# Patient Record
Sex: FEMALE | Race: WHITE | Employment: OTHER | URBAN - METROPOLITAN AREA
[De-identification: names, ages, dates, MRNs, and addresses within clinical notes are randomized per-mention and may not be internally consistent; named-entity substitution may affect disease eponyms.]

---

## 2017-07-06 PROBLEM — J69.0 ASPIRATION PNEUMONIA (HCC): Status: ACTIVE | Noted: 2017-07-06

## 2018-07-03 ENCOUNTER — HOSPITAL ENCOUNTER (INPATIENT)
Age: 73
LOS: 7 days | Discharge: HOME | DRG: 280 | End: 2018-07-10
Attending: EMERGENCY MEDICINE | Admitting: FAMILY MEDICINE
Payer: MEDICARE

## 2018-07-03 ENCOUNTER — APPOINTMENT (OUTPATIENT)
Dept: GENERAL RADIOLOGY | Age: 73
DRG: 280 | End: 2018-07-03
Payer: MEDICARE

## 2018-07-03 DIAGNOSIS — I21.4 NON-STEMI (NON-ST ELEVATED MYOCARDIAL INFARCTION) (HCC): ICD-10-CM

## 2018-07-03 DIAGNOSIS — J93.9 PNEUMOTHORAX, LEFT: Primary | ICD-10-CM

## 2018-07-03 DIAGNOSIS — F41.9 ANXIETY: ICD-10-CM

## 2018-07-03 DIAGNOSIS — J44.1 COPD EXACERBATION (HCC): ICD-10-CM

## 2018-07-03 DIAGNOSIS — R06.03 ACUTE RESPIRATORY DISTRESS: ICD-10-CM

## 2018-07-03 LAB
ALBUMIN SERPL-MCNC: 4.2 G/DL (ref 3.5–5.2)
ALP BLD-CCNC: 64 U/L (ref 35–104)
ALT SERPL-CCNC: 6 U/L (ref 0–32)
ANION GAP SERPL CALCULATED.3IONS-SCNC: 14 MMOL/L (ref 7–16)
APTT: 24.6 SEC (ref 24.5–35.1)
APTT: 48.8 SEC (ref 24.5–35.1)
AST SERPL-CCNC: 12 U/L (ref 0–31)
BASOPHILS ABSOLUTE: 0.06 E9/L (ref 0–0.2)
BASOPHILS RELATIVE PERCENT: 0.5 % (ref 0–2)
BILIRUB SERPL-MCNC: 0.4 MG/DL (ref 0–1.2)
BILIRUBIN URINE: NEGATIVE
BLOOD, URINE: NEGATIVE
BUN BLDV-MCNC: 15 MG/DL (ref 8–23)
CALCIUM SERPL-MCNC: 9.1 MG/DL (ref 8.6–10.2)
CHLORIDE BLD-SCNC: 100 MMOL/L (ref 98–107)
CLARITY: CLEAR
CO2: 26 MMOL/L (ref 22–29)
COLOR: YELLOW
CREAT SERPL-MCNC: 0.6 MG/DL (ref 0.5–1)
EKG ATRIAL RATE: 111 BPM
EKG P AXIS: 72 DEGREES
EKG P-R INTERVAL: 140 MS
EKG Q-T INTERVAL: 362 MS
EKG QRS DURATION: 84 MS
EKG QTC CALCULATION (BAZETT): 511 MS
EKG R AXIS: -98 DEGREES
EKG T AXIS: 74 DEGREES
EKG VENTRICULAR RATE: 120 BPM
EOSINOPHILS ABSOLUTE: 0.11 E9/L (ref 0.05–0.5)
EOSINOPHILS RELATIVE PERCENT: 0.9 % (ref 0–6)
GFR AFRICAN AMERICAN: >60
GFR NON-AFRICAN AMERICAN: >60 ML/MIN/1.73
GLUCOSE BLD-MCNC: 134 MG/DL (ref 74–109)
GLUCOSE URINE: NEGATIVE MG/DL
HCT VFR BLD CALC: 41.8 % (ref 34–48)
HEMOGLOBIN: 13.6 G/DL (ref 11.5–15.5)
IMMATURE GRANULOCYTES #: 0.08 E9/L
IMMATURE GRANULOCYTES %: 0.7 % (ref 0–5)
KETONES, URINE: 15 MG/DL
LACTIC ACID: 1 MMOL/L (ref 0.5–2.2)
LEFT VENTRICULAR EJECTION FRACTION HIGH VALUE: 35 %
LEFT VENTRICULAR EJECTION FRACTION MODE: NORMAL
LEUKOCYTE ESTERASE, URINE: NEGATIVE
LV EF: 30 %
LV EF: 33 %
LVEF MODALITY: NORMAL
LYMPHOCYTES ABSOLUTE: 1.67 E9/L (ref 1.5–4)
LYMPHOCYTES RELATIVE PERCENT: 14 % (ref 20–42)
MCH RBC QN AUTO: 27.9 PG (ref 26–35)
MCHC RBC AUTO-ENTMCNC: 32.5 % (ref 32–34.5)
MCV RBC AUTO: 85.8 FL (ref 80–99.9)
MONOCYTES ABSOLUTE: 0.4 E9/L (ref 0.1–0.95)
MONOCYTES RELATIVE PERCENT: 3.4 % (ref 2–12)
NEUTROPHILS ABSOLUTE: 9.59 E9/L (ref 1.8–7.3)
NEUTROPHILS RELATIVE PERCENT: 80.5 % (ref 43–80)
NITRITE, URINE: NEGATIVE
PDW BLD-RTO: 13.3 FL (ref 11.5–15)
PH UA: 6 (ref 5–9)
PLATELET # BLD: 208 E9/L (ref 130–450)
PMV BLD AUTO: 10.1 FL (ref 7–12)
POTASSIUM SERPL-SCNC: 3.2 MMOL/L (ref 3.5–5)
PRO-BNP: 184 PG/ML (ref 0–125)
PROTEIN UA: NEGATIVE MG/DL
RBC # BLD: 4.87 E12/L (ref 3.5–5.5)
SODIUM BLD-SCNC: 140 MMOL/L (ref 132–146)
SPECIFIC GRAVITY UA: 1.02 (ref 1–1.03)
TOTAL PROTEIN: 7.4 G/DL (ref 6.4–8.3)
TROPONIN: 0.16 NG/ML (ref 0–0.03)
TROPONIN: 0.44 NG/ML (ref 0–0.03)
UROBILINOGEN, URINE: 0.2 E.U./DL
WBC # BLD: 11.9 E9/L (ref 4.5–11.5)

## 2018-07-03 PROCEDURE — 85730 THROMBOPLASTIN TIME PARTIAL: CPT

## 2018-07-03 PROCEDURE — 6360000002 HC RX W HCPCS: Performed by: PHYSICIAN ASSISTANT

## 2018-07-03 PROCEDURE — 93005 ELECTROCARDIOGRAM TRACING: CPT | Performed by: PHYSICIAN ASSISTANT

## 2018-07-03 PROCEDURE — 36415 COLL VENOUS BLD VENIPUNCTURE: CPT

## 2018-07-03 PROCEDURE — 81003 URINALYSIS AUTO W/O SCOPE: CPT

## 2018-07-03 PROCEDURE — 6360000002 HC RX W HCPCS: Performed by: NURSE PRACTITIONER

## 2018-07-03 PROCEDURE — 84484 ASSAY OF TROPONIN QUANT: CPT

## 2018-07-03 PROCEDURE — 94761 N-INVAS EAR/PLS OXIMETRY MLT: CPT

## 2018-07-03 PROCEDURE — 80053 COMPREHEN METABOLIC PANEL: CPT

## 2018-07-03 PROCEDURE — 99285 EMERGENCY DEPT VISIT HI MDM: CPT

## 2018-07-03 PROCEDURE — 0W9B30Z DRAINAGE OF LEFT PLEURAL CAVITY WITH DRAINAGE DEVICE, PERCUTANEOUS APPROACH: ICD-10-PCS | Performed by: FAMILY MEDICINE

## 2018-07-03 PROCEDURE — 2580000003 HC RX 258: Performed by: PHYSICIAN ASSISTANT

## 2018-07-03 PROCEDURE — 71045 X-RAY EXAM CHEST 1 VIEW: CPT

## 2018-07-03 PROCEDURE — 6360000004 HC RX CONTRAST MEDICATION: Performed by: INTERNAL MEDICINE

## 2018-07-03 PROCEDURE — APPSS60 APP SPLIT SHARED TIME 46-60 MINUTES: Performed by: NURSE PRACTITIONER

## 2018-07-03 PROCEDURE — 96375 TX/PRO/DX INJ NEW DRUG ADDON: CPT

## 2018-07-03 PROCEDURE — 85025 COMPLETE CBC W/AUTO DIFF WBC: CPT

## 2018-07-03 PROCEDURE — 6370000000 HC RX 637 (ALT 250 FOR IP): Performed by: FAMILY MEDICINE

## 2018-07-03 PROCEDURE — 6370000000 HC RX 637 (ALT 250 FOR IP): Performed by: NURSE PRACTITIONER

## 2018-07-03 PROCEDURE — 87081 CULTURE SCREEN ONLY: CPT

## 2018-07-03 PROCEDURE — 51702 INSERT TEMP BLADDER CATH: CPT

## 2018-07-03 PROCEDURE — 94667 MNPJ CHEST WALL 1ST: CPT

## 2018-07-03 PROCEDURE — 87040 BLOOD CULTURE FOR BACTERIA: CPT

## 2018-07-03 PROCEDURE — 96365 THER/PROPH/DIAG IV INF INIT: CPT

## 2018-07-03 PROCEDURE — 94640 AIRWAY INHALATION TREATMENT: CPT

## 2018-07-03 PROCEDURE — 32551 INSERTION OF CHEST TUBE: CPT

## 2018-07-03 PROCEDURE — 94664 DEMO&/EVAL PT USE INHALER: CPT

## 2018-07-03 PROCEDURE — 99223 1ST HOSP IP/OBS HIGH 75: CPT | Performed by: INTERNAL MEDICINE

## 2018-07-03 PROCEDURE — 83605 ASSAY OF LACTIC ACID: CPT

## 2018-07-03 PROCEDURE — 2580000003 HC RX 258: Performed by: FAMILY MEDICINE

## 2018-07-03 PROCEDURE — 2000000000 HC ICU R&B

## 2018-07-03 PROCEDURE — 83880 ASSAY OF NATRIURETIC PEPTIDE: CPT

## 2018-07-03 PROCEDURE — 6360000002 HC RX W HCPCS: Performed by: INTERNAL MEDICINE

## 2018-07-03 PROCEDURE — 6370000000 HC RX 637 (ALT 250 FOR IP): Performed by: PHYSICIAN ASSISTANT

## 2018-07-03 PROCEDURE — 2500000003 HC RX 250 WO HCPCS

## 2018-07-03 PROCEDURE — 93306 TTE W/DOPPLER COMPLETE: CPT

## 2018-07-03 RX ORDER — SODIUM CHLORIDE 0.9 % (FLUSH) 0.9 %
10 SYRINGE (ML) INJECTION EVERY 12 HOURS SCHEDULED
Status: DISCONTINUED | OUTPATIENT
Start: 2018-07-03 | End: 2018-07-10 | Stop reason: HOSPADM

## 2018-07-03 RX ORDER — CHLORHEXIDINE GLUCONATE 0.12 MG/ML
15 RINSE ORAL 2 TIMES DAILY
Status: DISCONTINUED | OUTPATIENT
Start: 2018-07-03 | End: 2018-07-10 | Stop reason: HOSPADM

## 2018-07-03 RX ORDER — POTASSIUM CHLORIDE 20 MEQ/1
40 TABLET, EXTENDED RELEASE ORAL ONCE
Status: COMPLETED | OUTPATIENT
Start: 2018-07-03 | End: 2018-07-03

## 2018-07-03 RX ORDER — ASPIRIN 81 MG/1
81 TABLET ORAL EVERY MORNING
Status: DISCONTINUED | OUTPATIENT
Start: 2018-07-04 | End: 2018-07-10 | Stop reason: HOSPADM

## 2018-07-03 RX ORDER — POTASSIUM CHLORIDE 7.45 MG/ML
10 INJECTION INTRAVENOUS ONCE
Status: COMPLETED | OUTPATIENT
Start: 2018-07-03 | End: 2018-07-03

## 2018-07-03 RX ORDER — CHOLECALCIFEROL (VITAMIN D3) 50 MCG
2000 TABLET ORAL EVERY MORNING
Status: DISCONTINUED | OUTPATIENT
Start: 2018-07-04 | End: 2018-07-10 | Stop reason: HOSPADM

## 2018-07-03 RX ORDER — HEPARIN SODIUM 10000 [USP'U]/100ML
12 INJECTION, SOLUTION INTRAVENOUS CONTINUOUS
Status: DISCONTINUED | OUTPATIENT
Start: 2018-07-03 | End: 2018-07-04

## 2018-07-03 RX ORDER — ACETAMINOPHEN 325 MG/1
650 TABLET ORAL EVERY 4 HOURS PRN
Status: DISCONTINUED | OUTPATIENT
Start: 2018-07-03 | End: 2018-07-10 | Stop reason: HOSPADM

## 2018-07-03 RX ORDER — SODIUM CHLORIDE 0.9 % (FLUSH) 0.9 %
10 SYRINGE (ML) INJECTION PRN
Status: DISCONTINUED | OUTPATIENT
Start: 2018-07-03 | End: 2018-07-10 | Stop reason: HOSPADM

## 2018-07-03 RX ORDER — HEPARIN SODIUM 1000 [USP'U]/ML
60 INJECTION, SOLUTION INTRAVENOUS; SUBCUTANEOUS ONCE
Status: COMPLETED | OUTPATIENT
Start: 2018-07-03 | End: 2018-07-03

## 2018-07-03 RX ORDER — ALBUTEROL SULFATE 2.5 MG/3ML
2.5 SOLUTION RESPIRATORY (INHALATION) EVERY 4 HOURS PRN
Status: DISCONTINUED | OUTPATIENT
Start: 2018-07-03 | End: 2018-07-10 | Stop reason: HOSPADM

## 2018-07-03 RX ORDER — ONDANSETRON 2 MG/ML
4 INJECTION INTRAMUSCULAR; INTRAVENOUS EVERY 6 HOURS PRN
Status: DISCONTINUED | OUTPATIENT
Start: 2018-07-03 | End: 2018-07-10 | Stop reason: HOSPADM

## 2018-07-03 RX ORDER — AMLODIPINE BESYLATE 5 MG/1
5 TABLET ORAL EVERY MORNING
Status: DISCONTINUED | OUTPATIENT
Start: 2018-07-04 | End: 2018-07-04

## 2018-07-03 RX ORDER — LOSARTAN POTASSIUM AND HYDROCHLOROTHIAZIDE 12.5; 1 MG/1; MG/1
1 TABLET ORAL EVERY MORNING
Status: DISCONTINUED | OUTPATIENT
Start: 2018-07-04 | End: 2018-07-03 | Stop reason: CLARIF

## 2018-07-03 RX ORDER — 0.9 % SODIUM CHLORIDE 0.9 %
1000 INTRAVENOUS SOLUTION INTRAVENOUS ONCE
Status: COMPLETED | OUTPATIENT
Start: 2018-07-03 | End: 2018-07-03

## 2018-07-03 RX ORDER — HEPARIN SODIUM 1000 [USP'U]/ML
60 INJECTION, SOLUTION INTRAVENOUS; SUBCUTANEOUS PRN
Status: DISCONTINUED | OUTPATIENT
Start: 2018-07-03 | End: 2018-07-04

## 2018-07-03 RX ORDER — HEPARIN SODIUM 1000 [USP'U]/ML
30 INJECTION, SOLUTION INTRAVENOUS; SUBCUTANEOUS PRN
Status: DISCONTINUED | OUTPATIENT
Start: 2018-07-03 | End: 2018-07-04

## 2018-07-03 RX ORDER — LEVOFLOXACIN 750 MG/1
750 TABLET ORAL DAILY
COMMUNITY
Start: 2018-06-20 | End: 2018-07-03

## 2018-07-03 RX ORDER — CALCIUM CARBONATE 500(1250)
1000 TABLET ORAL EVERY MORNING
Status: DISCONTINUED | OUTPATIENT
Start: 2018-07-04 | End: 2018-07-10 | Stop reason: HOSPADM

## 2018-07-03 RX ORDER — ASPIRIN 81 MG/1
324 TABLET, CHEWABLE ORAL ONCE
Status: COMPLETED | OUTPATIENT
Start: 2018-07-03 | End: 2018-07-03

## 2018-07-03 RX ORDER — METHYLPREDNISOLONE SODIUM SUCCINATE 125 MG/2ML
125 INJECTION, POWDER, LYOPHILIZED, FOR SOLUTION INTRAMUSCULAR; INTRAVENOUS ONCE
Status: COMPLETED | OUTPATIENT
Start: 2018-07-03 | End: 2018-07-03

## 2018-07-03 RX ORDER — LIDOCAINE HYDROCHLORIDE AND EPINEPHRINE 10; 10 MG/ML; UG/ML
INJECTION, SOLUTION INFILTRATION; PERINEURAL
Status: COMPLETED
Start: 2018-07-03 | End: 2018-07-03

## 2018-07-03 RX ORDER — IPRATROPIUM BROMIDE AND ALBUTEROL SULFATE 2.5; .5 MG/3ML; MG/3ML
3 SOLUTION RESPIRATORY (INHALATION) ONCE
Status: COMPLETED | OUTPATIENT
Start: 2018-07-03 | End: 2018-07-03

## 2018-07-03 RX ORDER — LOSARTAN POTASSIUM 50 MG/1
100 TABLET ORAL DAILY
Status: DISCONTINUED | OUTPATIENT
Start: 2018-07-04 | End: 2018-07-04

## 2018-07-03 RX ORDER — FAMOTIDINE 20 MG/1
20 TABLET, FILM COATED ORAL 2 TIMES DAILY
Status: DISCONTINUED | OUTPATIENT
Start: 2018-07-03 | End: 2018-07-10 | Stop reason: HOSPADM

## 2018-07-03 RX ORDER — RALOXIFENE HYDROCHLORIDE 60 MG/1
60 TABLET, FILM COATED ORAL EVERY MORNING
Status: DISCONTINUED | OUTPATIENT
Start: 2018-07-04 | End: 2018-07-10 | Stop reason: HOSPADM

## 2018-07-03 RX ORDER — FORMOTEROL FUMARATE 20 UG/2ML
20 SOLUTION RESPIRATORY (INHALATION) EVERY 12 HOURS
Status: DISCONTINUED | OUTPATIENT
Start: 2018-07-03 | End: 2018-07-04

## 2018-07-03 RX ORDER — IPRATROPIUM BROMIDE AND ALBUTEROL SULFATE 2.5; .5 MG/3ML; MG/3ML
1 SOLUTION RESPIRATORY (INHALATION) EVERY 6 HOURS PRN
Status: DISCONTINUED | OUTPATIENT
Start: 2018-07-03 | End: 2018-07-03

## 2018-07-03 RX ORDER — DIAZEPAM 5 MG/1
10 TABLET ORAL EVERY 12 HOURS PRN
Status: DISCONTINUED | OUTPATIENT
Start: 2018-07-03 | End: 2018-07-10 | Stop reason: HOSPADM

## 2018-07-03 RX ORDER — IPRATROPIUM BROMIDE AND ALBUTEROL SULFATE 2.5; .5 MG/3ML; MG/3ML
1 SOLUTION RESPIRATORY (INHALATION) 4 TIMES DAILY
Status: DISCONTINUED | OUTPATIENT
Start: 2018-07-03 | End: 2018-07-03

## 2018-07-03 RX ORDER — HYDROCHLOROTHIAZIDE 12.5 MG/1
12.5 TABLET ORAL DAILY
Status: DISCONTINUED | OUTPATIENT
Start: 2018-07-04 | End: 2018-07-04

## 2018-07-03 RX ORDER — BUDESONIDE 0.25 MG/2ML
250 INHALANT ORAL 2 TIMES DAILY
Status: DISCONTINUED | OUTPATIENT
Start: 2018-07-03 | End: 2018-07-10 | Stop reason: HOSPADM

## 2018-07-03 RX ORDER — ACETAMINOPHEN 650 MG
TABLET, EXTENDED RELEASE ORAL
Status: COMPLETED
Start: 2018-07-03 | End: 2018-07-03

## 2018-07-03 RX ORDER — FLUTICASONE PROPIONATE 50 MCG
1 SPRAY, SUSPENSION (ML) NASAL NIGHTLY PRN
Status: DISCONTINUED | OUTPATIENT
Start: 2018-07-03 | End: 2018-07-10 | Stop reason: HOSPADM

## 2018-07-03 RX ORDER — ALBUTEROL SULFATE 2.5 MG/3ML
2.5 SOLUTION RESPIRATORY (INHALATION) 4 TIMES DAILY
Status: DISCONTINUED | OUTPATIENT
Start: 2018-07-03 | End: 2018-07-10 | Stop reason: HOSPADM

## 2018-07-03 RX ADMIN — METHYLPREDNISOLONE SODIUM SUCCINATE 125 MG: 125 INJECTION, POWDER, FOR SOLUTION INTRAMUSCULAR; INTRAVENOUS at 11:29

## 2018-07-03 RX ADMIN — LIDOCAINE HYDROCHLORIDE,EPINEPHRINE BITARTRATE 20 ML: 10; .01 INJECTION, SOLUTION INFILTRATION; PERINEURAL at 12:39

## 2018-07-03 RX ADMIN — SODIUM CHLORIDE 1000 ML: 9 INJECTION, SOLUTION INTRAVENOUS at 11:28

## 2018-07-03 RX ADMIN — POTASSIUM CHLORIDE 10 MEQ: 7.46 INJECTION, SOLUTION INTRAVENOUS at 12:39

## 2018-07-03 RX ADMIN — IPRATROPIUM BROMIDE AND ALBUTEROL SULFATE 3 AMPULE: .5; 3 SOLUTION RESPIRATORY (INHALATION) at 11:30

## 2018-07-03 RX ADMIN — HEPARIN SODIUM 4300 UNITS: 1000 INJECTION, SOLUTION INTRAVENOUS; SUBCUTANEOUS at 13:25

## 2018-07-03 RX ADMIN — ACETAMINOPHEN 650 MG: 325 TABLET ORAL at 17:47

## 2018-07-03 RX ADMIN — Medication 10 ML: at 21:30

## 2018-07-03 RX ADMIN — POTASSIUM CHLORIDE 40 MEQ: 20 TABLET, EXTENDED RELEASE ORAL at 12:39

## 2018-07-03 RX ADMIN — HEPARIN SODIUM AND DEXTROSE 12 UNITS/KG/HR: 10000; 5 INJECTION INTRAVENOUS at 13:26

## 2018-07-03 RX ADMIN — FAMOTIDINE 20 MG: 20 TABLET ORAL at 20:29

## 2018-07-03 RX ADMIN — BUDESONIDE 250 MCG: 0.25 SUSPENSION RESPIRATORY (INHALATION) at 19:57

## 2018-07-03 RX ADMIN — DIAZEPAM 10 MG: 5 TABLET ORAL at 21:29

## 2018-07-03 RX ADMIN — Medication: at 12:40

## 2018-07-03 RX ADMIN — FORMOTEROL FUMARATE DIHYDRATE 20 MCG: 20 SOLUTION RESPIRATORY (INHALATION) at 19:58

## 2018-07-03 RX ADMIN — PERFLUTREN 2 ML: 6.52 INJECTION, SUSPENSION INTRAVENOUS at 14:37

## 2018-07-03 RX ADMIN — ASPIRIN 81 MG CHEWABLE TABLET 243 MG: 81 TABLET CHEWABLE at 12:38

## 2018-07-03 ASSESSMENT — PAIN DESCRIPTION - PAIN TYPE: TYPE: ACUTE PAIN

## 2018-07-03 ASSESSMENT — PAIN SCALES - GENERAL
PAINLEVEL_OUTOF10: 6
PAINLEVEL_OUTOF10: 0

## 2018-07-03 ASSESSMENT — PAIN DESCRIPTION - FREQUENCY: FREQUENCY: INTERMITTENT

## 2018-07-03 ASSESSMENT — PAIN DESCRIPTION - ORIENTATION: ORIENTATION: LEFT

## 2018-07-03 ASSESSMENT — PAIN DESCRIPTION - LOCATION: LOCATION: CHEST

## 2018-07-03 ASSESSMENT — PAIN DESCRIPTION - DESCRIPTORS: DESCRIPTORS: ACHING;DISCOMFORT

## 2018-07-03 NOTE — ED PROVIDER NOTES
has a 35.00 pack-year smoking history. She has never used smokeless tobacco. She reports that she does not drink alcohol or use drugs. Family History: family history includes Cancer in her mother and sister; Heart Disease in her mother; Other in her father. The patients home medications have been reviewed.     Allergies: Dye [iodides]    -------------------------------------------------- RESULTS -------------------------------------------------  All laboratory and radiology results have been personally reviewed by myself   LABS:  Results for orders placed or performed during the hospital encounter of 07/03/18   CBC Auto Differential   Result Value Ref Range    WBC 11.9 (H) 4.5 - 11.5 E9/L    RBC 4.87 3.50 - 5.50 E12/L    Hemoglobin 13.6 11.5 - 15.5 g/dL    Hematocrit 41.8 34.0 - 48.0 %    MCV 85.8 80.0 - 99.9 fL    MCH 27.9 26.0 - 35.0 pg    MCHC 32.5 32.0 - 34.5 %    RDW 13.3 11.5 - 15.0 fL    Platelets 675 825 - 146 E9/L    MPV 10.1 7.0 - 12.0 fL    Neutrophils % 80.5 (H) 43.0 - 80.0 %    Immature Granulocytes % 0.7 0.0 - 5.0 %    Lymphocytes % 14.0 (L) 20.0 - 42.0 %    Monocytes % 3.4 2.0 - 12.0 %    Eosinophils % 0.9 0.0 - 6.0 %    Basophils % 0.5 0.0 - 2.0 %    Neutrophils # 9.59 (H) 1.80 - 7.30 E9/L    Immature Granulocytes # 0.08 E9/L    Lymphocytes # 1.67 1.50 - 4.00 E9/L    Monocytes # 0.40 0.10 - 0.95 E9/L    Eosinophils # 0.11 0.05 - 0.50 E9/L    Basophils # 0.06 0.00 - 0.20 E9/L   Comprehensive Metabolic Panel   Result Value Ref Range    Sodium 140 132 - 146 mmol/L    Potassium 3.2 (L) 3.5 - 5.0 mmol/L    Chloride 100 98 - 107 mmol/L    CO2 26 22 - 29 mmol/L    Anion Gap 14 7 - 16 mmol/L    Glucose 134 (H) 74 - 109 mg/dL    BUN 15 8 - 23 mg/dL    CREATININE 0.6 0.5 - 1.0 mg/dL    GFR Non-African American >60 >=60 mL/min/1.73    GFR African American >60     Calcium 9.1 8.6 - 10.2 mg/dL    Total Protein 7.4 6.4 - 8.3 g/dL    Alb 4.2 3.5 - 5.2 g/dL    Total Bilirubin 0.4 0.0 - 1.2 mg/dL    Alkaline Phosphatase 64 35 - 104 U/L    ALT 6 0 - 32 U/L    AST 12 0 - 31 U/L   Troponin   Result Value Ref Range    Troponin 0.16 (H) 0.00 - 0.03 ng/mL   Brain Natriuretic Peptide   Result Value Ref Range    Pro- (H) 0 - 125 pg/mL   Lactic Acid, Plasma   Result Value Ref Range    Lactic Acid 1.0 0.5 - 2.2 mmol/L   APTT   Result Value Ref Range    aPTT 24.6 24.5 - 35.1 sec   EKG 12 Lead   Result Value Ref Range    Ventricular Rate 120 BPM    Atrial Rate 111 BPM    P-R Interval 140 ms    QRS Duration 84 ms    Q-T Interval 362 ms    QTc Calculation (Bazett) 511 ms    P Axis 72 degrees    R Axis -98 degrees    T Axis 74 degrees   EKG 12 Lead   Result Value Ref Range    Ventricular Rate 110 BPM    Atrial Rate 110 BPM    P-R Interval 128 ms    QRS Duration 82 ms    Q-T Interval 358 ms    QTc Calculation (Bazett) 484 ms    P Axis 69 degrees    R Axis -94 degrees    T Axis 68 degrees       RADIOLOGY:  Interpreted by Radiologist.  XR CHEST PORTABLE   Final Result   1. Interval decrease in size of previously identified left   pneumothorax status post chest tube placement. 2.  Persistent mildly spiculated left upper lobe bandlike opacity. If   not already obtained, pulmonary consultation should be considered. XR CHEST PORTABLE   Final Result   Left pneumothorax with estimated volume of approximately 40-50%. Small bilateral pleural effusions. Nonspecific hazy reticular opacities in the right lower lung, which   may be related to edema, interstitial infiltrate, or chronic fibrosis. Critical finding of pneumothorax reported to Dr. Key Bautista of the [facility]   ED at approximately 1210 hours on 7/3/2018. Dr. Key Bautista was already   aware of the finding.           ------------------------- NURSING NOTES AND VITALS REVIEWED ---------------------------   The nursing notes within the ED encounter and vital signs as below have been reviewed.    BP (!) 98/53   Pulse 105   Temp 97.9 °F (36.6 °C) (Oral)   Resp 16   Ht 5' 6\"

## 2018-07-03 NOTE — ED NOTES
ECHO finished in room, called to ICU to notify RN that pt is ready to go up.      Chaparro Gregory RN  07/03/18 7635

## 2018-07-03 NOTE — CONSULTS
Pulse  Av.8  Min: 105  Max: 110  Respiration Range: Resp  Av.5  Min: 16  Max: 26  Current Pulse Ox[de-identified]  SpO2: 96 %  24HR Pulse Ox Range:  SpO2  Av.8 %  Min: 84 %  Max: 100 %  Oxygen Amount and Delivery: O2 Flow Rate (L/min): 5 L/min    I/O (24 Hours)    Patient Vitals for the past 8 hrs:   BP Temp Temp src Pulse Resp SpO2 Height Weight   18 1343 (!) 98/53 97.9 °F (36.6 °C) Oral 105 16 96 % - -   18 1252 (!) 111/57 98.7 °F (37.1 °C) - 108 20 95 % - -   18 1223 (!) 98/56 98.7 °F (37.1 °C) - 108 20 100 % - -   18 1107 117/71 98.7 °F (37.1 °C) - 110 26 (!) 84 % 5' 6\" (1.676 m) 158 lb (71.7 kg)       Intake/Output Summary (Last 24 hours) at 18 1520  Last data filed at 18 1345   Gross per 24 hour   Intake              100 ml   Output                0 ml   Net              100 ml     I/O last 3 completed shifts: In: 100 [IV Piggyback:100]  Out: -      Date 18 0000 - 18 2359   Shift 7900-1760 3383-5844 8493-5612 24 Hour Total   I  N  T  A  K  E   IV Piggyback  100  (1.4)  100  (1.4)    Shift Total  (mL/kg)  100  (1.4)  100  (1.4)   O  U  T  P  U  T   Shift Total  (mL/kg)       Weight (kg)  71.7 71.7 71.7     Patient Vitals for the past 96 hrs (Last 3 readings):   Weight   18 1107 158 lb (71.7 kg)         Exam   PHYSICAL EXAM:  General: awake, alert, oriented to person, place, time, and purpose, appears stated age, cooperative, no acute distress  Eyes: conjunctivae/corneas clear, sclera non icteric, EOMI  Ears: no obvious scars, no lesions, no masses, hearing intact  Mouth: mucous membranes moist, no obvious oral sores  Head: normocephalic, atraumatic  Neck: no JVD, no adenopathy, no thyromegaly, neck is supple, trachea is midline  Back: ROM normal, no CVA tenderness.   Chest: tube placement at 5th intercostal space, slight tenderness to palpation over area  Lungs: clear to auscultation bilaterally, without rhonchi, crackle, wheezing, or rale, no retractions

## 2018-07-03 NOTE — ED NOTES
Notified ICU that ECHO is in room at this time and will be approx 20 minutes, we will call back if needed.      Jorden David RN  07/03/18 2241

## 2018-07-03 NOTE — CONSULTS
13.6   HCT  41.8   MCV  85.8   PLT  208       BMP:  Recent Labs      07/03/18   1124   NA  140   K  3.2*   CL  100   CO2  26   BUN  15   CREATININE  0.6    ALB:3,BILIDIR:3,BILITOT:3,ALKPHOS:3)@    PT/INR: No results for input(s): PROTIME, INR in the last 72 hours. Cultures:  Sputum: not available  Blood: not available    ABG:   No results for input(s): PH, PO2, PCO2, HCO3, BE, O2SAT, METHB, O2HB, COHB, O2CON, HHB, THB in the last 72 hours. Films:     XR CHEST PORTABLE   Final Result   1. Interval decrease in size of previously identified left   pneumothorax status post chest tube placement. 2.  Persistent mildly spiculated left upper lobe bandlike opacity. If   not already obtained, pulmonary consultation should be considered. XR CHEST PORTABLE   Final Result   Left pneumothorax with estimated volume of approximately 40-50%. Small bilateral pleural effusions. Nonspecific hazy reticular opacities in the right lower lung, which   may be related to edema, interstitial infiltrate, or chronic fibrosis. Critical finding of pneumothorax reported to Dr. Brigette Burks of the [facility]   ED at approximately 1210 hours on 7/3/2018. Dr. Brigette Burks was already   aware of the finding. .        Assessment:  1. PTX, spontaneous,   2. Bronchiectasis  3. + troponin: demand ischemia? Plan:  1. Chest tube to suction  2. Empiric aerosols  3. Vest treatments as at home      Thanks for letting us see this patient in consultation. Please contact us with any questions. Office [phone] or after hours through [employer], x [phone]. Please note that voice recognition technology was used in the preparation of this note and make therefore it may contain inadvertent transcription errors    Sree Saldivar M.D., F.C.C.P.   [facility] note  Pulmonary HPI:   67year old with a PMH of COPD, anxiety, 50 pack years (quit 2007), who presented 12/2017 for progressive T[place]NS[place] and a recent CT with \"many centrilobular nodules\" and raloxifene (EVISTA) 60 mg oral tablet Take by mouth    sertraline (ZOLOFT) 25 mg oral tablet    SPIRIVA RESPIMAT 2.5 mcg/actuation inhl inhalation 2 puffs daily 1 Inhaler 5    STARCH (THICK-IT ORAL) Take by mouth    VIT C/VIT E/LUTEIN/MIN/OMEGA-3 (OCUVITE ORAL) Take by mouth     No facility-administered medications prior to visit. Allergies: Allergies   Allergen Reactions    Iodinated Contrast- Oral And Iv Dye     Physical examination:  There were no vitals taken for this visit. Physical Examination:  Vitals:   04/27/18 1439   BP: 134/76   Pulse: 80   SpO2: 93%   Weight: 152 lb (68.9 kg)   Height: 5' 5\" (1.651 m)   4L O2     Gen: alert, pleasant, no distress at rest   HEENT:AT/ NC, no anterior cervical lymphadenopathy  PULM/THORAX: No accessory muscle use, very prolonged expiratory phase, No crackles appreciated. Scant bilateral end expiratory wheezing   CV:RRR, S1 and S2 appreciated, no extra heart sounds, murmurs or rub auscultated. No JVD  EXT: warm without edema, clubbing or cyanosis. No asymmetrical edema or tenderness     Labs: reviewed from recent inpatient stay     Imaging: reviewed from disc she brought in     PFTs:     Date FVC (%) FEV-1 FEV1/FVC FRC DLCO Flow loop/comment   12/8/17 2.86 (100%) 1.29 (64%) 45% 31%       Discussion:   67year old woman with severe COPD/emphysema with progressive symptoms over 4 months prior to initial consult (12/2017) found to have tree in bud findings on chest CT. No consitutional symptoms or hemoptysis but did have significant sputum production with initial concern for NTM. Outpatient cultures grew pan sensitive pseudomonas in Jan. She was seen in follow up in early March after having recent outpatient worsening of hypoxia in setting of influenza A virus. Inpatient stay was notable for sputum cx again + pan sensitive pseudomonas. She underwent bronch with transbronchial biopsy with tissue cx and BAL negative for AFB and negative bacterial cx.  Sputum AFB negative. Given worsening symptoms and sputum cx + pseudomonas, she was treated with vanc/zoysn initially and transitioned for an additional 14 days of levofloxacin at discharge     >Severe COPD/emphysema/bronchiectasis: Significantly improved since inpatient admission in March. Sputum cx +pseudomonas s/p 2 weeks of levofloxacin. AFB from sputum, BAL and tissue bx negative so at this point much less concerned regarding NTM  - Currently on 0L at rest, 2L NC with activity.  Repeat O2 eval with home pulmonologist in 6/2018  - Continue current inhaler therapy   - Continue current clearance maneuvers with chest vest/hypertonic saline BID     Patient was seen and discussed with Dr. Joye Rasheed    RTC in 3 months or sooner as needed      Trina Block, [address]  [address], [address] [place] [address]          [facility], [address] [place] [address], [address] [place] [address]

## 2018-07-03 NOTE — PLAN OF CARE
Problem: Falls - Risk of:  Goal: Will remain free from falls  Will remain free from falls   Outcome: Met This Shift  No falls noted

## 2018-07-03 NOTE — CONSULTS
Full Consult Dictated. Job #[id number].      Electronically signed by MISHA Callejas CNP on 7/3/18 at 4:41 PM

## 2018-07-03 NOTE — CONSULTS
[address] [address]                                   CONSULTATION    PATIENT NAME: Albert Dias                       :        1945  MED REC NO:   [id number]                            ROOM:       9114  ACCOUNT NO:   [de-identified]                           ADMIT DATE: 2018  PROVIDER:     Hui Montejo Cnp    CONSULT DATE:  2018    CONSULTANTS:  Dr. Minal Carey. REQUESTING PHYSICIAN:  Dr. Maria Eugenia Cadet. REASON FOR CONSULTATION:  Elevated troponin. HISTORY OF PRESENT ILLNESS:  The patient is a 78-year-old  female  who is previously not known to [facility]) Cardiology physicians. She has  a past medical history that includes COPD, hypertension, migraines, recent  pneumonia, left ankle fracture, and history of right breast lumpectomy,  which she was reported as \"benign. \"    The patient [facility] [place] [facility]  [facility] with complaints of sudden onset of shortness of breath and  midsternal chest tightness, which started at approximately 7:30 a.m. when  she woke up. She states she was getting out of bed to use her Advair  inhaler when she suddenly became short of breath and had 9/10 midsternal  chest tightness. She reported having \"a white thick\" productive cough over  the past three to four days. She was being treated for pneumonia for  approximately two weeks on p.o. Levaquin and reports no improvement. She  denies any nausea, vomiting, fever, chills, dizziness, or lightheadedness. Denies any recent near syncope. Upon arrival to the Emergency Department, vital signs included a blood  pressure of 117/71, heart rate 110, respirations 26, oral temperature 98.7  degrees Fahrenheit with an oxygen saturation of 84% on room air. She was  placed on a nonrebreather at 15 liters.   Laboratory results revealed a  sodium of 140, potassium 3.2, BUN 15, to 50% with small bilateral pleural effusion with  nonspecific hazy, reticular opacities in the right lower lung, which may be  related to edema, interstitial infiltrate, or chronic fibrosis. Repeat  chest x-ray on 07/03/2018 showed interval decrease in size of previously  identified left pneumothorax, status post chest tube placement, persistent,  mildly spiculated left upper lobe band-like opacity. Laboratory results:   ProBNP 184, troponin less 0.16, BUN 15, creatinine 0.6. White blood count  11.9. For remainder of diagnostic and laboratory tests, please see history of  present illness. Milla Krishnan CNP    I independently interviewed and examined the patient. I have reviewed the above documentation completed by the QUANG. Please see my additional contributions to the HPI, physical exam, and assessment / medical decision making.     Reason for consult: elevated troponin      She was not previously known to [facility] cardiology group.     Mrs. Last is a 76-year-old female with history of COPD on 3 L of home on O2, hypertension, migraines, MRSA pneumonia and peptic ulcer disease was recently treated for pneumonia with antibiotics. She has been coughing and spitting up thick mucus for the past few days. This morning, she developed acute dyspnea and chest tightness and came to the emergency room for evaluation. She felt tightness in her throat and could not breathe. She tried nebulizer treatment at home without any significant improvement in her symptoms. She denied fever, chills, nausea or vomiting. She was evaluated in the emergency room and was found have a 50% pneumothorax involving the left hemithorax. She immediately underwent a placement of chest tube with near complete resolution of her symptoms. Currently, she denies any chest pain, tightness, dyspnea or nausea or vomiting. No prior history of any heart attacks or strokes. She quit smoking several years back.  She does not have any diabetes or hyperlipidemia or family history of premature coronary artery disease.     Review of Systems:  Cardiac: As per HPI  General: No fever, chills  Pulmonary: As per HPI  GI: No nausea, vomiting  Musculoskeletal: CESPEDES x 4, no focal motor deficits  Skin: Intact, no rashes  Neuro/Psych: No headache or seizures     Physical Exam:  BP (!) 98/53   Pulse 105   Temp 97.9 °F (36.6 °C) (Oral)   Resp 16   Ht 5' 6\" (1.676 m)   Wt 158 lb (71.7 kg)   SpO2 96%   BMI 25.50 kg/m²   Appearance: Awake, alert, no acute respiratory distress  Skin: Intact, no rash  Head: Normocephalic, atraumatic  ENMT: MMM, no rhinorrhea  Neck: Supple, no carotid bruits  Lungs: Has bilateral scattered wheezing with diminished breath sounds over the left hemithorax. She also has a chest tube in her left hemithorax. Cardiac: Regular rate and rhythmtachycardic, +S1S2, no murmurs apparent  Abdomen: Soft, +bowel sounds  Extremities: Moves all extremities x 4, no lower extremity edema  Neurologic: No focal motor deficits apparent, normal mood and affect     Telemetry findings reviewed: SR at rate 105, no new tachy/bradyarrhythmias overnight  EKG: Sinus tachycardia, premature supraventricular complexes, right superior axis, abnormal EKG. Labs: Potassium 3.2, bun/creatinine 15 stress 0.6 rest of the chemistry is normal. Troponins were 0.16. CBC normal.     Assessment:  1. Elevated troponin in the presence of acute pneumothorax causing chest pain and dyspnea most likely secondary to demand ischemia  2. Acute dyspnea and chest pain secondary to large left-sided pneumothorax status post chest tube placementimproved  3. History of severe COPD on home O2  4. History of hypertension well controlled     Plan:   1. Trend troponins  2. Once the blood pressures are to stabilized consider restarting her on antihypertensive medications  3. Consider substituting heparin for Lovenox. If the Troponin starts trending down then we can discontinue heparin  4.  Obtain transthoracic echocardiogram to evaluate wall motion abnormalities and also assess LV function  5.  Once she is hemodynamically stable then we could consider getting a stress test as an outpatient or before she leaves the hospital.           Thank you for consulting us and I will be following with you for any  further recommendations     Rand Carranza MD  [facility]) Cardiology      D: 07/03/2018 16:40:30       T: 07/03/2018 19:56:14     [id number]  Job#: [id number]     Doc#: [id number]    CC:

## 2018-07-03 NOTE — CONSULTS
atraumatic  ENMT: MMM, no rhinorrhea  Neck: Supple, no carotid bruits  Lungs: Has bilateral scattered wheezing with diminished breath sounds over the left hemithorax. She also has a chest tube in her left hemithorax. Cardiac: Regular rate and rhythm-tachycardic, +S1S2, no murmurs apparent  Abdomen: Soft, +bowel sounds  Extremities: Moves all extremities x 4, no lower extremity edema  Neurologic: No focal motor deficits apparent, normal mood and affect    Telemetry findings reviewed: SR at rate 105, no new tachy/bradyarrhythmias overnight  EKG: Sinus tachycardia, premature supraventricular complexes, right superior axis, abnormal EKG. Labs: Potassium 3.2, bun/creatinine 15 stress 0.6 rest of the chemistry is normal. Troponins were 0.16. CBC normal.    Assessment:  1. Elevated troponin in the presence of acute pneumothorax causing chest pain and dyspnea most likely secondary to demand ischemia  2. Acute dyspnea and chest pain secondary to large left-sided pneumothorax status post chest tube placement-improved  3. History of severe COPD on home O2  4. History of hypertension well controlled    Plan:   1. Trend troponins  2. Once the blood pressures are to stabilized consider restarting her on antihypertensive medications  3. Consider substituting heparin for Lovenox. If the Troponin starts trending down then we can discontinue heparin  4. Obtain transthoracic echocardiogram to evaluate wall motion abnormalities and also assess LV function  5.  Once she is hemodynamically stable then we could consider getting a stress test as an outpatient or before she leaves the hospital.        Thank you for consulting us and I will be following with you for any  further recommendations    Prakash Gunter MD  [facility]) Cardiology

## 2018-07-04 ENCOUNTER — APPOINTMENT (OUTPATIENT)
Dept: GENERAL RADIOLOGY | Age: 73
DRG: 280 | End: 2018-07-04
Payer: MEDICARE

## 2018-07-04 PROBLEM — I42.9 CARDIOMYOPATHY (HCC): Status: ACTIVE | Noted: 2018-07-04

## 2018-07-04 PROBLEM — I21.4 NSTEMI (NON-ST ELEVATED MYOCARDIAL INFARCTION) (HCC): Status: ACTIVE | Noted: 2018-07-04

## 2018-07-04 LAB
ALBUMIN SERPL-MCNC: 3.5 G/DL (ref 3.5–5.2)
ALP BLD-CCNC: 46 U/L (ref 35–104)
ALT SERPL-CCNC: 6 U/L (ref 0–32)
ANION GAP SERPL CALCULATED.3IONS-SCNC: 14 MMOL/L (ref 7–16)
APTT: 55.5 SEC (ref 24.5–35.1)
AST SERPL-CCNC: 21 U/L (ref 0–31)
BASOPHILS ABSOLUTE: 0 E9/L (ref 0–0.2)
BASOPHILS RELATIVE PERCENT: 0 % (ref 0–2)
BILIRUB SERPL-MCNC: 0.2 MG/DL (ref 0–1.2)
BUN BLDV-MCNC: 13 MG/DL (ref 8–23)
CALCIUM SERPL-MCNC: 8.4 MG/DL (ref 8.6–10.2)
CHLORIDE BLD-SCNC: 105 MMOL/L (ref 98–107)
CO2: 24 MMOL/L (ref 22–29)
CREAT SERPL-MCNC: 0.7 MG/DL (ref 0.5–1)
EOSINOPHILS ABSOLUTE: 0 E9/L (ref 0.05–0.5)
EOSINOPHILS RELATIVE PERCENT: 0 % (ref 0–6)
GFR AFRICAN AMERICAN: >60
GFR NON-AFRICAN AMERICAN: >60 ML/MIN/1.73
GLUCOSE BLD-MCNC: 127 MG/DL (ref 74–109)
HCT VFR BLD CALC: 35.9 % (ref 34–48)
HEMOGLOBIN: 11.7 G/DL (ref 11.5–15.5)
IMMATURE GRANULOCYTES #: 0.04 E9/L
IMMATURE GRANULOCYTES %: 0.6 % (ref 0–5)
LYMPHOCYTES ABSOLUTE: 1.22 E9/L (ref 1.5–4)
LYMPHOCYTES RELATIVE PERCENT: 18.7 % (ref 20–42)
MCH RBC QN AUTO: 28 PG (ref 26–35)
MCHC RBC AUTO-ENTMCNC: 32.6 % (ref 32–34.5)
MCV RBC AUTO: 85.9 FL (ref 80–99.9)
MONOCYTES ABSOLUTE: 0.36 E9/L (ref 0.1–0.95)
MONOCYTES RELATIVE PERCENT: 5.5 % (ref 2–12)
NEUTROPHILS ABSOLUTE: 4.92 E9/L (ref 1.8–7.3)
NEUTROPHILS RELATIVE PERCENT: 75.2 % (ref 43–80)
PDW BLD-RTO: 13.3 FL (ref 11.5–15)
PLATELET # BLD: 182 E9/L (ref 130–450)
PMV BLD AUTO: 10 FL (ref 7–12)
POTASSIUM REFLEX MAGNESIUM: 3.6 MMOL/L (ref 3.5–5)
RBC # BLD: 4.18 E12/L (ref 3.5–5.5)
SODIUM BLD-SCNC: 143 MMOL/L (ref 132–146)
TOTAL PROTEIN: 6.5 G/DL (ref 6.4–8.3)
TROPONIN: 0.32 NG/ML (ref 0–0.03)
TROPONIN: 0.39 NG/ML (ref 0–0.03)
WBC # BLD: 6.5 E9/L (ref 4.5–11.5)

## 2018-07-04 PROCEDURE — 99233 SBSQ HOSP IP/OBS HIGH 50: CPT | Performed by: INTERNAL MEDICINE

## 2018-07-04 PROCEDURE — 6370000000 HC RX 637 (ALT 250 FOR IP): Performed by: FAMILY MEDICINE

## 2018-07-04 PROCEDURE — 85025 COMPLETE CBC W/AUTO DIFF WBC: CPT

## 2018-07-04 PROCEDURE — 6370000000 HC RX 637 (ALT 250 FOR IP): Performed by: NURSE PRACTITIONER

## 2018-07-04 PROCEDURE — 94668 MNPJ CHEST WALL SBSQ: CPT

## 2018-07-04 PROCEDURE — 2060000000 HC ICU INTERMEDIATE R&B

## 2018-07-04 PROCEDURE — 36415 COLL VENOUS BLD VENIPUNCTURE: CPT

## 2018-07-04 PROCEDURE — 87077 CULTURE AEROBIC IDENTIFY: CPT

## 2018-07-04 PROCEDURE — 6370000000 HC RX 637 (ALT 250 FOR IP): Performed by: INTERNAL MEDICINE

## 2018-07-04 PROCEDURE — 85730 THROMBOPLASTIN TIME PARTIAL: CPT

## 2018-07-04 PROCEDURE — 71045 X-RAY EXAM CHEST 1 VIEW: CPT

## 2018-07-04 PROCEDURE — 87186 SC STD MICRODIL/AGAR DIL: CPT

## 2018-07-04 PROCEDURE — 87205 SMEAR GRAM STAIN: CPT

## 2018-07-04 PROCEDURE — 6360000002 HC RX W HCPCS: Performed by: INTERNAL MEDICINE

## 2018-07-04 PROCEDURE — 80053 COMPREHEN METABOLIC PANEL: CPT

## 2018-07-04 PROCEDURE — 2580000003 HC RX 258: Performed by: FAMILY MEDICINE

## 2018-07-04 PROCEDURE — 87070 CULTURE OTHR SPECIMN AEROBIC: CPT

## 2018-07-04 PROCEDURE — 84484 ASSAY OF TROPONIN QUANT: CPT

## 2018-07-04 PROCEDURE — 94150 VITAL CAPACITY TEST: CPT

## 2018-07-04 PROCEDURE — 94640 AIRWAY INHALATION TREATMENT: CPT

## 2018-07-04 RX ORDER — ATORVASTATIN CALCIUM 40 MG/1
40 TABLET, FILM COATED ORAL NIGHTLY
Status: DISCONTINUED | OUTPATIENT
Start: 2018-07-04 | End: 2018-07-10 | Stop reason: HOSPADM

## 2018-07-04 RX ORDER — METOPROLOL SUCCINATE 25 MG/1
12.5 TABLET, EXTENDED RELEASE ORAL DAILY
Status: DISCONTINUED | OUTPATIENT
Start: 2018-07-04 | End: 2018-07-10 | Stop reason: HOSPADM

## 2018-07-04 RX ORDER — HYDROCODONE BITARTRATE AND ACETAMINOPHEN 5; 325 MG/1; MG/1
1 TABLET ORAL EVERY 6 HOURS PRN
Status: DISCONTINUED | OUTPATIENT
Start: 2018-07-04 | End: 2018-07-10 | Stop reason: HOSPADM

## 2018-07-04 RX ORDER — HEPARIN SODIUM 1000 [USP'U]/ML
60 INJECTION, SOLUTION INTRAVENOUS; SUBCUTANEOUS ONCE
Status: DISCONTINUED | OUTPATIENT
Start: 2018-07-04 | End: 2018-07-04

## 2018-07-04 RX ADMIN — FORMOTEROL FUMARATE DIHYDRATE 20 MCG: 20 SOLUTION RESPIRATORY (INHALATION) at 09:39

## 2018-07-04 RX ADMIN — HYDROCHLOROTHIAZIDE 12.5 MG: 12.5 TABLET ORAL at 08:30

## 2018-07-04 RX ADMIN — ATORVASTATIN CALCIUM 40 MG: 40 TABLET, FILM COATED ORAL at 20:19

## 2018-07-04 RX ADMIN — ALBUTEROL SULFATE 2.5 MG: 2.5 SOLUTION RESPIRATORY (INHALATION) at 16:34

## 2018-07-04 RX ADMIN — Medication 10 ML: at 20:20

## 2018-07-04 RX ADMIN — ENOXAPARIN SODIUM 30 MG: 30 INJECTION SUBCUTANEOUS at 08:29

## 2018-07-04 RX ADMIN — BUDESONIDE 250 MCG: 0.25 SUSPENSION RESPIRATORY (INHALATION) at 20:38

## 2018-07-04 RX ADMIN — Medication 1000 MG: at 08:30

## 2018-07-04 RX ADMIN — ALBUTEROL SULFATE 2.5 MG: 2.5 SOLUTION RESPIRATORY (INHALATION) at 12:17

## 2018-07-04 RX ADMIN — ALBUTEROL SULFATE 2.5 MG: 2.5 SOLUTION RESPIRATORY (INHALATION) at 20:39

## 2018-07-04 RX ADMIN — METOPROLOL SUCCINATE 12.5 MG: 25 TABLET, FILM COATED, EXTENDED RELEASE ORAL at 08:30

## 2018-07-04 RX ADMIN — LOSARTAN POTASSIUM 100 MG: 50 TABLET, FILM COATED ORAL at 08:30

## 2018-07-04 RX ADMIN — RALOXIFENE HYDROCHLORIDE 60 MG: 60 TABLET, FILM COATED ORAL at 08:31

## 2018-07-04 RX ADMIN — BUDESONIDE 250 MCG: 0.25 SUSPENSION RESPIRATORY (INHALATION) at 09:39

## 2018-07-04 RX ADMIN — FAMOTIDINE 20 MG: 20 TABLET ORAL at 08:30

## 2018-07-04 RX ADMIN — FAMOTIDINE 20 MG: 20 TABLET ORAL at 20:19

## 2018-07-04 RX ADMIN — CHLORHEXIDINE GLUCONATE 15 ML: 1.2 RINSE ORAL at 08:29

## 2018-07-04 RX ADMIN — SERTRALINE 50 MG: 50 TABLET, FILM COATED ORAL at 20:19

## 2018-07-04 RX ADMIN — Medication 2000 UNITS: at 08:31

## 2018-07-04 RX ADMIN — ACETAMINOPHEN 650 MG: 325 TABLET ORAL at 06:24

## 2018-07-04 RX ADMIN — HYDROCODONE BITARTRATE AND ACETAMINOPHEN 1 TABLET: 5; 325 TABLET ORAL at 16:06

## 2018-07-04 RX ADMIN — Medication 10 ML: at 08:29

## 2018-07-04 RX ADMIN — ASPIRIN 81 MG: 81 TABLET, COATED ORAL at 08:30

## 2018-07-04 ASSESSMENT — PAIN DESCRIPTION - DESCRIPTORS
DESCRIPTORS: ACHING
DESCRIPTORS: HEADACHE
DESCRIPTORS: ACHING

## 2018-07-04 ASSESSMENT — PAIN SCALES - GENERAL
PAINLEVEL_OUTOF10: 0
PAINLEVEL_OUTOF10: 4
PAINLEVEL_OUTOF10: 2
PAINLEVEL_OUTOF10: 0
PAINLEVEL_OUTOF10: 5
PAINLEVEL_OUTOF10: 0
PAINLEVEL_OUTOF10: 0
PAINLEVEL_OUTOF10: 2

## 2018-07-04 ASSESSMENT — PAIN DESCRIPTION - PAIN TYPE
TYPE: ACUTE PAIN
TYPE: CHRONIC PAIN
TYPE: ACUTE PAIN

## 2018-07-04 ASSESSMENT — PAIN DESCRIPTION - PROGRESSION: CLINICAL_PROGRESSION: GRADUALLY IMPROVING

## 2018-07-04 ASSESSMENT — PAIN DESCRIPTION - LOCATION
LOCATION: BACK;RIB CAGE
LOCATION: HEAD
LOCATION: BACK;RIB CAGE

## 2018-07-04 ASSESSMENT — PAIN DESCRIPTION - FREQUENCY
FREQUENCY: INTERMITTENT

## 2018-07-04 ASSESSMENT — PAIN DESCRIPTION - ORIENTATION
ORIENTATION: LEFT
ORIENTATION: LEFT

## 2018-07-04 ASSESSMENT — PAIN DESCRIPTION - ONSET: ONSET: AWAKENED FROM SLEEP

## 2018-07-04 NOTE — PROGRESS NOTES
Critical Care Team - Daily Progress Note         Date and time: 7/4/2018 12:55 PM  Patient's name:  Vicente Gifford Record Number: [id number]  Patient's account/billing number: [de-identified]  Patient's YOB: 1945  Age: 67 y.o. Date of Admission: 7/3/2018 10:54 AM  Length of stay during current admission: 1      Primary Care Physician: Hawa Machuca MD  ICU Attending Physician: Dr. Ayad Gramajo    Code Status: Full Code    Reason for ICU admission: Pneumothorax      SUBJECTIVE:     BRIEF HISTORY:     The patient is a 67 y.o. female with significant past medical history of COPD, Cryptococcal Pneumonia, and HTN presented to  ED on 7/3/18 after having a complaint of shortness of breath for the past 2-3 days. Patient states that today she awoke and feel an increase in shortness of breath with simple movements or even daily chores. In the ED the patient was found to have a pneumothorax on the left side of her chest. In the ED, the patient had a chest tube placed on the left side with marked improvement on subsequent chest x-ray. Additionally, the patient states that she is currenlty on Levaquin as she had been having some pink colored sputum over the past several days. The patient denies any fevers, chills, or chest pain. OVERNIGHT EVENTS:    7/4/18: No acute events overnight, patient maintained pressure and oxygen saturation overnight without complaints.       CURRENT VENTILATION STATUS:     [] Ventilator  [] BIPAP  [] Nasal Cannula [x] Room Air      IF INTUBATED, ET TUBE MARKING AT LOWER LIP:     cms    SECRETIONS : Amount:  [] Small [] Moderate  [] Large  [x] None  Color:     [] White [] Colored  [] Bloody    SEDATION:  RAAS Score:  [] Propofol gtt  [] Versed gtt  [] Ativan gtt   [] No Sedation    PARALYZED:  [x] No    [] Yes      VASOPRESSORS:  [x] No    [] Yes    If yes -   [] Levophed       [] Dopamine     [] Vasopressin       [] Dobutamine  [] Phenylephrine         [] Epinephrine    CENTRAL LINES:     [] No   [] Yes   (Date of Insertion:   )           If yes -     [] Right IJ     [] Left IJ [] Right Femoral [] Left Femoral                   [] Right Subclavian [] Left Subclavian       TRACY'S CATHETER:   [] No   [x] Yes  (Date of Insertion:   )     URINE OUTPUT:            [] Good   [x] Low              [] Anuric      OBJECTIVE:     VITAL SIGNS:  /70   Pulse 70   Temp 98.2 °F (36.8 °C) (Oral)   Resp 26   Ht 5' 6\" (1.676 m)   Wt 164 lb 0.4 oz (74.4 kg)   SpO2 94%   BMI 26.47 kg/m²   Tmax over 24 hours:  Temp (24hrs), Av °F (36.7 °C), Min:97.7 °F (36.5 °C), Max:98.5 °F (36.9 °C)      Patient Vitals for the past 6 hrs:   BP Temp Temp src Pulse Resp SpO2   18 1100 114/70 - - 70 26 94 %   18 1000 112/62 - - 96 21 92 %   18 0900 105/63 - - 85 30 94 %   18 0829 105/63 - - 74 - -   18 0800 100/63 98.2 °F (36.8 °C) Oral 71 17 94 %   18 0700 (!) 97/59 - - 71 26 94 %         Intake/Output Summary (Last 24 hours) at 18 1255  Last data filed at 18 1000   Gross per 24 hour   Intake              624 ml   Output             1847 ml   Net            -1223 ml     Wt Readings from Last 2 Encounters:   18 164 lb 0.4 oz (74.4 kg)   17 157 lb (71.2 kg)     Body mass index is 26.47 kg/m². PHYSICAL EXAM:  General: awake, alert, oriented to person, place, time, and purpose, appears stated age, cooperative, no acute distress  Eyes: conjunctivae/corneas clear, sclera non icteric, EOMI  Ears: no obvious scars, no lesions, no masses, hearing intact  Mouth: mucous membranes moist, no obvious oral sores  Head: normocephalic, atraumatic  Neck: no JVD, no adenopathy, no thyromegaly, neck is supple, trachea is midline  Back: ROM normal, no CVA tenderness.   Chest: tube placement at 5th intercostal space, slight tenderness to palpation over area  Lungs: clear to auscultation bilaterally, without rhonchi, crackle, wheezing, or rale, Subcut  [] EPC Cuffs    NUTRITION:  [] NPO [] Tube Feeding (Specify: ) [] TPN  [x] PO (Diet: DIET CARDIAC; Honey Thick  Diet NPO, After Midnight)    HOME MEDICATIONS RECONCILED: [] No  [x] Yes    INSULIN DRIP:   [x] No   [] Yes    CONSULTATION NEEDED:  [x] No   [] Yes    FAMILY UPDATED:    [x] No   [] Yes    TRANSFER OUT OF ICU:   [] No   [x] Yes    ADDITIONAL PLAN:  1. Pneumothorax resolved on repeat chest xray continue to follow CXR and D/C chest tube  2. Elevated troponin, cardiology on case, no intervention at this time  2. On GI/DVT prophylaxis  3. Can be transferred out of the ICU    Robbin Valerio DO  Resident, PGY-2  7/4/2018  12:55 PM  I personally saw, examined and provided care for the patient. Radiographs, labs and medication list were reviewed by me independently. I spoke with bedside nursing, therapists and consultants. Critical care services and times documented are independent of procedures and multidisciplinary rounds with Residents. Additionally comprehensive, multidisciplinary rounds were conducted with the MICU team. The case was discussed in detail and plans for care were established. Review of Residents documentation was conducted and revisions were made as appropriate. I agree with the above documented exam, problem list and plan of care.   Miroslava Gómez D.O.  CCT 39 min

## 2018-07-04 NOTE — H&P
mg by mouth every morning   losartan-hydrochlorothiazide (HYZAAR) 100-12.5 MG per tablet, Take 1 tablet by mouth every morning   chlorhexidine (PERIDEX) 0.12 % solution, Take 15 mLs by mouth 2 times daily  Thickening Agents (THICK-AID) LIQD, by Does not apply route as needed  albuterol sulfate  (90 BASE) MCG/ACT inhaler, Inhale 2 puffs into the lungs every 4 hours as needed for Wheezing  EPINEPHrine (EPIPEN 2-TIARA) 0.3 MG/0.3ML SOAJ injection, Inject 0.3 mLs into the muscle once as needed Use as directed for allergic reaction  diazepam (VALIUM) 10 MG tablet, Take 5-10 mg by mouth every 12 hours as needed for Anxiety.  .  raloxifene (EVISTA) 60 MG tablet, Take 60 mg by mouth every morning   aspirin 81 MG tablet, Take 81 mg by mouth every morning   Multiple Vitamins-Minerals (OCUVITE PO), Take 1 tablet by mouth every morning   calcium carbonate 600 MG TABS tablet, Take 2 tablets by mouth every morning   Cholecalciferol (VITAMIN D) 2000 UNITS CAPS capsule, Take 2,000 Units by mouth every morning   Omega-3 Fatty Acids (FISH OIL) 1000 MG CPDR, Take 1,000 mg by mouth every morning   UNABLE TO FIND, Please include Vital Stimualtion Therapy into treatment plan with Speech Pathologist  DX: Dysphagia    Allergies:    Dye [iodides]    Social History:   Social History     Social History    Marital status:      Spouse name: N/A    Number of children: N/A    Years of education: N/A     Occupational History    retired           Social History Main Topics    Smoking status: Former Smoker     Packs/day: 1.00     Years: 35.00     Types: Cigarettes     Quit date: 1/1/2008    Smokeless tobacco: Never Used    Alcohol use No    Drug use: No    Sexual activity: Not on file     Other Topics Concern    Not on file     Social History Narrative    No narrative on file       Family History:   Family History   Problem Relation Age of Onset    Heart Disease Mother     Cancer Mother    Gama Joshua Other Father    Gamaflorian Ortegacal

## 2018-07-04 NOTE — PROGRESS NOTES
Serge Neumann MD   10 mL at 07/03/18 2130    sodium chloride flush 0.9 % injection 10 mL  10 mL Intravenous PRN Serge Neumann MD        acetaminophen (TYLENOL) tablet 650 mg  650 mg Oral Q4H PRN Serge Neumann MD   650 mg at 07/03/18 1747    magnesium hydroxide (MILK OF MAGNESIA) 400 MG/5ML suspension 30 mL  30 mL Oral Daily PRN Serge Neumann MD        [facility]etron [facility]) injection 4 mg  4 mg Intravenous Q6H PRN Serge Neumann MD        famotidine (PEPCID) tablet 20 mg  20 mg Oral BID Serge Neumann MD   20 mg at 07/03/18 2029    amLODIPine (NORVASC) tablet 5 mg  5 mg Oral SHAYNA Neumann MD        aspirin EC tablet 81 mg  81 mg Oral SHAYNA Neumann MD        calcium elemental (OSCAL) tablet 1,000 mg  1,000 mg Oral SHAYNA Neumann MD        chlorhexidine (PERIDEX) 0.12 % solution 15 mL  15 mL Mouth/Throat BID Serge Neumann MD        vitamin D tablet 2,000 Units  2,000 Units Oral SHAYNA Neumann MD        fluticasone (FLONASE) 50 MCG/ACT nasal spray 1 spray  1 spray Nasal Nightly PRN Serge Neumann MD        raloxifene (EVISTA) tablet 60 mg  60 mg Oral SHAYNA Neumann MD        sertraline (ZOLOFT) tablet 50 mg  50 mg Oral Nightly Serge Neumann MD        diazepam (VALIUM) tablet 10 mg  10 mg Oral Q12H PRN Serge Neumann MD   10 mg at 07/03/18 2129    budesonide (PULMICORT) nebulizer suspension 250 mcg  250 mcg Nebulization BID Nellene Banana, APRN - CNP   250 mcg at 07/03/18 1957    losartan (COZAAR) tablet 100 mg  100 mg Oral Daily Serge Neumann MD        And    hydrochlorothiazide (HYDRODIURIL) tablet 12.5 mg  12.5 mg Oral Daily Serge Neumann MD        formoterol (PERFOROMIST) nebulizer solution 20 mcg  20 mcg Nebulization Q12H Daraquilino Grimes, DO   20 mcg at 07/03/18 1958    albuterol (PROVENTIL) nebulizer solution 2.5 mg  2.5 mg Nebulization 4x daily Geraldine Grimes DO   Stopped at 07/03/18 1957    albuterol (PROVENTIL) nebulizer solution 2.5 mg  2.5 mg Nebulization Q4H PRN Carola Hernandez DO          heparin (porcine) 14 Units/kg/hr (07/04/18 0501)       Physical Exam:  BP 93/68   Pulse 69   Temp 97.8 °F (36.6 °C) (Oral)   Resp 18   Ht 5' 6\" (1.676 m)   Wt 164 lb 0.4 oz (74.4 kg)   SpO2 95%   BMI 26.47 kg/m²   Weight change: Wt Readings from Last 3 Encounters:   07/03/18 164 lb 0.4 oz (74.4 kg)   07/06/17 157 lb (71.2 kg)   05/14/17 160 lb (72.6 kg)     The patient is awake, alert and in no discomfort or distress. No gross musculoskeletal deformity is present. No significant skin or nail changes are present. Gross examination of head, eyes, nose and throat are negative. Jugular venous pressure is normal and no carotid bruits are present. Normal respiratory effort is noted with no accessory muscle usage present. Lung fields are clear to ascultation. Cardiac examination is notable for a regular rate and rhythm with no palpable thrill. No gallop rhythm or cardiac murmur are identified. A benign abdominal examination is present with no masses or organomegaly. Intact pulses are present throughout all extremities and no peripheral edema is present. No focal neurologic deficits are present. Intake/Output:    Intake/Output Summary (Last 24 hours) at 07/04/18 0609  Last data filed at 07/04/18 0400   Gross per 24 hour   Intake              174 ml   Output             1210 ml   Net            -1036 ml     I/O this shift:  In: -   Out: 650 [Urine:650]    Laboratory Tests:  Lab Results   Component Value Date    CREATININE 0.7 07/04/2018    BUN 13 07/04/2018     07/04/2018    K 3.6 07/04/2018     07/04/2018    CO2 24 07/04/2018     No results for input(s): CKTOTAL, CKMB in the last 72 hours.     Invalid input(s): TROPONONI  No results found for: BNP  Lab Results   Component Value Date    WBC 6.5 07/04/2018    RBC 4.18 07/04/2018    HGB 11.7 07/04/2018    HCT 35.9 07/04/2018    MCV 85.9 07/04/2018    MCH 28.0 07/04/2018    [facility] further stabilization of her pneumothorax with a repeat chest x-ray pending. Continued appropriate medical management including that of high-dose atorvastatin benefit a reduced risk of future atherosclerotic development. Armida Lopez.  Maurice Or, [facility]

## 2018-07-04 NOTE — PLAN OF CARE
Problem: Falls - Risk of:  Goal: Absence of physical injury  Absence of physical injury   Outcome: Met This Shift      Problem: Airway Clearance - Ineffective:  Goal: Ability to maintain a clear airway will improve  Ability to maintain a clear airway will improve   Outcome: Met This Shift      Problem: Gas Exchange - Impaired:  Goal: Levels of oxygenation will improve  Levels of oxygenation will improve   Outcome: Met This Shift

## 2018-07-04 NOTE — PROGRESS NOTES
Pulmonary Progress Note    Admit Date: 7/3/2018  Hospital day                               PCP: Sonja Mathur MD    Chief Complaint (s):  Patient Active Problem List   Diagnosis    Tachycardia    HTN (hypertension)    Hypoxia    [facility]    Cryptogenic organizing pneumonia [facility]    Aspiration pneumonia ([facility]    Pneumothorax, left       Subjective:  - Awake and alert, doing well. Conversant. Chest radiograph shows resolution of the pneumothorax. Echocardiogram is noted. Vitals:  VITALS:  /62   Pulse 96   Temp 98.2 °F (36.8 °C) (Oral)   Resp 21   Ht 5' 6\" (1.676 m)   Wt 164 lb 0.4 oz (74.4 kg)   SpO2 92%   BMI 26.47 kg/m²     24HR INTAKE/OUTPUT:      Intake/Output Summary (Last 24 hours) at 18 1036  Last data filed at 18 1000   Gross per 24 hour   Intake              624 ml   Output             1847 ml   Net            -1223 ml       24HR PULSE OXIMETRY RANGE:    SpO2  Av.4 %  Min: 84 %  Max: 100 %    Medications:  IV:      Scheduled Meds:   metoprolol succinate  12.5 mg Oral Daily    atorvastatin  40 mg Oral Nightly    enoxaparin  30 mg Subcutaneous Daily    sodium chloride flush  10 mL Intravenous 2 times per day    famotidine  20 mg Oral BID    aspirin  81 mg Oral QAM    calcium elemental  1,000 mg Oral QAM    chlorhexidine  15 mL Mouth/Throat BID    vitamin D  2,000 Units Oral QAM    raloxifene  60 mg Oral QAM    sertraline  50 mg Oral Nightly    budesonide  250 mcg Nebulization BID    losartan  100 mg Oral Daily    And    hydrochlorothiazide  12.5 mg Oral Daily    formoterol  20 mcg Nebulization Q12H    albuterol  2.5 mg Nebulization 4x daily       Diet:   DIET CARDIAC; Honey Thick  Diet NPO, After Midnight     EXAM:  General: No distress. Alert. Eyes: PERRL. No sclera icterus. No conjunctival injection. ENT: No discharge. Pharynx clear. Neck: Trachea midline. Normal thyroid.   Resp: No accessory muscle use. No rales. No wheezing. No rhonchi. CV: Regular rate. Regular rhythm. No murmur or rub. Abd: Non-tender. Non-distended. No masses. No organomegaly. Normal bowel sounds. Skin: Warm and dry. No nodule on exposed extremities. No rash on exposed extremities. Ext: No cyanosis, clubbing, edema  Lymph: No cervical LAD. No supraclavicular LAD. M/S: No cyanosis. No joint deformity. No clubbing. Neuro: Awake. Follows commands. Positive pupils/gag/corneals. Normal pain response. Results:  CBC: Recent Labs      07/03/18 1124 07/04/18   0413   WBC  11.9*  6.5   HGB  13.6  11.7   HCT  41.8  35.9   MCV  85.8  85.9   PLT  208  182     BMP: Recent Labs      07/03/18 1124 07/04/18   0413   NA  140  143   K  3.2*  3.6   CL  100  105   CO2  26  24   BUN  15  13   CREATININE  0.6  0.7     LIVER PROFILE:   Recent Labs      07/03/18 1124 07/04/18   0413   AST  12  21   ALT  6  6   BILITOT  0.4  0.2   ALKPHOS  64  46     PT/INR: No results for input(s): PROTIME, INR in the last 72 hours. APTT:   Recent Labs      07/03/18 1124 07/03/18 2020 07/04/18   0413   APTT  24.6  48.8*  55.5*         Pathology:  1. N/A      Microbiology:  1. None    Recent ABG:   No results for input(s): PH, PO2, PCO2, HCO3, BE, O2SAT, METHB, O2HB, COHB, O2CON, HHB, THB in the last 72 hours. Recent Films:  XR CHEST PORTABLE   Final Result   Findings compatible with emphysema   Tortuous ectatic aorta   Probable infiltrate at the left lung base suspicious for pneumonia                  XR CHEST PORTABLE   Final Result   1. Interval decrease in size of previously identified left   pneumothorax status post chest tube placement. 2.  Persistent mildly spiculated left upper lobe bandlike opacity. If   not already obtained, pulmonary consultation should be considered. XR CHEST PORTABLE   Final Result   Left pneumothorax with estimated volume of approximately 40-50%. Small bilateral pleural effusions.

## 2018-07-05 ENCOUNTER — APPOINTMENT (OUTPATIENT)
Dept: GENERAL RADIOLOGY | Age: 73
DRG: 280 | End: 2018-07-05
Payer: MEDICARE

## 2018-07-05 LAB
APTT: 24.8 SEC (ref 24.5–35.1)
BASOPHILS ABSOLUTE: 0.03 E9/L (ref 0–0.2)
BASOPHILS RELATIVE PERCENT: 0.3 % (ref 0–2)
EKG ATRIAL RATE: 110 BPM
EKG P AXIS: 69 DEGREES
EKG P-R INTERVAL: 128 MS
EKG Q-T INTERVAL: 358 MS
EKG QRS DURATION: 82 MS
EKG QTC CALCULATION (BAZETT): 484 MS
EKG R AXIS: -94 DEGREES
EKG T AXIS: 68 DEGREES
EKG VENTRICULAR RATE: 110 BPM
EOSINOPHILS ABSOLUTE: 0.03 E9/L (ref 0.05–0.5)
EOSINOPHILS RELATIVE PERCENT: 0.3 % (ref 0–6)
HCT VFR BLD CALC: 36.4 % (ref 34–48)
HEMOGLOBIN: 11.6 G/DL (ref 11.5–15.5)
IMMATURE GRANULOCYTES #: 0.04 E9/L
IMMATURE GRANULOCYTES %: 0.5 % (ref 0–5)
LEFT VENTRICULAR EJECTION FRACTION HIGH VALUE: 40 %
LEFT VENTRICULAR EJECTION FRACTION MODE: NORMAL
LV EF: 35 %
LYMPHOCYTES ABSOLUTE: 2.44 E9/L (ref 1.5–4)
LYMPHOCYTES RELATIVE PERCENT: 28.3 % (ref 20–42)
MCH RBC QN AUTO: 28.2 PG (ref 26–35)
MCHC RBC AUTO-ENTMCNC: 31.9 % (ref 32–34.5)
MCV RBC AUTO: 88.3 FL (ref 80–99.9)
MONOCYTES ABSOLUTE: 0.58 E9/L (ref 0.1–0.95)
MONOCYTES RELATIVE PERCENT: 6.7 % (ref 2–12)
MRSA CULTURE ONLY: NORMAL
NEUTROPHILS ABSOLUTE: 5.5 E9/L (ref 1.8–7.3)
NEUTROPHILS RELATIVE PERCENT: 63.9 % (ref 43–80)
PDW BLD-RTO: 13.6 FL (ref 11.5–15)
PLATELET # BLD: 179 E9/L (ref 130–450)
PMV BLD AUTO: 10 FL (ref 7–12)
RBC # BLD: 4.12 E12/L (ref 3.5–5.5)
WBC # BLD: 8.6 E9/L (ref 4.5–11.5)

## 2018-07-05 PROCEDURE — 6370000000 HC RX 637 (ALT 250 FOR IP): Performed by: NURSE PRACTITIONER

## 2018-07-05 PROCEDURE — 2700000000 HC OXYGEN THERAPY PER DAY

## 2018-07-05 PROCEDURE — 71045 X-RAY EXAM CHEST 1 VIEW: CPT

## 2018-07-05 PROCEDURE — 94640 AIRWAY INHALATION TREATMENT: CPT

## 2018-07-05 PROCEDURE — 36415 COLL VENOUS BLD VENIPUNCTURE: CPT

## 2018-07-05 PROCEDURE — 6370000000 HC RX 637 (ALT 250 FOR IP): Performed by: INTERNAL MEDICINE

## 2018-07-05 PROCEDURE — 93308 TTE F-UP OR LMTD: CPT

## 2018-07-05 PROCEDURE — 6360000004 HC RX CONTRAST MEDICATION: Performed by: INTERNAL MEDICINE

## 2018-07-05 PROCEDURE — 2580000003 HC RX 258: Performed by: FAMILY MEDICINE

## 2018-07-05 PROCEDURE — 85025 COMPLETE CBC W/AUTO DIFF WBC: CPT

## 2018-07-05 PROCEDURE — 85730 THROMBOPLASTIN TIME PARTIAL: CPT

## 2018-07-05 PROCEDURE — 2060000000 HC ICU INTERMEDIATE R&B

## 2018-07-05 PROCEDURE — 94150 VITAL CAPACITY TEST: CPT

## 2018-07-05 PROCEDURE — 99233 SBSQ HOSP IP/OBS HIGH 50: CPT | Performed by: INTERNAL MEDICINE

## 2018-07-05 PROCEDURE — 6370000000 HC RX 637 (ALT 250 FOR IP): Performed by: FAMILY MEDICINE

## 2018-07-05 PROCEDURE — 6360000002 HC RX W HCPCS: Performed by: INTERNAL MEDICINE

## 2018-07-05 RX ORDER — HEPARIN SODIUM 10000 [USP'U]/100ML
12 INJECTION, SOLUTION INTRAVENOUS CONTINUOUS
Status: DISCONTINUED | OUTPATIENT
Start: 2018-07-05 | End: 2018-07-09

## 2018-07-05 RX ORDER — HEPARIN SODIUM 1000 [USP'U]/ML
30 INJECTION, SOLUTION INTRAVENOUS; SUBCUTANEOUS PRN
Status: DISCONTINUED | OUTPATIENT
Start: 2018-07-05 | End: 2018-07-09

## 2018-07-05 RX ORDER — HEPARIN SODIUM 1000 [USP'U]/ML
60 INJECTION, SOLUTION INTRAVENOUS; SUBCUTANEOUS PRN
Status: DISCONTINUED | OUTPATIENT
Start: 2018-07-05 | End: 2018-07-09

## 2018-07-05 RX ADMIN — PERFLUTREN 2 ML: 6.52 INJECTION, SUSPENSION INTRAVENOUS at 10:50

## 2018-07-05 RX ADMIN — CHLORHEXIDINE GLUCONATE 15 ML: 1.2 RINSE ORAL at 20:58

## 2018-07-05 RX ADMIN — ENOXAPARIN SODIUM 30 MG: 30 INJECTION SUBCUTANEOUS at 08:59

## 2018-07-05 RX ADMIN — SERTRALINE 50 MG: 50 TABLET, FILM COATED ORAL at 20:57

## 2018-07-05 RX ADMIN — HYDROCODONE BITARTRATE AND ACETAMINOPHEN 1 TABLET: 5; 325 TABLET ORAL at 08:59

## 2018-07-05 RX ADMIN — METOPROLOL SUCCINATE 12.5 MG: 25 TABLET, FILM COATED, EXTENDED RELEASE ORAL at 08:58

## 2018-07-05 RX ADMIN — ALBUTEROL SULFATE 2.5 MG: 2.5 SOLUTION RESPIRATORY (INHALATION) at 08:34

## 2018-07-05 RX ADMIN — Medication 10 ML: at 20:57

## 2018-07-05 RX ADMIN — Medication 1000 MG: at 08:58

## 2018-07-05 RX ADMIN — ALBUTEROL SULFATE 2.5 MG: 2.5 SOLUTION RESPIRATORY (INHALATION) at 20:16

## 2018-07-05 RX ADMIN — BUDESONIDE 250 MCG: 0.25 SUSPENSION RESPIRATORY (INHALATION) at 08:32

## 2018-07-05 RX ADMIN — FAMOTIDINE 20 MG: 20 TABLET ORAL at 20:57

## 2018-07-05 RX ADMIN — RALOXIFENE HYDROCHLORIDE 60 MG: 60 TABLET, FILM COATED ORAL at 08:59

## 2018-07-05 RX ADMIN — ASPIRIN 81 MG: 81 TABLET, COATED ORAL at 08:59

## 2018-07-05 RX ADMIN — ALBUTEROL SULFATE 2.5 MG: 2.5 SOLUTION RESPIRATORY (INHALATION) at 11:54

## 2018-07-05 RX ADMIN — HEPARIN SODIUM AND DEXTROSE 12 UNITS/KG/HR: 10000; 5 INJECTION INTRAVENOUS at 18:20

## 2018-07-05 RX ADMIN — Medication 10 ML: at 09:00

## 2018-07-05 RX ADMIN — Medication 2000 UNITS: at 08:59

## 2018-07-05 RX ADMIN — CHLORHEXIDINE GLUCONATE 15 ML: 1.2 RINSE ORAL at 08:59

## 2018-07-05 RX ADMIN — BUDESONIDE 250 MCG: 0.25 SUSPENSION RESPIRATORY (INHALATION) at 20:16

## 2018-07-05 RX ADMIN — HYDROCODONE BITARTRATE AND ACETAMINOPHEN 1 TABLET: 5; 325 TABLET ORAL at 18:02

## 2018-07-05 RX ADMIN — FAMOTIDINE 20 MG: 20 TABLET ORAL at 08:59

## 2018-07-05 RX ADMIN — ALBUTEROL SULFATE 2.5 MG: 2.5 SOLUTION RESPIRATORY (INHALATION) at 17:18

## 2018-07-05 RX ADMIN — ATORVASTATIN CALCIUM 40 MG: 40 TABLET, FILM COATED ORAL at 20:57

## 2018-07-05 ASSESSMENT — PAIN SCALES - GENERAL
PAINLEVEL_OUTOF10: 2
PAINLEVEL_OUTOF10: 4
PAINLEVEL_OUTOF10: 4
PAINLEVEL_OUTOF10: 2
PAINLEVEL_OUTOF10: 3

## 2018-07-05 NOTE — PLAN OF CARE
Problem: Falls - Risk of:  Goal: Will remain free from falls  Will remain free from falls   Outcome: Met This Shift    Goal: Absence of physical injury  Absence of physical injury   Outcome: Met This Shift      Problem: Gas Exchange - Impaired:  Goal: Levels of oxygenation will improve  Levels of oxygenation will improve   Outcome: Met This Shift

## 2018-07-05 NOTE — PROGRESS NOTES
Pulmonary Progress Note    Admit Date: 7/3/2018  Hospital day                               PCP: Neelima Tamez MD    Chief Complaint (s):  Patient Active Problem List   Diagnosis    Tachycardia    HTN (hypertension)    Hypoxia    [facility]    Cryptogenic organizing pneumonia ([facility]    Aspiration pneumonia ([facility]    Pneumothorax, left    Cardiomyopathy ([facility]    NSTEMI (non-ST elevated myocardial infarction) ([facility]       Subjective:  - Awake and alert, Alerted by radiology that with water seal, the patient's pneumothorax has increased in size. Vitals:  VITALS:  BP (!) 106/55   Pulse 78   Temp 98.6 °F (37 °C) (Oral)   Resp 17   Ht 5' 6\" (1.676 m)   Wt 160 lb (72.6 kg)   SpO2 92%   BMI 25.82 kg/m²     24HR INTAKE/OUTPUT:      Intake/Output Summary (Last 24 hours) at 18 1216  Last data filed at 18 0031   Gross per 24 hour   Intake              120 ml   Output              600 ml   Net             -480 ml       24HR PULSE OXIMETRY RANGE:    SpO2  Av.8 %  Min: 92 %  Max: 95 %    Medications:  IV:      Scheduled Meds:   [START ON 2018] enoxaparin  40 mg Subcutaneous Daily    metoprolol succinate  12.5 mg Oral Daily    atorvastatin  40 mg Oral Nightly    sodium chloride flush  10 mL Intravenous 2 times per day    famotidine  20 mg Oral BID    aspirin  81 mg Oral QAM    calcium elemental  1,000 mg Oral QAM    chlorhexidine  15 mL Mouth/Throat BID    vitamin D  2,000 Units Oral QAM    raloxifene  60 mg Oral QAM    sertraline  50 mg Oral Nightly    budesonide  250 mcg Nebulization BID    albuterol  2.5 mg Nebulization 4x daily       Diet:   DIET CARDIAC; Honey Thick  Diet NPO, After Midnight     EXAM:  General: No distress. Alert. Eyes: PERRL. No sclera icterus. No conjunctival injection. ENT: No discharge. Pharynx clear. Neck: Trachea midline. Normal thyroid. Resp: No accessory muscle use. No rales. No wheezing. Tortuous ectatic aorta   Probable infiltrate at the left lung base suspicious for pneumonia                  XR CHEST PORTABLE   Final Result   1. Interval decrease in size of previously identified left   pneumothorax status post chest tube placement. 2.  Persistent mildly spiculated left upper lobe bandlike opacity. If   not already obtained, pulmonary consultation should be considered. XR CHEST PORTABLE   Final Result   Left pneumothorax with estimated volume of approximately 40-50%. Small bilateral pleural effusions. Nonspecific hazy reticular opacities in the right lower lung, which   may be related to edema, interstitial infiltrate, or chronic fibrosis. Critical finding of pneumothorax reported to Dr. Ash Herbert of the [facility]   ED at approximately 1210 hours on 7/3/2018. Dr. Ash Herbert was already   aware of the finding. Assessment:  1. COPD with severe bronchiectasis  2. Pseudomonas colonization with tender intermittent infection  3. No evidence of NTM at this time  4. Spontaneous pneumothorax  5. Abnormal echocardiogram with positive cardiac enzymes: Motion abnormalities suggest Takatsubo cardiomyopathy. Beta agonist use may aggravate this. Plan:  1. Chest tube to Suction      Care reviewed with the staff and the patient's family as available. Please note that voice recognition technology was used in the preparation of this note and make therefore it may contain inadvertent transcription errors. Peace Yap M.D., F.C.C.P.     [facility], [address] [place] [address], [address] [place] [address]

## 2018-07-05 NOTE — CARE COORDINATION
CASE MANAGEMENT. .. Met with patient and her family in room 439. We discussed being npo after mn for poss cc in am. Nursing to confirm with pulm if patient will be stable for procedure. Patient will be kept updated with plan of care.

## 2018-07-05 NOTE — PROGRESS NOTES
Dr. Maxine Britt was paged for cardiac cath clearance. Dr. Maxine Britt said no cardiac cath right now. He asked that I make sure Dr. Emani Kwong is aware.  Dr. Emani Kwong was messaged via perfect serve

## 2018-07-05 NOTE — PROGRESS NOTES
Subjective: The patient is awake and alert. No problems overnight. Denies chest pain, angina, and dyspnea. Denies abdominal pain. Tolerating diet. No nausea or vomiting. Objective:    Vitals:  BP (!) 106/55   Pulse 78   Temp 98.6 °F (37 °C) (Oral)   Resp 17   Ht 5' 6\" (1.676 m)   Wt 160 lb (72.6 kg)   SpO2 92%   BMI 25.82 kg/m²     Physical Exam:  Heart:  RRR, no murmurs, gallops, or rubs. Lungs:diminished breath sounds LLL    Abd: bowel sounds present, nontender, nondistended, no masses  Extrem:  No clubbing, cyanosis, or edema    Labs:  CBC:   Lab Results   Component Value Date    WBC 8.6 2018    RBC 4.12 2018    HGB 11.6 2018    HCT 36.4 2018    MCV 88.3 2018    MCH 28.2 2018    MCHC 31.9 2018    RDW 13.6 2018     2018    MPV 10.0 2018     BMP:    Lab Results   Component Value Date     2018    K 3.6 2018     2018    CO2 24 2018    BUN 13 2018    LABALBU 3.5 2018    CREATININE 0.7 2018    CALCIUM 8.4 2018    GFRAA >60 2018    LABGLOM >60 2018    GLUCOSE 127 2018        Radiology:  Xr Chest Portable    Result Date: 2018  Patient MRN:  [id number] : 1945 Age: 67 years Gender: Female Order Date:  2018 11:15 AM EXAM: XR CHEST PORTABLE NUMBER OF IMAGES:  1 INDICATION:  water seal chest tube water seal chest tube COMPARISON: 2018, July 3, 2018 Result: Lines and tubes: There is a left-sided chest tube, unchanged in position. Lungs and Pleura: Fibrotic change and bronchiectasis of the right upper lobe. Heterogeneous opacity in the left midlung is unchanged. There is slightly improved aeration of lungs bilaterally, a small left pleural effusion remains. No pneumothorax. Cardiomediastinal silhouette: Stable appearance of the cardiomediastinal silhouette. Other: Bony thorax is unchanged.      Relatively stable appearance of the chest, slightly

## 2018-07-05 NOTE — PROGRESS NOTES
Dr. Savita Agrawal called back and wants to place patient on heparin drip but wants to make sure it's ok with Dr. Pranav Pappas first. Call placed to Dr. Cody Fortune answering service.  Awaiting a call back

## 2018-07-06 ENCOUNTER — APPOINTMENT (OUTPATIENT)
Dept: GENERAL RADIOLOGY | Age: 73
DRG: 280 | End: 2018-07-06
Payer: MEDICARE

## 2018-07-06 LAB
APTT: 38.7 SEC (ref 24.5–35.1)
APTT: 60.7 SEC (ref 24.5–35.1)
CHOLESTEROL, TOTAL: 138 MG/DL (ref 0–199)
HDLC SERPL-MCNC: 67 MG/DL
LDL CHOLESTEROL CALCULATED: 53 MG/DL (ref 0–99)
TRIGL SERPL-MCNC: 91 MG/DL (ref 0–149)
VLDLC SERPL CALC-MCNC: 18 MG/DL

## 2018-07-06 PROCEDURE — 85730 THROMBOPLASTIN TIME PARTIAL: CPT

## 2018-07-06 PROCEDURE — 36415 COLL VENOUS BLD VENIPUNCTURE: CPT

## 2018-07-06 PROCEDURE — 71045 X-RAY EXAM CHEST 1 VIEW: CPT

## 2018-07-06 PROCEDURE — 6370000000 HC RX 637 (ALT 250 FOR IP): Performed by: INTERNAL MEDICINE

## 2018-07-06 PROCEDURE — 6370000000 HC RX 637 (ALT 250 FOR IP): Performed by: NURSE PRACTITIONER

## 2018-07-06 PROCEDURE — 6370000000 HC RX 637 (ALT 250 FOR IP): Performed by: FAMILY MEDICINE

## 2018-07-06 PROCEDURE — 2580000003 HC RX 258: Performed by: FAMILY MEDICINE

## 2018-07-06 PROCEDURE — 2060000000 HC ICU INTERMEDIATE R&B

## 2018-07-06 PROCEDURE — 94640 AIRWAY INHALATION TREATMENT: CPT

## 2018-07-06 PROCEDURE — 99232 SBSQ HOSP IP/OBS MODERATE 35: CPT | Performed by: INTERNAL MEDICINE

## 2018-07-06 PROCEDURE — 80061 LIPID PANEL: CPT

## 2018-07-06 PROCEDURE — 6360000002 HC RX W HCPCS: Performed by: INTERNAL MEDICINE

## 2018-07-06 PROCEDURE — 2700000000 HC OXYGEN THERAPY PER DAY

## 2018-07-06 RX ADMIN — SERTRALINE 50 MG: 50 TABLET, FILM COATED ORAL at 20:46

## 2018-07-06 RX ADMIN — Medication 1000 MG: at 08:02

## 2018-07-06 RX ADMIN — Medication 2000 UNITS: at 08:02

## 2018-07-06 RX ADMIN — Medication 10 ML: at 20:46

## 2018-07-06 RX ADMIN — FAMOTIDINE 20 MG: 20 TABLET ORAL at 20:46

## 2018-07-06 RX ADMIN — BUDESONIDE 250 MCG: 0.25 SUSPENSION RESPIRATORY (INHALATION) at 08:04

## 2018-07-06 RX ADMIN — BUDESONIDE 250 MCG: 0.25 SUSPENSION RESPIRATORY (INHALATION) at 20:14

## 2018-07-06 RX ADMIN — METOPROLOL SUCCINATE 12.5 MG: 25 TABLET, FILM COATED, EXTENDED RELEASE ORAL at 08:02

## 2018-07-06 RX ADMIN — ATORVASTATIN CALCIUM 40 MG: 40 TABLET, FILM COATED ORAL at 20:46

## 2018-07-06 RX ADMIN — CHLORHEXIDINE GLUCONATE 15 ML: 1.2 RINSE ORAL at 20:47

## 2018-07-06 RX ADMIN — ALBUTEROL SULFATE 2.5 MG: 2.5 SOLUTION RESPIRATORY (INHALATION) at 20:14

## 2018-07-06 RX ADMIN — RALOXIFENE HYDROCHLORIDE 60 MG: 60 TABLET, FILM COATED ORAL at 08:02

## 2018-07-06 RX ADMIN — HYDROCODONE BITARTRATE AND ACETAMINOPHEN 1 TABLET: 5; 325 TABLET ORAL at 08:01

## 2018-07-06 RX ADMIN — ALBUTEROL SULFATE 2.5 MG: 2.5 SOLUTION RESPIRATORY (INHALATION) at 11:59

## 2018-07-06 RX ADMIN — ALBUTEROL SULFATE 2.5 MG: 2.5 SOLUTION RESPIRATORY (INHALATION) at 08:04

## 2018-07-06 RX ADMIN — HYDROCODONE BITARTRATE AND ACETAMINOPHEN 1 TABLET: 5; 325 TABLET ORAL at 00:17

## 2018-07-06 RX ADMIN — CHLORHEXIDINE GLUCONATE 15 ML: 1.2 RINSE ORAL at 08:02

## 2018-07-06 RX ADMIN — HEPARIN SODIUM 2180 UNITS: 1000 INJECTION, SOLUTION INTRAVENOUS; SUBCUTANEOUS at 01:16

## 2018-07-06 RX ADMIN — HEPARIN SODIUM AND DEXTROSE 14 UNITS/KG/HR: 10000; 5 INJECTION INTRAVENOUS at 16:49

## 2018-07-06 RX ADMIN — HYDROCODONE BITARTRATE AND ACETAMINOPHEN 1 TABLET: 5; 325 TABLET ORAL at 14:28

## 2018-07-06 RX ADMIN — ALBUTEROL SULFATE 2.5 MG: 2.5 SOLUTION RESPIRATORY (INHALATION) at 16:31

## 2018-07-06 RX ADMIN — FAMOTIDINE 20 MG: 20 TABLET ORAL at 08:02

## 2018-07-06 RX ADMIN — ASPIRIN 81 MG: 81 TABLET, COATED ORAL at 08:02

## 2018-07-06 ASSESSMENT — PAIN DESCRIPTION - PAIN TYPE
TYPE: ACUTE PAIN
TYPE: ACUTE PAIN

## 2018-07-06 ASSESSMENT — PAIN DESCRIPTION - ORIENTATION
ORIENTATION: LEFT
ORIENTATION: LEFT

## 2018-07-06 ASSESSMENT — PAIN DESCRIPTION - DESCRIPTORS
DESCRIPTORS: ACHING;DISCOMFORT
DESCRIPTORS: ACHING;DISCOMFORT

## 2018-07-06 ASSESSMENT — PAIN DESCRIPTION - LOCATION
LOCATION: CHEST
LOCATION: RIB CAGE

## 2018-07-06 ASSESSMENT — PAIN DESCRIPTION - ONSET: ONSET: ON-GOING

## 2018-07-06 ASSESSMENT — PAIN SCALES - GENERAL
PAINLEVEL_OUTOF10: 2
PAINLEVEL_OUTOF10: 4
PAINLEVEL_OUTOF10: 2
PAINLEVEL_OUTOF10: 2

## 2018-07-06 ASSESSMENT — PAIN DESCRIPTION - PROGRESSION: CLINICAL_PROGRESSION: GRADUALLY IMPROVING

## 2018-07-06 ASSESSMENT — PAIN DESCRIPTION - FREQUENCY
FREQUENCY: CONTINUOUS
FREQUENCY: INTERMITTENT

## 2018-07-06 NOTE — PROGRESS NOTES
2057    HYDROcodone-acetaminophen (NORCO) 5-325 MG per tablet 1 tablet  1 tablet Oral Q6H PRN Nellene Banana, APRN - CNP   1 tablet at 07/06/18 0801    sodium chloride flush 0.9 % injection 10 mL  10 mL Intravenous 2 times per day Serge Neumann MD   10 mL at 07/05/18 2057    sodium chloride flush 0.9 % injection 10 mL  10 mL Intravenous PRN Serge Neumann MD        acetaminophen (TYLENOL) tablet 650 mg  650 mg Oral Q4H PRN Serge Neumann MD   650 mg at 07/04/18 9667    magnesium hydroxide (MILK OF MAGNESIA) 400 MG/5ML suspension 30 mL  30 mL Oral Daily PRN Serge Neumann MD        ondansetron [facility]) injection 4 mg  4 mg Intravenous Q6H PRN Serge Neumann MD        famotidine (PEPCID) tablet 20 mg  20 mg Oral BID Serge Neumann MD   20 mg at 07/06/18 0802    aspirin EC tablet 81 mg  81 mg Oral SHAYNA Neumann MD   81 mg at 07/06/18 0802    calcium elemental (OSCAL) tablet 1,000 mg  1,000 mg Oral SHAYNA Neumann MD   1,000 mg at 07/06/18 0802    chlorhexidine (PERIDEX) 0.12 % solution 15 mL  15 mL Mouth/Throat BID Serge Neumann MD   15 mL at 07/06/18 0802    vitamin D tablet 2,000 Units  2,000 Units Oral SHAYNA Neumann MD   2,000 Units at 07/06/18 0802    fluticasone (FLONASE) 50 MCG/ACT nasal spray 1 spray  1 spray Nasal Nightly PRN Serge Neumann MD        raloxifene (EVISTA) tablet 60 mg  60 mg Oral SHAYNA Neumann MD   60 mg at 07/06/18 0802    sertraline (ZOLOFT) tablet 50 mg  50 mg Oral Nightly Serge Neumann MD   50 mg at 07/05/18 2057    diazepam (VALIUM) tablet 10 mg  10 mg Oral Q12H PRN Serge Neumann MD   10 mg at 07/03/18 2129    budesonide (PULMICORT) nebulizer suspension 250 mcg  250 mcg Nebulization BID Nellene Gurmeet, APRN - CNP   250 mcg at 07/06/18 0804    albuterol (PROVENTIL) nebulizer solution 2.5 mg  2.5 mg Nebulization 4x daily Geraldine Grimes, DO   2.5 mg at 07/06/18 0804    albuterol (PROVENTIL) nebulizer solution 2.5 mg  2.5 mg

## 2018-07-06 NOTE — PROGRESS NOTES
masses. No organomegaly. Normal bowel sounds. Skin: Warm and dry. No nodule on exposed extremities. No rash on exposed extremities. Ext: No cyanosis, clubbing, edema  Lymph: No cervical LAD. No supraclavicular LAD. M/S: No cyanosis. No joint deformity. No clubbing. Neuro: Awake. Follows commands. Positive pupils/gag/corneals. Normal pain response. Results:  CBC:   Recent Labs      07/04/18 0413  07/05/18   0259   WBC  6.5  8.6   HGB  11.7  11.6   HCT  35.9  36.4   MCV  85.9  88.3   PLT  182  179     BMP:   Recent Labs      07/04/18 0413   NA  143   K  3.6   CL  105   CO2  24   BUN  13   CREATININE  0.7     LIVER PROFILE:   Recent Labs      07/04/18 0413   AST  21   ALT  6   BILITOT  0.2   ALKPHOS  46     PT/INR: No results for input(s): PROTIME, INR in the last 72 hours. APTT:   Recent Labs      07/05/18   1817  07/06/18   0005  07/06/18   0655   APTT  24.8  38.7*  60.7*         Pathology:  1. N/A      Microbiology:  1. None    Recent ABG:   No results for input(s): PH, PO2, PCO2, HCO3, BE, O2SAT, METHB, O2HB, COHB, O2CON, HHB, THB in the last 72 hours. Recent Films:  XR CHEST PORTABLE   Final Result   1. Since examination dated 07/05/2018, interval decrease in size of   the left apical pneumothorax, as above. 2. Unchanged left-sided pleural effusion with associated patchy left   basilar airspace opacity. Recommend clinical correlation. 3. Stable patchy right upper lobe airspace disease. 4. Unchanged positioning of a left-sided chest tube, as above. ALERT:  THIS IS AN ABNORMAL REPORT      XR CHEST PORTABLE   Final Result   ALERT:  THIS IS AN ABNORMAL REPORT. Findings communicated   directly with Dr. Stephanie Woods at approximately 7/5/2018 11:38 AM .   1. Left-sided pleural catheter with persistent left pneumothorax. 2. Stable, enlarged cardiomediastinal silhouette. .      XR CHEST PORTABLE   Final Result      Relatively stable appearance of the chest, slightly improved aeration   of

## 2018-07-07 ENCOUNTER — APPOINTMENT (OUTPATIENT)
Dept: GENERAL RADIOLOGY | Age: 73
DRG: 280 | End: 2018-07-07
Payer: MEDICARE

## 2018-07-07 LAB
ANION GAP SERPL CALCULATED.3IONS-SCNC: 12 MMOL/L (ref 7–16)
APTT: 47.1 SEC (ref 24.5–35.1)
APTT: 59.1 SEC (ref 24.5–35.1)
BUN BLDV-MCNC: 20 MG/DL (ref 8–23)
CALCIUM SERPL-MCNC: 8.3 MG/DL (ref 8.6–10.2)
CHLORIDE BLD-SCNC: 105 MMOL/L (ref 98–107)
CO2: 26 MMOL/L (ref 22–29)
CREAT SERPL-MCNC: 0.7 MG/DL (ref 0.5–1)
CULTURE, RESPIRATORY: ABNORMAL
CULTURE, RESPIRATORY: ABNORMAL
GFR AFRICAN AMERICAN: >60
GFR NON-AFRICAN AMERICAN: >60 ML/MIN/1.73
GLUCOSE BLD-MCNC: 102 MG/DL (ref 74–109)
ORGANISM: ABNORMAL
PLATELET # BLD: 151 E9/L (ref 130–450)
POTASSIUM SERPL-SCNC: 3.5 MMOL/L (ref 3.5–5)
SMEAR, RESPIRATORY: ABNORMAL
SODIUM BLD-SCNC: 143 MMOL/L (ref 132–146)

## 2018-07-07 PROCEDURE — 6370000000 HC RX 637 (ALT 250 FOR IP): Performed by: FAMILY MEDICINE

## 2018-07-07 PROCEDURE — 2700000000 HC OXYGEN THERAPY PER DAY

## 2018-07-07 PROCEDURE — 6370000000 HC RX 637 (ALT 250 FOR IP): Performed by: NURSE PRACTITIONER

## 2018-07-07 PROCEDURE — 36415 COLL VENOUS BLD VENIPUNCTURE: CPT

## 2018-07-07 PROCEDURE — 6370000000 HC RX 637 (ALT 250 FOR IP): Performed by: INTERNAL MEDICINE

## 2018-07-07 PROCEDURE — 85730 THROMBOPLASTIN TIME PARTIAL: CPT

## 2018-07-07 PROCEDURE — 6360000002 HC RX W HCPCS: Performed by: INTERNAL MEDICINE

## 2018-07-07 PROCEDURE — 94640 AIRWAY INHALATION TREATMENT: CPT

## 2018-07-07 PROCEDURE — 2580000003 HC RX 258: Performed by: FAMILY MEDICINE

## 2018-07-07 PROCEDURE — 80048 BASIC METABOLIC PNL TOTAL CA: CPT

## 2018-07-07 PROCEDURE — 85049 AUTOMATED PLATELET COUNT: CPT

## 2018-07-07 PROCEDURE — 2060000000 HC ICU INTERMEDIATE R&B

## 2018-07-07 PROCEDURE — 99232 SBSQ HOSP IP/OBS MODERATE 35: CPT | Performed by: INTERNAL MEDICINE

## 2018-07-07 PROCEDURE — 71045 X-RAY EXAM CHEST 1 VIEW: CPT

## 2018-07-07 RX ADMIN — HYDROCODONE BITARTRATE AND ACETAMINOPHEN 1 TABLET: 5; 325 TABLET ORAL at 20:42

## 2018-07-07 RX ADMIN — HEPARIN SODIUM AND DEXTROSE 16 UNITS/KG/HR: 10000; 5 INJECTION INTRAVENOUS at 15:28

## 2018-07-07 RX ADMIN — FAMOTIDINE 20 MG: 20 TABLET ORAL at 20:35

## 2018-07-07 RX ADMIN — ALBUTEROL SULFATE 2.5 MG: 2.5 SOLUTION RESPIRATORY (INHALATION) at 21:06

## 2018-07-07 RX ADMIN — Medication 10 ML: at 09:11

## 2018-07-07 RX ADMIN — SERTRALINE 50 MG: 50 TABLET, FILM COATED ORAL at 20:35

## 2018-07-07 RX ADMIN — Medication 10 ML: at 20:35

## 2018-07-07 RX ADMIN — Medication 2000 UNITS: at 09:06

## 2018-07-07 RX ADMIN — Medication 1000 MG: at 09:07

## 2018-07-07 RX ADMIN — BUDESONIDE 250 MCG: 0.25 SUSPENSION RESPIRATORY (INHALATION) at 07:55

## 2018-07-07 RX ADMIN — ALBUTEROL SULFATE 2.5 MG: 2.5 SOLUTION RESPIRATORY (INHALATION) at 11:45

## 2018-07-07 RX ADMIN — METOPROLOL SUCCINATE 12.5 MG: 25 TABLET, FILM COATED, EXTENDED RELEASE ORAL at 09:05

## 2018-07-07 RX ADMIN — RALOXIFENE HYDROCHLORIDE 60 MG: 60 TABLET, FILM COATED ORAL at 09:09

## 2018-07-07 RX ADMIN — HEPARIN SODIUM AND DEXTROSE 16 UNITS/KG/HR: 10000; 5 INJECTION INTRAVENOUS at 07:51

## 2018-07-07 RX ADMIN — BUDESONIDE 250 MCG: 0.25 SUSPENSION RESPIRATORY (INHALATION) at 21:06

## 2018-07-07 RX ADMIN — HEPARIN SODIUM 2180 UNITS: 1000 INJECTION, SOLUTION INTRAVENOUS; SUBCUTANEOUS at 07:50

## 2018-07-07 RX ADMIN — ASPIRIN 81 MG: 81 TABLET, COATED ORAL at 09:07

## 2018-07-07 RX ADMIN — CHLORHEXIDINE GLUCONATE 15 ML: 1.2 RINSE ORAL at 09:10

## 2018-07-07 RX ADMIN — CHLORHEXIDINE GLUCONATE 15 ML: 1.2 RINSE ORAL at 20:36

## 2018-07-07 RX ADMIN — HYDROCODONE BITARTRATE AND ACETAMINOPHEN 1 TABLET: 5; 325 TABLET ORAL at 07:53

## 2018-07-07 RX ADMIN — FAMOTIDINE 20 MG: 20 TABLET ORAL at 09:06

## 2018-07-07 RX ADMIN — ATORVASTATIN CALCIUM 40 MG: 40 TABLET, FILM COATED ORAL at 20:35

## 2018-07-07 RX ADMIN — ALBUTEROL SULFATE 2.5 MG: 2.5 SOLUTION RESPIRATORY (INHALATION) at 17:01

## 2018-07-07 RX ADMIN — ALBUTEROL SULFATE 2.5 MG: 2.5 SOLUTION RESPIRATORY (INHALATION) at 07:55

## 2018-07-07 ASSESSMENT — PAIN DESCRIPTION - DESCRIPTORS: DESCRIPTORS: SORE;TENDER

## 2018-07-07 ASSESSMENT — PAIN DESCRIPTION - PAIN TYPE: TYPE: ACUTE PAIN

## 2018-07-07 ASSESSMENT — PAIN DESCRIPTION - FREQUENCY: FREQUENCY: INTERMITTENT

## 2018-07-07 ASSESSMENT — PAIN SCALES - GENERAL
PAINLEVEL_OUTOF10: 0
PAINLEVEL_OUTOF10: 4
PAINLEVEL_OUTOF10: 3
PAINLEVEL_OUTOF10: 5
PAINLEVEL_OUTOF10: 0

## 2018-07-07 ASSESSMENT — PAIN DESCRIPTION - ORIENTATION: ORIENTATION: LEFT

## 2018-07-07 ASSESSMENT — PAIN DESCRIPTION - LOCATION: LOCATION: BACK

## 2018-07-07 ASSESSMENT — PAIN DESCRIPTION - DIRECTION: RADIATING_TOWARDS: CHEST

## 2018-07-07 NOTE — PROGRESS NOTES
Pulmonary Progress Note    Admit Date: 7/3/2018  Hospital day                               PCP: Kaya Barajas MD    Chief Complaint (s):  Patient Active Problem List   Diagnosis    Tachycardia    HTN (hypertension)    Hypoxia    [facility]    Cryptogenic organizing pneumonia [facility]    Aspiration pneumonia ([facility]    Pneumothorax, left    Cardiomyopathy [facility]    NSTEMI (non-ST elevated myocardial infarction) ([facility]       Subjective:  - Awake and alert, Doing well. Vitals:  VITALS:  BP (!) 100/54   Pulse 79   Temp 97.8 °F (36.6 °C) (Oral)   Resp 19   Ht 5' 6\" (1.676 m)   Wt 162 lb 3.2 oz (73.6 kg)   SpO2 92%   BMI 26.18 kg/m²     24HR INTAKE/OUTPUT:      Intake/Output Summary (Last 24 hours) at 18 1457  Last data filed at 18 1000   Gross per 24 hour   Intake            409.2 ml   Output              250 ml   Net            159.2 ml       24HR PULSE OXIMETRY RANGE:    SpO2  Av %  Min: 92 %  Max: 96 %    Medications:  IV:   heparin (porcine) 16 Units/kg/hr (18 0751)       Scheduled Meds:   metoprolol succinate  12.5 mg Oral Daily    atorvastatin  40 mg Oral Nightly    sodium chloride flush  10 mL Intravenous 2 times per day    famotidine  20 mg Oral BID    aspirin  81 mg Oral QAM    calcium elemental  1,000 mg Oral QAM    chlorhexidine  15 mL Mouth/Throat BID    vitamin D  2,000 Units Oral QAM    raloxifene  60 mg Oral QAM    sertraline  50 mg Oral Nightly    budesonide  250 mcg Nebulization BID    albuterol  2.5 mg Nebulization 4x daily       Diet:   DIET CARDIAC; Honey Thick     EXAM:  General: No distress. Alert. Eyes: PERRL. No sclera icterus. No conjunctival injection. ENT: No discharge. Pharynx clear. Neck: Trachea midline. Normal thyroid. Resp: No accessory muscle use. No rales. No wheezing. No rhonchi. CV: Regular rate. Regular rhythm. No murmur or rub. Abd: Non-tender. Non-distended.  No communicated   directly with Dr. Candis Gutiérrez at approximately 7/5/2018 11:38 AM .   1. Left-sided pleural catheter with persistent left pneumothorax. 2. Stable, enlarged cardiomediastinal silhouette. .      XR CHEST PORTABLE   Final Result      Relatively stable appearance of the chest, slightly improved aeration   of the lungs. Fibrotic change of the right upper lobe and heterogeneous opacity left   midlung is unchanged. No pneumothorax. XR CHEST PORTABLE   Final Result   Findings compatible with emphysema   Tortuous ectatic aorta   Probable infiltrate at the left lung base suspicious for pneumonia                  XR CHEST PORTABLE   Final Result   1. Interval decrease in size of previously identified left   pneumothorax status post chest tube placement. 2.  Persistent mildly spiculated left upper lobe bandlike opacity. If   not already obtained, pulmonary consultation should be considered. XR CHEST PORTABLE   Final Result   Left pneumothorax with estimated volume of approximately 40-50%. Small bilateral pleural effusions. Nonspecific hazy reticular opacities in the right lower lung, which   may be related to edema, interstitial infiltrate, or chronic fibrosis. Critical finding of pneumothorax reported to Dr. Yoana Mukherjee of the [facility]   ED at approximately 1210 hours on 7/3/2018. Dr. Yoana Mukherjee was already   aware of the finding. XR CHEST PORTABLE    (Results Pending)       Assessment:  1. COPD with severe bronchiectasis  2. Pseudomonas colonization with intermittent infection  3. No evidence of NTM at this time  4. Spontaneous pneumothorax: This a.m. chest x-ray shows resolution  5. Abnormal echocardiogram with positive cardiac enzymes: Motion abnormalities suggest Takatsubo cardiomyopathy. Beta agonist use may aggravate this. Plan:  1. Chest tube Removed at the bedside without incident  2. Chest radial graft in the morning. If no PTX, remove chest tube.   3. Cardiac cath on

## 2018-07-07 NOTE — PROGRESS NOTES
INPATIENT CARDIOLOGY FOLLOW-UP    Name: Aurelia Parikh    Age: 67 y.o. Date of Admission: 7/3/2018 10:54 AM    Date of Service: 7/7/2018    Chief Complaint: Follow-up for cardiomyopathy ,NSTEMI    Interim History:  No new overnight cardiac complaints. Currently with no complaints of CP, SOB, palpitations, dizziness, or lightheadedness. SR on telemetry. Review of Systems:   Cardiac: As per HPI  General: No fever, chills  Pulmonary: As per HPI  HEENT: No visual disturbances, difficult swallowing  GI: No nausea, vomiting  Endocrine: No thyroid disease or DM  Musculoskeletal: CESPEDES x 4, no focal motor deficits  Skin: Intact, no rashes  Neuro/Psych: No headache or seizures    Problem List:  Active Problems:    HTN (hypertension)    COPD (chronic obstructive pulmonary disease) [facility]    Cryptogenic organizing pneumonia (HCC)    Pneumothorax, left    Cardiomyopathy ([facility]    NSTEMI (non-ST elevated myocardial infarction) ([facility]  Resolved Problems:    * No resolved hospital problems. *      Allergies:   Allergies   Allergen Reactions    Dye [Iodides]        Current Medications:  Current Facility-Administered Medications   Medication Dose Route Frequency Provider Last Rate Last Dose    heparin (porcine) injection 4,360 Units  60 Units/kg Intravenous PRN Laci Brooks MD        heparin (porcine) injection 2,180 Units  30 Units/kg Intravenous PRN Laci Brooks MD   2,180 Units at 07/07/18 0750    heparin 25,000 units in dextrose 5% 250 mL infusion  12 Units/kg/hr Intravenous Continuous Laci Brooks MD 11.6 mL/hr at 07/07/18 1528 16 Units/kg/hr at 07/07/18 1528    metoprolol succinate (TOPROL XL) extended release tablet 12.5 mg  12.5 mg Oral Daily Alejandrina Weiner MD   12.5 mg at 07/07/18 0905    atorvastatin (LIPITOR) tablet 40 mg  40 mg Oral Nightly Alejandrina Weiner MD   40 mg at 07/06/18 2046    HYDROcodone-acetaminophen (NORCO) 5-325 MG per tablet 1 tablet  1 tablet Oral Q6H PRN MISHA Marsh - for: TSHFT4, TSH  No results found for: LABA1C  No results found for: EAG  Lab Results   Component Value Date    CHOL 138 07/06/2018     Lab Results   Component Value Date    TRIG 91 07/06/2018     Lab Results   Component Value Date    HDL 67 07/06/2018     Lab Results   Component Value Date    LDLCALC 53 07/06/2018     Lab Results   Component Value Date    LABVLDL 18 07/06/2018     No results found for: [facility]    Cardiac Tests:  ECG:    TTE- 7/3/2018  Technically difficult examination due to COPD and underlying left   pneumothorax. Definity contrast was used to delineate endocardial borders.   Left ventricle is normal in size .   Ejection fraction is visually estimated at 30-35%.   Mid to apical segments are akinetic with hyperdynamic basal segments   suggests stress/Takostsubo cardiomyopathy.   Normal left ventricle wall thickness.   Stage I diastolic dysfunction.   Normal right ventricular size and function.   No significant valvular abnormalities.   Pulmonary hypertension is moderate .   No evidence for hemodynamically significant pericardial effusion.   No previous echo for comparison.     Repeat echo- showed no significant change in the LV function. No LV thrombus but LV apex looks akinetic     Assessment:  1. Elevated troponin in the presence of acute pneumothorax /chest pain  ? Non ST elevation MI vs. Stress cardiomyopathy. 2. New LV dysfunction EF 30-35% - possuble stress cardiomyopathy, Angiogram 08/09/18. AUC score 8  indication 4.     3.  S/P chest tube for large left pneumothorax improving but pneumothorax was not resolved. 4. History of severe COPD on home O2  5. History of hypertension well controlled     Plan  1    cath on Monday.   2    Continue metoprolol  May add ACEI pending course  3    Continue Lipitor and aspirin  4    Rx COPD and pneumothorax as Dr. Dolly Miller MD  [facility]) Cardiology        Lab Results   Component Value Date     07/07/2018

## 2018-07-08 ENCOUNTER — APPOINTMENT (OUTPATIENT)
Dept: GENERAL RADIOLOGY | Age: 73
DRG: 280 | End: 2018-07-08
Payer: MEDICARE

## 2018-07-08 LAB
APTT: 60.9 SEC (ref 24.5–35.1)
BLOOD CULTURE, ROUTINE: NORMAL
CULTURE, BLOOD 2: NORMAL

## 2018-07-08 PROCEDURE — 6370000000 HC RX 637 (ALT 250 FOR IP): Performed by: NURSE PRACTITIONER

## 2018-07-08 PROCEDURE — 6370000000 HC RX 637 (ALT 250 FOR IP): Performed by: INTERNAL MEDICINE

## 2018-07-08 PROCEDURE — 99231 SBSQ HOSP IP/OBS SF/LOW 25: CPT | Performed by: NURSE PRACTITIONER

## 2018-07-08 PROCEDURE — 2060000000 HC ICU INTERMEDIATE R&B

## 2018-07-08 PROCEDURE — 6370000000 HC RX 637 (ALT 250 FOR IP): Performed by: FAMILY MEDICINE

## 2018-07-08 PROCEDURE — 71045 X-RAY EXAM CHEST 1 VIEW: CPT

## 2018-07-08 PROCEDURE — 85730 THROMBOPLASTIN TIME PARTIAL: CPT

## 2018-07-08 PROCEDURE — 2700000000 HC OXYGEN THERAPY PER DAY

## 2018-07-08 PROCEDURE — 6360000002 HC RX W HCPCS: Performed by: INTERNAL MEDICINE

## 2018-07-08 PROCEDURE — 36415 COLL VENOUS BLD VENIPUNCTURE: CPT

## 2018-07-08 PROCEDURE — 94640 AIRWAY INHALATION TREATMENT: CPT

## 2018-07-08 RX ADMIN — ALBUTEROL SULFATE 2.5 MG: 2.5 SOLUTION RESPIRATORY (INHALATION) at 07:49

## 2018-07-08 RX ADMIN — Medication 1000 MG: at 08:50

## 2018-07-08 RX ADMIN — ASPIRIN 81 MG: 81 TABLET, COATED ORAL at 08:50

## 2018-07-08 RX ADMIN — ALBUTEROL SULFATE 2.5 MG: 2.5 SOLUTION RESPIRATORY (INHALATION) at 12:13

## 2018-07-08 RX ADMIN — ALBUTEROL SULFATE 2.5 MG: 2.5 SOLUTION RESPIRATORY (INHALATION) at 16:17

## 2018-07-08 RX ADMIN — HEPARIN SODIUM AND DEXTROSE 16 UNITS/KG/HR: 10000; 5 INJECTION INTRAVENOUS at 12:25

## 2018-07-08 RX ADMIN — CHLORHEXIDINE GLUCONATE 15 ML: 1.2 RINSE ORAL at 08:50

## 2018-07-08 RX ADMIN — SERTRALINE 50 MG: 50 TABLET, FILM COATED ORAL at 20:59

## 2018-07-08 RX ADMIN — CHLORHEXIDINE GLUCONATE 15 ML: 1.2 RINSE ORAL at 20:59

## 2018-07-08 RX ADMIN — FAMOTIDINE 20 MG: 20 TABLET ORAL at 08:50

## 2018-07-08 RX ADMIN — RALOXIFENE HYDROCHLORIDE 60 MG: 60 TABLET, FILM COATED ORAL at 08:50

## 2018-07-08 RX ADMIN — ALBUTEROL SULFATE 2.5 MG: 2.5 SOLUTION RESPIRATORY (INHALATION) at 20:08

## 2018-07-08 RX ADMIN — DIAZEPAM 10 MG: 5 TABLET ORAL at 21:03

## 2018-07-08 RX ADMIN — METOPROLOL SUCCINATE 12.5 MG: 25 TABLET, FILM COATED, EXTENDED RELEASE ORAL at 08:50

## 2018-07-08 RX ADMIN — ATORVASTATIN CALCIUM 40 MG: 40 TABLET, FILM COATED ORAL at 20:59

## 2018-07-08 RX ADMIN — BUDESONIDE 250 MCG: 0.25 SUSPENSION RESPIRATORY (INHALATION) at 07:49

## 2018-07-08 RX ADMIN — FAMOTIDINE 20 MG: 20 TABLET ORAL at 20:59

## 2018-07-08 RX ADMIN — Medication 2000 UNITS: at 08:50

## 2018-07-08 RX ADMIN — BUDESONIDE 250 MCG: 0.25 SUSPENSION RESPIRATORY (INHALATION) at 20:08

## 2018-07-08 RX ADMIN — HYDROCODONE BITARTRATE AND ACETAMINOPHEN 1 TABLET: 5; 325 TABLET ORAL at 17:22

## 2018-07-08 ASSESSMENT — PAIN SCALES - GENERAL
PAINLEVEL_OUTOF10: 4
PAINLEVEL_OUTOF10: 0

## 2018-07-08 ASSESSMENT — PAIN DESCRIPTION - PAIN TYPE: TYPE: ACUTE PAIN

## 2018-07-08 ASSESSMENT — PAIN DESCRIPTION - FREQUENCY: FREQUENCY: INTERMITTENT

## 2018-07-08 ASSESSMENT — PAIN DESCRIPTION - LOCATION: LOCATION: BACK;CHEST;HEAD

## 2018-07-08 ASSESSMENT — PAIN DESCRIPTION - DESCRIPTORS: DESCRIPTORS: ACHING;DISCOMFORT

## 2018-07-08 NOTE — PROGRESS NOTES
right upper lobe airspace disease. 4. Unchanged positioning of a left-sided chest tube, as above. ALERT:  THIS IS AN ABNORMAL REPORT      XR CHEST PORTABLE   Final Result   ALERT:  THIS IS AN ABNORMAL REPORT. Findings communicated   directly with Dr. Alison Nguyen at approximately 7/5/2018 11:38 AM .   1. Left-sided pleural catheter with persistent left pneumothorax. 2. Stable, enlarged cardiomediastinal silhouette. .      XR CHEST PORTABLE   Final Result      Relatively stable appearance of the chest, slightly improved aeration   of the lungs. Fibrotic change of the right upper lobe and heterogeneous opacity left   midlung is unchanged. No pneumothorax. XR CHEST PORTABLE   Final Result   Findings compatible with emphysema   Tortuous ectatic aorta   Probable infiltrate at the left lung base suspicious for pneumonia                  XR CHEST PORTABLE   Final Result   1. Interval decrease in size of previously identified left   pneumothorax status post chest tube placement. 2.  Persistent mildly spiculated left upper lobe bandlike opacity. If   not already obtained, pulmonary consultation should be considered. XR CHEST PORTABLE   Final Result   Left pneumothorax with estimated volume of approximately 40-50%. Small bilateral pleural effusions. Nonspecific hazy reticular opacities in the right lower lung, which   may be related to edema, interstitial infiltrate, or chronic fibrosis. Critical finding of pneumothorax reported to Dr. Tao See of the [facility]   ED at approximately 1210 hours on 7/3/2018. Dr. Tao See was already   aware of the finding. XR CHEST PORTABLE    (Results Pending)       Assessment:  1. COPD with severe bronchiectasis  2. Pseudomonas colonization with intermittent infection, now resistant to fluoroquinolones  3. No evidence of NTM at this time  4. Spontaneous pneumothorax: This a.m. chest x-ray shows resolution  5.  Abnormal echocardiogram with positive cardiac

## 2018-07-08 NOTE — PROGRESS NOTES
Inpatient Cardiology Progress note     PATIENT IS BEING FOLLOWED FOR: Cardiomyopathy and elevated troponin    Maya Carroll is a 67 y. o. female seen in initial consultation by Dr Ray Olivo 7/3/2018     SUBJECTIVE: Denies CP. SOB at baseline. Moist cough continues  OBJECTIVE: No apparent distress     ROS:  Consist: Denies fevers, chills or night sweats  Heart: Denies chest pain, palpitations, lightheadedness, dizziness or syncope  Lungs: Denies SOB, cough, wheezing, orthopnea or PND  GI: Denies abdominal pain, vomiting or diarrhea    PHYSICAL EXAM:   /63   Pulse 76   Temp 98.1 °F (36.7 °C) (Oral)   Resp 18   Ht 5' 6\" (1.676 m)   Wt 162 lb 3.2 oz (73.6 kg)   SpO2 92%   BMI 26.18 kg/m²    B/P Range last 24 hours: Systolic (14WUP), IMI:828 , Min:102 , GZP:221    Diastolic (02JDY), DUK:47, Min:56, Max:63    CONST: Well developed, elderly  female who appears stated age. Awake, alert and cooperative. No apparent distress  HEENT:   Head- Normocephalic, atraumatic   Eyes- Conjunctivae pink, anicteric  Throat- Oral mucosa pink and moist  Neck-  No stridor, trachea midline, no jugular venous distention. No carotid bruit  CHEST: Chest symmetrical and non-tender to palpation. No accessory muscle use or intercostal retractions  RESPIRATORY:  Lung sounds - rhonchi posteriorly no wheezing, on NC O2.   CARDIOVASCULAR:     Heart Ausculation- Regular rate and rhythm, no murmur. No s3, s4 or rub   PV: No lower extremity edema. No varicosities. Pedal pulses palpable, no clubbing or cyanosis   ABDOMEN: Soft, non-tender to light palpation. Bowel sounds present. No palpable masses; no abdominal bruit  MS: Good muscle strength and tone. No atrophy or abnormal movements. : Deferred  SKIN: Warm and dry no statis dermatitis or ulcers   NEURO / PSYCH: Oriented to person, place and time. Speech clear and appropriate. Follows all commands.  Pleasant affect       Intake/Output Summary (Last 24 hours) at 07/08/18 [address] filed at 07/08/18 1053   Gross per 24 hour   Intake              360 ml   Output              575 ml   Net             -215 ml       Weight:   Wt Readings from Last 3 Encounters:   07/08/18 162 lb 3.2 oz (73.6 kg)   07/06/17 157 lb (71.2 kg)   05/14/17 160 lb (72.6 kg)     Current Inpatient Medications:   metoprolol succinate  12.5 mg Oral Daily    atorvastatin  40 mg Oral Nightly    sodium chloride flush  10 mL Intravenous 2 times per day    famotidine  20 mg Oral BID    aspirin  81 mg Oral QAM    calcium elemental  1,000 mg Oral QAM    chlorhexidine  15 mL Mouth/Throat BID    vitamin D  2,000 Units Oral QAM    raloxifene  60 mg Oral QAM    sertraline  50 mg Oral Nightly    budesonide  250 mcg Nebulization BID    albuterol  2.5 mg Nebulization 4x daily       IV Infusions (if any):   heparin (porcine) 16 Units/kg/hr (07/08/18 1225)       DIAGNOSTIC/ LABORATORY DATA:  Labs:   CBC:   Recent Labs      07/07/18   0620   PLT  151     BMP: Recent Labs      07/07/18   0620   NA  143   K  3.5   CO2  26   BUN  20   CREATININE  0.7   LABGLOM  >60   CALCIUM  8.3*     APTT:  Recent Labs      07/07/18   0620  07/07/18   1430   APTT  47.1*  59.1*     FASTING LIPID PANEL:  Lab Results   Component Value Date    CHOL 138 07/06/2018    HDL 67 07/06/2018    LDLCALC 53 07/06/2018    TRIG 91 07/06/2018       CXR 7/8/2018: Stable bibasilar opacities. Differential includes atelectasis/scarring, infection, and/or layering pleural effusions    Telemetry: SR with PVC's    TTE 7/3/2018 Dr Savita Agrawal: Technically difficult examination due to COPD and underlying left pneumothorax.  Definity contrast was used to delineate endocardial borders. Left ventricle is normal in size. EF visually estimated at 30-35%. Mid to apical segments are akinetic with hyperdynamic basal segments suggests stress/Takostsubo cardiomyopathy. Normal left ventricle wall thickness. Stage I DD. Normal right ventricular size and function.   No

## 2018-07-08 NOTE — PROGRESS NOTES
Subjective: The patient is awake and alert. No problems overnight. Denies chest pain, angina, and dyspnea. Denies abdominal pain. Tolerating diet. No nausea or vomiting. Objective:    Vitals:  /63   Pulse 76   Temp 98.1 °F (36.7 °C) (Oral)   Resp 18   Ht 5' 6\" (1.676 m)   Wt 162 lb 3.2 oz (73.6 kg)   SpO2 92%   BMI 26.18 kg/m²     Physical Exam:  Heart:  RRR, no murmurs, gallops, or rubs. Lungs: scattered rhonchi and wheezing. breath sounds LLL improved  Abd: bowel sounds present, nontender, nondistended, no masses  Extrem:  No clubbing, cyanosis, or edema    Labs:  CBC:   Lab Results   Component Value Date    WBC 8.6 2018    RBC 4.12 2018    HGB 11.6 2018    HCT 36.4 2018    MCV 88.3 2018    MCH 28.2 2018    MCHC 31.9 2018    RDW 13.6 2018     2018    MPV 10.0 2018     BMP:    Lab Results   Component Value Date     2018    K 3.5 2018    K 3.6 2018     2018    CO2 26 2018    BUN 20 2018    LABALBU 3.5 2018    CREATININE 0.7 2018    CALCIUM 8.3 2018    GFRAA >60 2018    LABGLOM >60 2018    GLUCOSE 102 2018        Radiology:  Xr Chest Portable    Result Date: 2018  Patient MRN:  [id number] : 1945 Age: 67 years Gender: Female Order Date:  2018 11:15 AM EXAM: XR CHEST PORTABLE NUMBER OF IMAGES:  1 INDICATION:  water seal chest tube water seal chest tube COMPARISON: 2018, July 3, 2018 Result: Lines and tubes: There is a left-sided chest tube, unchanged in position. Lungs and Pleura: Fibrotic change and bronchiectasis of the right upper lobe. Heterogeneous opacity in the left midlung is unchanged. There is slightly improved aeration of lungs bilaterally, a small left pleural effusion remains. No pneumothorax. Cardiomediastinal silhouette: Stable appearance of the cardiomediastinal silhouette. Other: Bony thorax is unchanged. Relatively stable appearance of the chest, slightly improved aeration of the lungs. Fibrotic change of the right upper lobe and heterogeneous opacity left midlung is unchanged. No pneumothorax. Xr Chest Portable    Result Date: 2018  Patient MRN: [id number] : 1945 Age:  67 years Gender: Female Order Date: 2018 8:30 AM Exam: XR CHEST PORTABLE Number of Images: 1 view Indication:   respiratory distress respiratory distress Comparison: 7/3/2018 Findings: The heart is unremarkable. The lung fields are hyperinflated. Density is seen in the lung fields suggesting scar. The aorta is tortuous and ectatic. There is a suggestion of an infiltrate at the left lung base    Findings compatible with emphysema Tortuous ectatic aorta Probable infiltrate at the left lung base suspicious for pneumonia     Xr Chest Portable    Result Date: 7/3/2018  Patient MRN:  [id number] : 1945 Age: 67 years Gender: Female Order Date:  7/3/2018 12:15 PM EXAM: XR CHEST PORTABLE NUMBER OF IMAGES:  2, VIEWS:  1 INDICATION: Chest Tube Placement. COMPARISON: Chest x-ray dated 7/3/2018 at 1147. Chest CT dated 10/30/2017. Findings: There has been interval placement of a left-sided chest tube. The lungs again show increased and irregular radiolucency compatible with emphysema/COPD. A linear lucent lucent area seen along the medial aspect of the left lung at the interface of the pleural surface and the mediastinum suggesting a small, approximately 10% or less, pneumothorax. A mildly spiculated left upper lobe bandlike opacity again seen. The pulmonary vasculature and cardiomediastinal silhouette appear within normal limits. 1.  Interval decrease in size of previously identified left pneumothorax status post chest tube placement. 2.  Persistent mildly spiculated left upper lobe bandlike opacity. If not already obtained, pulmonary consultation should be considered.      Xr Chest Portable    Result Date: 7/3/2018  Patient MRN: [id number] : 1945 Age: 67 years Gender: Female Order Date:  7/3/2018 11:15 AM Exam: XR CHEST PORTABLE Number of views:  1 portable AP upright view of the chest Indication: Shortness of breath Comparison: Chest CT dated 10/30/2017. Chest radiograph dated 2015. FINDINGS:  There is a left pneumothorax with estimated volume of approximately 40-50%. There are small bilateral pleural effusions. There are hazy reticular opacities in the right lower lung. Left pneumothorax with estimated volume of approximately 40-50%. Small bilateral pleural effusions. Nonspecific hazy reticular opacities in the right lower lung, which may be related to edema, interstitial infiltrate, or chronic fibrosis. Critical finding of pneumothorax reported to Dr. Neymar Montez of the [facility] ED at approximately 1210 hours on 7/3/2018. Dr. Neymar Montez was already aware of the finding.        Assessment:    Patient Active Problem List   Diagnosis    Tachycardia    HTN (hypertension)    Hypoxia    COPD (chronic obstructive pulmonary disease) ([address]    Cryptogenic organizing pneumonia ([address]    Aspiration pneumonia ([address]    Pneumothorax, left    Cardiomyopathy ([address]    NSTEMI (non-ST elevated myocardial infarction) [facility])       Plan:for left heart cath tomorrow            Electronically signed by Marisa Melendez MD on 2018 at 2:01 PM

## 2018-07-09 ENCOUNTER — HOSPITAL ENCOUNTER (OUTPATIENT)
Age: 73
Discharge: HOME | End: 2018-07-09
Payer: MEDICARE

## 2018-07-09 ENCOUNTER — APPOINTMENT (OUTPATIENT)
Dept: GENERAL RADIOLOGY | Age: 73
DRG: 280 | End: 2018-07-09
Payer: MEDICARE

## 2018-07-09 LAB
ABO/RH: NORMAL
ANION GAP SERPL CALCULATED.3IONS-SCNC: 13 MMOL/L (ref 7–16)
ANTIBODY SCREEN: NORMAL
BUN BLDV-MCNC: 12 MG/DL (ref 8–23)
CALCIUM SERPL-MCNC: 8.5 MG/DL (ref 8.6–10.2)
CHLORIDE BLD-SCNC: 102 MMOL/L (ref 98–107)
CO2: 25 MMOL/L (ref 22–29)
CREAT SERPL-MCNC: 0.8 MG/DL (ref 0.5–1)
GFR AFRICAN AMERICAN: >60
GFR NON-AFRICAN AMERICAN: >60 ML/MIN/1.73
GLUCOSE BLD-MCNC: 98 MG/DL (ref 74–109)
HCT VFR BLD CALC: 36.1 % (ref 34–48)
HEMOGLOBIN: 11.5 G/DL (ref 11.5–15.5)
MCH RBC QN AUTO: 27.8 PG (ref 26–35)
MCHC RBC AUTO-ENTMCNC: 31.9 % (ref 32–34.5)
MCV RBC AUTO: 87.4 FL (ref 80–99.9)
PDW BLD-RTO: 13.7 FL (ref 11.5–15)
PLATELET # BLD: 175 E9/L (ref 130–450)
PMV BLD AUTO: 10 FL (ref 7–12)
POTASSIUM SERPL-SCNC: 3.8 MMOL/L (ref 3.5–5)
RBC # BLD: 4.13 E12/L (ref 3.5–5.5)
SODIUM BLD-SCNC: 140 MMOL/L (ref 132–146)
WBC # BLD: 6.8 E9/L (ref 4.5–11.5)

## 2018-07-09 PROCEDURE — 80048 BASIC METABOLIC PNL TOTAL CA: CPT

## 2018-07-09 PROCEDURE — 85027 COMPLETE CBC AUTOMATED: CPT

## 2018-07-09 PROCEDURE — 6370000000 HC RX 637 (ALT 250 FOR IP): Performed by: FAMILY MEDICINE

## 2018-07-09 PROCEDURE — 36415 COLL VENOUS BLD VENIPUNCTURE: CPT

## 2018-07-09 PROCEDURE — 2709999900 HC NON-CHARGEABLE SUPPLY

## 2018-07-09 PROCEDURE — C1769 GUIDE WIRE: HCPCS

## 2018-07-09 PROCEDURE — 71045 X-RAY EXAM CHEST 1 VIEW: CPT

## 2018-07-09 PROCEDURE — 6370000000 HC RX 637 (ALT 250 FOR IP): Performed by: INTERNAL MEDICINE

## 2018-07-09 PROCEDURE — 93458 L HRT ARTERY/VENTRICLE ANGIO: CPT | Performed by: INTERNAL MEDICINE

## 2018-07-09 PROCEDURE — 94667 MNPJ CHEST WALL 1ST: CPT

## 2018-07-09 PROCEDURE — 4A023N7 MEASUREMENT OF CARDIAC SAMPLING AND PRESSURE, LEFT HEART, PERCUTANEOUS APPROACH: ICD-10-PCS | Performed by: INTERNAL MEDICINE

## 2018-07-09 PROCEDURE — B2111ZZ FLUOROSCOPY OF MULTIPLE CORONARY ARTERIES USING LOW OSMOLAR CONTRAST: ICD-10-PCS | Performed by: INTERNAL MEDICINE

## 2018-07-09 PROCEDURE — 2700000000 HC OXYGEN THERAPY PER DAY

## 2018-07-09 PROCEDURE — 6370000000 HC RX 637 (ALT 250 FOR IP): Performed by: NURSE PRACTITIONER

## 2018-07-09 PROCEDURE — C1894 INTRO/SHEATH, NON-LASER: HCPCS

## 2018-07-09 PROCEDURE — 86901 BLOOD TYPING SEROLOGIC RH(D): CPT

## 2018-07-09 PROCEDURE — 99232 SBSQ HOSP IP/OBS MODERATE 35: CPT | Performed by: INTERNAL MEDICINE

## 2018-07-09 PROCEDURE — 2500000003 HC RX 250 WO HCPCS

## 2018-07-09 PROCEDURE — 86850 RBC ANTIBODY SCREEN: CPT

## 2018-07-09 PROCEDURE — B2151ZZ FLUOROSCOPY OF LEFT HEART USING LOW OSMOLAR CONTRAST: ICD-10-PCS | Performed by: INTERNAL MEDICINE

## 2018-07-09 PROCEDURE — A0425 GROUND MILEAGE: HCPCS

## 2018-07-09 PROCEDURE — 2580000003 HC RX 258: Performed by: FAMILY MEDICINE

## 2018-07-09 PROCEDURE — 94640 AIRWAY INHALATION TREATMENT: CPT

## 2018-07-09 PROCEDURE — 86900 BLOOD TYPING SEROLOGIC ABO: CPT

## 2018-07-09 PROCEDURE — A0426 ALS 1: HCPCS

## 2018-07-09 PROCEDURE — 6360000002 HC RX W HCPCS

## 2018-07-09 PROCEDURE — 2060000000 HC ICU INTERMEDIATE R&B

## 2018-07-09 RX ADMIN — CHLORHEXIDINE GLUCONATE 15 ML: 1.2 RINSE ORAL at 20:02

## 2018-07-09 RX ADMIN — ALBUTEROL SULFATE 2.5 MG: 2.5 SOLUTION RESPIRATORY (INHALATION) at 20:31

## 2018-07-09 RX ADMIN — RALOXIFENE HYDROCHLORIDE 60 MG: 60 TABLET, FILM COATED ORAL at 14:53

## 2018-07-09 RX ADMIN — FAMOTIDINE 20 MG: 20 TABLET ORAL at 20:01

## 2018-07-09 RX ADMIN — HYDROCODONE BITARTRATE AND ACETAMINOPHEN 1 TABLET: 5; 325 TABLET ORAL at 20:01

## 2018-07-09 RX ADMIN — SERTRALINE 50 MG: 50 TABLET, FILM COATED ORAL at 20:01

## 2018-07-09 RX ADMIN — PREDNISONE 50 MG: 10 TABLET ORAL at 07:11

## 2018-07-09 RX ADMIN — ATORVASTATIN CALCIUM 40 MG: 40 TABLET, FILM COATED ORAL at 20:01

## 2018-07-09 RX ADMIN — Medication 10 ML: at 20:01

## 2018-07-09 RX ADMIN — Medication 2000 UNITS: at 14:53

## 2018-07-09 RX ADMIN — ACETAMINOPHEN 650 MG: 325 TABLET ORAL at 15:01

## 2018-07-09 RX ADMIN — DIAZEPAM 10 MG: 5 TABLET ORAL at 08:02

## 2018-07-09 RX ADMIN — BUDESONIDE 250 MCG: 0.25 SUSPENSION RESPIRATORY (INHALATION) at 20:31

## 2018-07-09 RX ADMIN — METOPROLOL SUCCINATE 12.5 MG: 25 TABLET, FILM COATED, EXTENDED RELEASE ORAL at 14:53

## 2018-07-09 RX ADMIN — ALBUTEROL SULFATE 2.5 MG: 2.5 SOLUTION RESPIRATORY (INHALATION) at 16:19

## 2018-07-09 RX ADMIN — Medication 10 ML: at 14:53

## 2018-07-09 RX ADMIN — Medication 1000 MG: at 14:53

## 2018-07-09 ASSESSMENT — PAIN SCALES - GENERAL
PAINLEVEL_OUTOF10: 0
PAINLEVEL_OUTOF10: 3
PAINLEVEL_OUTOF10: 7
PAINLEVEL_OUTOF10: 0

## 2018-07-09 NOTE — PROGRESS NOTES
LABA1C  No results found for: EAG  Lab Results   Component Value Date    CHOL 138 07/06/2018     Lab Results   Component Value Date    TRIG 91 07/06/2018     Lab Results   Component Value Date    HDL 67 07/06/2018     Lab Results   Component Value Date    LDLCALC 53 07/06/2018     Lab Results   Component Value Date    LABVLDL 18 07/06/2018     No results found for: CHOLHDLRATIO    Cardiac Tests:  ECG:    CXR 7/8/2018: Stable bibasilar opacities. Differential includes atelectasis/scarring, infection, and/or layering pleural effusions     Telemetry: SR with PVC's     TTE 7/3/2018 Dr Chris Panchal: Technically difficult examination due to COPD and underlying left pneumothorax. Definity contrast was used to delineate endocardial borders. Left ventricle is normal in size. EF visually estimated at 30-35%. Mid to apical segments are akinetic with hyperdynamic basal segments suggests stress/Takostsubo cardiomyopathy. Normal left ventricle wall thickness. Stage I DD. Normal right ventricular size and function.   No significant valvular abnormalities. Moderate PHTN. No evidence for hemodynamically significant pericardial effusion. No previous echo for comparison.     Limited TTE 7/5/2018 Dr Chris Panchal: Limited echo to evaluate LV function. Definity given during procedure to delineate endocardial borders. Left ventricle size is normal. Ejection fraction is visually estimated at 35-40%.  Hyperdynamic basal segments and severely hypokinetic mid to distal anterior wall, anterolateral and inferior and apical walls.   No evidence of left ventricular mass or thrombus noted. Normal right ventricular size and function.   Pulmonary hypertension is mild. Compared to prior echo, no changes noted.     ASSESSMENT:   1. Elevated troponin in setting of acute pneumothorax causing CP and TOWNSEND secondary to NSTEMI versus stress cardiomyopathy  2. New LV dysfunction EF 30-35%  Possible stress cardiomyopathy  3.  Severe COPD/emphysema/bronchiectasis on home O2

## 2018-07-09 NOTE — PROCEDURES
[address]                   [address] [place],  [facility]                              CARDIAC CATHETERIZATION    PATIENT NAME: Michael Lamb                       :        1945  MED REC NO:   [id number]                            ROOM:  ACCOUNT NO:   [de-identified]                           ADMIT DATE: 2018  PROVIDER:     Nighat Arnold MD    DATE OF PROCEDURE:  2018    PROCEDURE:  Left heart catheterization, selective coronary angiography, and  left ventriculography done through right radial approach. The patient received intravenous Versed for sedation. INDICATION FOR THE PROCEDURE:  Possible non-ST-elevation myocardial  infarction. PRESSURES:  1. Aorta 90/68 with a mean of 80.  2.  Left ventricular systolic pressure 736. Ventricular end-diastolic  pressure 8.  3.  There was no gradient across aortic valve. CORONARY ANGIOGRAPHY:  Left main:  The left main artery did not appear to have any significant  angiographic disease. LAD:  The left anterior descending artery did not appear to have any  significant angiographic disease. Intermediate ramus: This is a moderate size vessel which had a 50% mid  vessel narrowing: There was a mild aneurysmal dilatation before the mid  vessel concentric 50% narrowing. LCX:  Left circumflex is a codominant vessel with around 20% proximal  vessel narrowing. RCA:  The right coronary artery is a codominant vessel with around 40% mid  vessel disease. LEFT VENTRICULOGRAPHY:  The left ventricle is normal in size. There was  hypokinesis of the apical one-third of the anterolateral wall, the apex,  and the apical half of the inferior wall with an estimated ejection  fraction of 40%. There was no mitral regurgitation. The right radial arterial sheath was removed at the end of the procedure,  and a TR band was applied with adequate hemostasis and with preservation of  pulse.     The

## 2018-07-09 NOTE — PROGRESS NOTES
No masses. No organomegaly. Normal bowel sounds. Skin: Warm and dry. No nodule on exposed extremities. No rash on exposed extremities. Ext: No cyanosis, clubbing, edema  Lymph: No cervical LAD. No supraclavicular LAD. M/S: No cyanosis. No joint deformity. No clubbing. Neuro: Awake. Follows commands. Positive pupils/gag/corneals. Normal pain response. Results:  CBC:   Recent Labs      07/07/18   0620  07/09/18   0411   WBC   --   6.8   HGB   --   11.5   HCT   --   36.1   MCV   --   87.4   PLT  151  175     BMP:   Recent Labs      07/07/18   0620  07/09/18   0411   NA  143  140   K  3.5  3.8   CL  105  102   CO2  26  25   BUN  20  12   CREATININE  0.7  0.8     LIVER PROFILE:   No results for input(s): AST, ALT, LIPASE, BILIDIR, BILITOT, ALKPHOS in the last 72 hours. Invalid input(s): AMYLASE,  ALB  PT/INR: No results for input(s): PROTIME, INR in the last 72 hours. APTT:   Recent Labs      07/07/18   0620  07/07/18   1430  07/08/18   1500   APTT  47.1*  59.1*  60.9*         Pathology:  1. N/A      Microbiology:  Pseudomonas aeruginosa, note that this is now resistant to Levaquin, the antibiotic that has been used repeatedly by her [facility] pulmonary group. Recent ABG:   No results for input(s): PH, PO2, PCO2, HCO3, BE, O2SAT, METHB, O2HB, COHB, O2CON, HHB, THB in the last 72 hours. Recent Films:  XR CHEST PORTABLE   Final Result   Cardiomegaly   Findings compatible with atherosclerotic disease of the aorta. Stable density at the lung bases and at the right upper lobe                  XR CHEST PORTABLE   Final Result   Stable bibasilar opacities. Differential includes   atelectasis/scarring, infection, and/or layering pleural effusions.                            XR CHEST PORTABLE   Final Result   Findings compatible with emphysema   Tortuous ectatic aorta   Findings compatible with extensive scar throughout the lung fields   Left chest tube remains, no definite pneumothorax

## 2018-07-09 NOTE — OP NOTE
Indication:  1. Possible NSTEMI  2. AUC score: 8  3. AUC indication: 4    Procedure: Left Heart Catheterization, coronary angiography, left ventriculography    Anesthesia: Versed  Time sedation was administered: 09:33. I was present in the room when sedation was administered. Procedure end time: 10:02  Time spent with face to face monitoring of moderate sedation: 10:10    LHC performed via right radial approach using a 6 F sheath. 2.5mg of diluted Verapamil and 200mcg of nitroglycerine administered through the sheath. 5000 U heparin administered IV. Findings:  Left main: 0%  stenosis  LAD: 0 %  Stenosis  IR: 40% stenosis  Circumflex: 20-30 %   stenosis  RCA: Dominant. 30-40 %  stenosis  LV angio: 40%  ejection fraction    Hemodynamics:  LV: 110 mmHg. EDP: 8 No gradient across AV. Ao: 90/68 ( 80 ). Sheath removed and TR band applied. There was good hemostasis achieved and the distal pulses were intact.      Complication: None   Estimated blood loss: minimal  Contrast use: 72    Post op diagnosis:  Mild CAD  LV dysfunction likely secondary to stress induced cardiomyopathy     PLAN:  Medical therapy  Risk factor modification  Back to [facility] for continued management / FU pulmonary status    Electronically signed by Jim Son MD on 7/9/2018 at 10:26 AM

## 2018-07-10 VITALS
DIASTOLIC BLOOD PRESSURE: 57 MMHG | TEMPERATURE: 97.7 F | WEIGHT: 165.5 LBS | RESPIRATION RATE: 20 BRPM | HEART RATE: 86 BPM | OXYGEN SATURATION: 96 % | HEIGHT: 66 IN | BODY MASS INDEX: 26.6 KG/M2 | SYSTOLIC BLOOD PRESSURE: 101 MMHG

## 2018-07-10 PROBLEM — J93.9 PNEUMOTHORAX: Status: ACTIVE | Noted: 2018-07-10

## 2018-07-10 PROBLEM — J93.9 PNEUMOTHORAX: Status: RESOLVED | Noted: 2018-07-10 | Resolved: 2018-07-10

## 2018-07-10 LAB
ANION GAP SERPL CALCULATED.3IONS-SCNC: 12 MMOL/L (ref 7–16)
BUN BLDV-MCNC: 14 MG/DL (ref 8–23)
CALCIUM SERPL-MCNC: 8.6 MG/DL (ref 8.6–10.2)
CHLORIDE BLD-SCNC: 105 MMOL/L (ref 98–107)
CO2: 26 MMOL/L (ref 22–29)
CREAT SERPL-MCNC: 0.7 MG/DL (ref 0.5–1)
GFR AFRICAN AMERICAN: >60
GFR NON-AFRICAN AMERICAN: >60 ML/MIN/1.73
GLUCOSE BLD-MCNC: 120 MG/DL (ref 74–109)
POTASSIUM SERPL-SCNC: 3.7 MMOL/L (ref 3.5–5)
SODIUM BLD-SCNC: 143 MMOL/L (ref 132–146)

## 2018-07-10 PROCEDURE — 99231 SBSQ HOSP IP/OBS SF/LOW 25: CPT | Performed by: NURSE PRACTITIONER

## 2018-07-10 PROCEDURE — 6370000000 HC RX 637 (ALT 250 FOR IP): Performed by: FAMILY MEDICINE

## 2018-07-10 PROCEDURE — 2580000003 HC RX 258: Performed by: FAMILY MEDICINE

## 2018-07-10 PROCEDURE — 94640 AIRWAY INHALATION TREATMENT: CPT

## 2018-07-10 PROCEDURE — 6370000000 HC RX 637 (ALT 250 FOR IP): Performed by: NURSE PRACTITIONER

## 2018-07-10 PROCEDURE — 36415 COLL VENOUS BLD VENIPUNCTURE: CPT

## 2018-07-10 PROCEDURE — 80048 BASIC METABOLIC PNL TOTAL CA: CPT

## 2018-07-10 PROCEDURE — 2700000000 HC OXYGEN THERAPY PER DAY

## 2018-07-10 PROCEDURE — 6370000000 HC RX 637 (ALT 250 FOR IP): Performed by: INTERNAL MEDICINE

## 2018-07-10 PROCEDURE — 94668 MNPJ CHEST WALL SBSQ: CPT

## 2018-07-10 RX ORDER — ATORVASTATIN CALCIUM 40 MG/1
40 TABLET, FILM COATED ORAL NIGHTLY
Qty: 30 TABLET | Refills: 3 | Status: CANCELLED | OUTPATIENT
Start: 2018-07-10

## 2018-07-10 RX ORDER — METOPROLOL SUCCINATE 25 MG/1
12.5 TABLET, EXTENDED RELEASE ORAL DAILY
Qty: 30 TABLET | Refills: 3 | Status: SHIPPED | OUTPATIENT
Start: 2018-07-11

## 2018-07-10 RX ORDER — BUDESONIDE 0.25 MG/2ML
250 INHALANT ORAL 2 TIMES DAILY
Qty: 60 AMPULE | Refills: 3 | Status: CANCELLED | OUTPATIENT
Start: 2018-07-10

## 2018-07-10 RX ORDER — BUDESONIDE 0.25 MG/2ML
250 INHALANT ORAL 2 TIMES DAILY
Qty: 60 AMPULE | Refills: 3 | Status: SHIPPED | OUTPATIENT
Start: 2018-07-10

## 2018-07-10 RX ORDER — METOPROLOL SUCCINATE 25 MG/1
12.5 TABLET, EXTENDED RELEASE ORAL DAILY
Qty: 30 TABLET | Refills: 3 | Status: CANCELLED | OUTPATIENT
Start: 2018-07-10

## 2018-07-10 RX ORDER — ATORVASTATIN CALCIUM 40 MG/1
40 TABLET, FILM COATED ORAL NIGHTLY
Qty: 30 TABLET | Refills: 3 | Status: SHIPPED | OUTPATIENT
Start: 2018-07-10 | End: 2019-03-21 | Stop reason: SDUPTHER

## 2018-07-10 RX ORDER — DIAZEPAM 5 MG/1
10 TABLET ORAL NIGHTLY PRN
Qty: 30 TABLET | Refills: 0 | Status: SHIPPED | OUTPATIENT
Start: 2018-07-10 | End: 2018-07-20

## 2018-07-10 RX ADMIN — ALBUTEROL SULFATE 2.5 MG: 2.5 SOLUTION RESPIRATORY (INHALATION) at 12:21

## 2018-07-10 RX ADMIN — ALBUTEROL SULFATE 2.5 MG: 2.5 SOLUTION RESPIRATORY (INHALATION) at 09:35

## 2018-07-10 RX ADMIN — FAMOTIDINE 20 MG: 20 TABLET ORAL at 08:30

## 2018-07-10 RX ADMIN — Medication 1000 MG: at 11:52

## 2018-07-10 RX ADMIN — CHLORHEXIDINE GLUCONATE 15 ML: 1.2 RINSE ORAL at 08:31

## 2018-07-10 RX ADMIN — BUDESONIDE 250 MCG: 0.25 SUSPENSION RESPIRATORY (INHALATION) at 09:35

## 2018-07-10 RX ADMIN — Medication 2000 UNITS: at 08:30

## 2018-07-10 RX ADMIN — Medication 10 ML: at 08:31

## 2018-07-10 RX ADMIN — METOPROLOL SUCCINATE 12.5 MG: 25 TABLET, FILM COATED, EXTENDED RELEASE ORAL at 08:30

## 2018-07-10 RX ADMIN — HYDROCODONE BITARTRATE AND ACETAMINOPHEN 1 TABLET: 5; 325 TABLET ORAL at 11:51

## 2018-07-10 RX ADMIN — RALOXIFENE HYDROCHLORIDE 60 MG: 60 TABLET, FILM COATED ORAL at 11:51

## 2018-07-10 RX ADMIN — ASPIRIN 81 MG: 81 TABLET, COATED ORAL at 08:30

## 2018-07-10 ASSESSMENT — PAIN SCALES - GENERAL
PAINLEVEL_OUTOF10: 0

## 2018-07-10 NOTE — PROGRESS NOTES
(EVISTA) tablet 60 mg  60 mg Oral QAM Yolanda Hidalgo MD   60 mg at 18 1453    sertraline (ZOLOFT) tablet 50 mg  50 mg Oral Nightly Yolanda Hidalgo MD   50 mg at 18    diazepam (VALIUM) tablet 10 mg  10 mg Oral Q12H PRN Yolanda Hidalgo MD   10 mg at 18 08    budesonide (PULMICORT) nebulizer suspension 250 mcg  250 mcg Nebulization BID Giana Wood MISHA - CNP   250 mcg at 18    albuterol (PROVENTIL) nebulizer solution 2.5 mg  2.5 mg Nebulization 4x daily Edgerton Skipper, DO   2.5 mg at 18    albuterol (PROVENTIL) nebulizer solution 2.5 mg  2.5 mg Nebulization Q4H PRN Sobeida Skipper, DO             Objective:    Vital Signs:  BP (!) 99/56   Pulse 78   Temp 97.3 °F (36.3 °C) (Oral)   Resp 18   Ht 5' 6\" (1.676 m)   Wt 165 lb 8 oz (75.1 kg)   SpO2 95%   BMI 26.71 kg/m²   Temp: 97.3 °F (36.3 °C) Temp  Av.6 °F (36.4 °C)  Min: 97.3 °F (36.3 °C)  Max: 97.8 °F (87.4 °C)  Systolic (98JHB), BUX:787 , Min:99 , PTZ:517    Diastolic (35RGW), KCA:83, Min:55, Max:63      Intake/Output:    Intake/Output Summary (Last 24 hours) at 07/10/18 0353  Last data filed at 18 1900   Gross per 24 hour   Intake              360 ml   Output              100 ml   Net              260 ml     No intake/output data recorded. Weight:     Wt Readings from Last 3 Encounters:   07/10/18 165 lb 8 oz (75.1 kg)   17 157 lb (71.2 kg)   17 160 lb (72.6 kg)         Physical Exam:   General Appearance: alert and oriented to person, place and time, well developed and well- nourished, in no acute distress  Pulmonary/Chest:  Bilateral air entry is equal - occasional rhonchi.   Cardiovascular: normal rate, regular rhythm, normal S1 and S2, no murmurs,  Abdomen: soft, non-tender, non-distended, normal bowel sounds, no masses or organomegaly,  Extremities: no cyanosis, clubbing or edema      Labs:    CBC:   Recent Labs      18   0620  18   0411   WBC   --   6.8 Active Problem List   Diagnosis Code    Tachycardia R00.0    HTN (hypertension) I10    Hypoxia R09.02    COPD (chronic obstructive pulmonary disease) (HCC) J44.9    Cryptogenic organizing pneumonia ([facility] J84.116    [facility] J69.0    Pneumothorax, left J93.9    Cardiomyopathy ([facility] I42.9    NSTEMI (non-ST elevated myocardial infarction) ([facility] I21.4       Plan:     Discharge home    All chart data thoroughly reviewed during today's visit. Case discussed with Dr. Charmaine Rosenthal, who agrees with the above plan.     Electronically signed by MISHA Lundberg CNP on 7/10/2018 at 3:53 AM

## 2018-07-10 NOTE — PROGRESS NOTES
Inpatient Cardiology Progress note     PATIENT IS BEING FOLLOWED FOR: Cardiomyopathy and elevated troponin    Cary Sellers is a 67 y. o. female seen in initial consultation by Dr Pineda Dose 7/3/2018     SUBJECTIVE: No CP, SOB or dizziness. OBJECTIVE: No apparent distress while sitting up in bed. ROS:  Consist: Denies fevers, chills or night sweats  Heart: Denies chest pain, palpitations, lightheadedness, dizziness or syncope  Lungs: Denies SOB, cough, wheezing, orthopnea or PND  GI: Denies abdominal pain, vomiting or diarrhea    PHYSICAL EXAM:   BP (!) 101/57   Pulse 86   Temp 97.7 °F (36.5 °C) (Oral)   Resp 20   Ht 5' 6\" (1.676 m)   Wt 165 lb 8 oz (75.1 kg)   SpO2 96%   BMI 26.71 kg/m²    B/P Range last 24 hours: Systolic (22YTJ), KRH:772 , Min:99 , GPS:424    Diastolic (10URL), SFP:35, Min:56, Max:63  CONST: Well developed, elderly  female who appears stated age. Awake, alert and cooperative. No apparent distress  HEENT:   Head- Normocephalic, atraumatic   Eyes- Conjunctivae pink, anicteric  Throat- Oral mucosa pink and moist  Neck-  No stridor, trachea midline, no jugular venous distention. No carotid bruit  CHEST: Chest symmetrical and non-tender to palpation. No accessory muscle use or intercostal retractions. Dressing D&I on L upper flank, no Sub-q emphysema noted. RESPIRATORY:  Lung sounds - rhonchi posteriorly no wheezing, on NC O2.   CARDIOVASCULAR:     Heart Ausculation- Regular rate and rhythm, no murmur. No s3, s4 or rub   PV: No lower extremity edema. No varicosities. Pedal pulses palpable, no clubbing or cyanosis   ABDOMEN: Soft, non-tender to light palpation. Bowel sounds present. No palpable masses; no abdominal bruit  MS: Good muscle strength and tone. No atrophy or abnormal movements. : Deferred  SKIN: Warm and dry no statis dermatitis or ulcers   NEURO / PSYCH: Oriented to person, place and time. Speech clear and appropriate. Follows all commands.  Pleasant affect cardiomyopathy. Normal left ventricle wall thickness. Stage I DD. Normal right ventricular size and function. No significant valvular abnormalities. Moderate PHTN. No evidence for hemodynamically significant pericardial effusion. No previous echo for comparison.     Limited TTE 7/5/2018 Dr Phuong Tyler: Limited echo to evaluate LV function. Definity given during procedure to delineate endocardial borders. Left ventricle size is normal. Ejection fraction is visually estimated at 35-40%.  Hyperdynamic basal segments and severely hypokinetic mid to distal anterior wall, anterolateral and inferior and apical walls. No evidence of left ventricular mass or thrombus noted. Normal right ventricular size and function. Pulmonary hypertension is mild. Compared to prior echo, no changes noted. [facility] 7/9/2018 Dr Manjeet Jain: Left main, no significant disease. LAD, no significant disease. Intermediate ramus, 50% mid vessel narrowing. LCX, codominant vessel with 20% proximal vessel disease. RCA, codominant vessel with 40% mid vessel disease. ASSESSMENT:   1. Elevated troponin in setting of acute pneumothorax causing CP and TOWNSEND secondary to NSTEMI versus stress cardiomyopathy  2. New LV dysfunction EF 30-35% (EF 60-65% in 2013) most likely stress cardiomyopathy  3. Mild CAD on [facility] 7/9/2018  4. Severe COPD/emphysema/bronchiectasis on home O2 (3 liters). Hx tobacco use quit in 2007  5. Hx HTN with borderline hypotension this admission  6. COPD with severe bronchiectasis  7. Pseudomonas colonization with intermittent infection, now resistant to fluoroquinolones  8. Spontaneous pneumothorax 7/3/2018 s/p CT insertion with removal 7/7/2018. CXR 7/7/2018 shows resolution  9. Iodine allergy causes Rash        PLAN:  1. Myopathy possibly due to Takotsubo but due to CAD on cath we will treat with GDMT  2. Will add ACEI as BP allows as outpatient (BP will not allow to initiate in hospital)  3. No need for diuretic at this time  4.  Continue ASA and

## 2018-07-11 NOTE — DISCHARGE SUMMARY
Spiriva Respimat 2.5 mcg two  inhalations daily, sertraline 50 mg at bedtime, fluticasone nasal spray,  losartan//12.5 mg daily, raloxifene 60 mg daily, aspirin 81 mg  daily, multivitamin daily, calcium carbonate 600 mg b.i.d., vitamin D 2000  IU per day, omega-3 fatty acid. The patient instructed to stop Levaquin,  stop amlodipine.         Darvin Figueroa MD    D: 07/10/2018 14:43:50       T: 07/10/2018 23:46:08     [id number]  Job#: [id number]     Doc#: [id number]    CC:

## 2018-07-12 ENCOUNTER — TELEPHONE (OUTPATIENT)
Dept: CARDIOLOGY CLINIC | Age: 73
End: 2018-07-12

## 2018-07-19 ENCOUNTER — HOSPITAL ENCOUNTER (OUTPATIENT)
Dept: GENERAL RADIOLOGY | Age: 73
Discharge: HOME | End: 2018-07-21
Payer: MEDICARE

## 2018-07-19 ENCOUNTER — HOSPITAL ENCOUNTER (OUTPATIENT)
Dept: NUCLEAR MEDICINE | Age: 73
Discharge: HOME | End: 2018-07-19
Payer: MEDICARE

## 2018-07-19 DIAGNOSIS — R09.02 HYPOXIA: ICD-10-CM

## 2018-07-19 DIAGNOSIS — I26.99 PE (PULMONARY THROMBOEMBOLISM) (HCC): ICD-10-CM

## 2018-07-19 PROCEDURE — A9558 XE133 XENON 10MCI: HCPCS | Performed by: RADIOLOGY

## 2018-07-19 PROCEDURE — 3430000000 HC RX DIAGNOSTIC RADIOPHARMACEUTICAL: Performed by: RADIOLOGY

## 2018-07-19 PROCEDURE — A9540 TC99M MAA: HCPCS | Performed by: RADIOLOGY

## 2018-07-19 PROCEDURE — 78582 LUNG VENTILAT&PERFUS IMAGING: CPT

## 2018-07-19 PROCEDURE — 71045 X-RAY EXAM CHEST 1 VIEW: CPT

## 2018-07-19 RX ADMIN — Medication 10 MILLICURIE: at 13:10

## 2018-07-19 RX ADMIN — Medication 6 MILLICURIE: at 13:10

## 2018-08-07 ENCOUNTER — OFFICE VISIT (OUTPATIENT)
Dept: CARDIOLOGY CLINIC | Age: 73
End: 2018-08-07
Payer: MEDICARE

## 2018-08-07 VITALS
SYSTOLIC BLOOD PRESSURE: 108 MMHG | HEART RATE: 62 BPM | WEIGHT: 158.2 LBS | RESPIRATION RATE: 16 BRPM | HEIGHT: 66 IN | BODY MASS INDEX: 25.43 KG/M2 | DIASTOLIC BLOOD PRESSURE: 60 MMHG

## 2018-08-07 DIAGNOSIS — I10 HYPERTENSION, UNSPECIFIED TYPE: Primary | ICD-10-CM

## 2018-08-07 DIAGNOSIS — I51.81 STRESS-INDUCED CARDIOMYOPATHY: ICD-10-CM

## 2018-08-07 PROCEDURE — 1123F ACP DISCUSS/DSCN MKR DOCD: CPT | Performed by: INTERNAL MEDICINE

## 2018-08-07 PROCEDURE — 1111F DSCHRG MED/CURRENT MED MERGE: CPT | Performed by: INTERNAL MEDICINE

## 2018-08-07 PROCEDURE — 3017F COLORECTAL CA SCREEN DOC REV: CPT | Performed by: INTERNAL MEDICINE

## 2018-08-07 PROCEDURE — G8419 CALC BMI OUT NRM PARAM NOF/U: HCPCS | Performed by: INTERNAL MEDICINE

## 2018-08-07 PROCEDURE — 99214 OFFICE O/P EST MOD 30 MIN: CPT | Performed by: INTERNAL MEDICINE

## 2018-08-07 PROCEDURE — G8598 ASA/ANTIPLAT THER USED: HCPCS | Performed by: INTERNAL MEDICINE

## 2018-08-07 PROCEDURE — G8400 PT W/DXA NO RESULTS DOC: HCPCS | Performed by: INTERNAL MEDICINE

## 2018-08-07 PROCEDURE — 1090F PRES/ABSN URINE INCON ASSESS: CPT | Performed by: INTERNAL MEDICINE

## 2018-08-07 PROCEDURE — 1101F PT FALLS ASSESS-DOCD LE1/YR: CPT | Performed by: INTERNAL MEDICINE

## 2018-08-07 PROCEDURE — 93000 ELECTROCARDIOGRAM COMPLETE: CPT | Performed by: INTERNAL MEDICINE

## 2018-08-07 PROCEDURE — G8427 DOCREV CUR MEDS BY ELIG CLIN: HCPCS | Performed by: INTERNAL MEDICINE

## 2018-08-07 PROCEDURE — 4040F PNEUMOC VAC/ADMIN/RCVD: CPT | Performed by: INTERNAL MEDICINE

## 2018-08-07 PROCEDURE — 1036F TOBACCO NON-USER: CPT | Performed by: INTERNAL MEDICINE

## 2018-08-16 ENCOUNTER — HOSPITAL ENCOUNTER (OUTPATIENT)
Dept: CARDIOLOGY | Age: 73
Discharge: HOME | End: 2018-08-16
Payer: MEDICARE

## 2018-08-16 DIAGNOSIS — I51.81 STRESS-INDUCED CARDIOMYOPATHY: ICD-10-CM

## 2018-08-16 LAB
LV EF: 55 %
LVEF MODALITY: NORMAL

## 2018-08-16 PROCEDURE — 93306 TTE W/DOPPLER COMPLETE: CPT | Performed by: PSYCHIATRY & NEUROLOGY

## 2018-08-17 ENCOUNTER — TELEPHONE (OUTPATIENT)
Dept: CARDIOLOGY CLINIC | Age: 73
End: 2018-08-17

## 2018-09-06 ENCOUNTER — OFFICE VISIT (OUTPATIENT)
Dept: CARDIOLOGY CLINIC | Age: 73
End: 2018-09-06
Payer: MEDICARE

## 2018-09-06 VITALS
HEART RATE: 73 BPM | DIASTOLIC BLOOD PRESSURE: 84 MMHG | HEIGHT: 66 IN | WEIGHT: 160 LBS | RESPIRATION RATE: 18 BRPM | SYSTOLIC BLOOD PRESSURE: 126 MMHG | BODY MASS INDEX: 25.71 KG/M2

## 2018-09-06 DIAGNOSIS — I51.81 STRESS-INDUCED CARDIOMYOPATHY: Primary | ICD-10-CM

## 2018-09-06 DIAGNOSIS — I21.4 NSTEMI (NON-ST ELEVATED MYOCARDIAL INFARCTION) (HCC): ICD-10-CM

## 2018-09-06 PROCEDURE — 4040F PNEUMOC VAC/ADMIN/RCVD: CPT | Performed by: INTERNAL MEDICINE

## 2018-09-06 PROCEDURE — G8598 ASA/ANTIPLAT THER USED: HCPCS | Performed by: INTERNAL MEDICINE

## 2018-09-06 PROCEDURE — G8427 DOCREV CUR MEDS BY ELIG CLIN: HCPCS | Performed by: INTERNAL MEDICINE

## 2018-09-06 PROCEDURE — 1123F ACP DISCUSS/DSCN MKR DOCD: CPT | Performed by: INTERNAL MEDICINE

## 2018-09-06 PROCEDURE — G8419 CALC BMI OUT NRM PARAM NOF/U: HCPCS | Performed by: INTERNAL MEDICINE

## 2018-09-06 PROCEDURE — 1036F TOBACCO NON-USER: CPT | Performed by: INTERNAL MEDICINE

## 2018-09-06 PROCEDURE — 1090F PRES/ABSN URINE INCON ASSESS: CPT | Performed by: INTERNAL MEDICINE

## 2018-09-06 PROCEDURE — 99213 OFFICE O/P EST LOW 20 MIN: CPT | Performed by: INTERNAL MEDICINE

## 2018-09-06 PROCEDURE — G8400 PT W/DXA NO RESULTS DOC: HCPCS | Performed by: INTERNAL MEDICINE

## 2018-09-06 PROCEDURE — 1101F PT FALLS ASSESS-DOCD LE1/YR: CPT | Performed by: INTERNAL MEDICINE

## 2018-09-06 PROCEDURE — 93000 ELECTROCARDIOGRAM COMPLETE: CPT | Performed by: INTERNAL MEDICINE

## 2018-09-06 PROCEDURE — 3017F COLORECTAL CA SCREEN DOC REV: CPT | Performed by: INTERNAL MEDICINE

## 2018-09-06 NOTE — PROGRESS NOTES
OUTPATIENT CARDIOLOGY FOLLOW-UP    Name: Cydney Isaacs    Age: 75 y.o. Primary Care Physician: Reina Anderson MD    Date of Service: 9/6/2018    Chief Complaint:   Chief Complaint   Patient presents with    Hypertension     4 week ov- pt has complaints of sob, dizziness       Interim History:   Mrs. Last is a 34-year-old female history of COPD, hypertension, migraines and history of peptic ulcer disease was recently admitted to the hospital for pneumonia. On 7/3/2018, She was found have a 50% pneumothorax on the left side of the chest when she presented with acute chest pain and dyspnea. . She immediately underwent placement of a left-sided chest tube with a near complete resolution of her symptoms. She denied any history of diabetes, hyperlipidemia or premature coronary artery disease in her family. She was seen in the hospital as a new consult from 7/3/2018 for elevated troponin levels. She had an echocardiogram in the hospital which showed a new onset LV dysfunction with an EF of 30-35% with hyperdynamic basal segments and the severe anterior wall ischemia suggestive of stress-induced cardiomyopathy. She underwent a cardiac catheterization on 7/9/2018 which showed no significant coronary artery disease. She was discharged home on 7/10/2018. Her chest tube was removed prior to cardiac cath with a complete resolution of pneumothorax. She was seen in the office on 8/7/2018, since her last visit, she has not had any ER visits or hospitalizations. He is compliant with medications, as well as salt and fluid intake. He does not take any over the arthritis medications. She was diagnosed with pseudomonas lung infection and has been getting IV meropenum for the past  2 weeks. She is still coughing and expectorating yellow sputum. She thinks her dyspnea is getting better and currently taking home oxygen at 2 L/m by nasal cannula. She is still coughing up large amounts of sputum.  No new cardiac complaints (TOPROL XL) 25 MG extended release tablet Take 0.5 tablets by mouth daily 30 tablet 3    OXYGEN Inhale 3 L into the lungs continuous      tiotropium (SPIRIVA RESPIMAT) 2.5 MCG/ACT AERS inhaler Inhale 2 puffs into the lungs daily 3 Inhaler 6    sertraline (ZOLOFT) 25 MG tablet Take 25 mg by mouth nightly       fluticasone (FLONASE) 50 MCG/ACT nasal spray 1 spray by Nasal route nightly as needed for Rhinitis or Allergies       losartan-hydrochlorothiazide (HYZAAR) 100-12.5 MG per tablet Take 1 tablet by mouth every morning       Thickening Agents (THICK-AID) LIQD by Does not apply route as needed      diazepam (VALIUM) 10 MG tablet Take 5-10 mg by mouth every 12 hours as needed for Anxiety. Orval Opitz raloxifene (EVISTA) 60 MG tablet Take 60 mg by mouth every morning       aspirin 81 MG tablet Take 81 mg by mouth every morning       Multiple Vitamins-Minerals (OCUVITE PO) Take 1 tablet by mouth every morning       calcium carbonate 600 MG TABS tablet Take 2 tablets by mouth every morning       Cholecalciferol (VITAMIN D) 2000 UNITS CAPS capsule Take 2,000 Units by mouth every morning       Omega-3 Fatty Acids (FISH OIL) 1000 MG CPDR Take 1,000 mg by mouth every morning        No current facility-administered medications for this visit. Physical Exam:  /84   Pulse 73   Resp 18   Ht 5' 6\" (1.676 m)   Wt 160 lb (72.6 kg)   BMI 25.82 kg/m²   Wt Readings from Last 3 Encounters:   09/06/18 160 lb (72.6 kg)   08/07/18 158 lb 3.2 oz (71.8 kg)   07/10/18 165 lb 8 oz (75.1 kg)     Appearance: Awake, alert and oriented x 3, no acute respiratory distress  Skin: Intact, no rash  Head: Normocephalic, atraumatic  Eyes: EOMI, no conjunctival erythema  ENMT: No pharyngeal erythema, MMM, no rhinorrhea  Neck: Supple, no elevated JVP, no carotid bruits  Lungs: Clear to auscultation bilaterally. No wheezes, rales, or rhonchi.   Cardiac: Regular rate and rhythm, +S1S2, no murmurs apparent  Abdomen: Soft, score (Juan Walden et al., 2013) failed to calculate for the following reasons: The patient has a prior MI or stroke diagnosis        ASSESSMENT:  1. Stress cardiomyopathy with an EF of 30-35% with no significant coronary artery disease   improving, her acute stress is most likely tension pneumothorax. Now, her LV function has recovered, HFpEF improved. 2. Severe COPD and history of recent left-sided tension pneumothorax requiring chest tube placement resolved - on home O2  3. Nonanginal left-sided chest pain - resolved  4. Hypertension well controlled  5. Hyperlipidemia on statin  6. Pseudomonas lung infection on IV antibiotics    Plan:   1. Continue metoprolol and cozaar for at least one year. 2. Continue rest of the medication as before. 3. Complete antibiotics as per primary team  4. Continue antibiotics as per her pulmonologist.  5. Follow up in 6 months. The patient's current medication list, allergies, problem list and results of all previously ordered testing were reviewed at today's visit.     Elli Monge MD  [facility]) Cardiology

## 2018-09-07 ENCOUNTER — APPOINTMENT (OUTPATIENT)
Dept: GENERAL RADIOLOGY | Age: 73
DRG: 163 | End: 2018-09-07
Payer: MEDICARE

## 2018-09-07 ENCOUNTER — HOSPITAL ENCOUNTER (INPATIENT)
Age: 73
LOS: 7 days | Discharge: HOME HEALTH | DRG: 163 | End: 2018-09-14
Attending: EMERGENCY MEDICINE | Admitting: FAMILY MEDICINE
Payer: MEDICARE

## 2018-09-07 DIAGNOSIS — J93.12 SECONDARY SPONTANEOUS PNEUMOTHORAX: ICD-10-CM

## 2018-09-07 DIAGNOSIS — R09.02 HYPOXIA: ICD-10-CM

## 2018-09-07 DIAGNOSIS — R06.89 DYSPNEA AND RESPIRATORY ABNORMALITIES: ICD-10-CM

## 2018-09-07 DIAGNOSIS — J93.9 PNEUMOTHORAX ON LEFT: Primary | ICD-10-CM

## 2018-09-07 DIAGNOSIS — R06.00 DYSPNEA AND RESPIRATORY ABNORMALITIES: ICD-10-CM

## 2018-09-07 PROBLEM — J69.0 ASPIRATION PNEUMONIA (HCC): Status: RESOLVED | Noted: 2017-07-06 | Resolved: 2018-09-07

## 2018-09-07 PROBLEM — I21.4 NSTEMI (NON-ST ELEVATED MYOCARDIAL INFARCTION) (HCC): Status: RESOLVED | Noted: 2018-07-04 | Resolved: 2018-09-07

## 2018-09-07 LAB
ABO/RH: NORMAL
ALBUMIN SERPL-MCNC: 4.1 G/DL (ref 3.5–5.2)
ALP BLD-CCNC: 58 U/L (ref 35–104)
ALT SERPL-CCNC: 7 U/L (ref 0–32)
ANION GAP SERPL CALCULATED.3IONS-SCNC: 13 MMOL/L (ref 7–16)
ANTIBODY SCREEN: NORMAL
AST SERPL-CCNC: 14 U/L (ref 0–31)
BASOPHILS ABSOLUTE: 0.05 E9/L (ref 0–0.2)
BASOPHILS RELATIVE PERCENT: 0.5 % (ref 0–2)
BILIRUB SERPL-MCNC: 0.4 MG/DL (ref 0–1.2)
BUN BLDV-MCNC: 14 MG/DL (ref 8–23)
CALCIUM SERPL-MCNC: 9 MG/DL (ref 8.6–10.2)
CHLORIDE BLD-SCNC: 102 MMOL/L (ref 98–107)
CO2: 27 MMOL/L (ref 22–29)
CREAT SERPL-MCNC: 0.7 MG/DL (ref 0.5–1)
EKG ATRIAL RATE: 85 BPM
EKG P AXIS: 58 DEGREES
EKG P-R INTERVAL: 138 MS
EKG Q-T INTERVAL: 334 MS
EKG QRS DURATION: 72 MS
EKG QTC CALCULATION (BAZETT): 397 MS
EKG R AXIS: -65 DEGREES
EKG T AXIS: 2 DEGREES
EKG VENTRICULAR RATE: 85 BPM
EOSINOPHILS ABSOLUTE: 0.06 E9/L (ref 0.05–0.5)
EOSINOPHILS RELATIVE PERCENT: 0.6 % (ref 0–6)
GFR AFRICAN AMERICAN: >60
GFR NON-AFRICAN AMERICAN: >60 ML/MIN/1.73
GLUCOSE BLD-MCNC: 134 MG/DL (ref 74–109)
HCT VFR BLD CALC: 41.3 % (ref 34–48)
HEMOGLOBIN: 13.2 G/DL (ref 11.5–15.5)
IMMATURE GRANULOCYTES #: 0.03 E9/L
IMMATURE GRANULOCYTES %: 0.3 % (ref 0–5)
INR BLD: 1
LACTIC ACID: 0.9 MMOL/L (ref 0.5–2.2)
LYMPHOCYTES ABSOLUTE: 1.45 E9/L (ref 1.5–4)
LYMPHOCYTES RELATIVE PERCENT: 15.4 % (ref 20–42)
MCH RBC QN AUTO: 27.8 PG (ref 26–35)
MCHC RBC AUTO-ENTMCNC: 32 % (ref 32–34.5)
MCV RBC AUTO: 86.9 FL (ref 80–99.9)
MONOCYTES ABSOLUTE: 0.4 E9/L (ref 0.1–0.95)
MONOCYTES RELATIVE PERCENT: 4.2 % (ref 2–12)
NEUTROPHILS ABSOLUTE: 7.43 E9/L (ref 1.8–7.3)
NEUTROPHILS RELATIVE PERCENT: 79 % (ref 43–80)
PDW BLD-RTO: 14.3 FL (ref 11.5–15)
PLATELET # BLD: 170 E9/L (ref 130–450)
PMV BLD AUTO: 11 FL (ref 7–12)
POTASSIUM SERPL-SCNC: 3.4 MMOL/L (ref 3.5–5)
PROTHROMBIN TIME: 12.1 SEC (ref 9.3–12.4)
RBC # BLD: 4.75 E12/L (ref 3.5–5.5)
SODIUM BLD-SCNC: 142 MMOL/L (ref 132–146)
TOTAL PROTEIN: 7.2 G/DL (ref 6.4–8.3)
TROPONIN: 0.04 NG/ML (ref 0–0.03)
WBC # BLD: 9.4 E9/L (ref 4.5–11.5)

## 2018-09-07 PROCEDURE — 6360000002 HC RX W HCPCS: Performed by: EMERGENCY MEDICINE

## 2018-09-07 PROCEDURE — 6370000000 HC RX 637 (ALT 250 FOR IP): Performed by: NURSE PRACTITIONER

## 2018-09-07 PROCEDURE — 80053 COMPREHEN METABOLIC PANEL: CPT

## 2018-09-07 PROCEDURE — 6360000002 HC RX W HCPCS: Performed by: NURSE PRACTITIONER

## 2018-09-07 PROCEDURE — 6370000000 HC RX 637 (ALT 250 FOR IP): Performed by: EMERGENCY MEDICINE

## 2018-09-07 PROCEDURE — 86850 RBC ANTIBODY SCREEN: CPT

## 2018-09-07 PROCEDURE — 85610 PROTHROMBIN TIME: CPT

## 2018-09-07 PROCEDURE — 71045 X-RAY EXAM CHEST 1 VIEW: CPT

## 2018-09-07 PROCEDURE — 87040 BLOOD CULTURE FOR BACTERIA: CPT

## 2018-09-07 PROCEDURE — 0W9B30Z DRAINAGE OF LEFT PLEURAL CAVITY WITH DRAINAGE DEVICE, PERCUTANEOUS APPROACH: ICD-10-PCS | Performed by: EMERGENCY MEDICINE

## 2018-09-07 PROCEDURE — 6370000000 HC RX 637 (ALT 250 FOR IP): Performed by: FAMILY MEDICINE

## 2018-09-07 PROCEDURE — 86901 BLOOD TYPING SEROLOGIC RH(D): CPT

## 2018-09-07 PROCEDURE — 85025 COMPLETE CBC W/AUTO DIFF WBC: CPT

## 2018-09-07 PROCEDURE — 83605 ASSAY OF LACTIC ACID: CPT

## 2018-09-07 PROCEDURE — 94664 DEMO&/EVAL PT USE INHALER: CPT

## 2018-09-07 PROCEDURE — 32551 INSERTION OF CHEST TUBE: CPT

## 2018-09-07 PROCEDURE — 2580000003 HC RX 258: Performed by: FAMILY MEDICINE

## 2018-09-07 PROCEDURE — 94640 AIRWAY INHALATION TREATMENT: CPT

## 2018-09-07 PROCEDURE — 2060000000 HC ICU INTERMEDIATE R&B

## 2018-09-07 PROCEDURE — 6360000002 HC RX W HCPCS: Performed by: FAMILY MEDICINE

## 2018-09-07 PROCEDURE — 2700000000 HC OXYGEN THERAPY PER DAY

## 2018-09-07 PROCEDURE — 99285 EMERGENCY DEPT VISIT HI MDM: CPT

## 2018-09-07 PROCEDURE — 93005 ELECTROCARDIOGRAM TRACING: CPT | Performed by: EMERGENCY MEDICINE

## 2018-09-07 PROCEDURE — 86900 BLOOD TYPING SEROLOGIC ABO: CPT

## 2018-09-07 PROCEDURE — 84484 ASSAY OF TROPONIN QUANT: CPT

## 2018-09-07 RX ORDER — LOSARTAN POTASSIUM AND HYDROCHLOROTHIAZIDE 12.5; 1 MG/1; MG/1
1 TABLET ORAL EVERY MORNING
Status: DISCONTINUED | OUTPATIENT
Start: 2018-09-08 | End: 2018-09-07

## 2018-09-07 RX ORDER — MORPHINE SULFATE 4 MG/ML
4 INJECTION, SOLUTION INTRAMUSCULAR; INTRAVENOUS EVERY 4 HOURS PRN
Status: DISCONTINUED | OUTPATIENT
Start: 2018-09-07 | End: 2018-09-10 | Stop reason: RX

## 2018-09-07 RX ORDER — CALCIUM CARBONATE 500(1250)
1 TABLET ORAL EVERY MORNING
Status: DISCONTINUED | OUTPATIENT
Start: 2018-09-08 | End: 2018-09-14 | Stop reason: HOSPADM

## 2018-09-07 RX ORDER — FLUTICASONE PROPIONATE 50 MCG
1 SPRAY, SUSPENSION (ML) NASAL NIGHTLY PRN
Status: DISCONTINUED | OUTPATIENT
Start: 2018-09-07 | End: 2018-09-14 | Stop reason: HOSPADM

## 2018-09-07 RX ORDER — BUDESONIDE 0.25 MG/2ML
250 INHALANT ORAL 2 TIMES DAILY
Status: DISCONTINUED | OUTPATIENT
Start: 2018-09-07 | End: 2018-09-14 | Stop reason: HOSPADM

## 2018-09-07 RX ORDER — ASPIRIN 81 MG/1
81 TABLET ORAL EVERY MORNING
Status: DISCONTINUED | OUTPATIENT
Start: 2018-09-08 | End: 2018-09-14 | Stop reason: HOSPADM

## 2018-09-07 RX ORDER — MIDAZOLAM HYDROCHLORIDE 1 MG/ML
2 INJECTION INTRAMUSCULAR; INTRAVENOUS ONCE
Status: COMPLETED | OUTPATIENT
Start: 2018-09-07 | End: 2018-09-07

## 2018-09-07 RX ORDER — IMIPENEM AND CILASTATIN 500; 500 MG/1; MG/1
1000 INJECTION, POWDER, FOR SOLUTION INTRAVENOUS EVERY 8 HOURS
Status: DISCONTINUED | OUTPATIENT
Start: 2018-09-07 | End: 2018-09-07

## 2018-09-07 RX ORDER — ATORVASTATIN CALCIUM 40 MG/1
40 TABLET, FILM COATED ORAL NIGHTLY
Status: DISCONTINUED | OUTPATIENT
Start: 2018-09-07 | End: 2018-09-14 | Stop reason: HOSPADM

## 2018-09-07 RX ORDER — SODIUM CHLORIDE FOR INHALATION 3 %
15 VIAL, NEBULIZER (ML) INHALATION 2 TIMES DAILY
Status: DISCONTINUED | OUTPATIENT
Start: 2018-09-07 | End: 2018-09-14 | Stop reason: HOSPADM

## 2018-09-07 RX ORDER — SODIUM CHLORIDE 0.9 % (FLUSH) 0.9 %
10 SYRINGE (ML) INJECTION EVERY 12 HOURS SCHEDULED
Status: DISCONTINUED | OUTPATIENT
Start: 2018-09-07 | End: 2018-09-11 | Stop reason: SDUPTHER

## 2018-09-07 RX ORDER — IPRATROPIUM BROMIDE AND ALBUTEROL SULFATE 2.5; .5 MG/3ML; MG/3ML
1 SOLUTION RESPIRATORY (INHALATION)
Status: COMPLETED | OUTPATIENT
Start: 2018-09-07 | End: 2018-09-07

## 2018-09-07 RX ORDER — LOSARTAN POTASSIUM 50 MG/1
100 TABLET ORAL DAILY
Status: DISCONTINUED | OUTPATIENT
Start: 2018-09-08 | End: 2018-09-08

## 2018-09-07 RX ORDER — OXYCODONE HYDROCHLORIDE AND ACETAMINOPHEN 5; 325 MG/1; MG/1
1 TABLET ORAL ONCE
Status: COMPLETED | OUTPATIENT
Start: 2018-09-07 | End: 2018-09-07

## 2018-09-07 RX ORDER — DIAZEPAM 5 MG/1
5 TABLET ORAL NIGHTLY
Status: DISCONTINUED | OUTPATIENT
Start: 2018-09-07 | End: 2018-09-14 | Stop reason: HOSPADM

## 2018-09-07 RX ORDER — HYDROCODONE BITARTRATE AND ACETAMINOPHEN 5; 325 MG/1; MG/1
1 TABLET ORAL EVERY 6 HOURS PRN
Status: DISCONTINUED | OUTPATIENT
Start: 2018-09-07 | End: 2018-09-11

## 2018-09-07 RX ORDER — SODIUM CHLORIDE FOR INHALATION 3 %
15 VIAL, NEBULIZER (ML) INHALATION 2 TIMES DAILY
Status: ON HOLD | COMMUNITY
End: 2018-09-14 | Stop reason: HOSPADM

## 2018-09-07 RX ORDER — HYDROCHLOROTHIAZIDE 12.5 MG/1
12.5 TABLET ORAL DAILY
Status: DISCONTINUED | OUTPATIENT
Start: 2018-09-08 | End: 2018-09-08

## 2018-09-07 RX ORDER — SODIUM CHLORIDE 0.9 % (FLUSH) 0.9 %
10 SYRINGE (ML) INJECTION PRN
Status: DISCONTINUED | OUTPATIENT
Start: 2018-09-07 | End: 2018-09-11 | Stop reason: SDUPTHER

## 2018-09-07 RX ORDER — POTASSIUM CHLORIDE 20 MEQ/1
20 TABLET, EXTENDED RELEASE ORAL 2 TIMES DAILY WITH MEALS
Status: DISCONTINUED | OUTPATIENT
Start: 2018-09-07 | End: 2018-09-12

## 2018-09-07 RX ORDER — IMIPENEM AND CILASTATIN 500; 500 MG/1; MG/1
1000 INJECTION, POWDER, FOR SOLUTION INTRAVENOUS EVERY 8 HOURS
COMMUNITY
Start: 2018-09-05 | End: 2018-09-18

## 2018-09-07 RX ORDER — CHOLECALCIFEROL (VITAMIN D3) 50 MCG
2000 TABLET ORAL EVERY MORNING
Status: DISCONTINUED | OUTPATIENT
Start: 2018-09-08 | End: 2018-09-14 | Stop reason: HOSPADM

## 2018-09-07 RX ORDER — METOPROLOL SUCCINATE 25 MG/1
12.5 TABLET, EXTENDED RELEASE ORAL DAILY
Status: DISCONTINUED | OUTPATIENT
Start: 2018-09-08 | End: 2018-09-14 | Stop reason: HOSPADM

## 2018-09-07 RX ORDER — RALOXIFENE HYDROCHLORIDE 60 MG/1
60 TABLET, FILM COATED ORAL EVERY MORNING
Status: DISCONTINUED | OUTPATIENT
Start: 2018-09-08 | End: 2018-09-14 | Stop reason: HOSPADM

## 2018-09-07 RX ORDER — ACETAMINOPHEN 325 MG/1
650 TABLET ORAL EVERY 4 HOURS PRN
Status: DISCONTINUED | OUTPATIENT
Start: 2018-09-07 | End: 2018-09-13 | Stop reason: SDUPTHER

## 2018-09-07 RX ADMIN — DIAZEPAM 5 MG: 5 TABLET ORAL at 20:21

## 2018-09-07 RX ADMIN — SODIUM CHLORIDE SOLN NEBU 3% 4 ML: 3 NEBU SOLN at 19:44

## 2018-09-07 RX ADMIN — MIDAZOLAM HYDROCHLORIDE 2 MG: 1 INJECTION, SOLUTION INTRAMUSCULAR; INTRAVENOUS at 11:46

## 2018-09-07 RX ADMIN — IPRATROPIUM BROMIDE AND ALBUTEROL SULFATE 1 AMPULE: .5; 3 SOLUTION RESPIRATORY (INHALATION) at 10:30

## 2018-09-07 RX ADMIN — BUDESONIDE 250 MCG: 0.25 SUSPENSION RESPIRATORY (INHALATION) at 19:44

## 2018-09-07 RX ADMIN — ENOXAPARIN SODIUM 40 MG: 40 INJECTION SUBCUTANEOUS at 17:34

## 2018-09-07 RX ADMIN — MORPHINE SULFATE 4 MG: 4 INJECTION, SOLUTION INTRAMUSCULAR; INTRAVENOUS at 20:21

## 2018-09-07 RX ADMIN — SERTRALINE HYDROCHLORIDE 50 MG: 50 TABLET ORAL at 20:22

## 2018-09-07 RX ADMIN — HYDROCODONE BITARTRATE AND ACETAMINOPHEN 1 TABLET: 5; 325 TABLET ORAL at 23:26

## 2018-09-07 RX ADMIN — Medication 10 ML: at 18:55

## 2018-09-07 RX ADMIN — MEROPENEM 1 G: 1 INJECTION, POWDER, FOR SOLUTION INTRAVENOUS at 18:54

## 2018-09-07 RX ADMIN — POTASSIUM CHLORIDE 20 MEQ: 20 TABLET, EXTENDED RELEASE ORAL at 17:34

## 2018-09-07 RX ADMIN — ATORVASTATIN CALCIUM 40 MG: 40 TABLET, FILM COATED ORAL at 20:21

## 2018-09-07 RX ADMIN — OXYCODONE HYDROCHLORIDE AND ACETAMINOPHEN 1 TABLET: 5; 325 TABLET ORAL at 12:45

## 2018-09-07 RX ADMIN — HYDROCODONE BITARTRATE AND ACETAMINOPHEN 1 TABLET: 5; 325 TABLET ORAL at 17:34

## 2018-09-07 RX ADMIN — IPRATROPIUM BROMIDE AND ALBUTEROL SULFATE 1 AMPULE: .5; 3 SOLUTION RESPIRATORY (INHALATION) at 10:29

## 2018-09-07 RX ADMIN — Medication 10 ML: at 20:21

## 2018-09-07 RX ADMIN — IPRATROPIUM BROMIDE AND ALBUTEROL SULFATE 1 AMPULE: .5; 3 SOLUTION RESPIRATORY (INHALATION) at 10:27

## 2018-09-07 ASSESSMENT — PAIN DESCRIPTION - ORIENTATION: ORIENTATION: LEFT;POSTERIOR

## 2018-09-07 ASSESSMENT — PAIN DESCRIPTION - LOCATION
LOCATION: CHEST
LOCATION: CHEST;BACK

## 2018-09-07 ASSESSMENT — PAIN SCALES - GENERAL
PAINLEVEL_OUTOF10: 10
PAINLEVEL_OUTOF10: 8

## 2018-09-07 ASSESSMENT — PAIN DESCRIPTION - FREQUENCY: FREQUENCY: CONTINUOUS

## 2018-09-07 ASSESSMENT — PAIN DESCRIPTION - ONSET: ONSET: ON-GOING

## 2018-09-07 ASSESSMENT — PAIN DESCRIPTION - DESCRIPTORS
DESCRIPTORS: ACHING;CONSTANT
DESCRIPTORS: TIGHTNESS
DESCRIPTORS: ACHING;CONSTANT;DISCOMFORT

## 2018-09-07 ASSESSMENT — PAIN DESCRIPTION - PAIN TYPE
TYPE: ACUTE PAIN;SURGICAL PAIN
TYPE: SURGICAL PAIN

## 2018-09-07 ASSESSMENT — PAIN DESCRIPTION - PROGRESSION: CLINICAL_PROGRESSION: GRADUALLY WORSENING

## 2018-09-07 NOTE — PROCEDURES
Chest Tube Placement Procedure Note    Indication: pneumothorax    Consent: The patient was counseled regarding the procedure, its indications, risks, potential complications and alternatives, and any questions were answered. Consent was obtained to proceed. Pre-Medication: midazolam (Versed) intravenously    Procedure: The patient was placed in a semirecumbent position with the head of the bed at 30 degrees. The left side was prepped with betadine and draped in a sterile fashion. Local anesthesia over the insertion site was obtained by infiltration using 1% Lidocaine without epinephrine. An incision was made laterally in the midaxillary line. Blunt dissection up and over the rib was performed until access was obtained into the pleural cavity. A 9 [place] chest tube was placed and connected to a pleurivac at 20 cm H2O. Initial output from the tube was air. The tube was sutured in place and the site was covered with an occlusive dressing. All connections were banded. Breath sounds after the procedure were improved. A chest x-ray was obtained to evaluate placement and is still pending at this time. The patient tolerated the procedure well.     Complications: None

## 2018-09-07 NOTE — ED NOTES
Bed: 06  Expected date:   Expected time:   Means of arrival:   Comments:  JAMAR [facility]  09/07/18 1097

## 2018-09-08 ENCOUNTER — APPOINTMENT (OUTPATIENT)
Dept: GENERAL RADIOLOGY | Age: 73
DRG: 163 | End: 2018-09-08
Payer: MEDICARE

## 2018-09-08 LAB
ANION GAP SERPL CALCULATED.3IONS-SCNC: 10 MMOL/L (ref 7–16)
BUN BLDV-MCNC: 16 MG/DL (ref 8–23)
CALCIUM SERPL-MCNC: 8.7 MG/DL (ref 8.6–10.2)
CHLORIDE BLD-SCNC: 103 MMOL/L (ref 98–107)
CO2: 28 MMOL/L (ref 22–29)
CREAT SERPL-MCNC: 0.8 MG/DL (ref 0.5–1)
GFR AFRICAN AMERICAN: >60
GFR NON-AFRICAN AMERICAN: >60 ML/MIN/1.73
GLUCOSE BLD-MCNC: 102 MG/DL (ref 74–109)
HCT VFR BLD CALC: 37.8 % (ref 34–48)
HEMOGLOBIN: 11.8 G/DL (ref 11.5–15.5)
MCH RBC QN AUTO: 27.7 PG (ref 26–35)
MCHC RBC AUTO-ENTMCNC: 31.2 % (ref 32–34.5)
MCV RBC AUTO: 88.7 FL (ref 80–99.9)
PDW BLD-RTO: 14.6 FL (ref 11.5–15)
PLATELET # BLD: 160 E9/L (ref 130–450)
PMV BLD AUTO: 10.4 FL (ref 7–12)
POTASSIUM SERPL-SCNC: 3.7 MMOL/L (ref 3.5–5)
RBC # BLD: 4.26 E12/L (ref 3.5–5.5)
SODIUM BLD-SCNC: 141 MMOL/L (ref 132–146)
WBC # BLD: 6.8 E9/L (ref 4.5–11.5)

## 2018-09-08 PROCEDURE — 71045 X-RAY EXAM CHEST 1 VIEW: CPT

## 2018-09-08 PROCEDURE — 82104 ALPHA-1-ANTITRYPSIN PHENO: CPT

## 2018-09-08 PROCEDURE — 6360000002 HC RX W HCPCS: Performed by: NURSE PRACTITIONER

## 2018-09-08 PROCEDURE — 85027 COMPLETE CBC AUTOMATED: CPT

## 2018-09-08 PROCEDURE — 2580000003 HC RX 258: Performed by: FAMILY MEDICINE

## 2018-09-08 PROCEDURE — 2700000000 HC OXYGEN THERAPY PER DAY

## 2018-09-08 PROCEDURE — 6360000002 HC RX W HCPCS: Performed by: FAMILY MEDICINE

## 2018-09-08 PROCEDURE — 2060000000 HC ICU INTERMEDIATE R&B

## 2018-09-08 PROCEDURE — 82103 ALPHA-1-ANTITRYPSIN TOTAL: CPT

## 2018-09-08 PROCEDURE — 6370000000 HC RX 637 (ALT 250 FOR IP): Performed by: FAMILY MEDICINE

## 2018-09-08 PROCEDURE — 6370000000 HC RX 637 (ALT 250 FOR IP): Performed by: NURSE PRACTITIONER

## 2018-09-08 PROCEDURE — 80048 BASIC METABOLIC PNL TOTAL CA: CPT

## 2018-09-08 PROCEDURE — 36415 COLL VENOUS BLD VENIPUNCTURE: CPT

## 2018-09-08 PROCEDURE — 94640 AIRWAY INHALATION TREATMENT: CPT

## 2018-09-08 RX ADMIN — MORPHINE SULFATE 4 MG: 4 INJECTION, SOLUTION INTRAMUSCULAR; INTRAVENOUS at 20:46

## 2018-09-08 RX ADMIN — CALCIUM 500 MG: 500 TABLET ORAL at 09:01

## 2018-09-08 RX ADMIN — SODIUM CHLORIDE SOLN NEBU 3% 15 ML: 3 NEBU SOLN at 07:37

## 2018-09-08 RX ADMIN — MEROPENEM 1 G: 1 INJECTION, POWDER, FOR SOLUTION INTRAVENOUS at 09:01

## 2018-09-08 RX ADMIN — SODIUM CHLORIDE SOLN NEBU 3% 15 ML: 3 NEBU SOLN at 20:59

## 2018-09-08 RX ADMIN — RALOXIFENE HYDROCHLORIDE 60 MG: 60 TABLET, FILM COATED ORAL at 09:01

## 2018-09-08 RX ADMIN — MORPHINE SULFATE 4 MG: 4 INJECTION, SOLUTION INTRAMUSCULAR; INTRAVENOUS at 04:44

## 2018-09-08 RX ADMIN — HYDROCODONE BITARTRATE AND ACETAMINOPHEN 1 TABLET: 5; 325 TABLET ORAL at 06:16

## 2018-09-08 RX ADMIN — MORPHINE SULFATE 4 MG: 4 INJECTION, SOLUTION INTRAMUSCULAR; INTRAVENOUS at 00:44

## 2018-09-08 RX ADMIN — BUDESONIDE 250 MCG: 0.25 SUSPENSION RESPIRATORY (INHALATION) at 20:59

## 2018-09-08 RX ADMIN — DIAZEPAM 5 MG: 5 TABLET ORAL at 20:47

## 2018-09-08 RX ADMIN — MEROPENEM 1 G: 1 INJECTION, POWDER, FOR SOLUTION INTRAVENOUS at 01:51

## 2018-09-08 RX ADMIN — POTASSIUM CHLORIDE 20 MEQ: 20 TABLET, EXTENDED RELEASE ORAL at 09:01

## 2018-09-08 RX ADMIN — BUDESONIDE 250 MCG: 0.25 SUSPENSION RESPIRATORY (INHALATION) at 07:37

## 2018-09-08 RX ADMIN — MEROPENEM 1 G: 1 INJECTION, POWDER, FOR SOLUTION INTRAVENOUS at 16:29

## 2018-09-08 RX ADMIN — POTASSIUM CHLORIDE 20 MEQ: 20 TABLET, EXTENDED RELEASE ORAL at 16:29

## 2018-09-08 RX ADMIN — ASPIRIN 81 MG: 81 TABLET, COATED ORAL at 09:01

## 2018-09-08 RX ADMIN — ATORVASTATIN CALCIUM 40 MG: 40 TABLET, FILM COATED ORAL at 20:47

## 2018-09-08 RX ADMIN — Medication 10 ML: at 09:01

## 2018-09-08 RX ADMIN — Medication 10 ML: at 20:47

## 2018-09-08 RX ADMIN — CHOLECALCIFEROL TAB 50 MCG (2000 UNIT) 2000 UNITS: 50 TAB at 09:01

## 2018-09-08 RX ADMIN — SERTRALINE HYDROCHLORIDE 50 MG: 50 TABLET ORAL at 21:53

## 2018-09-08 RX ADMIN — ENOXAPARIN SODIUM 40 MG: 40 INJECTION SUBCUTANEOUS at 16:29

## 2018-09-08 ASSESSMENT — PAIN SCALES - GENERAL
PAINLEVEL_OUTOF10: 0
PAINLEVEL_OUTOF10: 7
PAINLEVEL_OUTOF10: 8
PAINLEVEL_OUTOF10: 6
PAINLEVEL_OUTOF10: 6
PAINLEVEL_OUTOF10: 2

## 2018-09-09 ENCOUNTER — APPOINTMENT (OUTPATIENT)
Dept: GENERAL RADIOLOGY | Age: 73
DRG: 163 | End: 2018-09-09
Payer: MEDICARE

## 2018-09-09 ENCOUNTER — APPOINTMENT (OUTPATIENT)
Dept: CT IMAGING | Age: 73
DRG: 163 | End: 2018-09-09
Payer: MEDICARE

## 2018-09-09 LAB
ANION GAP SERPL CALCULATED.3IONS-SCNC: 9 MMOL/L (ref 7–16)
BUN BLDV-MCNC: 21 MG/DL (ref 8–23)
CALCIUM SERPL-MCNC: 8.5 MG/DL (ref 8.6–10.2)
CHLORIDE BLD-SCNC: 103 MMOL/L (ref 98–107)
CO2: 28 MMOL/L (ref 22–29)
CREAT SERPL-MCNC: 0.7 MG/DL (ref 0.5–1)
GFR AFRICAN AMERICAN: >60
GFR NON-AFRICAN AMERICAN: >60 ML/MIN/1.73
GLUCOSE BLD-MCNC: 97 MG/DL (ref 74–109)
HCT VFR BLD CALC: 38.6 % (ref 34–48)
HEMOGLOBIN: 12.1 G/DL (ref 11.5–15.5)
MCH RBC QN AUTO: 28.1 PG (ref 26–35)
MCHC RBC AUTO-ENTMCNC: 31.3 % (ref 32–34.5)
MCV RBC AUTO: 89.6 FL (ref 80–99.9)
PDW BLD-RTO: 14.5 FL (ref 11.5–15)
PLATELET # BLD: 148 E9/L (ref 130–450)
PMV BLD AUTO: 10.7 FL (ref 7–12)
POTASSIUM SERPL-SCNC: 4.1 MMOL/L (ref 3.5–5)
RBC # BLD: 4.31 E12/L (ref 3.5–5.5)
SODIUM BLD-SCNC: 140 MMOL/L (ref 132–146)
WBC # BLD: 7.6 E9/L (ref 4.5–11.5)

## 2018-09-09 PROCEDURE — 6360000002 HC RX W HCPCS: Performed by: FAMILY MEDICINE

## 2018-09-09 PROCEDURE — 2060000000 HC ICU INTERMEDIATE R&B

## 2018-09-09 PROCEDURE — 6360000002 HC RX W HCPCS: Performed by: NURSE PRACTITIONER

## 2018-09-09 PROCEDURE — 6370000000 HC RX 637 (ALT 250 FOR IP): Performed by: FAMILY MEDICINE

## 2018-09-09 PROCEDURE — 80048 BASIC METABOLIC PNL TOTAL CA: CPT

## 2018-09-09 PROCEDURE — 85027 COMPLETE CBC AUTOMATED: CPT

## 2018-09-09 PROCEDURE — 71250 CT THORAX DX C-: CPT

## 2018-09-09 PROCEDURE — 94640 AIRWAY INHALATION TREATMENT: CPT

## 2018-09-09 PROCEDURE — 36415 COLL VENOUS BLD VENIPUNCTURE: CPT

## 2018-09-09 PROCEDURE — 2580000003 HC RX 258: Performed by: FAMILY MEDICINE

## 2018-09-09 PROCEDURE — 71045 X-RAY EXAM CHEST 1 VIEW: CPT

## 2018-09-09 PROCEDURE — 2700000000 HC OXYGEN THERAPY PER DAY

## 2018-09-09 RX ORDER — SERTRALINE HYDROCHLORIDE 100 MG/1
100 TABLET, FILM COATED ORAL NIGHTLY
Status: DISCONTINUED | OUTPATIENT
Start: 2018-09-09 | End: 2018-09-14 | Stop reason: HOSPADM

## 2018-09-09 RX ADMIN — ENOXAPARIN SODIUM 40 MG: 40 INJECTION SUBCUTANEOUS at 17:10

## 2018-09-09 RX ADMIN — MEROPENEM 1 G: 1 INJECTION, POWDER, FOR SOLUTION INTRAVENOUS at 08:52

## 2018-09-09 RX ADMIN — CALCIUM 500 MG: 500 TABLET ORAL at 08:52

## 2018-09-09 RX ADMIN — METOPROLOL SUCCINATE 12.5 MG: 25 TABLET, EXTENDED RELEASE ORAL at 08:52

## 2018-09-09 RX ADMIN — SERTRALINE 100 MG: 100 TABLET, FILM COATED ORAL at 21:16

## 2018-09-09 RX ADMIN — MEROPENEM 1 G: 1 INJECTION, POWDER, FOR SOLUTION INTRAVENOUS at 17:10

## 2018-09-09 RX ADMIN — SODIUM CHLORIDE SOLN NEBU 3% 4 ML: 3 NEBU SOLN at 08:32

## 2018-09-09 RX ADMIN — ATORVASTATIN CALCIUM 40 MG: 40 TABLET, FILM COATED ORAL at 21:16

## 2018-09-09 RX ADMIN — POTASSIUM CHLORIDE 20 MEQ: 20 TABLET, EXTENDED RELEASE ORAL at 17:10

## 2018-09-09 RX ADMIN — DIAZEPAM 5 MG: 5 TABLET ORAL at 21:16

## 2018-09-09 RX ADMIN — ASPIRIN 81 MG: 81 TABLET, COATED ORAL at 08:52

## 2018-09-09 RX ADMIN — MEROPENEM 1 G: 1 INJECTION, POWDER, FOR SOLUTION INTRAVENOUS at 01:38

## 2018-09-09 RX ADMIN — BUDESONIDE 250 MCG: 0.25 SUSPENSION RESPIRATORY (INHALATION) at 08:35

## 2018-09-09 RX ADMIN — Medication 10 ML: at 21:17

## 2018-09-09 RX ADMIN — BUDESONIDE 250 MCG: 0.25 SUSPENSION RESPIRATORY (INHALATION) at 20:51

## 2018-09-09 RX ADMIN — MORPHINE SULFATE 4 MG: 4 INJECTION, SOLUTION INTRAMUSCULAR; INTRAVENOUS at 10:24

## 2018-09-09 RX ADMIN — CHOLECALCIFEROL TAB 50 MCG (2000 UNIT) 2000 UNITS: 50 TAB at 08:52

## 2018-09-09 RX ADMIN — RALOXIFENE HYDROCHLORIDE 60 MG: 60 TABLET, FILM COATED ORAL at 08:52

## 2018-09-09 RX ADMIN — Medication 10 ML: at 08:52

## 2018-09-09 RX ADMIN — MORPHINE SULFATE 4 MG: 4 INJECTION, SOLUTION INTRAMUSCULAR; INTRAVENOUS at 18:45

## 2018-09-09 RX ADMIN — SODIUM CHLORIDE SOLN NEBU 3% 15 ML: 3 NEBU SOLN at 20:51

## 2018-09-09 RX ADMIN — POTASSIUM CHLORIDE 20 MEQ: 20 TABLET, EXTENDED RELEASE ORAL at 08:52

## 2018-09-09 ASSESSMENT — PAIN SCALES - GENERAL
PAINLEVEL_OUTOF10: 7
PAINLEVEL_OUTOF10: 7
PAINLEVEL_OUTOF10: 0

## 2018-09-10 ENCOUNTER — APPOINTMENT (OUTPATIENT)
Dept: GENERAL RADIOLOGY | Age: 73
DRG: 163 | End: 2018-09-10
Payer: MEDICARE

## 2018-09-10 LAB
ANION GAP SERPL CALCULATED.3IONS-SCNC: 11 MMOL/L (ref 7–16)
BUN BLDV-MCNC: 10 MG/DL (ref 8–23)
CALCIUM SERPL-MCNC: 6.8 MG/DL (ref 8.6–10.2)
CHLORIDE BLD-SCNC: 108 MMOL/L (ref 98–107)
CO2: 27 MMOL/L (ref 22–29)
CREAT SERPL-MCNC: 0.5 MG/DL (ref 0.5–1)
GFR AFRICAN AMERICAN: >60
GFR NON-AFRICAN AMERICAN: >60 ML/MIN/1.73
GLUCOSE BLD-MCNC: 114 MG/DL (ref 74–109)
HCT VFR BLD CALC: 33.3 % (ref 34–48)
HEMOGLOBIN: 10.4 G/DL (ref 11.5–15.5)
MCH RBC QN AUTO: 28 PG (ref 26–35)
MCHC RBC AUTO-ENTMCNC: 31.2 % (ref 32–34.5)
MCV RBC AUTO: 89.8 FL (ref 80–99.9)
PDW BLD-RTO: 14.3 FL (ref 11.5–15)
PLATELET # BLD: 137 E9/L (ref 130–450)
PMV BLD AUTO: 10.5 FL (ref 7–12)
POTASSIUM SERPL-SCNC: 3.2 MMOL/L (ref 3.5–5)
RBC # BLD: 3.71 E12/L (ref 3.5–5.5)
SODIUM BLD-SCNC: 146 MMOL/L (ref 132–146)
WBC # BLD: 6.5 E9/L (ref 4.5–11.5)

## 2018-09-10 PROCEDURE — 71045 X-RAY EXAM CHEST 1 VIEW: CPT

## 2018-09-10 PROCEDURE — 6370000000 HC RX 637 (ALT 250 FOR IP): Performed by: NURSE PRACTITIONER

## 2018-09-10 PROCEDURE — 6360000002 HC RX W HCPCS: Performed by: FAMILY MEDICINE

## 2018-09-10 PROCEDURE — 36415 COLL VENOUS BLD VENIPUNCTURE: CPT

## 2018-09-10 PROCEDURE — 85027 COMPLETE CBC AUTOMATED: CPT

## 2018-09-10 PROCEDURE — 2060000000 HC ICU INTERMEDIATE R&B

## 2018-09-10 PROCEDURE — 94640 AIRWAY INHALATION TREATMENT: CPT

## 2018-09-10 PROCEDURE — 2700000000 HC OXYGEN THERAPY PER DAY

## 2018-09-10 PROCEDURE — 6360000002 HC RX W HCPCS: Performed by: NURSE PRACTITIONER

## 2018-09-10 PROCEDURE — 2580000003 HC RX 258: Performed by: FAMILY MEDICINE

## 2018-09-10 PROCEDURE — 6370000000 HC RX 637 (ALT 250 FOR IP): Performed by: FAMILY MEDICINE

## 2018-09-10 PROCEDURE — 80048 BASIC METABOLIC PNL TOTAL CA: CPT

## 2018-09-10 RX ORDER — POTASSIUM CHLORIDE 20 MEQ/1
40 TABLET, EXTENDED RELEASE ORAL ONCE
Status: COMPLETED | OUTPATIENT
Start: 2018-09-10 | End: 2018-09-10

## 2018-09-10 RX ORDER — MORPHINE SULFATE 2 MG/ML
4 INJECTION, SOLUTION INTRAMUSCULAR; INTRAVENOUS EVERY 4 HOURS PRN
Status: DISCONTINUED | OUTPATIENT
Start: 2018-09-10 | End: 2018-09-11 | Stop reason: SDUPTHER

## 2018-09-10 RX ADMIN — Medication 10 ML: at 20:45

## 2018-09-10 RX ADMIN — MEROPENEM 1 G: 1 INJECTION, POWDER, FOR SOLUTION INTRAVENOUS at 01:00

## 2018-09-10 RX ADMIN — ENOXAPARIN SODIUM 40 MG: 40 INJECTION SUBCUTANEOUS at 16:58

## 2018-09-10 RX ADMIN — METOPROLOL SUCCINATE 12.5 MG: 25 TABLET, EXTENDED RELEASE ORAL at 09:10

## 2018-09-10 RX ADMIN — MORPHINE SULFATE 4 MG: 2 INJECTION, SOLUTION INTRAMUSCULAR; INTRAVENOUS at 21:05

## 2018-09-10 RX ADMIN — ASPIRIN 81 MG: 81 TABLET, COATED ORAL at 09:11

## 2018-09-10 RX ADMIN — BUDESONIDE 250 MCG: 0.25 SUSPENSION RESPIRATORY (INHALATION) at 20:56

## 2018-09-10 RX ADMIN — MORPHINE SULFATE 4 MG: 4 INJECTION, SOLUTION INTRAMUSCULAR; INTRAVENOUS at 00:07

## 2018-09-10 RX ADMIN — MORPHINE SULFATE 4 MG: 4 INJECTION, SOLUTION INTRAMUSCULAR; INTRAVENOUS at 17:03

## 2018-09-10 RX ADMIN — SODIUM CHLORIDE SOLN NEBU 3% 4 ML: 3 NEBU SOLN at 08:40

## 2018-09-10 RX ADMIN — SODIUM CHLORIDE SOLN NEBU 3% 4 ML: 3 NEBU SOLN at 20:52

## 2018-09-10 RX ADMIN — CHOLECALCIFEROL TAB 50 MCG (2000 UNIT) 2000 UNITS: 50 TAB at 09:11

## 2018-09-10 RX ADMIN — ATORVASTATIN CALCIUM 40 MG: 40 TABLET, FILM COATED ORAL at 20:45

## 2018-09-10 RX ADMIN — Medication 10 ML: at 09:10

## 2018-09-10 RX ADMIN — POTASSIUM CHLORIDE 20 MEQ: 20 TABLET, EXTENDED RELEASE ORAL at 16:58

## 2018-09-10 RX ADMIN — DIAZEPAM 5 MG: 5 TABLET ORAL at 20:45

## 2018-09-10 RX ADMIN — MEROPENEM 1 G: 1 INJECTION, POWDER, FOR SOLUTION INTRAVENOUS at 16:59

## 2018-09-10 RX ADMIN — MORPHINE SULFATE 4 MG: 4 INJECTION, SOLUTION INTRAMUSCULAR; INTRAVENOUS at 04:36

## 2018-09-10 RX ADMIN — MEROPENEM 1 G: 1 INJECTION, POWDER, FOR SOLUTION INTRAVENOUS at 09:10

## 2018-09-10 RX ADMIN — SERTRALINE 100 MG: 100 TABLET, FILM COATED ORAL at 20:45

## 2018-09-10 RX ADMIN — POTASSIUM CHLORIDE 20 MEQ: 20 TABLET, EXTENDED RELEASE ORAL at 09:11

## 2018-09-10 RX ADMIN — MORPHINE SULFATE 4 MG: 4 INJECTION, SOLUTION INTRAMUSCULAR; INTRAVENOUS at 10:18

## 2018-09-10 RX ADMIN — POTASSIUM CHLORIDE 40 MEQ: 20 TABLET, EXTENDED RELEASE ORAL at 18:22

## 2018-09-10 RX ADMIN — RALOXIFENE HYDROCHLORIDE 60 MG: 60 TABLET, FILM COATED ORAL at 09:10

## 2018-09-10 RX ADMIN — CALCIUM 500 MG: 500 TABLET ORAL at 09:11

## 2018-09-10 RX ADMIN — BUDESONIDE 250 MCG: 0.25 SUSPENSION RESPIRATORY (INHALATION) at 08:40

## 2018-09-10 ASSESSMENT — PAIN SCALES - GENERAL
PAINLEVEL_OUTOF10: 0
PAINLEVEL_OUTOF10: 4
PAINLEVEL_OUTOF10: 0
PAINLEVEL_OUTOF10: 8
PAINLEVEL_OUTOF10: 0
PAINLEVEL_OUTOF10: 0
PAINLEVEL_OUTOF10: 10
PAINLEVEL_OUTOF10: 7
PAINLEVEL_OUTOF10: 8
PAINLEVEL_OUTOF10: 0
PAINLEVEL_OUTOF10: 8

## 2018-09-10 ASSESSMENT — PAIN SCALES - WONG BAKER: WONGBAKER_NUMERICALRESPONSE: 0

## 2018-09-10 ASSESSMENT — PAIN DESCRIPTION - PAIN TYPE
TYPE: SURGICAL PAIN;ACUTE PAIN
TYPE: ACUTE PAIN;SURGICAL PAIN

## 2018-09-10 ASSESSMENT — PAIN DESCRIPTION - ORIENTATION
ORIENTATION: LEFT

## 2018-09-10 ASSESSMENT — PAIN DESCRIPTION - LOCATION
LOCATION: CHEST;BACK
LOCATION: BACK;CHEST;FLANK
LOCATION: CHEST;BACK
LOCATION: CHEST;BACK

## 2018-09-10 ASSESSMENT — PAIN DESCRIPTION - DESCRIPTORS
DESCRIPTORS: DISCOMFORT;ACHING;CONSTANT
DESCRIPTORS: ACHING;DISCOMFORT;SORE;SHARP
DESCRIPTORS: CONSTANT;DISCOMFORT
DESCRIPTORS: ACHING;DISCOMFORT;SHARP

## 2018-09-10 NOTE — PATIENT CARE CONFERENCE
[facility] Quality Flow/Interdisciplinary Rounds Progress Note        Quality Flow Rounds held on September 10, 2018    Disciplines Attending:  Bedside Nurse, ,  and Nursing Unit [address] was admitted on 9/7/2018  9:39 AM    Anticipated Discharge Date:  Expected Discharge Date: 09/10/18    Disposition:    Mio Score:  Mio Scale Score: 21    Readmission Risk              Risk of Unplanned Readmission:        16             Discussed patient goal for the day, patient clinical progression, and barriers to discharge.   The following Goal(s) of the Day/Commitment(s) have been identified:  Monitor pulmonary status, wean oxygen as tolerated, plan per thoracic surgery, continue IV merrem, discharge planning      Marta Morillo  September 10, 2018

## 2018-09-10 NOTE — PLAN OF CARE
Patient updated on below.   Problem: Falls - Risk of:  Goal: Will remain free from falls  Will remain free from falls   Outcome: Met This Shift      Problem: Pain:  Goal: Pain level will decrease  Pain level will decrease     Outcome: Met This Shift      Problem: Breathing Pattern - Ineffective:  Goal: Able to breathe comfortably  Able to breathe comfortably     Outcome: Met This Shift

## 2018-09-11 ENCOUNTER — APPOINTMENT (OUTPATIENT)
Dept: GENERAL RADIOLOGY | Age: 73
DRG: 163 | End: 2018-09-11
Payer: MEDICARE

## 2018-09-11 ENCOUNTER — ANESTHESIA EVENT (OUTPATIENT)
Dept: OPERATING ROOM | Age: 73
DRG: 163 | End: 2018-09-11
Payer: MEDICARE

## 2018-09-11 LAB
ALPHA-1 ANTITRYPSIN PHENOTYPE: NORMAL
ALPHA-1 ANTITRYPSIN: 200 MG/DL (ref 90–200)
ANION GAP SERPL CALCULATED.3IONS-SCNC: 8 MMOL/L (ref 7–16)
BUN BLDV-MCNC: 11 MG/DL (ref 8–23)
CALCIUM SERPL-MCNC: 8 MG/DL (ref 8.6–10.2)
CHLORIDE BLD-SCNC: 105 MMOL/L (ref 98–107)
CO2: 28 MMOL/L (ref 22–29)
CREAT SERPL-MCNC: 0.6 MG/DL (ref 0.5–1)
GFR AFRICAN AMERICAN: >60
GFR NON-AFRICAN AMERICAN: >60 ML/MIN/1.73
GLUCOSE BLD-MCNC: 106 MG/DL (ref 74–109)
HCT VFR BLD CALC: 36.8 % (ref 34–48)
HEMOGLOBIN: 11.4 G/DL (ref 11.5–15.5)
LV EF: 60 %
LVEF MODALITY: NORMAL
MCH RBC QN AUTO: 28 PG (ref 26–35)
MCHC RBC AUTO-ENTMCNC: 31 % (ref 32–34.5)
MCV RBC AUTO: 90.4 FL (ref 80–99.9)
PDW BLD-RTO: 14.2 FL (ref 11.5–15)
PLATELET # BLD: 136 E9/L (ref 130–450)
PMV BLD AUTO: 10.6 FL (ref 7–12)
POTASSIUM SERPL-SCNC: 5.2 MMOL/L (ref 3.5–5)
RBC # BLD: 4.07 E12/L (ref 3.5–5.5)
SODIUM BLD-SCNC: 141 MMOL/L (ref 132–146)
TROPONIN: <0.01 NG/ML (ref 0–0.03)
WBC # BLD: 7.2 E9/L (ref 4.5–11.5)

## 2018-09-11 PROCEDURE — 80048 BASIC METABOLIC PNL TOTAL CA: CPT

## 2018-09-11 PROCEDURE — 93005 ELECTROCARDIOGRAM TRACING: CPT | Performed by: NURSE PRACTITIONER

## 2018-09-11 PROCEDURE — 93306 TTE W/DOPPLER COMPLETE: CPT

## 2018-09-11 PROCEDURE — 2580000003 HC RX 258: Performed by: SPECIALIST

## 2018-09-11 PROCEDURE — 2580000003 HC RX 258: Performed by: FAMILY MEDICINE

## 2018-09-11 PROCEDURE — 6370000000 HC RX 637 (ALT 250 FOR IP): Performed by: FAMILY MEDICINE

## 2018-09-11 PROCEDURE — 94640 AIRWAY INHALATION TREATMENT: CPT

## 2018-09-11 PROCEDURE — 2060000000 HC ICU INTERMEDIATE R&B

## 2018-09-11 PROCEDURE — 84484 ASSAY OF TROPONIN QUANT: CPT

## 2018-09-11 PROCEDURE — 6360000002 HC RX W HCPCS: Performed by: NURSE PRACTITIONER

## 2018-09-11 PROCEDURE — 85027 COMPLETE CBC AUTOMATED: CPT

## 2018-09-11 PROCEDURE — APPSS60 APP SPLIT SHARED TIME 46-60 MINUTES: Performed by: NURSE PRACTITIONER

## 2018-09-11 PROCEDURE — 36415 COLL VENOUS BLD VENIPUNCTURE: CPT

## 2018-09-11 PROCEDURE — 2700000000 HC OXYGEN THERAPY PER DAY

## 2018-09-11 PROCEDURE — 71045 X-RAY EXAM CHEST 1 VIEW: CPT

## 2018-09-11 PROCEDURE — 99223 1ST HOSP IP/OBS HIGH 75: CPT | Performed by: INTERNAL MEDICINE

## 2018-09-11 PROCEDURE — 6360000002 HC RX W HCPCS: Performed by: FAMILY MEDICINE

## 2018-09-11 RX ORDER — SODIUM CHLORIDE 0.9 % (FLUSH) 0.9 %
10 SYRINGE (ML) INJECTION PRN
Status: DISCONTINUED | OUTPATIENT
Start: 2018-09-11 | End: 2018-09-12 | Stop reason: HOSPADM

## 2018-09-11 RX ORDER — SODIUM CHLORIDE 0.9 % (FLUSH) 0.9 %
10 SYRINGE (ML) INJECTION EVERY 12 HOURS SCHEDULED
Status: DISCONTINUED | OUTPATIENT
Start: 2018-09-11 | End: 2018-09-11 | Stop reason: SDUPTHER

## 2018-09-11 RX ORDER — MORPHINE SULFATE 2 MG/ML
4 INJECTION, SOLUTION INTRAMUSCULAR; INTRAVENOUS
Status: DISCONTINUED | OUTPATIENT
Start: 2018-09-11 | End: 2018-09-12

## 2018-09-11 RX ORDER — SODIUM CHLORIDE 0.9 % (FLUSH) 0.9 %
10 SYRINGE (ML) INJECTION PRN
Status: DISCONTINUED | OUTPATIENT
Start: 2018-09-11 | End: 2018-09-11 | Stop reason: SDUPTHER

## 2018-09-11 RX ORDER — SODIUM CHLORIDE 0.9 % (FLUSH) 0.9 %
10 SYRINGE (ML) INJECTION EVERY 12 HOURS SCHEDULED
Status: DISCONTINUED | OUTPATIENT
Start: 2018-09-11 | End: 2018-09-12 | Stop reason: HOSPADM

## 2018-09-11 RX ORDER — MORPHINE SULFATE 2 MG/ML
4 INJECTION, SOLUTION INTRAMUSCULAR; INTRAVENOUS EVERY 4 HOURS PRN
Status: DISCONTINUED | OUTPATIENT
Start: 2018-09-11 | End: 2018-09-11

## 2018-09-11 RX ADMIN — RALOXIFENE HYDROCHLORIDE 60 MG: 60 TABLET, FILM COATED ORAL at 08:17

## 2018-09-11 RX ADMIN — CHOLECALCIFEROL TAB 50 MCG (2000 UNIT) 2000 UNITS: 50 TAB at 08:16

## 2018-09-11 RX ADMIN — DIAZEPAM 5 MG: 5 TABLET ORAL at 20:25

## 2018-09-11 RX ADMIN — ATORVASTATIN CALCIUM 40 MG: 40 TABLET, FILM COATED ORAL at 20:25

## 2018-09-11 RX ADMIN — Medication 10 ML: at 20:26

## 2018-09-11 RX ADMIN — METOPROLOL SUCCINATE 12.5 MG: 25 TABLET, EXTENDED RELEASE ORAL at 08:17

## 2018-09-11 RX ADMIN — Medication 10 ML: at 08:17

## 2018-09-11 RX ADMIN — CALCIUM 500 MG: 500 TABLET ORAL at 08:16

## 2018-09-11 RX ADMIN — MEROPENEM 1 G: 1 INJECTION, POWDER, FOR SOLUTION INTRAVENOUS at 01:08

## 2018-09-11 RX ADMIN — MEROPENEM 1 G: 1 INJECTION, POWDER, FOR SOLUTION INTRAVENOUS at 17:24

## 2018-09-11 RX ADMIN — MORPHINE SULFATE 4 MG: 2 INJECTION, SOLUTION INTRAMUSCULAR; INTRAVENOUS at 05:03

## 2018-09-11 RX ADMIN — MEROPENEM 1 G: 1 INJECTION, POWDER, FOR SOLUTION INTRAVENOUS at 09:18

## 2018-09-11 RX ADMIN — ASPIRIN 81 MG: 81 TABLET, COATED ORAL at 08:17

## 2018-09-11 RX ADMIN — POTASSIUM CHLORIDE 20 MEQ: 20 TABLET, EXTENDED RELEASE ORAL at 17:24

## 2018-09-11 RX ADMIN — MORPHINE SULFATE 4 MG: 2 INJECTION, SOLUTION INTRAMUSCULAR; INTRAVENOUS at 13:43

## 2018-09-11 RX ADMIN — MORPHINE SULFATE 4 MG: 2 INJECTION, SOLUTION INTRAMUSCULAR; INTRAVENOUS at 17:24

## 2018-09-11 RX ADMIN — MORPHINE SULFATE 4 MG: 2 INJECTION, SOLUTION INTRAMUSCULAR; INTRAVENOUS at 09:31

## 2018-09-11 RX ADMIN — BUDESONIDE 250 MCG: 0.25 SUSPENSION RESPIRATORY (INHALATION) at 12:42

## 2018-09-11 RX ADMIN — Medication 10 ML: at 17:24

## 2018-09-11 RX ADMIN — Medication 10 ML: at 09:18

## 2018-09-11 RX ADMIN — POTASSIUM CHLORIDE 20 MEQ: 20 TABLET, EXTENDED RELEASE ORAL at 08:17

## 2018-09-11 RX ADMIN — MORPHINE SULFATE 4 MG: 2 INJECTION, SOLUTION INTRAMUSCULAR; INTRAVENOUS at 22:30

## 2018-09-11 RX ADMIN — SERTRALINE 100 MG: 100 TABLET, FILM COATED ORAL at 20:25

## 2018-09-11 RX ADMIN — SODIUM CHLORIDE SOLN NEBU 3% 15 ML: 3 NEBU SOLN at 21:59

## 2018-09-11 RX ADMIN — SODIUM CHLORIDE SOLN NEBU 3% 4 ML: 3 NEBU SOLN at 12:41

## 2018-09-11 RX ADMIN — MORPHINE SULFATE 4 MG: 2 INJECTION, SOLUTION INTRAMUSCULAR; INTRAVENOUS at 01:08

## 2018-09-11 RX ADMIN — Medication 10 ML: at 13:43

## 2018-09-11 RX ADMIN — BUDESONIDE 250 MCG: 0.25 SUSPENSION RESPIRATORY (INHALATION) at 21:59

## 2018-09-11 RX ADMIN — MORPHINE SULFATE 4 MG: 2 INJECTION, SOLUTION INTRAMUSCULAR; INTRAVENOUS at 20:29

## 2018-09-11 ASSESSMENT — ENCOUNTER SYMPTOMS
SHORTNESS OF BREATH: 1
DYSPNEA ACTIVITY LEVEL: AFTER AMBULATING 1 FLIGHT OF STAIRS

## 2018-09-11 ASSESSMENT — PAIN DESCRIPTION - LOCATION
LOCATION: CHEST;BACK
LOCATION: BACK;CHEST
LOCATION: BACK;CHEST
LOCATION: CHEST;BACK

## 2018-09-11 ASSESSMENT — PAIN DESCRIPTION - PAIN TYPE
TYPE: ACUTE PAIN
TYPE: ACUTE PAIN;SURGICAL PAIN
TYPE: ACUTE PAIN
TYPE: ACUTE PAIN;SURGICAL PAIN

## 2018-09-11 ASSESSMENT — PAIN SCALES - GENERAL
PAINLEVEL_OUTOF10: 5
PAINLEVEL_OUTOF10: 0
PAINLEVEL_OUTOF10: 10
PAINLEVEL_OUTOF10: 2
PAINLEVEL_OUTOF10: 10
PAINLEVEL_OUTOF10: 7
PAINLEVEL_OUTOF10: 7
PAINLEVEL_OUTOF10: 4
PAINLEVEL_OUTOF10: 9
PAINLEVEL_OUTOF10: 4
PAINLEVEL_OUTOF10: 10
PAINLEVEL_OUTOF10: 8
PAINLEVEL_OUTOF10: 4

## 2018-09-11 ASSESSMENT — PAIN DESCRIPTION - ORIENTATION
ORIENTATION: LEFT

## 2018-09-11 ASSESSMENT — PAIN DESCRIPTION - DESCRIPTORS
DESCRIPTORS: ACHING;DISCOMFORT
DESCRIPTORS: DISCOMFORT;ACHING
DESCRIPTORS: ACHING;DISCOMFORT

## 2018-09-11 NOTE — CONSULTS
Inpatient Cardiology Consultation      Reason for Consult:  Cardiac risk assessment for Left VATS procedure. Consulting Physician: Dr. Mary Beth Sterling    Requesting Physician:  Dr. Russell Whitney    Date of Consultation: 9/11/2018    HISTORY OF PRESENT ILLNESS: Ms. Cydney Asencio is a 75year old  female who is previously known to Dr. Roseanna Rodriguez; most recently evaluated by Dr. Roseanna Rodriguez in outpatient on 9/6/2018 for hospital follow-up. Mrs. Last was initially admitted to [facility] for pneumonia on 7/3/2018 where she was found to have a 50% pneumothorax on the left side of the chest when she presented with acute chest pain and dyspnea. She immediately underwent placement of a left-sided chest tube with a near complete. Cardiology was consultaed on 7/3/2018 for elevated troponin (0.16, 0.44, 0.32). Echocardiogram (7/5/2018) in the hospital which showed a new onset LV dysfunction with an EF of 30-35% with hyperdynamic basal segments and the severe anterior wall ischemia suggestive of stress-induced cardiomyopathy. Therefore she underwent a cardiac catheterization on 7/9/2018 which showed no significant coronary artery disease. She was discharged home on 7/10/2018. Her chest tube was removed prior to cardiac cath with a complete resolution of pneumothorax. Other PMH: COPD, HTN, Migraines and peptic ulcer disease. Ms. Cydney Asencio presented to [facility] on 9/7/2018 with complaints of sudden worsening SOB, wheezing, and white/clear productive cough. She was found to have a mod-large left pneumothorax (similar to her hospitalization in 7/2018). She had a pigtail catheter placed and CT surgery was consulted for possible Left VATS procedure. She was recommended for Left VATS and Cardiology was consulted for cardiac risk assessment.  Upon initial consultation, patient is currently sitting up in a chair at the bedside and denies SOB but has had two episodes of left chest wall anterior \"sharp\" pains that are on/off, lasting ~40 minutes, completely relieved with IV morphine. Troponin <0.01 (today) and no acute EKG changes. Please note: past medical records were reviewed per electronic medical record (EMR) - see detailed reports under Past Medical/ Surgical History. Past Medical History:    1. HTN  2. Migraines  3. Severe COPD with history of tobacco abuse (quit in 1972) 30 pack year smoker. 4. History of left sided tension pneumothorax requiring chest tube placement (7/3/2018)  5. History of right breast lumpectomy (benign per patient)  6. Nonischemic CMP:  · Echocardiogram:7/5/2018-LVEF 19-48%, stage I diastolic dysfunction and normal RV size and function, moderate pH. · TTE-8/16/2018: LVEF 55%, normal RV size and function, mild pulmonary hypertension. 7. Cardiac catheterization: 7/9/2018: Left main: 0%  stenosis, LAD: 0 %  stenosis, IR: 40% stenosis, Circumflex: 20-30 %   stenosis, RCA: Dominant.  30-40 %  stenosis LV angio: 40%  ejection fraction  8. LAFB  9. HLD: on statin therapy     Medications Prior to admit:  Prior to Admission medications    Medication Sig Start Date End Date Taking?  Authorizing Provider   sodium chloride, Inhalant, 3 % nebulizer solution Take 15 mLs by nebulization 2 times daily   Yes Historical Provider, MD   imipenem-cilastatin (PRIMAXIN) 500 MG injection Infuse 1,000 mg intravenously every 8 hours 9/5/18 9/18/18 Yes Historical Provider, MD   budesonide (PULMICORT) 0.25 MG/2ML nebulizer suspension Take 2 mLs by nebulization 2 times daily 7/10/18  Yes Rhett Patrick MD   atorvastatin (LIPITOR) 40 MG tablet Take 1 tablet by mouth nightly 7/10/18  Yes Rhett Patrick MD   metoprolol succinate (TOPROL XL) 25 MG extended release tablet Take 0.5 tablets by mouth daily 7/11/18  Yes Rhett Patrick MD   OXYGEN Inhale 3 L into the lungs continuous Had to increase to 5L d/t dyspnea   Yes Historical Provider, MD   tiotropium (SPIRIVA RESPIMAT) 2.5 MCG/ACT AERS inhaler Inhale 2 puffs into the lungs daily 1/31/17  Yes Don Urbina DO sertraline (ZOLOFT) 25 MG tablet Take 50 mg by mouth nightly  11/23/16  Yes Historical Provider, MD   fluticasone (FLONASE) 50 MCG/ACT nasal spray 1 spray by Nasal route nightly as needed for Rhinitis or Allergies  11/23/16  Yes Historical Provider, MD   losartan-hydrochlorothiazide (HYZAAR) 100-12.5 MG per tablet Take 1 tablet by mouth every morning    Yes Historical Provider, MD   Thickening Agents (THICK-AID) LIQD by Does not apply route as needed Campanilla thick d/t recurrent pneumonia   Yes Historical Provider, MD   raloxifene (EVISTA) 60 MG tablet Take 60 mg by mouth every morning    Yes Historical Provider, MD   aspirin 81 MG tablet Take 81 mg by mouth every morning    Yes Historical Provider, MD   Multiple Vitamins-Minerals (OCUVITE PO) Take 1 tablet by mouth every morning    Yes Historical Provider, MD   calcium carbonate 600 MG TABS tablet Take 1 tablet by mouth every morning    Yes Historical Provider, MD   Cholecalciferol (VITAMIN D) 2000 UNITS CAPS capsule Take 2,000 Units by mouth every morning    Yes Historical Provider, MD   Omega-3 Fatty Acids (FISH OIL) 1000 MG CPDR Take 1,000 mg by mouth every morning    Yes Historical Provider, MD       Current Medications:    Current Facility-Administered Medications: morphine injection 4 mg, 4 mg, Intravenous, Q4H PRN  sertraline (ZOLOFT) tablet 100 mg, 100 mg, Oral, Nightly  sodium chloride flush 0.9 % injection 10 mL, 10 mL, Intravenous, 2 times per day  sodium chloride flush 0.9 % injection 10 mL, 10 mL, Intravenous, PRN  acetaminophen (TYLENOL) tablet 650 mg, 650 mg, Oral, Q4H PRN  enoxaparin (LOVENOX) injection 40 mg, 40 mg, Subcutaneous, Daily  aspirin EC tablet 81 mg, 81 mg, Oral, QAM  atorvastatin (LIPITOR) tablet 40 mg, 40 mg, Oral, Nightly  budesonide (PULMICORT) nebulizer suspension 250 mcg, 250 mcg, Nebulization, BID  calcium elemental (OSCAL) tablet 500 mg, 1 tablet, Oral, QAM  vitamin D tablet 2,000 Units, 2,000 Units, Oral, QAM  fluticasone (FLONASE) 50 MCG/ACT nasal spray 1 spray, 1 spray, Nasal, Nightly PRN  metoprolol succinate (TOPROL XL) extended release tablet 12.5 mg, 12.5 mg, Oral, Daily  raloxifene (EVISTA) tablet 60 mg, 60 mg, Oral, QAM  sodium chloride (Inhalant) 3 % nebulizer solution 15 mL, 15 mL, Nebulization, BID  diazepam (VALIUM) tablet 5 mg, 5 mg, Oral, Nightly  potassium chloride (KLOR-CON M) extended release tablet 20 mEq, 20 mEq, Oral, BID WC  meropenem (MERREM) 1 g in sodium chloride 0.9 % 100 mL IVPB (mini-bag), 1 g, Intravenous, Q8H    Allergies:  Dye [iodides] (rash)    Social History: Former smoker: 30 pack year smoker; quit in 1972. Denies alcohol and illicit drug use. Denies using a walker or cane. Family History: Noncontributory due to advanced age. REVIEW OF SYSTEMS:     · Constitutional: + fatigue. Denies fevers, chills or night sweats  · Eyes: Denies visual changes or drainage  · ENT: Denies headaches or hearing loss. No mouth sores or sore throat. No epistaxis   · Cardiovascular: See HPI. + lower extremity swelling. · Respiratory: + [place], + dry cough. Denies  orthopnea or PND. No hemoptysis   · Gastrointestinal: Denies hematemesis or anorexia. No hematochezia or melena    · Genitourinary: Denies urgency, dysuria or hematuria. · Musculoskeletal: Denies gait disturbance, weakness or joint complaints  · Integumentary: Denies rash, hives or pruritis   · Neurological: Denies dizziness, headaches or seizures. No numbness or tingling  · Psychiatric: Denies anxiety or depression. · Endocrine: Denies temperature intolerance. No recent weight change. .  · Hematologic/Lymphatic: Denies abnormal bruising or bleeding. No swollen lymph nodes    PHYSICAL EXAM:   BP (!) 110/57   Pulse 70   Temp 98.6 °F (37 °C) (Oral)   Resp 16   Ht 5' 6\" (1.676 m)   Wt 166 lb 6 oz (75.5 kg)   SpO2 94%   BMI 26.85 kg/m²   CONST:  Well developed, well nourished elderly female who appears of stated age.  Awake, alert and 09/10/18 1851   Gross per 24 hour   Intake              600 ml   Output                0 ml   Net              600 ml       Labs:   CBC:   Recent Labs      09/10/18   0944  09/11/18   0410   WBC  6.5  7.2   HGB  10.4*  11.4*   HCT  33.3*  36.8   PLT  137  136     BMP: Recent Labs      09/10/18   0944  09/11/18   0410   NA  146  141   K  3.2*  5.2*   CO2  27  28   BUN  10  11   CREATININE  0.5  0.6   LABGLOM  >60  >60   CALCIUM  6.8*  8.0*     CARDIAC ENZYMES:  Recent Labs      09/11/18   0410   TROPONINI  <0.01     FASTING LIPID PANEL:  Lab Results   Component Value Date    CHOL 138 07/06/2018    HDL 67 07/06/2018    LDLCALC 53 07/06/2018    TRIG 91 07/06/2018     Electronically signed by MISHA Greenfield CNP on 9/11/18 at 8:35 AM    Assessment:   1. Admitted with recurrent left pneumothorax; scheduled for left VATS (9/12/2018)  2. Stress CMP with improved LV function: EF 30-35% on TTE 7/5/2018. Improved EF 55% on TTE 8/2018. Normal coronaries on cath 7/9/2018. 3. Non-anginal left sided chest pain. 4. Severe COPD with remote tobacco abuse  5. HTN: controlled  6. HLD: on statin therapy      Plan:   1. Recurrence of secondary Takotsubo myopathy has been estimated at 10% Therefore there is some elevated risk of MACE for VATS procedure. There is no strategy however which  would significantly alter this risk   2. Continue BB pre-op      [facility] cardiology attending note: The patient is interviewed and examined in her room by me today. [facility] on 9/7/2018 with complaints of sudden worsening SOB, wheezing, and white/clear productive cough. She was found to have a mod-large left pneumothorax (similar to her hospitalization in 7/2018). She had a pigtail catheter placed and CT surgery was consulted for possible Left VATS procedure. She was recommended for Left VATS and Cardiology was consulted for cardiac risk assessment.  Upon initial consultation, patient is currently sitting up in a chair at the bedside and

## 2018-09-11 NOTE — ANESTHESIA PRE PROCEDURE
Tao Barton MD   2,000 Units at 09/11/18 0816    fluticasone (FLONASE) 50 MCG/ACT nasal spray 1 spray  1 spray Nasal Nightly PRN Lulú Wolff MD        metoprolol succinate (TOPROL XL) extended release tablet 12.5 mg  12.5 mg Oral Daily Lulú Wolff MD   12.5 mg at 09/11/18 0817    raloxifene (EVISTA) tablet 60 mg  60 mg Oral QAM Lulú Wolff MD   60 mg at 09/11/18 0817    sodium chloride (Inhalant) 3 % nebulizer solution 15 mL  15 mL Nebulization BID Lulú Wolff MD   4 mL at 09/12/18 1761    diazepam (VALIUM) tablet 5 mg  5 mg Oral Nightly Lulú Wolff MD   5 mg at 09/11/18 2025    potassium chloride (KLOR-CON M) extended release tablet 20 mEq  20 mEq Oral BID WC Lulú Wolff MD   20 mEq at 09/11/18 1724    meropenem (MERREM) 1 g in sodium chloride 0.9 % 100 mL IVPB (mini-bag)  1 g Intravenous Q8H Lulú Wolff MD   Stopped at 09/12/18 1051       Allergies:     Allergies   Allergen Reactions    Dye [Iodides]        Problem List:    Patient Active Problem List   Diagnosis Code    Tachycardia R00.0    HTN (hypertension) I10    Hypoxia R09.02    COPD (chronic obstructive pulmonary disease) ([facility] [address] J44.9    Cryptogenic organizing pneumonia ([facility] [address] J84.116    Pneumothorax on left J93.9    Cardiomyopathy ([facility] [address] I42.9    Pneumothorax J93.9       Past Medical History:        Diagnosis Date    COPD (chronic obstructive pulmonary disease) ([facility] [address]     Hypertension     Migraines     MRSA (methicillin resistant staph aureus) culture positive     Pneumonia     Ulcer        Past Surgical History:        Procedure Laterality Date    BREAST LUMPECTOMY Right     TONSILLECTOMY         Social History:    Social History   Substance Use Topics    Smoking status: Former Smoker     Packs/day: 1.00     Years: 35.00     Types: Cigarettes     Start date: 9/7/1972     Quit date: 1/1/2007    Smokeless tobacco: Never Used    Alcohol use No                                Counseling given: Not

## 2018-09-12 ENCOUNTER — APPOINTMENT (OUTPATIENT)
Dept: GENERAL RADIOLOGY | Age: 73
DRG: 163 | End: 2018-09-12
Payer: MEDICARE

## 2018-09-12 ENCOUNTER — ANESTHESIA (OUTPATIENT)
Dept: OPERATING ROOM | Age: 73
DRG: 163 | End: 2018-09-12
Payer: MEDICARE

## 2018-09-12 VITALS — TEMPERATURE: 97.7 F | OXYGEN SATURATION: 98 %

## 2018-09-12 LAB
ABO/RH: NORMAL
ANION GAP SERPL CALCULATED.3IONS-SCNC: 6 MMOL/L (ref 7–16)
ANION GAP SERPL CALCULATED.3IONS-SCNC: 8 MMOL/L (ref 7–16)
ANTIBODY SCREEN: NORMAL
B.E.: 1.5 MMOL/L (ref -3–0)
B.E.: 2.3 MMOL/L (ref -3–0)
B.E.: 2.7 MMOL/L (ref -3–0)
BLOOD CULTURE, ROUTINE: NORMAL
BUN BLDV-MCNC: 10 MG/DL (ref 8–23)
BUN BLDV-MCNC: 10 MG/DL (ref 8–23)
CALCIUM SERPL-MCNC: 7.9 MG/DL (ref 8.6–10.2)
CALCIUM SERPL-MCNC: 8.4 MG/DL (ref 8.6–10.2)
CARDIOPULMONARY BYPASS: NO
CHLORIDE BLD-SCNC: 104 MMOL/L (ref 98–107)
CHLORIDE BLD-SCNC: 104 MMOL/L (ref 98–107)
CO2: 31 MMOL/L (ref 22–29)
CO2: 32 MMOL/L (ref 22–29)
CREAT SERPL-MCNC: 0.6 MG/DL (ref 0.5–1)
CREAT SERPL-MCNC: 0.6 MG/DL (ref 0.5–1)
CRITICAL NOTIFICATION: YES
CRITICAL NOTIFICATION: YES
CULTURE, BLOOD 2: NORMAL
DELIVERY SYSTEMS: ABNORMAL
DELIVERY SYSTEMS: ABNORMAL
DEVICE: ABNORMAL
EKG ATRIAL RATE: 80 BPM
EKG P AXIS: 39 DEGREES
EKG P-R INTERVAL: 120 MS
EKG Q-T INTERVAL: 386 MS
EKG QRS DURATION: 74 MS
EKG QTC CALCULATION (BAZETT): 445 MS
EKG R AXIS: -40 DEGREES
EKG T AXIS: 6 DEGREES
EKG VENTRICULAR RATE: 80 BPM
FIO2 ARTERIAL: 100
FIO2 ARTERIAL: 100
FIO2 ARTERIAL: 8
GFR AFRICAN AMERICAN: >60
GFR AFRICAN AMERICAN: >60
GFR NON-AFRICAN AMERICAN: >60 ML/MIN/1.73
GFR NON-AFRICAN AMERICAN: >60 ML/MIN/1.73
GLUCOSE BLD-MCNC: 101 MG/DL (ref 74–109)
GLUCOSE BLD-MCNC: 122 MG/DL (ref 74–109)
HCO3 ARTERIAL: 30.1 MMOL/L (ref 22–26)
HCO3 ARTERIAL: 31.4 MMOL/L (ref 22–26)
HCO3 ARTERIAL: 33.3 MMOL/L (ref 22–26)
HCT VFR BLD CALC: 36.5 % (ref 34–48)
HCT VFR BLD CALC: 39.1 % (ref 34–48)
HCT,ARTERIAL: 41 % (ref 34–48)
HEMOGLOBIN: 11.2 G/DL (ref 11.5–15.5)
HEMOGLOBIN: 12.2 G/DL (ref 11.5–15.5)
HGB, ARTERIAL: 14 G/DL (ref 11.5–15.5)
MCH RBC QN AUTO: 28.1 PG (ref 26–35)
MCH RBC QN AUTO: 28.4 PG (ref 26–35)
MCHC RBC AUTO-ENTMCNC: 30.7 % (ref 32–34.5)
MCHC RBC AUTO-ENTMCNC: 31.2 % (ref 32–34.5)
MCV RBC AUTO: 91.1 FL (ref 80–99.9)
MCV RBC AUTO: 91.5 FL (ref 80–99.9)
MODE: ABNORMAL
MODE: ABNORMAL
O2 SATURATION: 87.5 % (ref 92–98.5)
O2 SATURATION: 98.4 % (ref 92–98.5)
O2 SATURATION: 99.8 % (ref 92–98.5)
OPERATOR ID: 7874
OPERATOR ID: 7874
OPERATOR ID: 8634
PATIENT TEMP: 37
PATIENT TEMP: 37
PCO2 (TEMP CORRECTED): 68.8 MMHG (ref 35–45)
PCO2 (TEMP CORRECTED): 85.7 MMHG (ref 35–45)
PCO2 ARTERIAL: 66.5 MMHG (ref 35–45)
PDW BLD-RTO: 14.2 FL (ref 11.5–15)
PDW BLD-RTO: 14.3 FL (ref 11.5–15)
PH (TEMPERATURE CORRECTED): 7.2 (ref 7.35–7.45)
PH (TEMPERATURE CORRECTED): 7.27 (ref 7.35–7.45)
PH BLOOD GAS: 7.26 (ref 7.35–7.45)
PLATELET # BLD: 142 E9/L (ref 130–450)
PLATELET # BLD: 145 E9/L (ref 130–450)
PMV BLD AUTO: 10.2 FL (ref 7–12)
PMV BLD AUTO: 10.3 FL (ref 7–12)
PO2 (TEMP CORRECTED): 289 MMHG (ref 60–80)
PO2 (TEMP CORRECTED): 69.1 MMHG (ref 60–80)
PO2 ARTERIAL: 132.7 MMHG (ref 60–80)
POSITIVE END EXP PRESS: 8 CMH2O
POSITIVE END EXP PRESS: 8 CMH2O
POTASSIUM REFLEX MAGNESIUM: 4.9 MMOL/L (ref 3.5–5)
POTASSIUM SERPL-SCNC: 4.8 MMOL/L (ref 3.5–5)
PRESSURE SUPPORT: 15 CMH2O
RBC # BLD: 3.99 E12/L (ref 3.5–5.5)
RBC # BLD: 4.29 E12/L (ref 3.5–5.5)
RESPIRATORY RATE: 20 B/MIN
SODIUM BLD-SCNC: 142 MMOL/L (ref 132–146)
SODIUM BLD-SCNC: 143 MMOL/L (ref 132–146)
SOURCE, BLOOD GAS: ABNORMAL
TIDAL VOLUME: 500 ML
WBC # BLD: 6.9 E9/L (ref 4.5–11.5)
WBC # BLD: 8.6 E9/L (ref 4.5–11.5)

## 2018-09-12 PROCEDURE — 6360000002 HC RX W HCPCS: Performed by: NURSE PRACTITIONER

## 2018-09-12 PROCEDURE — 3600000004 HC SURGERY LEVEL 4 BASE: Performed by: SPECIALIST

## 2018-09-12 PROCEDURE — 0BBG4ZZ EXCISION OF LEFT UPPER LUNG LOBE, PERCUTANEOUS ENDOSCOPIC APPROACH: ICD-10-PCS | Performed by: SPECIALIST

## 2018-09-12 PROCEDURE — 2580000003 HC RX 258: Performed by: FAMILY MEDICINE

## 2018-09-12 PROCEDURE — 36415 COLL VENOUS BLD VENIPUNCTURE: CPT

## 2018-09-12 PROCEDURE — 85027 COMPLETE CBC AUTOMATED: CPT

## 2018-09-12 PROCEDURE — 99232 SBSQ HOSP IP/OBS MODERATE 35: CPT | Performed by: INTERNAL MEDICINE

## 2018-09-12 PROCEDURE — 2780000010 HC IMPLANT OTHER: Performed by: SPECIALIST

## 2018-09-12 PROCEDURE — 2700000000 HC OXYGEN THERAPY PER DAY

## 2018-09-12 PROCEDURE — C1729 CATH, DRAINAGE: HCPCS | Performed by: SPECIALIST

## 2018-09-12 PROCEDURE — 2580000003 HC RX 258: Performed by: SPECIALIST

## 2018-09-12 PROCEDURE — 6360000002 HC RX W HCPCS: Performed by: INTERNAL MEDICINE

## 2018-09-12 PROCEDURE — 6360000002 HC RX W HCPCS: Performed by: SPECIALIST

## 2018-09-12 PROCEDURE — 6370000000 HC RX 637 (ALT 250 FOR IP): Performed by: ANESTHESIOLOGY

## 2018-09-12 PROCEDURE — 3600000014 HC SURGERY LEVEL 4 ADDTL 15MIN: Performed by: SPECIALIST

## 2018-09-12 PROCEDURE — 7100000000 HC PACU RECOVERY - FIRST 15 MIN: Performed by: SPECIALIST

## 2018-09-12 PROCEDURE — 2580000003 HC RX 258: Performed by: NURSE ANESTHETIST, CERTIFIED REGISTERED

## 2018-09-12 PROCEDURE — 2000000000 HC ICU R&B

## 2018-09-12 PROCEDURE — 86900 BLOOD TYPING SEROLOGIC ABO: CPT

## 2018-09-12 PROCEDURE — 2500000003 HC RX 250 WO HCPCS: Performed by: NURSE ANESTHETIST, CERTIFIED REGISTERED

## 2018-09-12 PROCEDURE — 3700000000 HC ANESTHESIA ATTENDED CARE: Performed by: SPECIALIST

## 2018-09-12 PROCEDURE — 6360000002 HC RX W HCPCS: Performed by: NURSE ANESTHETIST, CERTIFIED REGISTERED

## 2018-09-12 PROCEDURE — 82803 BLOOD GASES ANY COMBINATION: CPT

## 2018-09-12 PROCEDURE — 2720000010 HC SURG SUPPLY STERILE: Performed by: SPECIALIST

## 2018-09-12 PROCEDURE — 2500000003 HC RX 250 WO HCPCS: Performed by: SPECIALIST

## 2018-09-12 PROCEDURE — 87081 CULTURE SCREEN ONLY: CPT

## 2018-09-12 PROCEDURE — 80048 BASIC METABOLIC PNL TOTAL CA: CPT

## 2018-09-12 PROCEDURE — 86850 RBC ANTIBODY SCREEN: CPT

## 2018-09-12 PROCEDURE — 94640 AIRWAY INHALATION TREATMENT: CPT

## 2018-09-12 PROCEDURE — 71045 X-RAY EXAM CHEST 1 VIEW: CPT

## 2018-09-12 PROCEDURE — 93010 ELECTROCARDIOGRAM REPORT: CPT | Performed by: INTERNAL MEDICINE

## 2018-09-12 PROCEDURE — 36600 WITHDRAWAL OF ARTERIAL BLOOD: CPT

## 2018-09-12 PROCEDURE — 3700000001 HC ADD 15 MINUTES (ANESTHESIA): Performed by: SPECIALIST

## 2018-09-12 PROCEDURE — 6360000002 HC RX W HCPCS: Performed by: FAMILY MEDICINE

## 2018-09-12 PROCEDURE — 7100000001 HC PACU RECOVERY - ADDTL 15 MIN: Performed by: SPECIALIST

## 2018-09-12 PROCEDURE — 2580000003 HC RX 258

## 2018-09-12 PROCEDURE — 6360000002 HC RX W HCPCS

## 2018-09-12 PROCEDURE — 88307 TISSUE EXAM BY PATHOLOGIST: CPT

## 2018-09-12 PROCEDURE — 2709999900 HC NON-CHARGEABLE SUPPLY: Performed by: SPECIALIST

## 2018-09-12 PROCEDURE — 3E0L4GC INTRODUCTION OF OTHER THERAPEUTIC SUBSTANCE INTO PLEURAL CAVITY, PERCUTANEOUS ENDOSCOPIC APPROACH: ICD-10-PCS | Performed by: SPECIALIST

## 2018-09-12 PROCEDURE — 94660 CPAP INITIATION&MGMT: CPT

## 2018-09-12 PROCEDURE — 0BQL4ZZ REPAIR LEFT LUNG, PERCUTANEOUS ENDOSCOPIC APPROACH: ICD-10-PCS | Performed by: FAMILY MEDICINE

## 2018-09-12 PROCEDURE — 86901 BLOOD TYPING SEROLOGIC RH(D): CPT

## 2018-09-12 RX ORDER — MIDAZOLAM HYDROCHLORIDE 1 MG/ML
INJECTION INTRAMUSCULAR; INTRAVENOUS PRN
Status: DISCONTINUED | OUTPATIENT
Start: 2018-09-12 | End: 2018-09-12 | Stop reason: SDUPTHER

## 2018-09-12 RX ORDER — KETOROLAC TROMETHAMINE 10 MG/1
10 TABLET, FILM COATED ORAL EVERY 6 HOURS
Status: DISCONTINUED | OUTPATIENT
Start: 2018-09-13 | End: 2018-09-14 | Stop reason: HOSPADM

## 2018-09-12 RX ORDER — ALBUTEROL SULFATE 0.63 MG/3ML
0.63 SOLUTION RESPIRATORY (INHALATION) EVERY 4 HOURS
Status: DISCONTINUED | OUTPATIENT
Start: 2018-09-12 | End: 2018-09-12

## 2018-09-12 RX ORDER — NEOSTIGMINE METHYLSULFATE 1 MG/ML
INJECTION, SOLUTION INTRAVENOUS PRN
Status: DISCONTINUED | OUTPATIENT
Start: 2018-09-12 | End: 2018-09-12 | Stop reason: SDUPTHER

## 2018-09-12 RX ORDER — MORPHINE SULFATE 2 MG/ML
2 INJECTION, SOLUTION INTRAMUSCULAR; INTRAVENOUS EVERY 5 MIN PRN
Status: DISCONTINUED | OUTPATIENT
Start: 2018-09-12 | End: 2018-09-12 | Stop reason: HOSPADM

## 2018-09-12 RX ORDER — MORPHINE SULFATE 2 MG/ML
2 INJECTION, SOLUTION INTRAMUSCULAR; INTRAVENOUS
Status: DISCONTINUED | OUTPATIENT
Start: 2018-09-12 | End: 2018-09-13 | Stop reason: SDUPTHER

## 2018-09-12 RX ORDER — SODIUM CHLORIDE, SODIUM LACTATE, POTASSIUM CHLORIDE, CALCIUM CHLORIDE 600; 310; 30; 20 MG/100ML; MG/100ML; MG/100ML; MG/100ML
INJECTION, SOLUTION INTRAVENOUS CONTINUOUS
Status: ACTIVE | OUTPATIENT
Start: 2018-09-12 | End: 2018-09-14

## 2018-09-12 RX ORDER — SODIUM CHLORIDE 0.9 % (FLUSH) 0.9 %
10 SYRINGE (ML) INJECTION EVERY 12 HOURS SCHEDULED
Status: DISCONTINUED | OUTPATIENT
Start: 2018-09-12 | End: 2018-09-14 | Stop reason: HOSPADM

## 2018-09-12 RX ORDER — FENTANYL CITRATE 50 UG/ML
INJECTION, SOLUTION INTRAMUSCULAR; INTRAVENOUS PRN
Status: DISCONTINUED | OUTPATIENT
Start: 2018-09-12 | End: 2018-09-12 | Stop reason: SDUPTHER

## 2018-09-12 RX ORDER — GLYCOPYRROLATE 0.2 MG/ML
INJECTION INTRAMUSCULAR; INTRAVENOUS PRN
Status: DISCONTINUED | OUTPATIENT
Start: 2018-09-12 | End: 2018-09-12 | Stop reason: SDUPTHER

## 2018-09-12 RX ORDER — DEXAMETHASONE SODIUM PHOSPHATE 4 MG/ML
INJECTION, SOLUTION INTRA-ARTICULAR; INTRALESIONAL; INTRAMUSCULAR; INTRAVENOUS; SOFT TISSUE PRN
Status: DISCONTINUED | OUTPATIENT
Start: 2018-09-12 | End: 2018-09-12 | Stop reason: SDUPTHER

## 2018-09-12 RX ORDER — METHYLPREDNISOLONE SODIUM SUCCINATE 125 MG/2ML
80 INJECTION, POWDER, LYOPHILIZED, FOR SOLUTION INTRAMUSCULAR; INTRAVENOUS EVERY 8 HOURS
Status: DISCONTINUED | OUTPATIENT
Start: 2018-09-12 | End: 2018-09-13

## 2018-09-12 RX ORDER — SODIUM CHLORIDE 0.9 % (FLUSH) 0.9 %
10 SYRINGE (ML) INJECTION PRN
Status: DISCONTINUED | OUTPATIENT
Start: 2018-09-12 | End: 2018-09-14 | Stop reason: HOSPADM

## 2018-09-12 RX ORDER — SODIUM CHLORIDE 0.9 % (FLUSH) 0.9 %
SYRINGE (ML) INJECTION
Status: COMPLETED
Start: 2018-09-12 | End: 2018-09-12

## 2018-09-12 RX ORDER — IPRATROPIUM BROMIDE AND ALBUTEROL SULFATE 2.5; .5 MG/3ML; MG/3ML
1 SOLUTION RESPIRATORY (INHALATION) ONCE
Status: COMPLETED | OUTPATIENT
Start: 2018-09-12 | End: 2018-09-12

## 2018-09-12 RX ORDER — ONDANSETRON 2 MG/ML
INJECTION INTRAMUSCULAR; INTRAVENOUS PRN
Status: DISCONTINUED | OUTPATIENT
Start: 2018-09-12 | End: 2018-09-12 | Stop reason: SDUPTHER

## 2018-09-12 RX ORDER — METHYLPREDNISOLONE SODIUM SUCCINATE 125 MG/2ML
INJECTION, POWDER, LYOPHILIZED, FOR SOLUTION INTRAMUSCULAR; INTRAVENOUS
Status: COMPLETED
Start: 2018-09-12 | End: 2018-09-12

## 2018-09-12 RX ORDER — ONDANSETRON 2 MG/ML
4 INJECTION INTRAMUSCULAR; INTRAVENOUS
Status: DISCONTINUED | OUTPATIENT
Start: 2018-09-12 | End: 2018-09-12 | Stop reason: HOSPADM

## 2018-09-12 RX ORDER — FENTANYL CITRATE 50 UG/ML
25 INJECTION, SOLUTION INTRAMUSCULAR; INTRAVENOUS EVERY 5 MIN PRN
Status: DISCONTINUED | OUTPATIENT
Start: 2018-09-12 | End: 2018-09-12 | Stop reason: HOSPADM

## 2018-09-12 RX ORDER — PROMETHAZINE HYDROCHLORIDE 25 MG/ML
6.25 INJECTION, SOLUTION INTRAMUSCULAR; INTRAVENOUS
Status: DISCONTINUED | OUTPATIENT
Start: 2018-09-12 | End: 2018-09-12 | Stop reason: HOSPADM

## 2018-09-12 RX ORDER — SODIUM CHLORIDE 9 MG/ML
INJECTION, SOLUTION INTRAVENOUS CONTINUOUS PRN
Status: DISCONTINUED | OUTPATIENT
Start: 2018-09-12 | End: 2018-09-12 | Stop reason: SDUPTHER

## 2018-09-12 RX ORDER — PROPOFOL 10 MG/ML
INJECTION, EMULSION INTRAVENOUS PRN
Status: DISCONTINUED | OUTPATIENT
Start: 2018-09-12 | End: 2018-09-12 | Stop reason: SDUPTHER

## 2018-09-12 RX ORDER — ACETAMINOPHEN 325 MG/1
650 TABLET ORAL EVERY 4 HOURS PRN
Status: DISCONTINUED | OUTPATIENT
Start: 2018-09-12 | End: 2018-09-14 | Stop reason: HOSPADM

## 2018-09-12 RX ORDER — LABETALOL HYDROCHLORIDE 5 MG/ML
INJECTION, SOLUTION INTRAVENOUS PRN
Status: DISCONTINUED | OUTPATIENT
Start: 2018-09-12 | End: 2018-09-12 | Stop reason: SDUPTHER

## 2018-09-12 RX ORDER — MEPERIDINE HYDROCHLORIDE 25 MG/ML
12.5 INJECTION INTRAMUSCULAR; INTRAVENOUS; SUBCUTANEOUS EVERY 5 MIN PRN
Status: DISCONTINUED | OUTPATIENT
Start: 2018-09-12 | End: 2018-09-12 | Stop reason: HOSPADM

## 2018-09-12 RX ORDER — ROCURONIUM BROMIDE 10 MG/ML
INJECTION, SOLUTION INTRAVENOUS PRN
Status: DISCONTINUED | OUTPATIENT
Start: 2018-09-12 | End: 2018-09-12 | Stop reason: SDUPTHER

## 2018-09-12 RX ORDER — DOCUSATE SODIUM 100 MG/1
100 CAPSULE, LIQUID FILLED ORAL 2 TIMES DAILY
Status: DISCONTINUED | OUTPATIENT
Start: 2018-09-12 | End: 2018-09-14 | Stop reason: HOSPADM

## 2018-09-12 RX ORDER — ALBUTEROL SULFATE 2.5 MG/3ML
2.5 SOLUTION RESPIRATORY (INHALATION) EVERY 4 HOURS
Status: DISCONTINUED | OUTPATIENT
Start: 2018-09-12 | End: 2018-09-13

## 2018-09-12 RX ORDER — KETOROLAC TROMETHAMINE 30 MG/ML
15 INJECTION, SOLUTION INTRAMUSCULAR; INTRAVENOUS EVERY 6 HOURS
Status: COMPLETED | OUTPATIENT
Start: 2018-09-12 | End: 2018-09-13

## 2018-09-12 RX ORDER — MORPHINE SULFATE 4 MG/ML
4 INJECTION, SOLUTION INTRAMUSCULAR; INTRAVENOUS
Status: DISCONTINUED | OUTPATIENT
Start: 2018-09-12 | End: 2018-09-13 | Stop reason: SDUPTHER

## 2018-09-12 RX ADMIN — LIDOCAINE HYDROCHLORIDE 60 MG: 20 INJECTION, SOLUTION INTRAVENOUS at 13:53

## 2018-09-12 RX ADMIN — LABETALOL HYDROCHLORIDE 2.5 MG: 5 INJECTION, SOLUTION INTRAVENOUS at 14:55

## 2018-09-12 RX ADMIN — SODIUM CHLORIDE SOLN NEBU 3% 15 ML: 3 NEBU SOLN at 20:12

## 2018-09-12 RX ADMIN — Medication 0.6 MG: at 15:23

## 2018-09-12 RX ADMIN — MEROPENEM 1 G: 1 INJECTION, POWDER, FOR SOLUTION INTRAVENOUS at 18:55

## 2018-09-12 RX ADMIN — FENTANYL CITRATE 50 MCG: 50 INJECTION, SOLUTION INTRAMUSCULAR; INTRAVENOUS at 13:53

## 2018-09-12 RX ADMIN — SODIUM CHLORIDE: 9 INJECTION, SOLUTION INTRAVENOUS at 13:43

## 2018-09-12 RX ADMIN — PROPOFOL 140 MG: 10 INJECTION, EMULSION INTRAVENOUS at 13:53

## 2018-09-12 RX ADMIN — MORPHINE SULFATE 4 MG: 2 INJECTION, SOLUTION INTRAMUSCULAR; INTRAVENOUS at 09:19

## 2018-09-12 RX ADMIN — KETOROLAC TROMETHAMINE 15 MG: 30 INJECTION, SOLUTION INTRAMUSCULAR at 19:00

## 2018-09-12 RX ADMIN — Medication 10 ML: at 19:24

## 2018-09-12 RX ADMIN — ROCURONIUM BROMIDE 50 MG: 10 INJECTION, SOLUTION INTRAVENOUS at 13:53

## 2018-09-12 RX ADMIN — LABETALOL HYDROCHLORIDE 5 MG: 5 INJECTION, SOLUTION INTRAVENOUS at 15:43

## 2018-09-12 RX ADMIN — MEROPENEM 1 G: 1 INJECTION, POWDER, FOR SOLUTION INTRAVENOUS at 01:06

## 2018-09-12 RX ADMIN — MIDAZOLAM HYDROCHLORIDE 2 MG: 1 INJECTION, SOLUTION INTRAMUSCULAR; INTRAVENOUS at 13:43

## 2018-09-12 RX ADMIN — DEXAMETHASONE SODIUM PHOSPHATE 10 MG: 4 INJECTION, SOLUTION INTRAMUSCULAR; INTRAVENOUS at 14:15

## 2018-09-12 RX ADMIN — SODIUM CHLORIDE, POTASSIUM CHLORIDE, SODIUM LACTATE AND CALCIUM CHLORIDE: 600; 310; 30; 20 INJECTION, SOLUTION INTRAVENOUS at 19:29

## 2018-09-12 RX ADMIN — MORPHINE SULFATE 4 MG: 2 INJECTION, SOLUTION INTRAMUSCULAR; INTRAVENOUS at 12:16

## 2018-09-12 RX ADMIN — MORPHINE SULFATE 4 MG: 2 INJECTION, SOLUTION INTRAMUSCULAR; INTRAVENOUS at 17:38

## 2018-09-12 RX ADMIN — ALBUTEROL SULFATE 2.5 MG: 2.5 SOLUTION RESPIRATORY (INHALATION) at 20:13

## 2018-09-12 RX ADMIN — ONDANSETRON 4 MG: 2 INJECTION INTRAMUSCULAR; INTRAVENOUS at 15:10

## 2018-09-12 RX ADMIN — Medication 3 MG: at 15:23

## 2018-09-12 RX ADMIN — MEROPENEM 1 G: 1 INJECTION, POWDER, FOR SOLUTION INTRAVENOUS at 10:21

## 2018-09-12 RX ADMIN — METHYLPREDNISOLONE SODIUM SUCCINATE 80 MG: 125 INJECTION, POWDER, LYOPHILIZED, FOR SOLUTION INTRAMUSCULAR; INTRAVENOUS at 16:35

## 2018-09-12 RX ADMIN — BUDESONIDE 250 MCG: 0.25 SUSPENSION RESPIRATORY (INHALATION) at 20:12

## 2018-09-12 RX ADMIN — MORPHINE SULFATE 4 MG: 2 INJECTION, SOLUTION INTRAMUSCULAR; INTRAVENOUS at 06:20

## 2018-09-12 RX ADMIN — Medication 10 ML: at 17:45

## 2018-09-12 RX ADMIN — IPRATROPIUM BROMIDE AND ALBUTEROL SULFATE 1 AMPULE: .5; 3 SOLUTION RESPIRATORY (INHALATION) at 16:14

## 2018-09-12 RX ADMIN — Medication 10 ML: at 09:19

## 2018-09-12 RX ADMIN — LABETALOL HYDROCHLORIDE 5 MG: 5 INJECTION, SOLUTION INTRAVENOUS at 14:32

## 2018-09-12 RX ADMIN — MORPHINE SULFATE 4 MG: 2 INJECTION, SOLUTION INTRAMUSCULAR; INTRAVENOUS at 04:16

## 2018-09-12 RX ADMIN — SODIUM CHLORIDE SOLN NEBU 3% 4 ML: 3 NEBU SOLN at 08:38

## 2018-09-12 RX ADMIN — BUDESONIDE 250 MCG: 0.25 SUSPENSION RESPIRATORY (INHALATION) at 08:39

## 2018-09-12 ASSESSMENT — PAIN DESCRIPTION - FREQUENCY
FREQUENCY: INTERMITTENT
FREQUENCY: INTERMITTENT

## 2018-09-12 ASSESSMENT — PAIN SCALES - GENERAL
PAINLEVEL_OUTOF10: 0
PAINLEVEL_OUTOF10: 0
PAINLEVEL_OUTOF10: 2
PAINLEVEL_OUTOF10: 2
PAINLEVEL_OUTOF10: 3
PAINLEVEL_OUTOF10: 8
PAINLEVEL_OUTOF10: 8
PAINLEVEL_OUTOF10: 9
PAINLEVEL_OUTOF10: 2
PAINLEVEL_OUTOF10: 7
PAINLEVEL_OUTOF10: 2
PAINLEVEL_OUTOF10: 7
PAINLEVEL_OUTOF10: 3
PAINLEVEL_OUTOF10: 7

## 2018-09-12 ASSESSMENT — PULMONARY FUNCTION TESTS
PIF_VALUE: 35
PIF_VALUE: 33
PIF_VALUE: 35
PIF_VALUE: 19
PIF_VALUE: 1
PIF_VALUE: 35
PIF_VALUE: 35
PIF_VALUE: 3
PIF_VALUE: 3
PIF_VALUE: 35
PIF_VALUE: 4
PIF_VALUE: 4
PIF_VALUE: 23
PIF_VALUE: 2
PIF_VALUE: 28
PIF_VALUE: 17
PIF_VALUE: 22
PIF_VALUE: 1
PIF_VALUE: 8
PIF_VALUE: 35
PIF_VALUE: 3
PIF_VALUE: 18
PIF_VALUE: 23
PIF_VALUE: 35
PIF_VALUE: 24
PIF_VALUE: 18
PIF_VALUE: 24
PIF_VALUE: 23
PIF_VALUE: 35
PIF_VALUE: 25
PIF_VALUE: 5
PIF_VALUE: 35
PIF_VALUE: 35
PIF_VALUE: 14
PIF_VALUE: 35
PIF_VALUE: 35
PIF_VALUE: 34
PIF_VALUE: 36
PIF_VALUE: 38
PIF_VALUE: 19
PIF_VALUE: 1
PIF_VALUE: 6
PIF_VALUE: 37
PIF_VALUE: 35
PIF_VALUE: 35
PIF_VALUE: 23
PIF_VALUE: 35
PIF_VALUE: 23
PIF_VALUE: 29
PIF_VALUE: 23
PIF_VALUE: 1
PIF_VALUE: 39
PIF_VALUE: 35
PIF_VALUE: 37
PIF_VALUE: 28
PIF_VALUE: 4
PIF_VALUE: 3
PIF_VALUE: 3
PIF_VALUE: 36
PIF_VALUE: 35
PIF_VALUE: 24
PIF_VALUE: 35
PIF_VALUE: 36
PIF_VALUE: 24
PIF_VALUE: 26
PIF_VALUE: 2
PIF_VALUE: 1
PIF_VALUE: 27
PIF_VALUE: 19
PIF_VALUE: 25
PIF_VALUE: 1
PIF_VALUE: 1
PIF_VALUE: 20
PIF_VALUE: 18
PIF_VALUE: 35
PIF_VALUE: 24
PIF_VALUE: 7
PIF_VALUE: 19
PIF_VALUE: 7
PIF_VALUE: 19
PIF_VALUE: 38
PIF_VALUE: 24
PIF_VALUE: 24
PIF_VALUE: 22
PIF_VALUE: 32
PIF_VALUE: 36
PIF_VALUE: 33
PIF_VALUE: 34
PIF_VALUE: 35
PIF_VALUE: 23
PIF_VALUE: 28
PIF_VALUE: 35
PIF_VALUE: 30
PIF_VALUE: 19
PIF_VALUE: 35
PIF_VALUE: 27
PIF_VALUE: 35
PIF_VALUE: 18
PIF_VALUE: 8
PIF_VALUE: 35
PIF_VALUE: 35
PIF_VALUE: 38
PIF_VALUE: 13
PIF_VALUE: 35
PIF_VALUE: 27
PIF_VALUE: 22
PIF_VALUE: 32
PIF_VALUE: 3
PIF_VALUE: 4
PIF_VALUE: 32
PIF_VALUE: 23
PIF_VALUE: 23
PIF_VALUE: 35
PIF_VALUE: 19
PIF_VALUE: 19
PIF_VALUE: 35
PIF_VALUE: 23
PIF_VALUE: 2

## 2018-09-12 ASSESSMENT — PAIN DESCRIPTION - ONSET: ONSET: GRADUAL

## 2018-09-12 ASSESSMENT — PAIN DESCRIPTION - PAIN TYPE
TYPE: ACUTE PAIN

## 2018-09-12 ASSESSMENT — PAIN DESCRIPTION - LOCATION
LOCATION: CHEST;BACK
LOCATION: CHEST
LOCATION: CHEST;BACK
LOCATION: CHEST

## 2018-09-12 ASSESSMENT — PAIN DESCRIPTION - DESCRIPTORS
DESCRIPTORS: ACHING;DISCOMFORT
DESCRIPTORS: ACHING;DISCOMFORT;SORE

## 2018-09-12 ASSESSMENT — PAIN DESCRIPTION - PROGRESSION: CLINICAL_PROGRESSION: NOT CHANGED

## 2018-09-12 ASSESSMENT — PAIN DESCRIPTION - ORIENTATION
ORIENTATION: LEFT

## 2018-09-13 ENCOUNTER — APPOINTMENT (OUTPATIENT)
Dept: GENERAL RADIOLOGY | Age: 73
DRG: 163 | End: 2018-09-13
Payer: MEDICARE

## 2018-09-13 LAB
AADO2: 295 MMHG
ANION GAP SERPL CALCULATED.3IONS-SCNC: 10 MMOL/L (ref 7–16)
B.E.: -1.5 MMOL/L (ref -3–3)
B.E.: 4.5 MMOL/L (ref -3–0)
BASOPHILS ABSOLUTE: 0 E9/L (ref 0–0.2)
BASOPHILS RELATIVE PERCENT: 0 % (ref 0–2)
BUN BLDV-MCNC: 17 MG/DL (ref 8–23)
CALCIUM SERPL-MCNC: 7.8 MG/DL (ref 8.6–10.2)
CARDIOPULMONARY BYPASS: NO
CHLORIDE BLD-SCNC: 101 MMOL/L (ref 98–107)
CO2: 28 MMOL/L (ref 22–29)
COHB: 0.4 % (ref 0–1.5)
COMMENT: ABNORMAL
CREAT SERPL-MCNC: 0.6 MG/DL (ref 0.5–1)
CRITICAL: ABNORMAL
DATE ANALYZED: ABNORMAL
DATE OF COLLECTION: ABNORMAL
DEVICE: ABNORMAL
EOSINOPHILS ABSOLUTE: 0 E9/L (ref 0.05–0.5)
EOSINOPHILS RELATIVE PERCENT: 0 % (ref 0–6)
FIO2 ARTERIAL: 50
FIO2: 80 %
GFR AFRICAN AMERICAN: >60
GFR NON-AFRICAN AMERICAN: >60 ML/MIN/1.73
GLUCOSE BLD-MCNC: 178 MG/DL (ref 74–109)
HCO3 ARTERIAL: 30.4 MMOL/L (ref 22–26)
HCO3: 24.4 MMOL/L (ref 22–26)
HCT VFR BLD CALC: 34.3 % (ref 34–48)
HEMOGLOBIN: 10.7 G/DL (ref 11.5–15.5)
HHB: 0.8 % (ref 0–5)
IMMATURE GRANULOCYTES #: 0.03 E9/L
IMMATURE GRANULOCYTES %: 0.4 % (ref 0–5)
LAB: ABNORMAL
LYMPHOCYTES ABSOLUTE: 0.62 E9/L (ref 1.5–4)
LYMPHOCYTES RELATIVE PERCENT: 9.2 % (ref 20–42)
Lab: ABNORMAL
MCH RBC QN AUTO: 27.6 PG (ref 26–35)
MCHC RBC AUTO-ENTMCNC: 31.2 % (ref 32–34.5)
MCV RBC AUTO: 88.6 FL (ref 80–99.9)
METHB: 0.4 % (ref 0–1.5)
MODE: ABNORMAL
MONOCYTES ABSOLUTE: 0.09 E9/L (ref 0.1–0.95)
MONOCYTES RELATIVE PERCENT: 1.3 % (ref 2–12)
NEUTROPHILS ABSOLUTE: 5.97 E9/L (ref 1.8–7.3)
NEUTROPHILS RELATIVE PERCENT: 89.1 % (ref 43–80)
O2 CONTENT: 16.7 ML/DL
O2 SATURATION: 97.6 % (ref 92–98.5)
O2 SATURATION: 99.2 % (ref 92–98.5)
O2HB: 98.4 % (ref 94–97)
OPERATOR ID: 2070
OPERATOR ID: ABNORMAL
PATIENT TEMP: 37 C
PCO2 ARTERIAL: 50.4 MMHG (ref 35–45)
PCO2: 46.3 MMHG (ref 35–45)
PDW BLD-RTO: 14.3 FL (ref 11.5–15)
PFO2: 2.59 MMHG/%
PH BLOOD GAS: 7.34 (ref 7.35–7.45)
PH BLOOD GAS: 7.39 (ref 7.35–7.45)
PLATELET # BLD: 133 E9/L (ref 130–450)
PMV BLD AUTO: 10.5 FL (ref 7–12)
PO2 ARTERIAL: 101.1 MMHG (ref 60–80)
PO2: 206.8 MMHG (ref 60–100)
POSITIVE END EXP PRESS: 8 CMH2O
POTASSIUM SERPL-SCNC: 4.1 MMOL/L (ref 3.5–5)
RBC # BLD: 3.87 E12/L (ref 3.5–5.5)
RESPIRATORY RATE: 22 B/MIN
RI(T): 143 %
SODIUM BLD-SCNC: 139 MMOL/L (ref 132–146)
SOURCE, BLOOD GAS: ABNORMAL
SOURCE, BLOOD GAS: ABNORMAL
THB: 11.7 G/DL (ref 11.5–16.5)
TIDAL VOLUME: 550 ML
TIME ANALYZED: 26
WBC # BLD: 6.7 E9/L (ref 4.5–11.5)

## 2018-09-13 PROCEDURE — 2580000003 HC RX 258: Performed by: FAMILY MEDICINE

## 2018-09-13 PROCEDURE — 2580000003 HC RX 258: Performed by: SPECIALIST

## 2018-09-13 PROCEDURE — 82803 BLOOD GASES ANY COMBINATION: CPT

## 2018-09-13 PROCEDURE — 80048 BASIC METABOLIC PNL TOTAL CA: CPT

## 2018-09-13 PROCEDURE — 6360000002 HC RX W HCPCS: Performed by: FAMILY MEDICINE

## 2018-09-13 PROCEDURE — 36592 COLLECT BLOOD FROM PICC: CPT

## 2018-09-13 PROCEDURE — 94660 CPAP INITIATION&MGMT: CPT

## 2018-09-13 PROCEDURE — 6370000000 HC RX 637 (ALT 250 FOR IP): Performed by: SPECIALIST

## 2018-09-13 PROCEDURE — 2060000000 HC ICU INTERMEDIATE R&B

## 2018-09-13 PROCEDURE — 6360000002 HC RX W HCPCS: Performed by: SPECIALIST

## 2018-09-13 PROCEDURE — 6360000002 HC RX W HCPCS: Performed by: INTERNAL MEDICINE

## 2018-09-13 PROCEDURE — 6370000000 HC RX 637 (ALT 250 FOR IP): Performed by: FAMILY MEDICINE

## 2018-09-13 PROCEDURE — 94640 AIRWAY INHALATION TREATMENT: CPT

## 2018-09-13 PROCEDURE — 36415 COLL VENOUS BLD VENIPUNCTURE: CPT

## 2018-09-13 PROCEDURE — APPSS15 APP SPLIT SHARED TIME 0-15 MINUTES: Performed by: NURSE PRACTITIONER

## 2018-09-13 PROCEDURE — 2700000000 HC OXYGEN THERAPY PER DAY

## 2018-09-13 PROCEDURE — 2580000003 HC RX 258

## 2018-09-13 PROCEDURE — 85025 COMPLETE CBC W/AUTO DIFF WBC: CPT

## 2018-09-13 PROCEDURE — 82805 BLOOD GASES W/O2 SATURATION: CPT

## 2018-09-13 PROCEDURE — 2580000003 HC RX 258: Performed by: INTERNAL MEDICINE

## 2018-09-13 PROCEDURE — 36600 WITHDRAWAL OF ARTERIAL BLOOD: CPT

## 2018-09-13 PROCEDURE — 71045 X-RAY EXAM CHEST 1 VIEW: CPT

## 2018-09-13 RX ORDER — METHYLPREDNISOLONE SODIUM SUCCINATE 40 MG/ML
40 INJECTION, POWDER, LYOPHILIZED, FOR SOLUTION INTRAMUSCULAR; INTRAVENOUS EVERY 8 HOURS
Status: DISCONTINUED | OUTPATIENT
Start: 2018-09-13 | End: 2018-09-14 | Stop reason: HOSPADM

## 2018-09-13 RX ORDER — FORMOTEROL FUMARATE 20 UG/2ML
20 SOLUTION RESPIRATORY (INHALATION) 2 TIMES DAILY
Status: DISCONTINUED | OUTPATIENT
Start: 2018-09-13 | End: 2018-09-14 | Stop reason: HOSPADM

## 2018-09-13 RX ORDER — KETOROLAC TROMETHAMINE 10 MG/1
10 TABLET, FILM COATED ORAL EVERY 6 HOURS
Qty: 16 TABLET | Refills: 0 | Status: SHIPPED | OUTPATIENT
Start: 2018-09-13 | End: 2019-09-03

## 2018-09-13 RX ORDER — MORPHINE SULFATE 2 MG/ML
2 INJECTION, SOLUTION INTRAMUSCULAR; INTRAVENOUS
Status: DISCONTINUED | OUTPATIENT
Start: 2018-09-13 | End: 2018-09-14 | Stop reason: HOSPADM

## 2018-09-13 RX ORDER — OXYCODONE HYDROCHLORIDE AND ACETAMINOPHEN 5; 325 MG/1; MG/1
1 TABLET ORAL EVERY 4 HOURS PRN
Status: DISCONTINUED | OUTPATIENT
Start: 2018-09-13 | End: 2018-09-14 | Stop reason: HOSPADM

## 2018-09-13 RX ORDER — OXYCODONE HYDROCHLORIDE AND ACETAMINOPHEN 5; 325 MG/1; MG/1
1 TABLET ORAL EVERY 6 HOURS PRN
Qty: 28 TABLET | Refills: 0 | Status: SHIPPED | OUTPATIENT
Start: 2018-09-13 | End: 2018-09-20

## 2018-09-13 RX ORDER — ALBUTEROL SULFATE 2.5 MG/3ML
2.5 SOLUTION RESPIRATORY (INHALATION) EVERY 6 HOURS
Status: DISCONTINUED | OUTPATIENT
Start: 2018-09-13 | End: 2018-09-14 | Stop reason: HOSPADM

## 2018-09-13 RX ORDER — MORPHINE SULFATE 2 MG/ML
4 INJECTION, SOLUTION INTRAMUSCULAR; INTRAVENOUS
Status: DISCONTINUED | OUTPATIENT
Start: 2018-09-13 | End: 2018-09-14 | Stop reason: HOSPADM

## 2018-09-13 RX ADMIN — DOCUSATE SODIUM 100 MG: 100 CAPSULE, LIQUID FILLED ORAL at 10:00

## 2018-09-13 RX ADMIN — MORPHINE SULFATE 2 MG: 2 INJECTION, SOLUTION INTRAMUSCULAR; INTRAVENOUS at 13:59

## 2018-09-13 RX ADMIN — FORMOTEROL FUMARATE DIHYDRATE 20 MCG: 20 SOLUTION RESPIRATORY (INHALATION) at 20:59

## 2018-09-13 RX ADMIN — DIAZEPAM 5 MG: 5 TABLET ORAL at 20:30

## 2018-09-13 RX ADMIN — SODIUM CHLORIDE SOLN NEBU 3% 15 ML: 3 NEBU SOLN at 21:02

## 2018-09-13 RX ADMIN — MORPHINE SULFATE 2 MG: 2 INJECTION, SOLUTION INTRAMUSCULAR; INTRAVENOUS at 15:34

## 2018-09-13 RX ADMIN — DOCUSATE SODIUM 100 MG: 100 CAPSULE, LIQUID FILLED ORAL at 20:30

## 2018-09-13 RX ADMIN — METOPROLOL SUCCINATE 12.5 MG: 25 TABLET, EXTENDED RELEASE ORAL at 10:00

## 2018-09-13 RX ADMIN — KETOROLAC TROMETHAMINE 15 MG: 30 INJECTION, SOLUTION INTRAMUSCULAR at 06:20

## 2018-09-13 RX ADMIN — SODIUM CHLORIDE SOLN NEBU 3% 4 ML: 3 NEBU SOLN at 07:56

## 2018-09-13 RX ADMIN — ALBUTEROL SULFATE 2.5 MG: 2.5 SOLUTION RESPIRATORY (INHALATION) at 04:09

## 2018-09-13 RX ADMIN — METHYLPREDNISOLONE SODIUM SUCCINATE 80 MG: 125 INJECTION, POWDER, LYOPHILIZED, FOR SOLUTION INTRAMUSCULAR; INTRAVENOUS at 09:59

## 2018-09-13 RX ADMIN — ALBUTEROL SULFATE 2.5 MG: 2.5 SOLUTION RESPIRATORY (INHALATION) at 13:05

## 2018-09-13 RX ADMIN — CALCIUM 500 MG: 500 TABLET ORAL at 10:00

## 2018-09-13 RX ADMIN — Medication 10 ML: at 20:30

## 2018-09-13 RX ADMIN — WATER: 1 INJECTION INTRAMUSCULAR; INTRAVENOUS; SUBCUTANEOUS at 10:01

## 2018-09-13 RX ADMIN — SODIUM CHLORIDE, POTASSIUM CHLORIDE, SODIUM LACTATE AND CALCIUM CHLORIDE: 600; 310; 30; 20 INJECTION, SOLUTION INTRAVENOUS at 13:38

## 2018-09-13 RX ADMIN — KETOROLAC TROMETHAMINE 15 MG: 30 INJECTION, SOLUTION INTRAMUSCULAR at 12:03

## 2018-09-13 RX ADMIN — KETOROLAC TROMETHAMINE 15 MG: 30 INJECTION, SOLUTION INTRAMUSCULAR at 01:12

## 2018-09-13 RX ADMIN — SODIUM CHLORIDE, POTASSIUM CHLORIDE, SODIUM LACTATE AND CALCIUM CHLORIDE: 600; 310; 30; 20 INJECTION, SOLUTION INTRAVENOUS at 03:33

## 2018-09-13 RX ADMIN — ATORVASTATIN CALCIUM 40 MG: 40 TABLET, FILM COATED ORAL at 20:30

## 2018-09-13 RX ADMIN — ALBUTEROL SULFATE 2.5 MG: 2.5 SOLUTION RESPIRATORY (INHALATION) at 07:57

## 2018-09-13 RX ADMIN — Medication 10 ML: at 10:01

## 2018-09-13 RX ADMIN — BUDESONIDE 250 MCG: 0.25 SUSPENSION RESPIRATORY (INHALATION) at 07:56

## 2018-09-13 RX ADMIN — MEROPENEM 1 G: 1 INJECTION, POWDER, FOR SOLUTION INTRAVENOUS at 10:00

## 2018-09-13 RX ADMIN — BUDESONIDE 250 MCG: 0.25 SUSPENSION RESPIRATORY (INHALATION) at 20:59

## 2018-09-13 RX ADMIN — ALBUTEROL SULFATE 2.5 MG: 2.5 SOLUTION RESPIRATORY (INHALATION) at 00:39

## 2018-09-13 RX ADMIN — MEROPENEM 1 G: 1 INJECTION, POWDER, FOR SOLUTION INTRAVENOUS at 03:33

## 2018-09-13 RX ADMIN — METHYLPREDNISOLONE SODIUM SUCCINATE 40 MG: 40 INJECTION, POWDER, LYOPHILIZED, FOR SOLUTION INTRAMUSCULAR; INTRAVENOUS at 17:20

## 2018-09-13 RX ADMIN — CHOLECALCIFEROL TAB 50 MCG (2000 UNIT) 2000 UNITS: 50 TAB at 10:00

## 2018-09-13 RX ADMIN — RALOXIFENE HYDROCHLORIDE 60 MG: 60 TABLET, FILM COATED ORAL at 10:00

## 2018-09-13 RX ADMIN — METHYLPREDNISOLONE SODIUM SUCCINATE 80 MG: 125 INJECTION, POWDER, LYOPHILIZED, FOR SOLUTION INTRAMUSCULAR; INTRAVENOUS at 01:12

## 2018-09-13 RX ADMIN — SERTRALINE 100 MG: 100 TABLET, FILM COATED ORAL at 22:07

## 2018-09-13 RX ADMIN — ASPIRIN 81 MG: 81 TABLET, COATED ORAL at 10:00

## 2018-09-13 RX ADMIN — KETOROLAC TROMETHAMINE 10 MG: 10 TABLET, FILM COATED ORAL at 17:20

## 2018-09-13 RX ADMIN — MEROPENEM 1 G: 1 INJECTION, POWDER, FOR SOLUTION INTRAVENOUS at 17:36

## 2018-09-13 ASSESSMENT — PAIN DESCRIPTION - LOCATION
LOCATION: CHEST

## 2018-09-13 ASSESSMENT — PAIN DESCRIPTION - FREQUENCY
FREQUENCY: CONTINUOUS
FREQUENCY: INTERMITTENT

## 2018-09-13 ASSESSMENT — PAIN DESCRIPTION - PAIN TYPE
TYPE: SURGICAL PAIN

## 2018-09-13 ASSESSMENT — PAIN DESCRIPTION - PROGRESSION
CLINICAL_PROGRESSION: NOT CHANGED
CLINICAL_PROGRESSION: GRADUALLY IMPROVING

## 2018-09-13 ASSESSMENT — PAIN SCALES - GENERAL
PAINLEVEL_OUTOF10: 0
PAINLEVEL_OUTOF10: 5
PAINLEVEL_OUTOF10: 2
PAINLEVEL_OUTOF10: 6
PAINLEVEL_OUTOF10: 0
PAINLEVEL_OUTOF10: 5
PAINLEVEL_OUTOF10: 5
PAINLEVEL_OUTOF10: 0
PAINLEVEL_OUTOF10: 6
PAINLEVEL_OUTOF10: 5
PAINLEVEL_OUTOF10: 0
PAINLEVEL_OUTOF10: 4

## 2018-09-13 ASSESSMENT — PAIN DESCRIPTION - DESCRIPTORS
DESCRIPTORS: ACHING;DISCOMFORT;SORE
DESCRIPTORS: ACHING;DISCOMFORT
DESCRIPTORS: ACHING;DISCOMFORT
DESCRIPTORS: ACHING
DESCRIPTORS: ACHING;DISCOMFORT

## 2018-09-13 ASSESSMENT — PAIN DESCRIPTION - ORIENTATION
ORIENTATION: LEFT

## 2018-09-13 ASSESSMENT — PAIN DESCRIPTION - ONSET
ONSET: GRADUAL

## 2018-09-14 ENCOUNTER — APPOINTMENT (OUTPATIENT)
Dept: GENERAL RADIOLOGY | Age: 73
DRG: 163 | End: 2018-09-14
Payer: MEDICARE

## 2018-09-14 VITALS
DIASTOLIC BLOOD PRESSURE: 68 MMHG | HEIGHT: 66 IN | SYSTOLIC BLOOD PRESSURE: 142 MMHG | WEIGHT: 170.2 LBS | HEART RATE: 89 BPM | OXYGEN SATURATION: 91 % | TEMPERATURE: 98.2 F | RESPIRATION RATE: 20 BRPM | BODY MASS INDEX: 27.35 KG/M2

## 2018-09-14 LAB
ANION GAP SERPL CALCULATED.3IONS-SCNC: 13 MMOL/L (ref 7–16)
BUN BLDV-MCNC: 19 MG/DL (ref 8–23)
CALCIUM SERPL-MCNC: 8.4 MG/DL (ref 8.6–10.2)
CHLORIDE BLD-SCNC: 106 MMOL/L (ref 98–107)
CO2: 26 MMOL/L (ref 22–29)
CREAT SERPL-MCNC: 0.5 MG/DL (ref 0.5–1)
GFR AFRICAN AMERICAN: >60
GFR NON-AFRICAN AMERICAN: >60 ML/MIN/1.73
GLUCOSE BLD-MCNC: 134 MG/DL (ref 74–109)
HCT VFR BLD CALC: 33.9 % (ref 34–48)
HEMOGLOBIN: 10.5 G/DL (ref 11.5–15.5)
MCH RBC QN AUTO: 28.1 PG (ref 26–35)
MCHC RBC AUTO-ENTMCNC: 31 % (ref 32–34.5)
MCV RBC AUTO: 90.6 FL (ref 80–99.9)
MRSA CULTURE ONLY: NORMAL
PDW BLD-RTO: 14.2 FL (ref 11.5–15)
PLATELET # BLD: 164 E9/L (ref 130–450)
PMV BLD AUTO: 10.7 FL (ref 7–12)
POTASSIUM SERPL-SCNC: 4 MMOL/L (ref 3.5–5)
RBC # BLD: 3.74 E12/L (ref 3.5–5.5)
SODIUM BLD-SCNC: 145 MMOL/L (ref 132–146)
WBC # BLD: 10.3 E9/L (ref 4.5–11.5)

## 2018-09-14 PROCEDURE — 94660 CPAP INITIATION&MGMT: CPT

## 2018-09-14 PROCEDURE — 2580000003 HC RX 258

## 2018-09-14 PROCEDURE — 6370000000 HC RX 637 (ALT 250 FOR IP): Performed by: FAMILY MEDICINE

## 2018-09-14 PROCEDURE — 36415 COLL VENOUS BLD VENIPUNCTURE: CPT

## 2018-09-14 PROCEDURE — 2580000003 HC RX 258: Performed by: FAMILY MEDICINE

## 2018-09-14 PROCEDURE — 6360000002 HC RX W HCPCS: Performed by: FAMILY MEDICINE

## 2018-09-14 PROCEDURE — 94640 AIRWAY INHALATION TREATMENT: CPT

## 2018-09-14 PROCEDURE — 80048 BASIC METABOLIC PNL TOTAL CA: CPT

## 2018-09-14 PROCEDURE — 6360000002 HC RX W HCPCS: Performed by: INTERNAL MEDICINE

## 2018-09-14 PROCEDURE — 2700000000 HC OXYGEN THERAPY PER DAY

## 2018-09-14 PROCEDURE — 6370000000 HC RX 637 (ALT 250 FOR IP): Performed by: SPECIALIST

## 2018-09-14 PROCEDURE — 2580000003 HC RX 258: Performed by: INTERNAL MEDICINE

## 2018-09-14 PROCEDURE — 2580000003 HC RX 258: Performed by: SPECIALIST

## 2018-09-14 PROCEDURE — 71045 X-RAY EXAM CHEST 1 VIEW: CPT

## 2018-09-14 PROCEDURE — 85027 COMPLETE CBC AUTOMATED: CPT

## 2018-09-14 RX ORDER — SERTRALINE HYDROCHLORIDE 25 MG/1
100 TABLET, FILM COATED ORAL NIGHTLY
Qty: 30 TABLET | Refills: 3 | Status: SHIPPED | OUTPATIENT
Start: 2018-09-14 | End: 2019-09-03

## 2018-09-14 RX ORDER — FORMOTEROL FUMARATE 20 UG/2ML
20 SOLUTION RESPIRATORY (INHALATION) 2 TIMES DAILY
Qty: 120 ML | Refills: 2 | Status: SHIPPED | OUTPATIENT
Start: 2018-09-14

## 2018-09-14 RX ORDER — IPRATROPIUM BROMIDE AND ALBUTEROL SULFATE 2.5; .5 MG/3ML; MG/3ML
1 SOLUTION RESPIRATORY (INHALATION) 4 TIMES DAILY
Qty: 360 ML | Refills: 2 | Status: ON HOLD | OUTPATIENT
Start: 2018-09-14 | End: 2022-09-09

## 2018-09-14 RX ADMIN — CALCIUM 500 MG: 500 TABLET ORAL at 09:46

## 2018-09-14 RX ADMIN — KETOROLAC TROMETHAMINE 10 MG: 10 TABLET, FILM COATED ORAL at 09:46

## 2018-09-14 RX ADMIN — KETOROLAC TROMETHAMINE 10 MG: 10 TABLET, FILM COATED ORAL at 15:06

## 2018-09-14 RX ADMIN — MEROPENEM 1 G: 1 INJECTION, POWDER, FOR SOLUTION INTRAVENOUS at 13:58

## 2018-09-14 RX ADMIN — CHOLECALCIFEROL TAB 50 MCG (2000 UNIT) 2000 UNITS: 50 TAB at 09:46

## 2018-09-14 RX ADMIN — ALBUTEROL SULFATE 2.5 MG: 2.5 SOLUTION RESPIRATORY (INHALATION) at 13:28

## 2018-09-14 RX ADMIN — ASPIRIN 81 MG: 81 TABLET, COATED ORAL at 09:46

## 2018-09-14 RX ADMIN — Medication 10 ML: at 02:38

## 2018-09-14 RX ADMIN — MEROPENEM 1 G: 1 INJECTION, POWDER, FOR SOLUTION INTRAVENOUS at 02:38

## 2018-09-14 RX ADMIN — KETOROLAC TROMETHAMINE 10 MG: 10 TABLET, FILM COATED ORAL at 04:08

## 2018-09-14 RX ADMIN — METHYLPREDNISOLONE SODIUM SUCCINATE 40 MG: 40 INJECTION, POWDER, LYOPHILIZED, FOR SOLUTION INTRAMUSCULAR; INTRAVENOUS at 09:46

## 2018-09-14 RX ADMIN — METHYLPREDNISOLONE SODIUM SUCCINATE 40 MG: 40 INJECTION, POWDER, LYOPHILIZED, FOR SOLUTION INTRAMUSCULAR; INTRAVENOUS at 02:38

## 2018-09-14 RX ADMIN — RALOXIFENE HYDROCHLORIDE 60 MG: 60 TABLET, FILM COATED ORAL at 09:46

## 2018-09-14 RX ADMIN — METOPROLOL SUCCINATE 12.5 MG: 25 TABLET, EXTENDED RELEASE ORAL at 09:46

## 2018-09-14 RX ADMIN — SODIUM CHLORIDE, POTASSIUM CHLORIDE, SODIUM LACTATE AND CALCIUM CHLORIDE: 600; 310; 30; 20 INJECTION, SOLUTION INTRAVENOUS at 04:09

## 2018-09-14 RX ADMIN — WATER 10 ML: 1 INJECTION INTRAMUSCULAR; INTRAVENOUS; SUBCUTANEOUS at 02:38

## 2018-09-14 RX ADMIN — SODIUM CHLORIDE SOLN NEBU 3% 15 ML: 3 NEBU SOLN at 09:15

## 2018-09-14 RX ADMIN — DOCUSATE SODIUM 100 MG: 100 CAPSULE, LIQUID FILLED ORAL at 09:46

## 2018-09-14 RX ADMIN — OXYCODONE HYDROCHLORIDE AND ACETAMINOPHEN 1 TABLET: 5; 325 TABLET ORAL at 09:46

## 2018-09-14 RX ADMIN — Medication 10 ML: at 04:08

## 2018-09-14 RX ADMIN — KETOROLAC TROMETHAMINE 10 MG: 10 TABLET, FILM COATED ORAL at 00:05

## 2018-09-14 RX ADMIN — FORMOTEROL FUMARATE DIHYDRATE 20 MCG: 20 SOLUTION RESPIRATORY (INHALATION) at 09:14

## 2018-09-14 RX ADMIN — ALBUTEROL SULFATE 2.5 MG: 2.5 SOLUTION RESPIRATORY (INHALATION) at 01:17

## 2018-09-14 RX ADMIN — BUDESONIDE 250 MCG: 0.25 SUSPENSION RESPIRATORY (INHALATION) at 09:15

## 2018-09-14 RX ADMIN — Medication 10 ML: at 09:47

## 2018-09-14 ASSESSMENT — PAIN SCALES - GENERAL
PAINLEVEL_OUTOF10: 4
PAINLEVEL_OUTOF10: 5
PAINLEVEL_OUTOF10: 5
PAINLEVEL_OUTOF10: 2
PAINLEVEL_OUTOF10: 6
PAINLEVEL_OUTOF10: 1

## 2018-09-14 ASSESSMENT — PAIN DESCRIPTION - DESCRIPTORS
DESCRIPTORS: ACHING;DISCOMFORT;DULL
DESCRIPTORS: ACHING;DISCOMFORT;DULL

## 2018-09-14 ASSESSMENT — PAIN DESCRIPTION - PAIN TYPE
TYPE: SURGICAL PAIN

## 2018-09-14 ASSESSMENT — PAIN DESCRIPTION - LOCATION
LOCATION: CHEST;RIB CAGE
LOCATION: CHEST;RIB CAGE

## 2018-09-14 ASSESSMENT — PAIN DESCRIPTION - FREQUENCY
FREQUENCY: INTERMITTENT
FREQUENCY: INTERMITTENT

## 2018-09-14 ASSESSMENT — PAIN DESCRIPTION - PROGRESSION
CLINICAL_PROGRESSION: NOT CHANGED
CLINICAL_PROGRESSION: NOT CHANGED

## 2018-09-14 ASSESSMENT — PAIN DESCRIPTION - ORIENTATION
ORIENTATION: LEFT
ORIENTATION: LEFT

## 2018-09-14 ASSESSMENT — PAIN DESCRIPTION - ONSET
ONSET: ON-GOING
ONSET: ON-GOING

## 2018-09-14 NOTE — CARE COORDINATION
Social Work/Discharge Planning:  Resume [facility] order noted and faxed [facility]. Notified Donna Shipman from [place] of discharge order.   Electronically signed by DAKOTA Wyatt on 9/14/2018 at 3:45 PM

## 2018-09-14 NOTE — PROGRESS NOTES
24 hour chart check complete.
[id number] chart check complete
ANESTHESIA HERE AND PLACING NEW IV AND ARTERIAL LINE AT THIS TIME
[facility]  [address], [address], [address]      Pulmonary Progress Note      SUBJECTIVE:  Claims doing ok with breathing and not much cough/congestion, some pain at CT site and asking for pain medications, CT clamped since yesterday late morning/noon    OBJECTIVE    Medications    Continuous Infusions:    Scheduled Meds:   sodium chloride flush  10 mL Intravenous 2 times per day    enoxaparin  40 mg Subcutaneous Daily    aspirin  81 mg Oral QAM    atorvastatin  40 mg Oral Nightly    budesonide  250 mcg Nebulization BID    calcium elemental  1 tablet Oral QAM    vitamin D  2,000 Units Oral QAM    metoprolol succinate  12.5 mg Oral Daily    raloxifene  60 mg Oral QAM    sertraline  50 mg Oral Nightly    sodium chloride (Inhalant)  15 mL Nebulization BID    diazepam  5 mg Oral Nightly    potassium chloride  20 mEq Oral BID WC    meropenem  1 g Intravenous Q8H       PRN Meds:sodium chloride flush, acetaminophen, fluticasone, HYDROcodone 5 mg - acetaminophen, morphine    Physical    VITALS:  /69   Pulse 67   Temp 98.1 °F (36.7 °C) (Oral)   Resp 18   Ht 5' 6\" (1.676 m)   Wt 158 lb (71.7 kg)   SpO2 92%   BMI 25.50 kg/m²     24HR INTAKE/OUTPUT:      Intake/Output Summary (Last 24 hours) at 18 1033  Last data filed at 18 1011   Gross per 24 hour   Intake              420 ml   Output                0 ml   Net              420 ml       24HR PULSE OXIMETRY RANGE:    SpO2  Av.5 %  Min: 92 %  Max: 93 %    General appearance: alert, appears stated age and cooperative  Lungs: rhonchi bibasilar, (+) CT left  Heart: regular rate and rhythm, S1, S2 normal, no murmur, click, rub or gallop  Abdomen: soft, non-tender; bowel sounds normal; no masses,  no organomegaly  Extremities: extremities normal, atraumatic, no cyanosis or edema  Neurologic: Mental status: Alert, oriented, thought content appropriate    Data
Chest tube clamped and off wall suction. Repeat cxr to be done this afternoon. Pt told to notify this RN of any arising symptoms.
Database complete. Medications reconciled. Care plans and education initiated. PICC line to right arm for home abx 3 times daily for 14 days through her Pulmonologist at [facility] Dr. Chery Wu. Local Pulmonologist is Dr. Tonya Johnson. Cardiologist is Dr. Gardenia Gramajo. On nectar thick liquids for recurrent pneumonia. Wears 3L 02 but has to increase it up to 5 since yesterday.
Date: 9/13/2018    Time: 0040    Patient Placed On BIPAP/CPAP/ Non-Invasive Ventilation? No    If no must comment. Facial area red/color change? No           If YES are Blister/Lesion present? No   If yes must notify nursing staff  BIPAP/CPAP skin barrier?   Yes    Skin barrier type:Liquicel       Comments:    Already on    Jacquelin Peace RRT
Dr Mathieu Caballero answering service paged to notify of consult.
End of shift chart check complete.
Patient placed on bipap and tolerated . spo2 95 %  Awake alert. Denies pain . Prepare to transfer. Report called to [facility] PROVIDERS [facility] SPECIALTY [facility] [place]
Pulmonary Progress Note    Admit Date: 2018  Hospital day                               PCP: Reyes Mesa, MD    Chief Complaint (s):  Patient Active Problem List   Diagnosis    Tachycardia    HTN (hypertension)    Hypoxia    COPD [facility]chronic obstructive [facility] disease) [facility]    Cryptogenic organizing pneumonia ([facility]    Pneumothorax, left    Cardiomyopathy ([facility]    Pneumothorax       Subjective:  - Awake and alert, doing reasonably well. Vitals:  VITALS:  /77   Pulse 80   Temp 98.1 °F (36.7 °C) (Oral)   Resp 18   Ht 5' 6\" (1.676 m)   Wt 166 lb 1.6 oz (75.3 kg)   SpO2 91%   BMI 26.81 kg/m²     24HR INTAKE/OUTPUT:      Intake/Output Summary (Last 24 hours) at 09/10/18 1251  Last data filed at 09/10/18 1147   Gross per 24 hour   Intake              500 ml   Output                0 ml   Net              500 ml       24HR PULSE OXIMETRY RANGE:    SpO2  Av.5 %  Min: 91 %  Max: 94 %    Medications:  IV:      Scheduled Meds:   sertraline  100 mg Oral Nightly    sodium chloride flush  10 mL Intravenous 2 times per day    enoxaparin  40 mg Subcutaneous Daily    aspirin  81 mg Oral QAM    atorvastatin  40 mg Oral Nightly    budesonide  250 mcg Nebulization BID    calcium elemental  1 tablet Oral QAM    vitamin D  2,000 Units Oral QAM    metoprolol succinate  12.5 mg Oral Daily    raloxifene  60 mg Oral QAM    sodium chloride (Inhalant)  15 mL Nebulization BID    diazepam  5 mg Oral Nightly    potassium chloride  20 mEq Oral BID WC    meropenem  1 g Intravenous Q8H       Diet:   DIET GENERAL;     EXAM:  General: No distress. Alert. Eyes: PERRL. No sclera icterus. No conjunctival injection. ENT: No discharge. Pharynx clear. Neck: Trachea midline. Normal thyroid. Resp: No accessory muscle use. No rales. No wheezing. No rhonchi. CV: Regular rate. Regular rhythm. No murmur or rub. Abd: Non-tender. Non-distended. No masses. No organomegaly.  Normal bowel
Subjective: The patient is awake and alert. No problems overnight. Denies chest pain, angina, and dyspnea. Denies abdominal pain. Tolerating diet. No nausea or vomiting. Somewhat tearful today    Objective:    Vitals:  /69   Pulse 67   Temp 98.1 °F (36.7 °C) (Oral)   Resp 18   Ht 5' 6\" (1.676 m)   Wt 158 lb (71.7 kg)   SpO2 92%   BMI 25.50 kg/m²     Physical Exam:  Heart:  RRR, no murmurs, gallops, or rubs. Lungs:  Absent breath sounds LLL  Abd: bowel sounds present, nontender, nondistended, no masses  Extrem:  No clubbing, cyanosis, or edema    Labs:  CBC:   Lab Results   Component Value Date    WBC 7.6 2018    RBC 4.31 2018    HGB 12.1 2018    HCT 38.6 2018    MCV 89.6 2018    MCH 28.1 2018    MCHC 31.3 2018    RDW 14.5 2018     2018    MPV 10.7 2018     BMP:    Lab Results   Component Value Date     2018    K 4.1 2018    K 3.6 2018     2018    CO2 28 2018    BUN 21 2018    LABALBU 4.1 2018    CREATININE 0.7 2018    CALCIUM 8.5 2018    GFRAA >60 2018    LABGLOM >60 2018    GLUCOSE 97 2018        Radiology:  Xr Chest Portable    Result Date: 2018  Patient MRN:  [id number] : 1945 Age: 75 years Gender: Female EXAM: XR CHEST PORTABLE INDICATION:  reassess pneumothorax reassess pneumothorax COMPARISON: 2018 FINDINGS: Single frontal view. Right-sided PICC line device unchanged the level of distal SVC. Mild cardiac megaly. Stable prominence of the superior mediastinum. Right upper lobe atelectasis versus scarring unchanged. Mild basilar atelectasis versus scarring. Left-sided pneumothorax not convincingly identified. Left-sided chest tube in situ tip at level of the left lung apex unchanged. Subcutaneous emphysema identified left lateral chest wall. Left-sided pneumothorax not convincingly identified. Left sided chest tube unchanged.
following placement of a larger   left-sided chest tube and following VATS. Probable expiratory imaging accounts for mild prominence of   bronchovascular crowding and interstitial density in the thorax   bilaterally. XR CHEST PORTABLE   Final Result   tortuous ectatic aorta   Airspace disease compatible with atelectasis or pneumonia   at the right and the left lung base   The chest does not appear to be significantly changed in the interval               XR CHEST PORTABLE   Final Result   Findings compatible with emphysema   Tortuous ectatic aorta   Stable density at both lung bases                  XR CHEST PORTABLE   Final Result      1. No significant interval change since prior study considering the   differences in technique. CT CHEST WO CONTRAST   Final Result      1. Interval enlargement of the small left anterior pneumothorax,   possibly loculated. Interval placement of a left-sided chest tube   located within the left upper lobe. 2. Interval development of left lower lobe atelectasis. 3. Severe emphysematous changes throughout the lungs consistent with   COPD in the appropriate clinical setting. 4. Innumerable right lower lobe centrilobular nodules, unchanged since   prior exam. This is of unclear etiology, possibly atypical pneumonia. 5. Cholelithiasis. 6. Chronic mediastinal lymphadenopathy. ALERT:  THIS IS AN ABNORMAL REPORT                  XR CHEST PORTABLE   Final Result   Findings compatible with emphysema   Tortuous ectatic aorta   There are mild bibasilar infiltrates worse on the left than the right. There is a mild left upper lobe and right upper lobe infiltrate. XR CHEST PORTABLE   Final Result   Left-sided pneumothorax not convincingly identified. Left sided chest   tube unchanged.          XR CHEST PORTABLE   Final Result   tortuous ectatic aorta   Airspace disease compatible with atelectasis or pneumonia and post on   emphysema and
metoprolol succinate  12.5 mg Oral Daily    raloxifene  60 mg Oral QAM    sodium chloride (Inhalant)  15 mL Nebulization BID    diazepam  5 mg Oral Nightly    meropenem  1 g Intravenous Q8H     Continuous Infusions:   lactated ringers 125 mL/hr at 09/13/18 0333     PRN Meds:     sodium chloride flush 10 mL PRN   acetaminophen 650 mg Q4H PRN   morphine 2 mg Q1H PRN   Or     morphine 4 mg Q2H PRN   acetaminophen 650 mg Q4H PRN   fluticasone 1 spray Nightly PRN         VENT SETTINGS (Comprehensive) (if applicable):  Vent Information  Vt Ordered: 550 mL  FiO2 : 50 %  Additional Respiratory  Assessments  Pulse: 88  Resp: 18  SpO2: 100 %  Oral Care: Mouth swabbed    ABGs:   Recent Labs      09/13/18   0026  09/13/18   0639   PH  7.340*  7.388   PCO2  46.3*   --    PO2  206.8*   --    HCO3  24.4   --    BE  -1.5  4.5*   O2SAT  99.2*  97.6       Laboratory findings:    Complete Blood Count: Recent Labs      09/12/18   0615  09/12/18   1605  09/13/18   0549   WBC  6.9  8.6  6.7   HGB  11.2*  12.2  10.7*   HCT  36.5  39.1  34.3   PLT  145  142  133        Last 3 Blood Glucose:   Recent Labs      09/12/18   0615  09/12/18   1605  09/13/18   0549   GLUCOSE  101  122*  178*        PT/INR:    Lab Results   Component Value Date    PROTIME 12.1 09/07/2018    INR 1.0 09/07/2018     PTT:    Lab Results   Component Value Date    APTT 60.9 07/08/2018       Comprehensive Metabolic Profile:   Recent Labs      09/12/18   0615  09/12/18   1605  09/13/18   0549   NA  142  143  139   K  4.8  4.9  4.1   CL  104  104  101   CO2  32*  31*  28   BUN  10  10  17   CREATININE  0.6  0.6  0.6   GLUCOSE  101  122*  178*   CALCIUM  8.4*  7.9*  7.8*      Magnesium: No results found for: MG  Phosphorus: No results found for: PHOS  Ionized Calcium: No results found for: CAION     Urinalysis:     Troponin:   Recent Labs      09/11/18   0410  09/11/18   1125  09/11/18 2040   TROPONINI  <0.01  <0.01  <0.01       Microbiology:    Cultures during this
Left sided chest tube unchanged. Xr Chest Portable    Result Date: 2018  Patient MRN: [id number] : 1945 Age:  75 years Gender: Female Order Date: 2018 12:00 AM Exam: XR CHEST PORTABLE Number of Images: 1 view Indication:   CT follow up CT follow up Comparison: 2018 Findings: There is a central venous catheter noted the tip is in the superior vena cava The heart is unremarkable. The lung fields demonstrate evidence for airspace disease. The aorta is tortuous and ectatic. Findings are likely superimposed on emphysema and scar. Previously described small pneumothorax not significantly changed    tortuous ectatic aorta Airspace disease compatible with atelectasis or pneumonia and post on emphysema and likely scar The chest does not appear to be significantly changed in the interval     Xr Chest Portable    Result Date: 2018  Patient MRN:  [id number] : 1945 Age: 75 years Gender: Female Order Date:  2018 12:00 PM EXAM: XR CHEST PORTABLE NUMBER OF IMAGES:  1, VIEWS:  1 INDICATION: PTX, Chest Tube Placement COMPARISON: Chest x-ray dated 2018 at 10:15. Findings: A right-sided PICC remains in satisfactory position. There has been a significant interval decrease in size of previously seen moderate to large left pneumothorax status post chest tube placement. Mild chest wall emphysema noted. The lungs again show increased and irregular radiolucency compatible with emphysema/COPD. The pulmonary vasculature and cardiomediastinal silhouette appear within normal limits. Possible tiny residual left pneumothorax, significantly improved when compared to the prior examination. Xr Chest Portable    Result Date: 2018  Patient MRN:  [id number] : 1945 Age: 75 years Gender: Female Order Date:  2018 10:15 AM EXAM: XR CHEST PORTABLE NUMBER OF IMAGES:  1, VIEWS:  1 INDICATION: Chest Pain COMPARISON: Chest x-ray dated 2018. Chest CT dated 10/30/2017.  Findings: A right-sided PICC
TROPONINI <0.01 09/11/2018     Lab Results   Component Value Date    INR 1.0 09/07/2018    INR 1.0 06/08/2014    PROTIME 12.1 09/07/2018    PROTIME 10.8 06/08/2014     No results found for: TSHFT4, TSH  No results found for: LABA1C  No results found for: EAG  Lab Results   Component Value Date    CHOL 138 07/06/2018     Lab Results   Component Value Date    TRIG 91 07/06/2018     Lab Results   Component Value Date    HDL 67 07/06/2018     Lab Results   Component Value Date    LDLCALC 53 07/06/2018     Lab Results   Component Value Date    LABVLDL 18 07/06/2018     Telemetry:     ECHO 9/11/2018: Dr. Shaylee Dotson  No wall motion abnormalities.   Ejection fraction is visually estimated at 55-65%.   Normal sized left atrium.   L atrial pressure not elevated   Mild mitral regurgitation   The aortic valve appears moderately sclerotic.   Pulmonary hypertension is mild .   No pericardial effusion. Pertinent History:   1. Admitted with recurrent left pneumothorax; scheduled for left VATS (9/12/2018)  2. Stress CMP with improved LV function: EF 30-35% on TTE 7/5/2018. Improved EF 55% on TTE 8/2018. Normal coronaries on cath 7/9/2018. 3. Non-anginal left sided chest pain: resolved. 4. Severe COPD with remote tobacco abuse  5. HTN: controlled  6. HLD: on statin therapy    7. Recurrence of secondary Takotsubo myopathy has been estimated at 10% Therefore there is some elevated risk of MACE for VATS procedure. There is no strategy however which  would significantly alter this risk  8. S/P  L VATS wedge resection left apical blebs with Pap talc pleurodesis   09/12/19    Assessment  1. Recurrent left pneumothorax  2 POD#1  S/P  L VATS wedge resection left apical blebs with Pap talc pleurodesis   09/12/19 per CT surgery. 3 History secondary Takotsubo myopathy. EF 55-65% on TTE 9/11/2018. 4 post op respiratory distress; on intermittent Bipap therapy. 5 Anemia: Hgb 10.7. Plan:  1. Continue current cardiac medications.    2.
not appear to be significantly changed in the interval                  XR CHEST PORTABLE   Final Result   Possible tiny residual left pneumothorax, significantly improved when   compared to the prior examination. XR CHEST PORTABLE   Final Result   1. Moderate to large left pneumothorax. Surgical consultation is   suggested. 2.  Severe emphysema/COPD. ALERT:  CRITICAL RESULT      Major findings were discussed with Dr. Marcell Castorena on 9/7/2018 12:49   PM         XR CHEST PORTABLE    (Results Pending)       Assessment:  1. Recurrent pneumothorax secondary to bullous emphysema on the left. 2. Bronchiectasis with Pseudomonas colonization    Plan:  1. Strp down soon    Care reviewed with the staff and the patient's family as available. Please note that voice recognition technology was used in the preparation of this note and make therefore it may contain inadvertent transcription errors. Harish Berger M.D., F.C.C.P.     [facility] [address], [address] [place] [address]

## 2018-09-15 NOTE — DISCHARGE SUMMARY
Patient Discharge Instructions    Discharged To:  home    RESUME ACTIVITY:      BATHING: OK to shower, keep dressing dry. DRIVING: No driving for 2weeks     RETURN TO WORK: Desk job only    WALKING:  Yes    SEXUAL ACTIVITY: Yes    STAIRS:  Yes    LIFTING: Less than 5 pounds for 2weeks     DIET: common adult    SPECIAL INSTRUCTIONS: Have chest xray completed day of office visit with Dr. Chrystal Lea    Call the office at [phone] if you have a fever > 100 F, or if your incision becomes red, tender, or drains more than a small amount of clear fluid. Dr. Chrystal Lea  for follow up appointment in:3 weeks    Call [phone] for appointment.
continuous home O2, 3 to 5 liters as needed.         Donna Palmer MD    D: 09/14/2018 14:10:45       T: 09/15/2018 5:37:45     [id number]  Job#: [id number]     Doc#: [id number]:

## 2018-09-19 ENCOUNTER — TELEPHONE (OUTPATIENT)
Dept: CARDIOLOGY CLINIC | Age: 73
End: 2018-09-19

## 2019-03-21 ENCOUNTER — OFFICE VISIT (OUTPATIENT)
Dept: CARDIOLOGY CLINIC | Age: 74
End: 2019-03-21
Payer: MEDICARE

## 2019-03-21 VITALS
BODY MASS INDEX: 27.13 KG/M2 | HEIGHT: 66 IN | RESPIRATION RATE: 16 BRPM | WEIGHT: 168.8 LBS | DIASTOLIC BLOOD PRESSURE: 78 MMHG | HEART RATE: 69 BPM | SYSTOLIC BLOOD PRESSURE: 112 MMHG

## 2019-03-21 DIAGNOSIS — I10 HYPERTENSION, UNSPECIFIED TYPE: Primary | ICD-10-CM

## 2019-03-21 PROCEDURE — G8484 FLU IMMUNIZE NO ADMIN: HCPCS | Performed by: INTERNAL MEDICINE

## 2019-03-21 PROCEDURE — 1090F PRES/ABSN URINE INCON ASSESS: CPT | Performed by: INTERNAL MEDICINE

## 2019-03-21 PROCEDURE — 93000 ELECTROCARDIOGRAM COMPLETE: CPT | Performed by: INTERNAL MEDICINE

## 2019-03-21 PROCEDURE — 1101F PT FALLS ASSESS-DOCD LE1/YR: CPT | Performed by: INTERNAL MEDICINE

## 2019-03-21 PROCEDURE — 1123F ACP DISCUSS/DSCN MKR DOCD: CPT | Performed by: INTERNAL MEDICINE

## 2019-03-21 PROCEDURE — G8427 DOCREV CUR MEDS BY ELIG CLIN: HCPCS | Performed by: INTERNAL MEDICINE

## 2019-03-21 PROCEDURE — 3017F COLORECTAL CA SCREEN DOC REV: CPT | Performed by: INTERNAL MEDICINE

## 2019-03-21 PROCEDURE — G8419 CALC BMI OUT NRM PARAM NOF/U: HCPCS | Performed by: INTERNAL MEDICINE

## 2019-03-21 PROCEDURE — G8400 PT W/DXA NO RESULTS DOC: HCPCS | Performed by: INTERNAL MEDICINE

## 2019-03-21 PROCEDURE — 99214 OFFICE O/P EST MOD 30 MIN: CPT | Performed by: INTERNAL MEDICINE

## 2019-03-21 PROCEDURE — 4040F PNEUMOC VAC/ADMIN/RCVD: CPT | Performed by: INTERNAL MEDICINE

## 2019-03-21 PROCEDURE — 1036F TOBACCO NON-USER: CPT | Performed by: INTERNAL MEDICINE

## 2019-03-21 RX ORDER — AMLODIPINE BESYLATE 5 MG/1
5 TABLET ORAL DAILY
COMMUNITY

## 2019-03-21 RX ORDER — ATORVASTATIN CALCIUM 20 MG/1
20 TABLET, FILM COATED ORAL NIGHTLY
Qty: 90 TABLET | Refills: 3 | Status: SHIPPED
Start: 2019-03-21 | End: 2020-03-16 | Stop reason: SDUPTHER

## 2019-05-07 PROBLEM — R13.10 DYSPHAGIA: Status: ACTIVE | Noted: 2019-05-07

## 2019-10-10 ENCOUNTER — OFFICE VISIT (OUTPATIENT)
Dept: CARDIOLOGY CLINIC | Age: 74
End: 2019-10-10
Payer: MEDICARE

## 2019-10-10 VITALS
WEIGHT: 182 LBS | SYSTOLIC BLOOD PRESSURE: 138 MMHG | HEART RATE: 70 BPM | BODY MASS INDEX: 30.32 KG/M2 | DIASTOLIC BLOOD PRESSURE: 86 MMHG | HEIGHT: 65 IN

## 2019-10-10 DIAGNOSIS — I10 HYPERTENSION, UNSPECIFIED TYPE: Primary | ICD-10-CM

## 2019-10-10 PROCEDURE — 93000 ELECTROCARDIOGRAM COMPLETE: CPT | Performed by: INTERNAL MEDICINE

## 2019-10-10 PROCEDURE — 99214 OFFICE O/P EST MOD 30 MIN: CPT | Performed by: INTERNAL MEDICINE

## 2019-10-10 PROCEDURE — 1123F ACP DISCUSS/DSCN MKR DOCD: CPT | Performed by: INTERNAL MEDICINE

## 2019-10-10 PROCEDURE — G8417 CALC BMI ABV UP PARAM F/U: HCPCS | Performed by: INTERNAL MEDICINE

## 2019-10-10 PROCEDURE — G8400 PT W/DXA NO RESULTS DOC: HCPCS | Performed by: INTERNAL MEDICINE

## 2019-10-10 PROCEDURE — 4040F PNEUMOC VAC/ADMIN/RCVD: CPT | Performed by: INTERNAL MEDICINE

## 2019-10-10 PROCEDURE — 1090F PRES/ABSN URINE INCON ASSESS: CPT | Performed by: INTERNAL MEDICINE

## 2019-10-10 PROCEDURE — 1036F TOBACCO NON-USER: CPT | Performed by: INTERNAL MEDICINE

## 2019-10-10 PROCEDURE — G8484 FLU IMMUNIZE NO ADMIN: HCPCS | Performed by: INTERNAL MEDICINE

## 2019-10-10 PROCEDURE — G8427 DOCREV CUR MEDS BY ELIG CLIN: HCPCS | Performed by: INTERNAL MEDICINE

## 2019-10-10 PROCEDURE — 3017F COLORECTAL CA SCREEN DOC REV: CPT | Performed by: INTERNAL MEDICINE

## 2020-02-04 ENCOUNTER — TELEPHONE (OUTPATIENT)
Dept: CARDIOLOGY CLINIC | Age: 75
End: 2020-02-04

## 2020-02-05 NOTE — TELEPHONE ENCOUNTER
Patient notified of Dr. Herrera's recommendations. Films requested from [address] [place] [address]. F/U scheduled for 3/26/20 at 11:20 a.m.

## 2020-03-16 ENCOUNTER — TELEPHONE (OUTPATIENT)
Dept: CARDIOLOGY CLINIC | Age: 75
End: 2020-03-16

## 2020-03-16 RX ORDER — ATORVASTATIN CALCIUM 20 MG/1
20 TABLET, FILM COATED ORAL NIGHTLY
Qty: 90 TABLET | Refills: 3 | Status: SHIPPED
Start: 2020-03-16 | End: 2021-03-10

## 2020-04-24 ENCOUNTER — TELEPHONE (OUTPATIENT)
Dept: CARDIOLOGY CLINIC | Age: 75
End: 2020-04-24

## 2020-05-08 ENCOUNTER — VIRTUAL VISIT (OUTPATIENT)
Dept: CARDIOLOGY CLINIC | Age: 75
End: 2020-05-08
Payer: MEDICARE

## 2020-05-08 VITALS
BODY MASS INDEX: 29.41 KG/M2 | WEIGHT: 176.5 LBS | SYSTOLIC BLOOD PRESSURE: 129 MMHG | DIASTOLIC BLOOD PRESSURE: 66 MMHG | HEIGHT: 65 IN

## 2020-05-08 PROCEDURE — 99442 PR PHYS/QHP TELEPHONE EVALUATION 11-20 MIN: CPT | Performed by: INTERNAL MEDICINE

## 2020-05-08 NOTE — PATIENT INSTRUCTIONS
· She is on guideline directed medical therapy for heart failure with reduced ejection fraction. Currently she has heart failure with improved LV function with an EF of 52%. We will continue Toprol-XL and losartan with hydrochlorothiazide. · We will continue atorvastatin 20 mg for hyperlipidemia. · Continue amlodipine 5 mg p.o. daily for hypertension  · Continue rest of the current medications as before  · She will follow-up with me in 6 months.

## 2021-02-21 ENCOUNTER — IMMUNIZATION (OUTPATIENT)
Dept: PRIMARY CARE CLINIC | Age: 76
End: 2021-02-21
Payer: MEDICARE

## 2021-02-21 PROCEDURE — 91300 COVID-19, PFIZER VACCINE 30MCG/0.3ML DOSE: CPT | Performed by: NURSE PRACTITIONER

## 2021-02-21 PROCEDURE — 0001A COVID-19, PFIZER VACCINE 30MCG/0.3ML DOSE: CPT | Performed by: NURSE PRACTITIONER

## 2021-02-25 PROBLEM — J47.9 BRONCHIECTASIS (HCC): Status: ACTIVE | Noted: 2018-02-25

## 2021-03-10 RX ORDER — ATORVASTATIN CALCIUM 20 MG/1
TABLET, FILM COATED ORAL
Qty: 90 TABLET | Refills: 3 | Status: SHIPPED
Start: 2021-03-10 | End: 2022-03-09

## 2021-03-17 ENCOUNTER — IMMUNIZATION (OUTPATIENT)
Dept: PRIMARY CARE CLINIC | Age: 76
End: 2021-03-17
Payer: MEDICARE

## 2021-03-17 PROCEDURE — 0002A COVID-19, PFIZER VACCINE 30MCG/0.3ML DOSE: CPT | Performed by: NURSE PRACTITIONER

## 2021-03-17 PROCEDURE — 91300 COVID-19, PFIZER VACCINE 30MCG/0.3ML DOSE: CPT | Performed by: NURSE PRACTITIONER

## 2021-06-07 ENCOUNTER — FOLLOWUP TELEPHONE ENCOUNTER (OUTPATIENT)
Dept: INFECTIOUS DISEASES | Age: 76
End: 2021-06-07

## 2021-06-07 NOTE — PROGRESS NOTES
I received a phone call from Dr. Pawan Engle, pulmonary at [facility]. Huey Nguyễn has been having dyspnea on exertion and a productive sputum. She is not getting her fluids. They asked for for a sputum specimen. Cultures growing Pseudomonas, sensitive to Levofloxacin but resistant to imipenem. We will most likely have to start either Avycaz or Meropenem (depending on sensitivities), together with inhaled Tobramycin for a minimum of 3 weeks. We will asked the patient to come to the office this or next week.     Jessica Fernandez MD

## 2021-06-08 ENCOUNTER — HOSPITAL ENCOUNTER (OUTPATIENT)
Age: 76
Discharge: HOME | End: 2021-06-08
Payer: MEDICARE

## 2021-06-08 DIAGNOSIS — J47.0 BRONCHIECTASIS WITH ACUTE LOWER RESPIRATORY INFECTION (HCC): ICD-10-CM

## 2021-06-08 LAB
ALBUMIN SERPL-MCNC: 4.2 G/DL (ref 3.5–5.2)
ALP BLD-CCNC: 101 U/L (ref 35–104)
ALT SERPL-CCNC: 10 U/L (ref 0–32)
ANION GAP SERPL CALCULATED.3IONS-SCNC: 9 MMOL/L (ref 7–16)
AST SERPL-CCNC: 12 U/L (ref 0–31)
BASOPHILS ABSOLUTE: 0.04 E9/L (ref 0–0.2)
BASOPHILS RELATIVE PERCENT: 0.6 % (ref 0–2)
BILIRUB SERPL-MCNC: 0.3 MG/DL (ref 0–1.2)
BUN BLDV-MCNC: 13 MG/DL (ref 6–23)
C-REACTIVE PROTEIN: 0.3 MG/DL (ref 0–0.4)
CALCIUM SERPL-MCNC: 8.9 MG/DL (ref 8.6–10.2)
CHLORIDE BLD-SCNC: 103 MMOL/L (ref 98–107)
CO2: 30 MMOL/L (ref 22–29)
CREAT SERPL-MCNC: 0.8 MG/DL (ref 0.5–1)
EOSINOPHILS ABSOLUTE: 0.12 E9/L (ref 0.05–0.5)
EOSINOPHILS RELATIVE PERCENT: 1.9 % (ref 0–6)
GFR AFRICAN AMERICAN: >60
GFR NON-AFRICAN AMERICAN: >60 ML/MIN/1.73
GLUCOSE BLD-MCNC: 95 MG/DL (ref 74–99)
HCT VFR BLD CALC: 37.6 % (ref 34–48)
HEMOGLOBIN: 11.1 G/DL (ref 11.5–15.5)
IMMATURE GRANULOCYTES #: 0.04 E9/L
IMMATURE GRANULOCYTES %: 0.6 % (ref 0–5)
LYMPHOCYTES ABSOLUTE: 1.33 E9/L (ref 1.5–4)
LYMPHOCYTES RELATIVE PERCENT: 21 % (ref 20–42)
MCH RBC QN AUTO: 22.6 PG (ref 26–35)
MCHC RBC AUTO-ENTMCNC: 29.5 % (ref 32–34.5)
MCV RBC AUTO: 76.6 FL (ref 80–99.9)
MONOCYTES ABSOLUTE: 0.33 E9/L (ref 0.1–0.95)
MONOCYTES RELATIVE PERCENT: 5.2 % (ref 2–12)
NEUTROPHILS ABSOLUTE: 4.47 E9/L (ref 1.8–7.3)
NEUTROPHILS RELATIVE PERCENT: 70.7 % (ref 43–80)
PDW BLD-RTO: 17.2 FL (ref 11.5–15)
PLATELET # BLD: 194 E9/L (ref 130–450)
PMV BLD AUTO: 10 FL (ref 7–12)
POTASSIUM SERPL-SCNC: 4.2 MMOL/L (ref 3.5–5)
RBC # BLD: 4.91 E12/L (ref 3.5–5.5)
SODIUM BLD-SCNC: 142 MMOL/L (ref 132–146)
TOTAL PROTEIN: 7.4 G/DL (ref 6.4–8.3)
WBC # BLD: 6.3 E9/L (ref 4.5–11.5)

## 2021-06-08 PROCEDURE — 85025 COMPLETE CBC W/AUTO DIFF WBC: CPT

## 2021-06-08 PROCEDURE — 87040 BLOOD CULTURE FOR BACTERIA: CPT

## 2021-06-08 PROCEDURE — 86140 C-REACTIVE PROTEIN: CPT

## 2021-06-08 PROCEDURE — 36415 COLL VENOUS BLD VENIPUNCTURE: CPT

## 2021-06-08 PROCEDURE — 80053 COMPREHEN METABOLIC PANEL: CPT

## 2021-06-13 LAB — CULTURE, BLOOD 3: NORMAL

## 2021-06-18 ENCOUNTER — HOSPITAL ENCOUNTER (OUTPATIENT)
Dept: PULMONOLOGY | Age: 76
Discharge: HOME | End: 2021-06-18
Payer: MEDICARE

## 2021-06-18 PROCEDURE — 94060 EVALUATION OF WHEEZING: CPT

## 2021-06-18 PROCEDURE — 94729 DIFFUSING CAPACITY: CPT

## 2021-06-18 NOTE — PROGRESS NOTES
06/18/21 0831   Resting (Room Air)   SpO2 90   HR 82   Resting (On O2)   SpO2 96   HR 91   O2 Device Nasal cannula   O2 Flow Rate (l/min) 3 l/min   Comments resting on O2 following walk   During Walk (Room Air)   SpO2 60      Walk/Assistance Device Ambulation   Rate of Dyspnea 1   Symptoms Shortness of breath   Comments placed pt. on O2 at 3 lpm following 1 min. Patient talking in completer sentences   During Walk (On O2)   SpO2 70      O2 Device Nasal cannula   O2 Flow Rate (l/min) 3 l/min   Walk/Assistance Device Ambulation   Rate of Dyspnea 1   Symptoms Shortness of breath   Comments incresed to 5 lpm after 2 minutes. Patient talking in complete sentences.    After Walk   SpO2 64   HR 98   O2 Flow Rate (l/min) 5 l/min   Rate of Dyspnea 1   Symptoms Shortness of breath   Comments rested on 5 lpm then decreased to 3 lpm   Sao2 96  HR 91

## 2021-06-22 ENCOUNTER — HOSPITAL ENCOUNTER (OUTPATIENT)
Dept: CT IMAGING | Age: 76
Discharge: HOME | End: 2021-06-24
Payer: MEDICARE

## 2021-06-22 ENCOUNTER — HOSPITAL ENCOUNTER (OUTPATIENT)
Dept: GENERAL RADIOLOGY | Age: 76
Discharge: HOME | End: 2021-06-24
Payer: MEDICARE

## 2021-06-22 DIAGNOSIS — J47.1 BRONCHIECTASIS WITH ACUTE EXACERBATION (HCC): ICD-10-CM

## 2021-06-22 DIAGNOSIS — R13.12 DYSPHAGIA, OROPHARYNGEAL: ICD-10-CM

## 2021-06-22 PROCEDURE — 2500000003 HC RX 250 WO HCPCS: Performed by: RADIOLOGY

## 2021-06-22 PROCEDURE — 92611 MOTION FLUOROSCOPY/SWALLOW: CPT | Performed by: SPEECH-LANGUAGE PATHOLOGIST

## 2021-06-22 PROCEDURE — 71250 CT THORAX DX C-: CPT

## 2021-06-22 PROCEDURE — 74230 X-RAY XM SWLNG FUNCJ C+: CPT

## 2021-06-22 RX ADMIN — BARIUM SULFATE 70 G: 0.81 POWDER, FOR SUSPENSION ORAL at 10:26

## 2021-06-22 RX ADMIN — BARIUM SULFATE 120 ML: 400 SUSPENSION ORAL at 10:26

## 2021-06-22 RX ADMIN — BARIUM SULFATE 10 ML: 400 PASTE ORAL at 10:26

## 2021-06-22 NOTE — PROGRESS NOTES
laryngeal vestibule at the height of the swallow. Pharyngeal stripping wave was present and complete. Pharyngeal contraction could not be determined due to logistical reasons not related to physiologic impairment. Pharyngoesophageal segment opening was completely distended for complete duration with no obstruction of bolus flow. Tongue base retraction allowed no contrast between the retracted tongue base and the posterior pharyngeal wall. Pharyngeal residue was not present. Esophageal clearance in the upright position could not be assessed due to logistical reasons not related to physiologic impairment. PENETRATION-ASPIRATION SCALE (PAS):  THIN 2 = Material enters the airway, remains above vocal folds, and is ejected from the airway very shallow and transient  MILDLY THICK 1 = Material does not enter the airway  MODERATELY THICK item not administered  PUREE 1 = Material does not enter the airway  HARD SOLID 1 = Material does not enter the airway       COMPENSATORY STRATEGIES    Compensatory strategies that were beneficial included Chin tuck      STRUCTURAL/FUNCTIONAL ANOMALIES   No structural/functional anomalies were noted    CERVICAL ESOPHAGEAL STAGE :     The cervical esophagus appeared adequate          ___________    Cognition:   Within functional limits for this exam    Oral Peripheral Examination   Adequate lingual/labial strength     Current Respiratory Status   3 liters nasal cannula     Parameters of Speech Production  Respiration:  Adequate for speech production  Quality:   Within functional limits  Intensity: Within functional limits    Pain: No pain reported. EDUCATION:   The Speech Language Pathologist (SLP) completed education regarding results of evaluation and that intervention is not warranted at this time.   Learner: Patient  Education: Reviewed results and recommendations of this evaluation reiveiwed implementation of chin gwen  Evaluation of Education:  Verbalizes understanding    This plan may be re-evaluated and revised as warranted. Evaluation Time includes thorough review of current medical information, gathering information on past medical history/social history and prior level of function, completion of standardized testing/informal observation of tasks, assessment of data and education on plan of care and goals. [x]The admitting diagnosis and active problem list, have been reviewed prior to initiation of this evaluation.     CPT Code: 05931  dysphagia study    ACTIVE PROBLEM LIST:   Patient Active Problem List   Diagnosis    Tachycardia    HTN (hypertension)    Hypoxia    COPD (chronic obstructive pulmonary disease) ([address]    Cryptogenic organizing pneumonia ([address]    Pneumothorax on left    Cardiomyopathy ([address]    Pneumothorax    Dysphagia    Bronchiectasis ([address]       Chin Florentino MSCCC/SLP  Speech Language Pathologist  [id number]

## 2021-06-29 PROBLEM — B99.9: Status: ACTIVE | Noted: 2018-02-25

## 2021-06-29 PROBLEM — J47.1: Status: ACTIVE | Noted: 2018-02-25

## 2021-07-22 ENCOUNTER — APPOINTMENT (OUTPATIENT)
Dept: GENERAL RADIOLOGY | Age: 76
DRG: 190 | End: 2021-07-22
Payer: MEDICARE

## 2021-07-22 ENCOUNTER — HOSPITAL ENCOUNTER (INPATIENT)
Age: 76
LOS: 1 days | Discharge: HOME | DRG: 190 | End: 2021-07-24
Attending: EMERGENCY MEDICINE | Admitting: INTERNAL MEDICINE
Payer: MEDICARE

## 2021-07-22 DIAGNOSIS — J44.1 COPD EXACERBATION (HCC): Primary | ICD-10-CM

## 2021-07-22 DIAGNOSIS — R09.02 HYPOXIA: ICD-10-CM

## 2021-07-22 LAB
ANION GAP SERPL CALCULATED.3IONS-SCNC: 13 MMOL/L (ref 7–16)
BASOPHILS ABSOLUTE: 0.01 E9/L (ref 0–0.2)
BASOPHILS RELATIVE PERCENT: 0.2 % (ref 0–2)
BUN BLDV-MCNC: 13 MG/DL (ref 6–23)
CALCIUM SERPL-MCNC: 9.2 MG/DL (ref 8.6–10.2)
CHLORIDE BLD-SCNC: 98 MMOL/L (ref 98–107)
CO2: 29 MMOL/L (ref 22–29)
CREAT SERPL-MCNC: 0.8 MG/DL (ref 0.5–1)
EOSINOPHILS ABSOLUTE: 0.01 E9/L (ref 0.05–0.5)
EOSINOPHILS RELATIVE PERCENT: 0.2 % (ref 0–6)
GFR AFRICAN AMERICAN: >60
GFR NON-AFRICAN AMERICAN: >60 ML/MIN/1.73
GLUCOSE BLD-MCNC: 96 MG/DL (ref 74–99)
HCT VFR BLD CALC: 37.1 % (ref 34–48)
HEMOGLOBIN: 10.8 G/DL (ref 11.5–15.5)
IMMATURE GRANULOCYTES #: 0.02 E9/L
IMMATURE GRANULOCYTES %: 0.3 % (ref 0–5)
LYMPHOCYTES ABSOLUTE: 1.02 E9/L (ref 1.5–4)
LYMPHOCYTES RELATIVE PERCENT: 17.1 % (ref 20–42)
MCH RBC QN AUTO: 21.7 PG (ref 26–35)
MCHC RBC AUTO-ENTMCNC: 29.1 % (ref 32–34.5)
MCV RBC AUTO: 74.6 FL (ref 80–99.9)
MONOCYTES ABSOLUTE: 0.3 E9/L (ref 0.1–0.95)
MONOCYTES RELATIVE PERCENT: 5 % (ref 2–12)
NEUTROPHILS ABSOLUTE: 4.59 E9/L (ref 1.8–7.3)
NEUTROPHILS RELATIVE PERCENT: 77.2 % (ref 43–80)
PDW BLD-RTO: 17.4 FL (ref 11.5–15)
PLATELET # BLD: 175 E9/L (ref 130–450)
PMV BLD AUTO: 9.8 FL (ref 7–12)
POTASSIUM REFLEX MAGNESIUM: 4 MMOL/L (ref 3.5–5)
PRO-BNP: 207 PG/ML (ref 0–450)
RBC # BLD: 4.97 E12/L (ref 3.5–5.5)
SEDIMENTATION RATE, ERYTHROCYTE: 37 MM/HR (ref 0–20)
SODIUM BLD-SCNC: 140 MMOL/L (ref 132–146)
TROPONIN, HIGH SENSITIVITY: 10 NG/L (ref 0–9)
WBC # BLD: 6 E9/L (ref 4.5–11.5)

## 2021-07-22 PROCEDURE — G0378 HOSPITAL OBSERVATION PER HR: HCPCS

## 2021-07-22 PROCEDURE — 80048 BASIC METABOLIC PNL TOTAL CA: CPT

## 2021-07-22 PROCEDURE — 6370000000 HC RX 637 (ALT 250 FOR IP): Performed by: EMERGENCY MEDICINE

## 2021-07-22 PROCEDURE — 6370000000 HC RX 637 (ALT 250 FOR IP): Performed by: INTERNAL MEDICINE

## 2021-07-22 PROCEDURE — 36415 COLL VENOUS BLD VENIPUNCTURE: CPT

## 2021-07-22 PROCEDURE — 96372 THER/PROPH/DIAG INJ SC/IM: CPT

## 2021-07-22 PROCEDURE — 2580000003 HC RX 258

## 2021-07-22 PROCEDURE — 2580000003 HC RX 258: Performed by: INTERNAL MEDICINE

## 2021-07-22 PROCEDURE — 85651 RBC SED RATE NONAUTOMATED: CPT

## 2021-07-22 PROCEDURE — 6360000002 HC RX W HCPCS: Performed by: EMERGENCY MEDICINE

## 2021-07-22 PROCEDURE — 84484 ASSAY OF TROPONIN QUANT: CPT

## 2021-07-22 PROCEDURE — 96375 TX/PRO/DX INJ NEW DRUG ADDON: CPT

## 2021-07-22 PROCEDURE — 96365 THER/PROPH/DIAG IV INF INIT: CPT

## 2021-07-22 PROCEDURE — 93005 ELECTROCARDIOGRAM TRACING: CPT | Performed by: NURSE PRACTITIONER

## 2021-07-22 PROCEDURE — 71046 X-RAY EXAM CHEST 2 VIEWS: CPT

## 2021-07-22 PROCEDURE — 99284 EMERGENCY DEPT VISIT MOD MDM: CPT

## 2021-07-22 PROCEDURE — 86140 C-REACTIVE PROTEIN: CPT

## 2021-07-22 PROCEDURE — 99220 PR INITIAL OBSERVATION CARE/DAY 70 MINUTES: CPT | Performed by: INTERNAL MEDICINE

## 2021-07-22 PROCEDURE — 6360000002 HC RX W HCPCS: Performed by: INTERNAL MEDICINE

## 2021-07-22 PROCEDURE — 96366 THER/PROPH/DIAG IV INF ADDON: CPT

## 2021-07-22 PROCEDURE — 87449 NOS EACH ORGANISM AG IA: CPT

## 2021-07-22 PROCEDURE — 94640 AIRWAY INHALATION TREATMENT: CPT

## 2021-07-22 PROCEDURE — 83880 ASSAY OF NATRIURETIC PEPTIDE: CPT

## 2021-07-22 PROCEDURE — 85025 COMPLETE CBC W/AUTO DIFF WBC: CPT

## 2021-07-22 RX ORDER — ONDANSETRON 4 MG/1
4 TABLET, ORALLY DISINTEGRATING ORAL EVERY 8 HOURS PRN
Status: DISCONTINUED | OUTPATIENT
Start: 2021-07-22 | End: 2021-07-24 | Stop reason: HOSPADM

## 2021-07-22 RX ORDER — ATORVASTATIN CALCIUM 20 MG/1
20 TABLET, FILM COATED ORAL DAILY
Status: DISCONTINUED | OUTPATIENT
Start: 2021-07-22 | End: 2021-07-24 | Stop reason: HOSPADM

## 2021-07-22 RX ORDER — POLYETHYLENE GLYCOL 3350 17 G/17G
17 POWDER, FOR SOLUTION ORAL DAILY PRN
Status: DISCONTINUED | OUTPATIENT
Start: 2021-07-22 | End: 2021-07-24 | Stop reason: HOSPADM

## 2021-07-22 RX ORDER — SODIUM CHLORIDE 0.9 % (FLUSH) 0.9 %
5-40 SYRINGE (ML) INJECTION PRN
Status: DISCONTINUED | OUTPATIENT
Start: 2021-07-22 | End: 2021-07-24 | Stop reason: HOSPADM

## 2021-07-22 RX ORDER — ACETAMINOPHEN 650 MG/1
650 SUPPOSITORY RECTAL EVERY 6 HOURS PRN
Status: DISCONTINUED | OUTPATIENT
Start: 2021-07-22 | End: 2021-07-24 | Stop reason: HOSPADM

## 2021-07-22 RX ORDER — FORMOTEROL FUMARATE 20 UG/2ML
20 SOLUTION RESPIRATORY (INHALATION) 2 TIMES DAILY
Status: DISCONTINUED | OUTPATIENT
Start: 2021-07-22 | End: 2021-07-22 | Stop reason: CLARIF

## 2021-07-22 RX ORDER — METHYLPREDNISOLONE SODIUM SUCCINATE 40 MG/ML
40 INJECTION, POWDER, LYOPHILIZED, FOR SOLUTION INTRAMUSCULAR; INTRAVENOUS EVERY 8 HOURS
Status: COMPLETED | OUTPATIENT
Start: 2021-07-22 | End: 2021-07-24

## 2021-07-22 RX ORDER — SODIUM CHLORIDE 9 MG/ML
25 INJECTION, SOLUTION INTRAVENOUS PRN
Status: DISCONTINUED | OUTPATIENT
Start: 2021-07-22 | End: 2021-07-24 | Stop reason: HOSPADM

## 2021-07-22 RX ORDER — METOPROLOL SUCCINATE 25 MG/1
12.5 TABLET, EXTENDED RELEASE ORAL DAILY
Status: DISCONTINUED | OUTPATIENT
Start: 2021-07-22 | End: 2021-07-24 | Stop reason: HOSPADM

## 2021-07-22 RX ORDER — ARFORMOTEROL TARTRATE 15 UG/2ML
15 SOLUTION RESPIRATORY (INHALATION) 2 TIMES DAILY
Status: DISCONTINUED | OUTPATIENT
Start: 2021-07-22 | End: 2021-07-24 | Stop reason: HOSPADM

## 2021-07-22 RX ORDER — ALBUTEROL SULFATE 2.5 MG/3ML
2.5 SOLUTION RESPIRATORY (INHALATION)
Status: DISCONTINUED | OUTPATIENT
Start: 2021-07-22 | End: 2021-07-24 | Stop reason: HOSPADM

## 2021-07-22 RX ORDER — MAGNESIUM SULFATE IN WATER 40 MG/ML
2000 INJECTION, SOLUTION INTRAVENOUS ONCE
Status: COMPLETED | OUTPATIENT
Start: 2021-07-22 | End: 2021-07-22

## 2021-07-22 RX ORDER — FLUTICASONE FUROATE AND VILANTEROL 200; 25 UG/1; UG/1
1 POWDER RESPIRATORY (INHALATION) DAILY
Status: DISCONTINUED | OUTPATIENT
Start: 2021-07-22 | End: 2021-07-22 | Stop reason: CLARIF

## 2021-07-22 RX ORDER — ASPIRIN 81 MG/1
81 TABLET ORAL EVERY MORNING
Status: DISCONTINUED | OUTPATIENT
Start: 2021-07-23 | End: 2021-07-24 | Stop reason: HOSPADM

## 2021-07-22 RX ORDER — ACETAMINOPHEN 325 MG/1
650 TABLET ORAL EVERY 6 HOURS PRN
Status: DISCONTINUED | OUTPATIENT
Start: 2021-07-22 | End: 2021-07-24 | Stop reason: HOSPADM

## 2021-07-22 RX ORDER — ARFORMOTEROL TARTRATE 15 UG/2ML
15 SOLUTION RESPIRATORY (INHALATION) 2 TIMES DAILY
Status: DISCONTINUED | OUTPATIENT
Start: 2021-07-22 | End: 2021-07-22 | Stop reason: SDUPTHER

## 2021-07-22 RX ORDER — HYDROCHLOROTHIAZIDE 12.5 MG/1
12.5 TABLET ORAL DAILY
Status: DISCONTINUED | OUTPATIENT
Start: 2021-07-22 | End: 2021-07-24 | Stop reason: HOSPADM

## 2021-07-22 RX ORDER — DIAZEPAM 5 MG/1
10 TABLET ORAL NIGHTLY PRN
Status: DISCONTINUED | OUTPATIENT
Start: 2021-07-22 | End: 2021-07-24 | Stop reason: HOSPADM

## 2021-07-22 RX ORDER — IPRATROPIUM BROMIDE AND ALBUTEROL SULFATE 2.5; .5 MG/3ML; MG/3ML
3 SOLUTION RESPIRATORY (INHALATION) ONCE
Status: COMPLETED | OUTPATIENT
Start: 2021-07-22 | End: 2021-07-22

## 2021-07-22 RX ORDER — BUDESONIDE 0.5 MG/2ML
0.5 INHALANT ORAL 2 TIMES DAILY
Status: DISCONTINUED | OUTPATIENT
Start: 2021-07-22 | End: 2021-07-22 | Stop reason: SDUPTHER

## 2021-07-22 RX ORDER — LOSARTAN POTASSIUM 50 MG/1
100 TABLET ORAL DAILY
Status: DISCONTINUED | OUTPATIENT
Start: 2021-07-22 | End: 2021-07-24 | Stop reason: HOSPADM

## 2021-07-22 RX ORDER — SERTRALINE HYDROCHLORIDE 100 MG/1
100 TABLET, FILM COATED ORAL NIGHTLY PRN
Status: DISCONTINUED | OUTPATIENT
Start: 2021-07-22 | End: 2021-07-24 | Stop reason: HOSPADM

## 2021-07-22 RX ORDER — PREDNISONE 20 MG/1
40 TABLET ORAL DAILY
Status: DISCONTINUED | OUTPATIENT
Start: 2021-07-25 | End: 2021-07-24 | Stop reason: HOSPADM

## 2021-07-22 RX ORDER — ONDANSETRON 2 MG/ML
4 INJECTION INTRAMUSCULAR; INTRAVENOUS EVERY 6 HOURS PRN
Status: DISCONTINUED | OUTPATIENT
Start: 2021-07-22 | End: 2021-07-24 | Stop reason: HOSPADM

## 2021-07-22 RX ORDER — CALCIUM CARBONATE 500(1250)
1 TABLET ORAL EVERY MORNING
Status: DISCONTINUED | OUTPATIENT
Start: 2021-07-23 | End: 2021-07-24 | Stop reason: HOSPADM

## 2021-07-22 RX ORDER — OMEGA-3 FATTY ACIDS CAP DELAYED RELEASE 1000 MG 1000 MG
1000 CAPSULE DELAYED RELEASE ORAL EVERY MORNING
Status: DISCONTINUED | OUTPATIENT
Start: 2021-07-23 | End: 2021-07-22

## 2021-07-22 RX ORDER — BUDESONIDE 0.25 MG/2ML
250 INHALANT ORAL 2 TIMES DAILY
Status: DISCONTINUED | OUTPATIENT
Start: 2021-07-22 | End: 2021-07-24 | Stop reason: HOSPADM

## 2021-07-22 RX ORDER — LOSARTAN POTASSIUM AND HYDROCHLOROTHIAZIDE 12.5; 1 MG/1; MG/1
1 TABLET ORAL EVERY MORNING
Status: DISCONTINUED | OUTPATIENT
Start: 2021-07-23 | End: 2021-07-22 | Stop reason: CLARIF

## 2021-07-22 RX ORDER — AMLODIPINE BESYLATE 5 MG/1
5 TABLET ORAL DAILY
Status: DISCONTINUED | OUTPATIENT
Start: 2021-07-22 | End: 2021-07-24 | Stop reason: HOSPADM

## 2021-07-22 RX ORDER — PREDNISONE 20 MG/1
60 TABLET ORAL ONCE
Status: COMPLETED | OUTPATIENT
Start: 2021-07-22 | End: 2021-07-22

## 2021-07-22 RX ORDER — SODIUM CHLORIDE 0.9 % (FLUSH) 0.9 %
5-40 SYRINGE (ML) INJECTION EVERY 12 HOURS SCHEDULED
Status: DISCONTINUED | OUTPATIENT
Start: 2021-07-22 | End: 2021-07-24 | Stop reason: HOSPADM

## 2021-07-22 RX ADMIN — BUDESONIDE 250 MCG: 0.25 SUSPENSION RESPIRATORY (INHALATION) at 19:57

## 2021-07-22 RX ADMIN — WATER 1 ML: 1 INJECTION INTRAMUSCULAR; INTRAVENOUS; SUBCUTANEOUS at 20:13

## 2021-07-22 RX ADMIN — MAGNESIUM SULFATE HEPTAHYDRATE 2000 MG: 40 INJECTION, SOLUTION INTRAVENOUS at 15:33

## 2021-07-22 RX ADMIN — AMLODIPINE BESYLATE 5 MG: 5 TABLET ORAL at 20:14

## 2021-07-22 RX ADMIN — DIAZEPAM 10 MG: 5 TABLET ORAL at 22:01

## 2021-07-22 RX ADMIN — METHYLPREDNISOLONE SODIUM SUCCINATE 40 MG: 40 INJECTION, POWDER, LYOPHILIZED, FOR SOLUTION INTRAMUSCULAR; INTRAVENOUS at 20:12

## 2021-07-22 RX ADMIN — ENOXAPARIN SODIUM 40 MG: 40 INJECTION SUBCUTANEOUS at 20:13

## 2021-07-22 RX ADMIN — IPRATROPIUM BROMIDE 0.5 MG: 0.5 SOLUTION RESPIRATORY (INHALATION) at 19:57

## 2021-07-22 RX ADMIN — SERTRALINE 100 MG: 100 TABLET, FILM COATED ORAL at 22:01

## 2021-07-22 RX ADMIN — ARFORMOTEROL TARTRATE 15 MCG: 15 SOLUTION RESPIRATORY (INHALATION) at 19:57

## 2021-07-22 RX ADMIN — METOPROLOL SUCCINATE 12.5 MG: 25 TABLET, EXTENDED RELEASE ORAL at 20:13

## 2021-07-22 RX ADMIN — SODIUM CHLORIDE, PRESERVATIVE FREE 10 ML: 5 INJECTION INTRAVENOUS at 21:00

## 2021-07-22 RX ADMIN — PREDNISONE 60 MG: 20 TABLET ORAL at 15:31

## 2021-07-22 RX ADMIN — IPRATROPIUM BROMIDE AND ALBUTEROL SULFATE 3 AMPULE: .5; 3 SOLUTION RESPIRATORY (INHALATION) at 14:49

## 2021-07-22 ASSESSMENT — PAIN SCALES - GENERAL
PAINLEVEL_OUTOF10: 0
PAINLEVEL_OUTOF10: 0

## 2021-07-22 NOTE — ED PROVIDER NOTES
HPI:  7/22/21,   Time: 1:50 PM EDT       Alka Bill is a 67 y.o. female presenting to the ED for sob, beginning days ago. The complaint has been persistent, moderate in severity, and worsened by moderate exertion. Hx copd, on 3l nc at home, worsening cough/congestion/wheezing. Min improvement with home nebs, on cipro for pseudomonas. No fever/chills/sweats. Bib private vehicke    Review of Systems:   Pertinent positives and negatives are stated within HPI, all other systems reviewed and are negative.          --------------------------------------------- PAST HISTORY ---------------------------------------------  Past Medical History:  has a past medical history of COPD (chronic obstructive pulmonary disease) ([facility] Hypertension, Migraines, MRSA (methicillin resistant staph aureus) culture positive, Pneumonia, and Ulcer. Past Surgical History:  has a past surgical history that includes Breast lumpectomy (Right); Tonsillectomy; and pr thorsc dx lungs/pericar/med/pleural space w/o bx (Left, 9/12/2018). Social History:  reports that she quit smoking about 14 years ago. Her smoking use included cigarettes. She started smoking about 48 years ago. She has a 35.00 pack-year smoking history. She has never used smokeless tobacco. She reports that she does not drink alcohol and does not use drugs. Family History: family history includes Cancer in her mother and sister; Heart Disease in her mother; Other in her father. The patients home medications have been reviewed.     Allergies: Dye [iodides]        ---------------------------------------------------PHYSICAL EXAM--------------------------------------    Constitutional/General: Alert and oriented x3, well appearing, non toxic in NAD  Head: Normocephalic and atraumatic  Eyes: PERRL, EOMI, conjunctive normal, sclera non icteric  Mouth: Oropharynx clear, handling secretions, no trismus, no asymmetry of the posterior oropharynx or uvular edema  Neck: Supple, full ROM, non tender to palpation in the midline, no stridor, no crepitus, no meningeal signs  Respiratory: wheezing, tachypnic, hypoxic on home 3l at 88%  Cardiovascular:  Regular rate. Regular rhythm. No murmurs, gallops, or rubs. 2+ distal pulses  Chest: No chest wall tenderness  GI:  Abdomen Soft, Non tender, Non distended. +BS. No organomegaly, no palpable masses,  No rebound, guarding, or rigidity. Musculoskeletal: Moves all extremities x 4. Warm and well perfused, no clubbing, cyanosis, or edema. Capillary refill <3 seconds  Integument: skin warm and dry. No rashes. Lymphatic: no lymphadenopathy noted  Neurologic: GCS 15, no focal deficits, symmetric strength 5/5 in the upper and lower extremities bilaterally  Psychiatric: Normal Affect    -------------------------------------------------- RESULTS -------------------------------------------------  I have personally reviewed all laboratory and imaging results for this patient. Results are listed below.      LABS:  Results for orders placed or performed during the hospital encounter of 07/22/21   CBC Auto Differential   Result Value Ref Range    WBC 6.0 4.5 - 11.5 E9/L    RBC 4.97 3.50 - 5.50 E12/L    Hemoglobin 10.8 (L) 11.5 - 15.5 g/dL    Hematocrit 37.1 34.0 - 48.0 %    MCV 74.6 (L) 80.0 - 99.9 fL    MCH 21.7 (L) 26.0 - 35.0 pg    MCHC 29.1 (L) 32.0 - 34.5 %    RDW 17.4 (H) 11.5 - 15.0 fL    Platelets 282 279 - 543 E9/L    MPV 9.8 7.0 - 12.0 fL    Neutrophils % 77.2 43.0 - 80.0 %    Immature Granulocytes % 0.3 0.0 - 5.0 %    Lymphocytes % 17.1 (L) 20.0 - 42.0 %    Monocytes % 5.0 2.0 - 12.0 %    Eosinophils % 0.2 0.0 - 6.0 %    Basophils % 0.2 0.0 - 2.0 %    Neutrophils Absolute 4.59 1.80 - 7.30 E9/L    Immature Granulocytes # 0.02 E9/L    Lymphocytes Absolute 1.02 (L) 1.50 - 4.00 E9/L    Monocytes Absolute 0.30 0.10 - 0.95 E9/L    Eosinophils Absolute 0.01 (L) 0.05 - 0.50 E9/L    Basophils Absolute 0.01 0.00 - 0.20 I9/T   Basic Metabolic Panel w/ Reflex to MG   Result Value Ref Range    Sodium 140 132 - 146 mmol/L    Potassium reflex Magnesium 4.0 3.5 - 5.0 mmol/L    Chloride 98 98 - 107 mmol/L    CO2 29 22 - 29 mmol/L    Anion Gap 13 7 - 16 mmol/L    Glucose 96 74 - 99 mg/dL    BUN 13 6 - 23 mg/dL    CREATININE 0.8 0.5 - 1.0 mg/dL    GFR Non-African American >60 >=60 mL/min/1.73    GFR African American >60     Calcium 9.2 8.6 - 10.2 mg/dL   Troponin   Result Value Ref Range    Troponin, High Sensitivity 10 (H) 0 - 9 ng/L   Brain Natriuretic Peptide   Result Value Ref Range    Pro- 0 - 450 pg/mL   EKG 12 Lead   Result Value Ref Range    Ventricular Rate 103 BPM    Atrial Rate 103 BPM    P-R Interval 112 ms    QRS Duration 96 ms    Q-T Interval 374 ms    QTc Calculation (Bazett) 489 ms    R Axis -22 degrees    T Axis 69 degrees       RADIOLOGY:  Interpreted by Radiologist.  XR CHEST (2 VW)   Final Result   No interval change             EKG:  This EKG is signed and interpreted by the EP. Time: 1351  Rate: 100  Rhythm: Sinus  Interpretation: non-specific EKG  Comparison: None      ------------------------- NURSING NOTES AND VITALS REVIEWED ---------------------------   The nursing notes within the ED encounter and vital signs as below have been reviewed by myself. /66   Pulse 104   Temp 99.1 °F (37.3 °C)   Resp 22   SpO2 95%   Oxygen Saturation Interpretation: Abnormal    The patients available past medical records and past encounters were reviewed.         ------------------------------ ED COURSE/MEDICAL DECISION MAKING----------------------  Medications   magnesium sulfate 2000 mg in 50 mL IVPB premix (2,000 mg Intravenous New Bag 7/22/21 6554)   ipratropium-albuterol (DUONEB) nebulizer solution 3 ampule (3 ampules Inhalation Given 7/22/21 9318)   predniSONE (DELTASONE) tablet 60 mg (60 mg Oral Given 7/22/21 8481)         ED COURSE:       Medical Decision Making:    Pt re eval at 355, on 4l nc, o2 91-92, will obs for copd due to inc o2 demand from baseline      This patient's ED course included: a personal history and physicial examination    This patient has remained hemodynamically stable during their ED course. Re-Evaluations:             Re-evaluation. Patients symptoms are improving    Re-examination  7/22/21   1:50 PM EDT          Vital Signs:   Vitals:    07/22/21 1233   BP: 137/66   Pulse: 104   Resp: 22   Temp: 99.1 °F (37.3 °C)   SpO2: 95%     Card/Pulm:  Rhythm: normal rate. Heart Sounds: no murmurs, gallops, or rubs. wheezing. Capillary Refill: normal.  Radial Pulse:  equal.  Skin:  Warm. Consultations:             niles    Critical Care: 35 min        Counseling: The emergency provider has spoken with the patient and discussed todays results, in addition to providing specific details for the plan of care and counseling regarding the diagnosis and prognosis. Questions are answered at this time and they are agreeable with the plan.       --------------------------------- IMPRESSION AND DISPOSITION ---------------------------------    IMPRESSION  1. COPD exacerbation ([address]    2. Hypoxia        DISPOSITION  Disposition: Admit to med/surg floor  Patient condition is stable    NOTE: This report was transcribed using voice recognition software.  Every effort was made to ensure accuracy; however, inadvertent computerized transcription errors may be present        Kenia Cortes MD  07/22/21 3828

## 2021-07-22 NOTE — H&P
[facility] History and Physical      CHIEF COMPLAINT: Shortness of breath  History of Present Illness:    65-year-old woman with past medical history of COPD on home oxygen, is on ciprofloxacin for Pseudomonas pneumonia/bronchiectasis for the past 5 weeks, presents with complaints of shortness of breath, cough. She had low-grade temperature at home. No chest pain. Chest x-ray in the ED here no acute findings. Patient is admitted with COPD exacerbation. She denies nausea, vomiting, abdominal pain, diarrhea. Informant(s) for H&P: Patient    REVIEW OF SYSTEMS:  A comprehensive review of systems was negative except for: what is in the HPI      PMH:  Past Medical History:   Diagnosis Date    COPD (chronic obstructive pulmonary disease) ([address]     Hypertension     Migraines     MRSA (methicillin resistant staph aureus) culture positive     Pneumonia     Ulcer        Surgical History:  Past Surgical History:   Procedure Laterality Date    BREAST LUMPECTOMY Right     DE THORSC DX LUNGS/PERICAR/MED/PLEURAL SPACE W/O BX Left 9/12/2018    LEFT VIDEO ASSISTED THORACOSCOPY STAPLING OF BLEBS TALC PLEURODESIS WITH PLEURAL STRIPPING POSSIBLE THORACOTOMY performed by Pretty Olivas MD at [facility]         Medications Prior to Admission:    Prior to Admission medications    Medication Sig Start Date End Date Taking?  Authorizing Provider   atorvastatin (LIPITOR) 20 MG tablet TAKE ONE TABLET BY MOUTH NIGHTLY 3/10/21   Tomer Odom MD   PROAIR  (75 Base) MCG/ACT inhaler Inhale 2 puffs into the lungs every 6 hours as needed for Wheezing 7/6/20   King Snider MD   Fluticasone Furoate-Vilanterol (BREO ELLIPTA IN) Inhale into the lungs daily    Historical Provider, MD   albuterol (PROVENTIL) (2.5 MG/3ML) 0.083% nebulizer solution USE 3ML IN NEBULIZER EVERY 4 HOURS AS NEEDED 6/23/19   Historical Provider, MD   diazepam (VALIUM) 10 MG tablet TAKE ONE TABLET BY MOUTH AT BEDTIME AS NEEDED 8/20/19   Historical Provider, MD [address] 18 MCG inhalation capsule INHALE 1 CAPSULE DAILY. 8/19/19   Historical Provider, MD   sertraline (ZOLOFT) 100 MG tablet TAKE ONE TABLET BY MOUTH AT BEDTIME 8/5/19   Historical Provider, MD   amLODIPine (NORVASC) 5 MG tablet Take 5 mg by mouth daily    Historical Provider, MD   formoterol (PERFOROMIST) 20 MCG/2ML nebulizer solution Take 2 mLs by nebulization 2 times daily 9/14/18   Tyrell Iraheta MD   ipratropium-albuterol (DUONEB) 0.5-2.5 (3) MG/3ML SOLN nebulizer solution Inhale 3 mLs into the lungs 4 times daily 9/14/18   Tyrell Iraheta MD   budesonide (PULMICORT) 0.25 MG/2ML nebulizer suspension Take 2 mLs by nebulization 2 times daily 7/10/18   Tyrell Iraheta MD   metoprolol succinate (TOPROL XL) 25 MG extended release tablet Take 0.5 tablets by mouth daily 7/11/18   Tyrell Iraheta MD   OXYGEN Inhale 3 L into the lungs continuous Had to increase to 5L d/t dyspnea    Historical Provider, MD   losartan-hydrochlorothiazide (HYZAAR) 100-12.5 MG per tablet Take 1 tablet by mouth every morning     Historical Provider, MD   raloxifene (EVISTA) 60 MG tablet Take 60 mg by mouth every morning     Historical Provider, MD   aspirin 81 MG tablet Take 81 mg by mouth every morning     Historical Provider, MD   Multiple Vitamins-Minerals (OCUVITE PO) Take 1 tablet by mouth every morning     Historical Provider, MD   calcium carbonate 600 MG TABS tablet Take 1 tablet by mouth every morning     Historical Provider, MD   Cholecalciferol (VITAMIN D) 2000 UNITS CAPS capsule Take 2,000 Units by mouth every morning     Historical Provider, MD   Omega-3 Fatty Acids (FISH OIL) 1000 MG CPDR Take 1,000 mg by mouth every morning     Historical Provider, MD       Allergies:    Dye [iodides]    Social History:    reports that she quit smoking about 14 years ago. Her smoking use included cigarettes. She started smoking about 48 years ago.  She has a 35.00 pack-year smoking history. She has never used smokeless tobacco. She reports that she does not drink alcohol and does not use drugs. Family History:   family history includes Cancer in her mother and sister; Heart Disease in her mother; Other in her father. PHYSICAL EXAM:  Vitals:  BP (!) 148/64   Pulse 96   Temp 98.7 °F (37.1 °C) (Oral)   Resp 18   SpO2 90%     General Appearance: alert and oriented to person, place and time and in no acute distress  Skin: warm and dry  Head: normocephalic and atraumatic  Eyes: pupils equal, round, and reactive to light, extraocular eye movements intact, conjunctivae normal  Neck: neck supple and non tender without mass   Pulmonary/Chest: Few rhonchi bilaterallyCardiovascular: normal rate, normal S1 and S2 and no carotid bruits  Abdomen: soft, non-tender, non-distended, normal bowel sounds, no masses or organomegaly  Extremities: no cyanosis, no clubbing and no edema  Neurologic: no cranial nerve deficit and speech normal        LABS:  Recent Labs     07/22/21  1443      K 4.0   CL 98   CO2 29   BUN 13   CREATININE 0.8   GLUCOSE 96   CALCIUM 9.2       Recent Labs     07/22/21  1443   WBC 6.0   RBC 4.97   HGB 10.8*   HCT 37.1   MCV 74.6*   MCH 21.7*   MCHC 29.1*   RDW 17.4*      MPV 9.8       No results for input(s): POCGLU in the last 72 hours. Radiology:   XR CHEST (2 VW)   Final Result   No interval change             EKG: Sinus tachycardia at 103 bpm.  Nonspecific ST-T changes. ASSESSMENT:    Acute exacerbation of COPD  Bronchiectasis with Pseudomonas infection on ciprofloxacin  Chronic hypoxic respiratory failure  Hypertension    Active Problems:    COPD exacerbation (HCC)  Resolved Problems:    * No resolved hospital problems. *      PLAN:    1. Admit to medical floor  2. Continue bronchodilators and supplemental oxygen  3. IV Solu-Medrol  4. Continue ciprofloxacin  5.   Consult pulmonary and ID    Code Status: Full code  DVT prophylaxis: Lovenox      NOTE: This report was transcribed using voice recognition software. Every effort was made to ensure accuracy; however, inadvertent computerized transcription errors may be present.   Electronically signed by Floyd Marcano MD on 7/22/2021 at 6:36 PM

## 2021-07-22 NOTE — ED NOTES
FIRST PROVIDER CONTACT ASSESSMENT NOTE      Department of Emergency Medicine   7/22/21  12:35 PM EDT    Chief Complaint: Shortness of Breath (Pt been on cipro for 5 weeks for psudomonas, increasing SOB. HX COPD on 3lnc cont.)      History of Present Illness:   Dominic Trevizo is a 67 y.o. female who presents to the ED for increased shortness of breath this morning when she was doing a nebulizer treatment. She has a history of COPD and is continuously on 3 L of oxygen via nasal cannula. She is also been on Cipro for the last 5 weeks for Pseudomonas. She states that she has been having low-grade fever the highest was 100.2. Denies any chest pain. States she has a cough that is nonproductive. Medical History:  has a past medical history of COPD (chronic obstructive pulmonary disease) ([address] Hypertension, Migraines, MRSA (methicillin resistant staph aureus) culture positive, Pneumonia, and Ulcer. Surgical History:  has a past surgical history that includes Breast lumpectomy (Right); Tonsillectomy; and pr thorsc dx lungs/pericar/med/pleural space w/o bx (Left, 9/12/2018). Social History:  reports that she quit smoking about 14 years ago. Her smoking use included cigarettes. She started smoking about 48 years ago. She has a 35.00 pack-year smoking history. She has never used smokeless tobacco. She reports that she does not drink alcohol and does not use drugs. Family History: family history includes Cancer in her mother and sister; Heart Disease in her mother; Other in her father.     *ALLERGIES*     Dye [iodides]     Physical Exam:      VS:  /66   Pulse 104   Temp 99.1 °F (37.3 °C)   Resp 22   SpO2 95%      Initial Plan of Care:  Initiate Treatment-Testing, Proceed toTreatment Area When Bed Available for ED Attending/MLP to Continue Care    -----------------END OF FIRST PROVIDER CONTACT ASSESSMENT NOTE--------------  Electronically signed by MISHA Deng CNP   DD: 7/22/21             Davina Pate MISHA - CNP  07/22/21 9382

## 2021-07-22 NOTE — ED NOTES
SBAR fax[facility] to [address] [place] [address] [facility] confirmation received. Called to confirm SBAR was received.      Prince Erazo RN  07/22/21 1849

## 2021-07-23 LAB
C-REACTIVE PROTEIN: 2.2 MG/DL (ref 0–0.4)
EKG ATRIAL RATE: 103 BPM
EKG P-R INTERVAL: 112 MS
EKG Q-T INTERVAL: 374 MS
EKG QRS DURATION: 96 MS
EKG QTC CALCULATION (BAZETT): 489 MS
EKG R AXIS: -22 DEGREES
EKG T AXIS: 69 DEGREES
EKG VENTRICULAR RATE: 103 BPM
L. PNEUMOPHILA SEROGP 1 UR AG: NORMAL
SARS-COV-2, NAAT: NOT DETECTED
STREP PNEUMONIAE ANTIGEN, URINE: NORMAL

## 2021-07-23 PROCEDURE — 2580000003 HC RX 258: Performed by: INTERNAL MEDICINE

## 2021-07-23 PROCEDURE — 2580000003 HC RX 258: Performed by: SPECIALIST

## 2021-07-23 PROCEDURE — 96366 THER/PROPH/DIAG IV INF ADDON: CPT

## 2021-07-23 PROCEDURE — G0378 HOSPITAL OBSERVATION PER HR: HCPCS

## 2021-07-23 PROCEDURE — 6360000002 HC RX W HCPCS: Performed by: SPECIALIST

## 2021-07-23 PROCEDURE — 87077 CULTURE AEROBIC IDENTIFY: CPT

## 2021-07-23 PROCEDURE — 87070 CULTURE OTHR SPECIMN AEROBIC: CPT

## 2021-07-23 PROCEDURE — 94640 AIRWAY INHALATION TREATMENT: CPT

## 2021-07-23 PROCEDURE — 6360000002 HC RX W HCPCS: Performed by: INTERNAL MEDICINE

## 2021-07-23 PROCEDURE — 96376 TX/PRO/DX INJ SAME DRUG ADON: CPT

## 2021-07-23 PROCEDURE — 96372 THER/PROPH/DIAG INJ SC/IM: CPT

## 2021-07-23 PROCEDURE — 87206 SMEAR FLUORESCENT/ACID STAI: CPT

## 2021-07-23 PROCEDURE — 6370000000 HC RX 637 (ALT 250 FOR IP): Performed by: INTERNAL MEDICINE

## 2021-07-23 PROCEDURE — 2700000000 HC OXYGEN THERAPY PER DAY

## 2021-07-23 PROCEDURE — 96367 TX/PROPH/DG ADDL SEQ IV INF: CPT

## 2021-07-23 PROCEDURE — 94667 MNPJ CHEST WALL 1ST: CPT

## 2021-07-23 PROCEDURE — 87205 SMEAR GRAM STAIN: CPT

## 2021-07-23 PROCEDURE — 87186 SC STD MICRODIL/AGAR DIL: CPT

## 2021-07-23 PROCEDURE — 99225 PR SBSQ OBSERVATION CARE/DAY 25 MINUTES: CPT | Performed by: INTERNAL MEDICINE

## 2021-07-23 PROCEDURE — 87635 SARS-COV-2 COVID-19 AMP PRB: CPT

## 2021-07-23 PROCEDURE — 93010 ELECTROCARDIOGRAM REPORT: CPT | Performed by: INTERNAL MEDICINE

## 2021-07-23 PROCEDURE — 0202U NFCT DS 22 TRGT SARS-COV-2: CPT

## 2021-07-23 PROCEDURE — 87081 CULTURE SCREEN ONLY: CPT

## 2021-07-23 RX ORDER — SODIUM CHLORIDE 9 MG/ML
INJECTION, SOLUTION INTRAVENOUS EVERY 8 HOURS
Status: DISCONTINUED | OUTPATIENT
Start: 2021-07-23 | End: 2021-07-24 | Stop reason: HOSPADM

## 2021-07-23 RX ORDER — TOBRAMYCIN SULFATE 40 MG/ML
300 INJECTION, SOLUTION INTRAMUSCULAR; INTRAVENOUS 2 TIMES DAILY
Status: DISCONTINUED | OUTPATIENT
Start: 2021-07-23 | End: 2021-07-24

## 2021-07-23 RX ADMIN — ARFORMOTEROL TARTRATE 15 MCG: 15 SOLUTION RESPIRATORY (INHALATION) at 08:30

## 2021-07-23 RX ADMIN — AMLODIPINE BESYLATE 5 MG: 5 TABLET ORAL at 07:50

## 2021-07-23 RX ADMIN — ATORVASTATIN CALCIUM 20 MG: 20 TABLET, FILM COATED ORAL at 07:49

## 2021-07-23 RX ADMIN — SODIUM CHLORIDE: 9 INJECTION, SOLUTION INTRAVENOUS at 19:00

## 2021-07-23 RX ADMIN — CALCIUM 500 MG: 500 TABLET ORAL at 07:49

## 2021-07-23 RX ADMIN — IPRATROPIUM BROMIDE 0.5 MG: 0.5 SOLUTION RESPIRATORY (INHALATION) at 08:29

## 2021-07-23 RX ADMIN — ENOXAPARIN SODIUM 40 MG: 40 INJECTION SUBCUTANEOUS at 07:49

## 2021-07-23 RX ADMIN — SODIUM CHLORIDE, PRESERVATIVE FREE 10 ML: 5 INJECTION INTRAVENOUS at 03:47

## 2021-07-23 RX ADMIN — TOBRAMYCIN SULFATE 300 MG: 40 INJECTION, SOLUTION INTRAMUSCULAR; INTRAVENOUS at 20:34

## 2021-07-23 RX ADMIN — IPRATROPIUM BROMIDE 0.5 MG: 0.5 SOLUTION RESPIRATORY (INHALATION) at 20:30

## 2021-07-23 RX ADMIN — LOSARTAN POTASSIUM 100 MG: 50 TABLET, FILM COATED ORAL at 07:50

## 2021-07-23 RX ADMIN — METHYLPREDNISOLONE SODIUM SUCCINATE 40 MG: 40 INJECTION, POWDER, LYOPHILIZED, FOR SOLUTION INTRAMUSCULAR; INTRAVENOUS at 21:33

## 2021-07-23 RX ADMIN — BUDESONIDE 250 MCG: 0.25 SUSPENSION RESPIRATORY (INHALATION) at 08:30

## 2021-07-23 RX ADMIN — HYDROCHLOROTHIAZIDE 12.5 MG: 12.5 TABLET ORAL at 07:49

## 2021-07-23 RX ADMIN — METOPROLOL SUCCINATE 12.5 MG: 25 TABLET, EXTENDED RELEASE ORAL at 07:49

## 2021-07-23 RX ADMIN — BUDESONIDE 250 MCG: 0.25 SUSPENSION RESPIRATORY (INHALATION) at 20:30

## 2021-07-23 RX ADMIN — ARFORMOTEROL TARTRATE 15 MCG: 15 SOLUTION RESPIRATORY (INHALATION) at 20:30

## 2021-07-23 RX ADMIN — METHYLPREDNISOLONE SODIUM SUCCINATE 40 MG: 40 INJECTION, POWDER, LYOPHILIZED, FOR SOLUTION INTRAMUSCULAR; INTRAVENOUS at 03:47

## 2021-07-23 RX ADMIN — SODIUM CHLORIDE, PRESERVATIVE FREE 10 ML: 5 INJECTION INTRAVENOUS at 07:51

## 2021-07-23 RX ADMIN — METHYLPREDNISOLONE SODIUM SUCCINATE 40 MG: 40 INJECTION, POWDER, LYOPHILIZED, FOR SOLUTION INTRAMUSCULAR; INTRAVENOUS at 13:12

## 2021-07-23 RX ADMIN — ASPIRIN 81 MG: 81 TABLET, COATED ORAL at 07:49

## 2021-07-23 RX ADMIN — PIPERACILLIN AND TAZOBACTAM 3375 MG: 3; .375 INJECTION, POWDER, LYOPHILIZED, FOR SOLUTION INTRAVENOUS at 15:36

## 2021-07-23 NOTE — CONSULTS
congestion, or sore throat. · Cardiovascular: No chest pain, dyspnea on exertion, or palpitations. · Respiratory: See above  · Gastrointestinal: No abdominal pain, nausea or emesis. No diarrhea or rectal bleeding or melena. No change in bowel habits. · Genitourinary: No dysuria, urinary frequency, or incontinence. No hematuria. · Musculoskeletal: No gait disturbance, weakness or joint complaints. · Integumentary: No rash or pruritis. No abnormal pigmentation, hair or nail changes. · Neurological: No headache, diplopia, dizziness, tremor, change in muscle strength, numbness or tingling. No change in gait, balance, coordination, mood, affect, memory, mentation, behavior. · Psychiatric: No anxiety or depression. · Endocrine: No temperature intolerance, excessive thirst, fluid intake, urinary frequency, excessive appetite, or recent weight change. · Hematologic/Lymphatic: No abnormal bruising or bleeding, blood clots or swollen lymph nodes. No anemia, fever, chills, night sweats, or swollen glands. · Allergic/Immunologic: No seasonal or perenial allergies. No history of hives or atopic dermatitis.     Social History:  · Alcohol:  No  · Tobacco:   No  · Employment:  no silica or asbestos exposure  · Family:  No family history of lung disease    Medications:   sodium chloride        amLODIPine  5 mg Oral Daily    aspirin  81 mg Oral QAM    atorvastatin  20 mg Oral Daily    budesonide  250 mcg Nebulization BID    calcium elemental  1 tablet Oral QAM    metoprolol succinate  12.5 mg Oral Daily    losartan  100 mg Oral Daily    And    hydroCHLOROthiazide  12.5 mg Oral Daily    Arformoterol Tartrate  15 mcg Nebulization BID    sodium chloride flush  5-40 mL Intravenous 2 times per day    enoxaparin  40 mg Subcutaneous Daily    methylPREDNISolone  40 mg Intravenous Q8H    Followed by   Johanny Helm ON 7/25/2021] predniSONE  40 mg Oral Daily    ipratropium  0.5 mg Nebulization BID Vitals:  Tmax:  VITALS:  /70   Pulse 82   Temp 98.8 °F (37.1 °C) (Oral)   Resp 16   Ht 5' 5\" (1.651 m)   Wt 198 lb (89.8 kg)   SpO2 92%   BMI 32.95 kg/m²   24HR INTAKE/OUTPUT:      Intake/Output Summary (Last 24 hours) at 2021 1207  Last data filed at 2021 2214  Gross per 24 hour   Intake 410.42 ml   Output 300 ml   Net 110.42 ml     CURRENT PULSE OXIMETRY:  SpO2: 92 %  24HR PULSE OXIMETRY RANGE:  SpO2  Av.7 %  Min: 90 %  Max: 95 %    EXAM:  General: No distress. Alert. Eyes: PERRL. No sclera icterus. No conjunctival injection. ENT: No discharge. Pharynx clear. Neck: Trachea midline. Normal thyroid. No jvd, no hjr. Resp: Bilateral wheezing. No accessory muscle use. Bibasilar rales. Few rhonchi. CV: Regular rate. Regular rhythm. No murmur No rub. Abd: Non-tender. Non-distended. No masses. No organmegaly. Normal bowel sounds. Skin: Warm and dry. No nodule on exposed extremities. No rash on exposed extremities. Lymph: No cervical LAD. No supraclavicular LAD. Ext: No joint deformity. No clubbing. No cyanosis. No edema  Neuro: Awake. Follows commands. Positive pupils/gag/corneals. Normal pain response. Lab Results:  CBC:   Recent Labs     21  1443   WBC 6.0   HGB 10.8*   HCT 37.1   MCV 74.6*          BMP:  Recent Labs     21  1443      K 4.0   CL 98   CO2 29   BUN 13   CREATININE 0.8    ALB:3,BILIDIR:3,BILITOT:3,ALKPHOS:3)@    PT/INR: No results for input(s): PROTIME, INR in the last 72 hours. Cultures:  Sputum: not available  Blood: not available    ABG:   No results for input(s): PH, PO2, PCO2, HCO3, BE, O2SAT, METHB, O2HB, COHB, O2CON, HHB, THB in the last 72 hours. Films:     XR CHEST (2 VW)   Final Result   No interval change         . Assessment:  1. Bronchiectasis with Pseudomonas infection. Another concern is atypical mycobacteria. Plan:  1. Aerosol treatments  2.  Antibiotics per infectious disease  3. Supplemental oxygen      Thanks for letting us see this patient in consultation. Total time in reviewing the previous admissions and records, reviewing the current x-rays, labs, and discussing with clinical staff including nursing and physicians, exceeded 50 minutes. Please contact us with any questions. Office [phone] or after hours through [employer], x [phone]. Please note that voice recognition technology was used (while wearing a Covid mandated mask) in the preparation of this note and make therefore it may contain inadvertent transcription errors. If the patient is a COVID 19 isolation patient, the above physical exam reflects that of the examining physician for the day. Baldev Rios MD,  M.D., F.C.C.P.     [facility], [address] [place] [address], [address], [facility]

## 2021-07-23 NOTE — PLAN OF CARE
Problem: Falls - Risk of:  Goal: Will remain free from falls  Description: Will remain free from falls  7/23/2021 0821 by Dominic Dawkins RN  Outcome: Met This Shift     Problem: Falls - Risk of:  Goal: Absence of physical injury  Description: Absence of physical injury  Outcome: Met This Shift     Problem: Breathing Pattern - Ineffective:  Goal: Ability to achieve and maintain a regular respiratory rate will improve  Description: Ability to achieve and maintain a regular respiratory rate will improve  7/23/2021 0821 by Dominic Dawkins RN  Outcome: Met This Shift

## 2021-07-23 NOTE — PROGRESS NOTES
[facility] ([facility]) Hospitalist Progress Note    Admission Date  7/22/2021  1:39 PM  Chief Complaint SOB  Admit Dx   COPD exacerbation ([address] [J44.1]    Subjective  History of Present Illness  Seen at bedside this AM  On 3L here, wears 3-4 at home  Tan / beige sputum production  Pt said low-grade fevers PTA, none overnight  No new issues today  Had been following with ID / getting cipro x5 weeks for Pseudomonas  Had been following with pulm / seen at [facility] / some concern for aspiration  No family present during my visit  No new issues identified today  Case was discussed with pt's nurse    Review of Systems - 12-point review of systems has been reviewed and is otherwise negative except as listed in the HPI    Objective  Physical Exam  Vitals: /70   Pulse 82   Temp 98.8 °F (37.1 °C) (Oral)   Resp 16   Ht 5' 5\" (1.651 m)   Wt 198 lb (89.8 kg)   SpO2 92%   BMI 32.95 kg/m²   General: well-developed, well-nourished, no acute distress, cooperative  Skin: warm, dry, intact, normal color without cyanosis  HEENT: normocephalic, atraumatic, mucous membranes normal, NCO2 in place  Respiratory: some coarse breath sounds to auscultation bilaterally without resp distress  Cardiovascular: regular rate and rhythm without murmur / rub / gallop  Abdominal: soft, nontender, nondistended, normoactive bowel sounds  Extremities: no mottling, pulses intact, no edema  Neurologic: awake, alert, no focal deficits  Psychiatric: normal affect, cooperative    Assessment  COPD exacerbation  Chronic hypoxia on home O2  Question aspiration  Chronic anemia  HTN    Plan  Wears 3-4L O2 at home, on 3 here. No fevers overnight but pt c/o low grade fevers and tan sputum production. ID and pulm consults regarding plan of care as she is established w Gisele Stout and Arben; pt had been on 5 weeks cipro per ID recently and some workup in [facility] recently including swallow eval that questionable showed some evidence of aspiration.   CXR showing bilateral fibrosis and hyperinflation but no acute findings. Continue steroids / nebs. Please see orders for further plan of care.     Code status  Full    DVT prophylaxis Lovenox  Disposition  Home when ready    Electronically signed by Erlin Hollins DO on 7/23/2021 at 10:08 AM

## 2021-07-23 NOTE — CONSULTS
[address] [place] [address] [facility]  Consultation Note     Admit Date: 7/22/2021  1:39 PM    Reason for Consult:   History of bronchiectasis with Pseudomonas infection. Admitted with shortness of breath, productive cough. COPD exacerbation    Attending Physician:  Marianela Braun DO    HISTORY OF PRESENT ILLNESS:             The history is obtained from extensive review of available past medical records. The patient is a 67 y.o. female who is known to the ID service. Seen in the office in follow-up. She had been seen by pulmonary at [facility] and was growing Pseudomonas in the respiratory culture which was sensitive to Levofloxacin but resistant to Imipenem. We opted to try Ciprofloxacin. She was seen in follow-up on 6/29/2021. The cough had improved. We opted to complete a 6-week treatment. The patient presents to the ED on 7/22/2021 complaining of shortness of breath. He had a low-grade fever 100.2 °F and had a productive cough. I asked for a respiratory panel. This was not done. No fever. No chest pain. She was started to use a chest vest again. She had a swallow evaluation and was told to concentrate on swallowing but did not require thickened fluids. Past Medical History:        Diagnosis Date    COPD (chronic obstructive pulmonary disease) ([address]     Hypertension     Migraines     MRSA (methicillin resistant staph aureus) culture positive     Pneumonia     Ulcer      June 2021. Seen in the office in follow-up. She had been seen by pulmonary at [facility] and was growing Pseudomonas in the respiratory culture which was sensitive to Levofloxacin but resistant to Imipenem. We opted to try Ciprofloxacin. She was seen in follow-up on 6/29/2021. The cough had improved. We opted to complete a 6-week treatment. February 2021. Seen in the office in follow-up. She reported a chronic productive cough and dyspnea on exertion.   She had received her SARS-CoV-2 vaccine the week before. August 2020. Seen in the office in follow-up she was now on oxygen by nasal cannula at 3 L. She had not required admissions to the hospital.    November 2019. Seen in the office. She was no longer using the chest vest but rather a flutter valve. She had a respiratory culture and has been treated with Doxycycline. Respiratory culture found Pseudomonas and she was treated with Ciprofloxacin. May 2019. Seen in the office. She had been at [facility] and treated with Imipenem for pneumonia. July 2018. She was admitted to the hospital in [facility] with a pneumothorax and a non-STEMI. He was found to have Pseudomonas in the sputum, resistant to quinolones. Peridex was stopped by PCP. March 2018. Prescribed Levofloxacin for pneumonia as an outpatient. November 2017. Seen in follow-up up in the office she had been on one course of Augmentin. She was being followed by pulmonary and was using a chest vest.  She is also been prescribed Peridex. May 2017. She was referred back to the office for recurrent pneumonia she was treated in December with Levofloxacin. This was changed over to Doxycycline and she made improvement. Treated with Augmentin in March 2016. She was diagnosed with dysphagia and was taken thickened fluids. January 2015. Admitted to [facility] with right upper lobe pneumonia. Seen by ID. Treated with Vancomycin. Respiratory cultures grew MRSA. She was discharged on 4 weeks of Vancomycin.     Past Surgical History:        Procedure Laterality Date    BREAST LUMPECTOMY Right     FL THORSC DX LUNGS/PERICAR/MED/PLEURAL SPACE W/O BX Left 9/12/2018    LEFT VIDEO ASSISTED THORACOSCOPY STAPLING OF BLEBS TALC PLEURODESIS WITH PLEURAL STRIPPING POSSIBLE THORACOTOMY performed by Pedro Paris MD at [place] [address]       Current Medications:   Scheduled Meds:   amLODIPine  5 mg Oral Daily    aspirin  81 mg Oral QAM    atorvastatin  20 mg Oral Daily  budesonide  250 mcg Nebulization BID    calcium elemental  1 tablet Oral QAM    metoprolol succinate  12.5 mg Oral Daily    losartan  100 mg Oral Daily    And    hydroCHLOROthiazide  12.5 mg Oral Daily    Arformoterol Tartrate  15 mcg Nebulization BID    sodium chloride flush  5-40 mL Intravenous 2 times per day    enoxaparin  40 mg Subcutaneous Daily    methylPREDNISolone  40 mg Intravenous Q8H    Followed by   Naila Moore ON 2021] predniSONE  40 mg Oral Daily    ipratropium  0.5 mg Nebulization BID     Continuous Infusions:   sodium chloride       PRN Meds:diazePAM, sertraline, sodium chloride flush, sodium chloride, ondansetron **OR** ondansetron, polyethylene glycol, acetaminophen **OR** acetaminophen, albuterol    Allergies:  Dye [iodides]    Social History:   Social History     Socioeconomic History    Marital status:      Spouse name: Not on file    Number of children: Not on file    Years of education: Not on file    Highest education level: Not on file   Occupational History    Occupation: retired     Comment:    Tobacco Use    Smoking status: Former Smoker     Packs/day: 1.00     Years: 35.00     Pack years: 35.00     Types: Cigarettes     Start date: 1972     Quit date: 2007     Years since quittin.5    Smokeless tobacco: Never Used   Vaping Use    Vaping Use: Never used   Substance and Sexual Activity    Alcohol use: No     Alcohol/week: 0.0 standard drinks    Drug use: No    Sexual activity: Never   Other Topics Concern    Not on file   Social History Narrative    Not on file     Social Determinants of Health     Financial Resource Strain:     Difficulty of Paying Living Expenses:    Food Insecurity:     Worried About Running Out of Food in the Last Year:     Ran Out of Food in the Last Year:    Transportation Needs:     Lack of Transportation (Medical):      Lack of Transportation (Non-Medical):    Physical Activity:     Days of Exercise per Week:     Minutes of Exercise per Session:    Stress:     Feeling of Stress :    Social Connections:     Frequency of Communication with Friends and Family:     Frequency of Social Gatherings with Friends and Family:     Attends Gnosticism Services:     Active Member of Clubs or Organizations:     Attends Club or Organization Meetings:     Marital Status:    Intimate Partner Violence:     Fear of Current or Ex-Partner:     Emotionally Abused:     Physically Abused:     Sexually Abused:       Pets: Dog  Travel: No  The patient lives at home with her     Family History:       Problem Relation Age of Onset    Heart Disease Mother     Cancer Mother     Other Father    [address] Sister    . Otherwise non-pertinent to the chief complaint. REVIEW OF SYSTEMS:    Constitutional: Negative for fevers, chills, diaphoresis  Neurologic: Negative   Psychiatric: Negative  Rheumatologic: Negative   Endocrine: Negative  Hematologic: Negative  Immunologic: Negative. SARS-CoV-2 vaccine up-to-date  ENT: Negative  Respiratory: As in the HPI  Cardiovascular: Negative  GI: Negative  : Negative  Musculoskeletal: Negative  Skin: No rashes. PHYSICAL EXAM:    Vitals:   /70   Pulse 82   Temp 98.8 °F (37.1 °C) (Oral)   Resp 16   Ht 5' 5\" (1.651 m)   Wt 198 lb (89.8 kg)   SpO2 92%   BMI 32.95 kg/m²   Constitutional: The patient is awake, alert, and oriented. Skin: Warm and dry. No rashes were noted. HEENT: Eyes show round, and reactive pupils. No jaundice. Moist mucous membranes, no ulcerations, no thrush. Neck: Supple to movements. No lymphadenopathy. Chest: No use of accessory muscles to breathe. Symmetrical expansion. Auscultation reveals scattered crackles on bases. Cardiovascular: S1 and S2 are rhythmic and regular. No murmurs appreciated. Abdomen: Positive bowel sounds to auscultation. Benign to palpation. No masses felt. No hepatosplenomegaly.   Extremities: No clubbing, no pneumoniae cannot be  ruled out since the antigen present in the sample  may be below the detection limit of the test.  Normal Range:Presumptive Negative       Recent Labs     07/22/21  2158   [id number] Presumptive Negative -suggesting no recent or current infections  with Legionella pneumophila serogroup 1. Infection to Legionella cannot be ruled out since other serogroups  and species may cause infection, antigen may not be present in  early infection, or level of antigen may be below the  detection limit. Normal Range: Presumptive Negative       No results for input(s): ASO in the last 72 hours. No results for input(s): CULTRESP in the last 72 hours. No results for input(s): PROCAL in the last 72 hours. Assessment:  · Infected bronchiectasis. She was treated with 6 weeks of Ciprofloxacin  · Possible pneumonia with Pseudomonas  · History of dysphagia    Plan:    · Start Zosyn and inhaled Tobramycin   · Nares screen MRSA  · Procalcitonin  · Respiratory panel  · Respiratory culture  · Check cultures, baseline ESR, CRP  · Monitor labs  · Will follow with you    Thank you for having us see this patient in consultation. I will be discussing this case with the treating physicians.     Jasmyne Hsieh MD  2:13 PM  7/23/2021

## 2021-07-24 VITALS
TEMPERATURE: 96.1 F | OXYGEN SATURATION: 95 % | BODY MASS INDEX: 32.99 KG/M2 | SYSTOLIC BLOOD PRESSURE: 133 MMHG | DIASTOLIC BLOOD PRESSURE: 67 MMHG | WEIGHT: 198 LBS | HEART RATE: 70 BPM | HEIGHT: 65 IN | RESPIRATION RATE: 16 BRPM

## 2021-07-24 LAB
ADENOVIRUS BY PCR: NOT DETECTED
BORDETELLA PARAPERTUSSIS BY PCR: NOT DETECTED
BORDETELLA PERTUSSIS BY PCR: NOT DETECTED
C-REACTIVE PROTEIN: 0.7 MG/DL (ref 0–0.4)
CHLAMYDOPHILIA PNEUMONIAE BY PCR: NOT DETECTED
CORONAVIRUS 229E BY PCR: NOT DETECTED
CORONAVIRUS HKU1 BY PCR: NOT DETECTED
CORONAVIRUS NL63 BY PCR: NOT DETECTED
CORONAVIRUS OC43 BY PCR: NOT DETECTED
GRAM STAIN ORDERABLE: NORMAL
HUMAN METAPNEUMOVIRUS BY PCR: NOT DETECTED
HUMAN RHINOVIRUS/ENTEROVIRUS BY PCR: NOT DETECTED
INFLUENZA A BY PCR: NOT DETECTED
INFLUENZA B BY PCR: NOT DETECTED
MYCOPLASMA PNEUMONIAE BY PCR: NOT DETECTED
PARAINFLUENZA VIRUS 1 BY PCR: NOT DETECTED
PARAINFLUENZA VIRUS 2 BY PCR: NOT DETECTED
PARAINFLUENZA VIRUS 3 BY PCR: DETECTED
PARAINFLUENZA VIRUS 4 BY PCR: NOT DETECTED
PROCALCITONIN: 0.04 NG/ML (ref 0–0.08)
RESPIRATORY SYNCYTIAL VIRUS BY PCR: NOT DETECTED
SARS-COV-2, PCR: NOT DETECTED
SEDIMENTATION RATE, ERYTHROCYTE: 40 MM/HR (ref 0–20)

## 2021-07-24 PROCEDURE — G0378 HOSPITAL OBSERVATION PER HR: HCPCS

## 2021-07-24 PROCEDURE — 36415 COLL VENOUS BLD VENIPUNCTURE: CPT

## 2021-07-24 PROCEDURE — 6370000000 HC RX 637 (ALT 250 FOR IP): Performed by: INTERNAL MEDICINE

## 2021-07-24 PROCEDURE — 94668 MNPJ CHEST WALL SBSQ: CPT

## 2021-07-24 PROCEDURE — 2700000000 HC OXYGEN THERAPY PER DAY

## 2021-07-24 PROCEDURE — 2580000003 HC RX 258: Performed by: INTERNAL MEDICINE

## 2021-07-24 PROCEDURE — 85651 RBC SED RATE NONAUTOMATED: CPT

## 2021-07-24 PROCEDURE — 6360000002 HC RX W HCPCS: Performed by: INTERNAL MEDICINE

## 2021-07-24 PROCEDURE — 96366 THER/PROPH/DIAG IV INF ADDON: CPT

## 2021-07-24 PROCEDURE — 84145 PROCALCITONIN (PCT): CPT

## 2021-07-24 PROCEDURE — 2580000003 HC RX 258: Performed by: SPECIALIST

## 2021-07-24 PROCEDURE — 1200000000 HC SEMI PRIVATE

## 2021-07-24 PROCEDURE — 99239 HOSP IP/OBS DSCHRG MGMT >30: CPT | Performed by: INTERNAL MEDICINE

## 2021-07-24 PROCEDURE — 96372 THER/PROPH/DIAG INJ SC/IM: CPT

## 2021-07-24 PROCEDURE — 94640 AIRWAY INHALATION TREATMENT: CPT

## 2021-07-24 PROCEDURE — 6360000002 HC RX W HCPCS: Performed by: SPECIALIST

## 2021-07-24 PROCEDURE — 96376 TX/PRO/DX INJ SAME DRUG ADON: CPT

## 2021-07-24 PROCEDURE — 86140 C-REACTIVE PROTEIN: CPT

## 2021-07-24 RX ORDER — PREDNISONE 10 MG/1
TABLET ORAL
Qty: 10 TABLET | Refills: 0 | Status: SHIPPED | OUTPATIENT
Start: 2021-07-24 | End: 2022-04-13 | Stop reason: ALTCHOICE

## 2021-07-24 RX ADMIN — METOPROLOL SUCCINATE 12.5 MG: 25 TABLET, EXTENDED RELEASE ORAL at 10:06

## 2021-07-24 RX ADMIN — METHYLPREDNISOLONE SODIUM SUCCINATE 40 MG: 40 INJECTION, POWDER, LYOPHILIZED, FOR SOLUTION INTRAMUSCULAR; INTRAVENOUS at 12:43

## 2021-07-24 RX ADMIN — PIPERACILLIN AND TAZOBACTAM 3375 MG: 3; .375 INJECTION, POWDER, LYOPHILIZED, FOR SOLUTION INTRAVENOUS at 06:18

## 2021-07-24 RX ADMIN — AMLODIPINE BESYLATE 5 MG: 5 TABLET ORAL at 10:06

## 2021-07-24 RX ADMIN — ARFORMOTEROL TARTRATE 15 MCG: 15 SOLUTION RESPIRATORY (INHALATION) at 08:19

## 2021-07-24 RX ADMIN — ATORVASTATIN CALCIUM 20 MG: 20 TABLET, FILM COATED ORAL at 10:06

## 2021-07-24 RX ADMIN — TOBRAMYCIN SULFATE 300 MG: 40 INJECTION, SOLUTION INTRAMUSCULAR; INTRAVENOUS at 08:35

## 2021-07-24 RX ADMIN — LOSARTAN POTASSIUM 100 MG: 50 TABLET, FILM COATED ORAL at 10:06

## 2021-07-24 RX ADMIN — HYDROCHLOROTHIAZIDE 12.5 MG: 12.5 TABLET ORAL at 10:06

## 2021-07-24 RX ADMIN — CALCIUM 500 MG: 500 TABLET ORAL at 10:06

## 2021-07-24 RX ADMIN — ASPIRIN 81 MG: 81 TABLET, COATED ORAL at 10:06

## 2021-07-24 RX ADMIN — ALBUTEROL SULFATE 2.5 MG: 2.5 SOLUTION RESPIRATORY (INHALATION) at 08:19

## 2021-07-24 RX ADMIN — PIPERACILLIN AND TAZOBACTAM 3375 MG: 3; .375 INJECTION, POWDER, LYOPHILIZED, FOR SOLUTION INTRAVENOUS at 00:54

## 2021-07-24 RX ADMIN — ENOXAPARIN SODIUM 40 MG: 40 INJECTION SUBCUTANEOUS at 10:05

## 2021-07-24 RX ADMIN — BUDESONIDE 250 MCG: 0.25 SUSPENSION RESPIRATORY (INHALATION) at 08:19

## 2021-07-24 RX ADMIN — SODIUM CHLORIDE: 9 INJECTION, SOLUTION INTRAVENOUS at 03:00

## 2021-07-24 RX ADMIN — METHYLPREDNISOLONE SODIUM SUCCINATE 40 MG: 40 INJECTION, POWDER, LYOPHILIZED, FOR SOLUTION INTRAMUSCULAR; INTRAVENOUS at 05:16

## 2021-07-24 RX ADMIN — IPRATROPIUM BROMIDE 0.5 MG: 0.5 SOLUTION RESPIRATORY (INHALATION) at 08:19

## 2021-07-24 RX ADMIN — SODIUM CHLORIDE, PRESERVATIVE FREE 10 ML: 5 INJECTION INTRAVENOUS at 10:09

## 2021-07-24 ASSESSMENT — PAIN SCALES - GENERAL: PAINLEVEL_OUTOF10: 0

## 2021-07-24 NOTE — PROGRESS NOTES
[address] [place] [address] [facility]  Progress Note    SUBJECTIVE:  Chief Complaint   Patient presents with    Shortness of Breath     Pt been on cipro for 5 weeks for psudomonas, increasing SOB. HX COPD on 3lnc cont. Patient is tolerating medications. No reported adverse drug reactions. No nausea, vomiting, diarrhea. No fevers overnight. On 4 L/min O2 via nc. Feeling better. Breathing better. +NPC    Review of systems:  As stated above in the chief complaint, otherwise negative. Medications:  Scheduled Meds:   piperacillin-tazobactam  3,375 mg Intravenous Q8H    tobramycin  300 mg Inhalation BID    amLODIPine  5 mg Oral Daily    aspirin  81 mg Oral QAM    atorvastatin  20 mg Oral Daily    budesonide  250 mcg Nebulization BID    calcium elemental  1 tablet Oral QAM    metoprolol succinate  12.5 mg Oral Daily    losartan  100 mg Oral Daily    And    hydroCHLOROthiazide  12.5 mg Oral Daily    Arformoterol Tartrate  15 mcg Nebulization BID    sodium chloride flush  5-40 mL Intravenous 2 times per day    enoxaparin  40 mg Subcutaneous Daily    methylPREDNISolone  40 mg Intravenous Q8H    Followed by   Yobani Rowe ON 2021] predniSONE  40 mg Oral Daily    ipratropium  0.5 mg Nebulization BID     Continuous Infusions:   sodium chloride 12.5 mL/hr at 21 0300    sodium chloride       PRN Meds:diazePAM, sertraline, sodium chloride flush, sodium chloride, ondansetron **OR** ondansetron, polyethylene glycol, acetaminophen **OR** acetaminophen, albuterol    OBJECTIVE:  /67   Pulse 70   Temp 96.1 °F (35.6 °C) (Oral)   Resp 16   Ht 5' 5\" (1.651 m)   Wt 198 lb (89.8 kg)   SpO2 95%   BMI 32.95 kg/m²   Temp  Av °F (36.1 °C)  Min: 96.1 °F (35.6 °C)  Max: 97.9 °F (36.6 °C)  Constitutional: The patient is awake, alert, and oriented. Sitting up in bed eating lunch in NAD  Skin: Warm and dry. No rashes were noted. HEENT: Round and reactive pupils.   Moist mucous membranes. No ulcerations or thrush. Neck: Supple to movements. Chest: coarse rhonchi b/l. resp little labored with speech  Cardiovascular: S1 and S2 are rhythmic and regular. Difficult to auscultate  Abdomen: Positive bowel sounds to auscultation. Benign to palpation. Extremities: No clubbing, no cyanosis, no edema. Lines: peripheral    Laboratory and Tests Review:  Lab Results   Component Value Date    WBC 6.0 07/22/2021    WBC 6.3 06/08/2021    WBC 10.3 09/14/2018    HGB 10.8 (L) 07/22/2021    HCT 37.1 07/22/2021    MCV 74.6 (L) 07/22/2021     07/22/2021     Lab Results   Component Value Date    NEUTROABS 4.59 07/22/2021    NEUTROABS 4.47 06/08/2021    NEUTROABS 5.97 09/13/2018     No results found for: [facility]  Lab Results   Component Value Date    ALT 10 06/08/2021    AST 12 06/08/2021    ALKPHOS 101 06/08/2021    BILITOT 0.3 06/08/2021     Lab Results   Component Value Date     07/22/2021    K 4.0 07/22/2021    CL 98 07/22/2021    CO2 29 07/22/2021    BUN 13 07/22/2021    CREATININE 0.8 07/22/2021    CREATININE 0.8 06/08/2021    CREATININE 0.5 09/14/2018    GFRAA >60 07/22/2021    LABGLOM >60 07/22/2021    GLUCOSE 96 07/22/2021    PROT 7.4 06/08/2021    LABALBU 4.2 06/08/2021    CALCIUM 9.2 07/22/2021    BILITOT 0.3 06/08/2021    ALKPHOS 101 06/08/2021    AST 12 06/08/2021    ALT 10 06/08/2021     Lab Results   Component Value Date    CRP 0.7 (H) 07/24/2021    CRP 2.2 (H) 07/22/2021    CRP 0.3 06/08/2021     Lab Results   Component Value Date    SEDRATE 40 (H) 07/24/2021    SEDRATE 37 (H) 07/22/2021    SEDRATE 55 (H) 03/03/2015     Radiology:      Microbiology:   Resp viral panel + Parainfluenza 3  Sputum cx pending, Gram stain rare PMNs, abundant GNRs, mod GP diplococci  COVID 19 neg  Urine legionella/Strep pneum Ag neg    ASSESSMENT:  · Parainfluenza 3 infection  · Infected bronchiectasis.   She was treated with 6 weeks of Ciprofloxacin  · Possible pneumonia with Pseudomonas  · History of

## 2021-07-24 NOTE — DISCHARGE SUMMARY
[facility] [facility] Phys[place] Discharge Summary       Martha Fisher, [address] [address]  [facility] [address] [phone]    Schedule an appointment as soon as possible for a visit in 3 weeks  hospital follow      Activity level: as theresa    Diet: ADULT DIET; Regular; Low Fat/Low Chol/High Fiber/CONTRERAS    Dispo:home    Condition at discharge: fair          Patient ID:  Dominic Trevizo  [id number]  67 y.o.  1945    Admit date: 7/22/2021    Discharge date and time:  7/24/2021  4:28 PM    Admission Diagnoses: Active Problems:    COPD exacerbation ([address]  Resolved Problems:    * No resolved hospital problems. *      Discharge Diagnoses: Active Problems:    COPD exacerbation ([address]  Resolved Problems:    * No resolved hospital problems. *    Parainfluenza 3 infection  Copd exacerbation  Recent Infected bronchiectasis  Chronic respiratory failure with hypoxia  htn      Consults:  IP CONSULT TO INFECTIOUS DISEASES  IP CONSULT TO PULMONOLOGY    Procedures: none    Hospital Course: Patient was admitted with COPD exacerbation ([address] [J44.1]. Patient is a 48-year-old woman with past medical history of COPD on home oxygen, is on ciprofloxacin for Pseudomonas pneumonia/bronchiectasis for the past 5 weeks, presents with complaints of shortness of breath, cough. She had low-grade temperature at home. No chest pain. Chest x-ray in the ED here no acute findings. Patient is admitted with COPD exacerbation. She denies nausea, vomiting, abdominal pain, diarrhea. Pt seen and examined by consultants. Pt had been on abx initially. Once workup showed pt had parainfluenza 3 as cause of presentation, abx stopped. On day of discharge, pt denied fevers, chills, n/v. Discharge planning d/w pt. Time given for questions and all questions answered.     Discharge Exam:  Vitals:    07/23/21 1935 07/23/21 2033 07/24/21 0819 07/24/21 1002   BP: 139/63   133/67   Pulse: 70   70   Resp: 16 18 20 16 Temp: 97.9 °F (36.6 °C)   96.1 °F (35.6 °C)   TempSrc: Oral   Oral   SpO2: 93% 92% 92% 95%   Weight:       Height:           Skin: warm and dry, no rash or erythema  Pulmonary/Chest: clear to auscultation bilaterally- no wheezes, rales or rhonchi, normal air movement, no respiratory distress  Cardiovascular: rhythm reg at rate of 72  Abdomen: soft, non-tender, non-distended, normal bowel sounds, no masses or organomegaly  Extremities: no cyanosis, no clubbing and no edema  I/O last 3 completed shifts: In: 10 [I.V.:10]  Out: -   No intake/output data recorded. LABS:  Recent Labs     07/22/21  1443      K 4.0   CL 98   CO2 29   BUN 13   CREATININE 0.8   GLUCOSE 96   CALCIUM 9.2       Recent Labs     07/22/21  1443   WBC 6.0   RBC 4.97   HGB 10.8*   HCT 37.1   MCV 74.6*   MCH 21.7*   MCHC 29.1*   RDW 17.4*      MPV 9.8       No results for input(s): POCGLU in the last 72 hours.         Imaging:   XR CHEST (2 VW)   Final Result   No interval change             Patient Instructions:      Medication List      START taking these medications    predniSONE 10 MG tablet  Commonly known as: DELTASONE  Take orally 4 tabs on 7/25; then 3 tabs on 7/26; then 2 tabs on 7/27; then 1 tab on 7/28        CONTINUE taking these medications    * albuterol (2.5 MG/3ML) 0.083% nebulizer solution  Commonly known as: PROVENTIL     * ProAir  (90 Base) MCG/ACT inhaler  Generic drug: albuterol sulfate HFA  Inhale 2 puffs into the lungs every 6 hours as needed for Wheezing     aspirin 81 MG tablet     atorvastatin 20 MG tablet  Commonly known as: LIPITOR  TAKE ONE TABLET BY MOUTH NIGHTLY     JUAN COLLINSTA IN     budesonide 0.25 MG/2ML nebulizer suspension  Commonly known as: Pulmicort  Take 2 mLs by nebulization 2 times daily     calcium carbonate 600 MG Tabs tablet     diazePAM 10 MG tablet  Commonly known as: VALIUM     fish oil 1000 MG Cpdr     formoterol 20 MCG/2ML nebulizer solution  Commonly known as:

## 2021-07-25 LAB — MRSA CULTURE ONLY: NORMAL

## 2021-07-26 LAB
CULTURE, RESPIRATORY: ABNORMAL
CULTURE, RESPIRATORY: ABNORMAL
ORGANISM: ABNORMAL
SMEAR, RESPIRATORY: ABNORMAL

## 2021-07-27 ENCOUNTER — CARE COORDINATION (OUTPATIENT)
Dept: CASE MANAGEMENT | Age: 76
End: 2021-07-27

## 2021-07-27 NOTE — CARE COORDINATION
[facility] Transitions Initial Follow Up Call    Call within 2 business days of discharge: Yes    Patient: Magan Silva Patient : 1945   MRN: <[id number]>  Reason for Admission: COPD  Discharge Date: 21 RARS: Readmission Risk Score: 14      Last Discharge [address] [place] [address]       Complaint Diagnosis Description Type Department Provider    21 Shortness of Breath COPD exacerbation ([facility] . .. ED to Hosp-Admission (Discharged) (ADMITTED) KELVIN Campbell DO; Rey Meyers... Spoke with: Anibal Ramey services provided:  Obtained and reviewed discharge summary and/or continuity of care documents  Education of patient/family/caregiver/guardian to support self-management-   Assessment and support for treatment adherence and medication management-         Care Transitions 24 Hour Call    Care Transitions Interventions       Transitions of Care Initial Call    Challenges to be reviewed by the provider   Additional needs identified to be addressed with provider: No  none             Method of communication with provider : none    Was this a readmission? No    Care Transition Nurse (CTN) contacted the patient by telephone to perform post hospital discharge assessment. Verified name and  with patient as identifiers. Provided introduction to self, and explanation of the CTN role. CTN reviewed discharge instructions, medical action plan and red flags with patient who verbalized understanding. Patient given an opportunity to ask questions and does not have any further questions or concerns at this time. Were discharge instructions available to patient? Yes. Reviewed appropriate site of care based on symptoms and resources available to patient including: PCP and When to call 911. The patient agrees to contact the PCP office for questions related to their healthcare.      Medication reconciliation was performed with patient, who verbalizes understanding of administration of home medications. Advised obtaining a 90-day supply of all daily and as-needed medications. Covid Risk Education     Educated patient about risk for severe COVID-19 due to risk factors according to CDC guidelines. CTN reviewed discharge instructions, medical action plan and red flag symptoms with the patient who verbalized understanding. Discussed COVID vaccination status: Yes. Education provided on COVID-19 vaccination as appropriate. Discussed exposure protocols and quarantine with CDC Guidelines. Patient was given an opportunity to verbalize any questions and concerns and agrees to contact CTN or health care provider for questions related to their healthcare. Reviewed and educated patient on any new and changed medications related to discharge diagnosis. Was patient discharged with a pulse oximeter? Yes Discussed and confirmed pulse oximeter discharge instructions and when to notify provider or seek emergency care. CTN provided contact information. Plan for follow-up call in 3-5 days based on severity of symptoms and risk factors. Plan for next call: symptom management-  and self management-        States she's doing fine. Reports wearing 4L of O2 continuously. Denies SOB, CP, or fever. States she is still feeling weak but is eating and drinking. Reports occasional cough but is not bringing anything up. Meds reviewed and she reports taking as prescribed. Denies questions or concerns at this time.      Follow Up  Future Appointments   Date Time Provider Sulaiman Casper   8/26/2021  9:00 AM Conchita Newton MD [facility]   6/2/2022  1:30 PM Araceli Gee, APRN - CNP [facility]       Meena Avendano

## 2021-08-03 ENCOUNTER — CARE COORDINATION (OUTPATIENT)
Dept: CASE MANAGEMENT | Age: 76
End: 2021-08-03

## 2021-08-03 NOTE — CARE COORDINATION
their healthcare. Patients top risk factors for readmission: medical condition-COPD and polypharmacy  Interventions to address risk factors: Obtained and reviewed discharge summary and/or continuity of care documents      Non-[facility] follow up appointment(s):     CTN provided contact information for future needs. Plan for follow-up call in 7-10 days based on severity of symptoms and risk factors. Plan for next call: symptom management-cough, sob and follow up appointment-PCP, pulmonology? Care Transitions Subsequent and Final Call    Subsequent and Final Calls  Do you have any ongoing symptoms?: Yes  Onset of Patient-reported symptoms: In the past 7 days  Patient-reported symptoms: Shortness of Breath, Cough  Interventions for patient-reported symptoms: Other  Have your medications changed?: No  Do you have any questions related to your medications?: No  Do you currently have any active services?: No  Do you have any needs or concerns that I can assist you with?: No  Care Transitions Interventions  No Identified Needs  Other Interventions:            Follow Up  Future Appointments   Date Time Provider Sulaiman Casper   8/26/2021  9:00 AM Evens Argaon MD [facility] IN[facility]   6/2/2022  1:30 PM Pepper Encarnacion APRN - CNP [facility]       Manisha Tellez RN

## 2021-08-10 ENCOUNTER — CARE COORDINATION (OUTPATIENT)
Dept: CASE MANAGEMENT | Age: 76
End: 2021-08-10

## 2021-08-10 NOTE — CARE COORDINATION
[facility] Transitions Follow Up Call    8/10/2021    Patient: Liana Beltran  Patient : 1945   MRN: [id number]  Reason for Admission: COPD exacerbation, hypoxia  Discharge Date: 21 RARS: Readmission Risk Score: 14         Spoke with: Patient reports she is feeling much better and no longer has cough. States she continues to wear her oxygen 4L continuous and denies feeling any more sob than usual. Denies any new issues or concerns. States her appetite is good and is feeling like herself again. Care Transitions Subsequent and Final Call    Subsequent and Final Calls  Do you have any ongoing symptoms?: No  Have your medications changed?: No  Do you have any questions related to your medications?: No  Do you have any needs or concerns that I can assist you with?: No  Care Transitions Interventions  No Identified Needs  Other Interventions:            Follow Up  Future Appointments   Date Time Provider Sulaiman Casper   2021  9:00 AM Kyra Moore MD [facility]   2022  1:30 PM MISHA Jackson - CNP [facility]       Nathaniel Vaca RN

## 2021-08-24 ENCOUNTER — CARE COORDINATION (OUTPATIENT)
Dept: CASE MANAGEMENT | Age: 76
End: 2021-08-24

## 2021-08-24 NOTE — CARE COORDINATION
[facility] Transitions Follow Up Call    2021    Patient: Danny Davenport  Patient : 1945   MRN: [id number]  Reason for Admission: COPD exacerbation, Hypoxia  Discharge Date: 21 RARS: Readmission Risk Score: 14         Spoke with: Patient reports she is overall feeling much better. States she has lowered her oxygen from 4 to 3L and denies feeling any more sob than usual.   States her appetite is good and there are no changes or new concerns to report. States she has a f/u with ID in 2 days. Denies any issues or needs at this time. Care Transitions Follow Up Call    Needs to be reviewed by the provider   Additional needs identified to be addressed with provider: No  none             Method of communication with provider : none      Care Transition Nurse (CTN) contacted the patient by telephone to follow up after admission on 21-21. Verified name and  with patient as identifiers. Addressed changes since last contact: none  Discussed follow-up appointments. If no appointment was previously scheduled, appointment scheduling offered: Yes. Is follow up appointment scheduled within 7 days of discharge? No.    Advance Care Planning:   Does patient have an Advance Directive: reviewed and current, reviewed and needs to be updated, not on file; education provided, not on file, patient declined education, decision maker updated and referral to internal ACP facilitator. CTN reviewed discharge instructions, medical action plan and red flags with patient and discussed any barriers to care and/or understanding of plan of care after discharge. Discussed appropriate site of care based on symptoms and resources available to patient including: PCP, Specialist and When to call 911. The patient agrees to contact the PCP office for questions related to their healthcare.      Patients top risk factors for readmission: medical condition-COPD and polypharmacy  Interventions to address risk factors: Obtained and reviewed discharge summary and/or continuity of care documents      Non-[facility] follow up appointment(s):     CTN provided contact information for future needs. Plan for follow-up call in 7-10 days based on severity of symptoms and risk factors. Plan for next call: follow up appointment-ID        Care Transitions Subsequent and Final Call    Subsequent and Final Calls  Do you have any ongoing symptoms?: No  Have your medications changed?: No  Do you have any questions related to your medications?: No  Do you currently have any active services?: No  Do you have any needs or concerns that I can assist you with?: No  Identified Barriers: None  Care Transitions Interventions  No Identified Needs  Other Interventions:            Follow Up  Future Appointments   Date Time Provider Sulaiman Casper   8/26/2021  9:00 AM Henrik Davis MD [facility]   6/2/2022  1:30 PM MISHA Villegas - CNP [facility]       Ej Riggs RN

## 2021-08-26 ENCOUNTER — HOSPITAL ENCOUNTER (OUTPATIENT)
Age: 76
Setting detail: SPECIMEN
Discharge: HOME | End: 2021-08-26
Payer: MEDICARE

## 2021-08-26 DIAGNOSIS — B99.9 EXACERBATION OF BRONCHIECTASIS DUE TO INFECTION (HCC): ICD-10-CM

## 2021-08-26 DIAGNOSIS — J47.1 EXACERBATION OF BRONCHIECTASIS DUE TO INFECTION (HCC): ICD-10-CM

## 2021-08-26 PROCEDURE — 87186 SC STD MICRODIL/AGAR DIL: CPT

## 2021-08-26 PROCEDURE — 87077 CULTURE AEROBIC IDENTIFY: CPT

## 2021-08-26 PROCEDURE — 87206 SMEAR FLUORESCENT/ACID STAI: CPT

## 2021-08-26 PROCEDURE — 87070 CULTURE OTHR SPECIMN AEROBIC: CPT

## 2021-08-30 ENCOUNTER — CLINICAL DOCUMENTATION (OUTPATIENT)
Dept: INFECTIOUS DISEASES | Age: 76
End: 2021-08-30

## 2021-08-30 DIAGNOSIS — J15.1 PNEUMONIA DUE TO PSEUDOMONAS SPECIES, UNSPECIFIED LATERALITY, UNSPECIFIED PART OF LUNG (HCC): ICD-10-CM

## 2021-08-30 RX ORDER — HEPARIN SODIUM (PORCINE) LOCK FLUSH IV SOLN 100 UNIT/ML 100 UNIT/ML
500 SOLUTION INTRAVENOUS PRN
Status: CANCELLED | OUTPATIENT
Start: 2021-08-30

## 2021-08-30 RX ORDER — SODIUM CHLORIDE 0.9 % (FLUSH) 0.9 %
5-40 SYRINGE (ML) INJECTION PRN
Status: CANCELLED | OUTPATIENT
Start: 2021-08-30

## 2021-08-30 RX ORDER — SODIUM CHLORIDE 9 MG/ML
25 INJECTION, SOLUTION INTRAVENOUS PRN
Status: CANCELLED | OUTPATIENT
Start: 2021-08-30

## 2021-08-31 ENCOUNTER — HOSPITAL ENCOUNTER (OUTPATIENT)
Dept: GENERAL RADIOLOGY | Age: 76
Discharge: HOME | End: 2021-09-02
Payer: MEDICARE

## 2021-08-31 ENCOUNTER — HOSPITAL ENCOUNTER (OUTPATIENT)
Dept: INFUSION THERAPY | Age: 76
Setting detail: INFUSION SERIES
Discharge: HOME | End: 2021-08-31
Payer: MEDICARE

## 2021-08-31 VITALS
RESPIRATION RATE: 16 BRPM | OXYGEN SATURATION: 90 % | WEIGHT: 195 LBS | SYSTOLIC BLOOD PRESSURE: 123 MMHG | BODY MASS INDEX: 32.49 KG/M2 | TEMPERATURE: 99.8 F | HEIGHT: 65 IN | HEART RATE: 106 BPM | DIASTOLIC BLOOD PRESSURE: 65 MMHG

## 2021-08-31 DIAGNOSIS — J15.1 PNEUMONIA DUE TO PSEUDOMONAS SPECIES, UNSPECIFIED LATERALITY, UNSPECIFIED PART OF LUNG (HCC): Primary | ICD-10-CM

## 2021-08-31 PROCEDURE — C1751 CATH, INF, PER/CENT/MIDLINE: HCPCS

## 2021-08-31 PROCEDURE — 76937 US GUIDE VASCULAR ACCESS: CPT

## 2021-08-31 PROCEDURE — 71045 X-RAY EXAM CHEST 1 VIEW: CPT

## 2021-08-31 PROCEDURE — 6360000002 HC RX W HCPCS: Performed by: SPECIALIST

## 2021-08-31 PROCEDURE — 96365 THER/PROPH/DIAG IV INF INIT: CPT

## 2021-08-31 PROCEDURE — 2580000003 HC RX 258: Performed by: SPECIALIST

## 2021-08-31 PROCEDURE — 36569 INSJ PICC 5 YR+ W/O IMAGING: CPT

## 2021-08-31 PROCEDURE — 2500000003 HC RX 250 WO HCPCS: Performed by: SPECIALIST

## 2021-08-31 RX ORDER — HEPARIN SODIUM (PORCINE) LOCK FLUSH IV SOLN 100 UNIT/ML 100 UNIT/ML
3 SOLUTION INTRAVENOUS PRN
Status: DISCONTINUED | OUTPATIENT
Start: 2021-08-31 | End: 2021-09-01 | Stop reason: HOSPADM

## 2021-08-31 RX ORDER — SODIUM CHLORIDE 0.9 % (FLUSH) 0.9 %
5-40 SYRINGE (ML) INJECTION PRN
Status: DISCONTINUED | OUTPATIENT
Start: 2021-08-31 | End: 2021-08-31 | Stop reason: ALTCHOICE

## 2021-08-31 RX ORDER — SODIUM CHLORIDE 9 MG/ML
25 INJECTION, SOLUTION INTRAVENOUS PRN
Status: CANCELLED | OUTPATIENT
Start: 2021-09-01

## 2021-08-31 RX ORDER — SODIUM CHLORIDE 0.9 % (FLUSH) 0.9 %
5-40 SYRINGE (ML) INJECTION PRN
Status: CANCELLED | OUTPATIENT
Start: 2021-09-01

## 2021-08-31 RX ORDER — HEPARIN SODIUM (PORCINE) LOCK FLUSH IV SOLN 100 UNIT/ML 100 UNIT/ML
500 SOLUTION INTRAVENOUS PRN
Status: CANCELLED | OUTPATIENT
Start: 2021-09-01

## 2021-08-31 RX ORDER — SODIUM CHLORIDE 0.9 % (FLUSH) 0.9 %
5-40 SYRINGE (ML) INJECTION PRN
Status: DISCONTINUED | OUTPATIENT
Start: 2021-08-31 | End: 2021-09-01 | Stop reason: HOSPADM

## 2021-08-31 RX ORDER — HEPARIN SODIUM (PORCINE) LOCK FLUSH IV SOLN 100 UNIT/ML 100 UNIT/ML
500 SOLUTION INTRAVENOUS PRN
Status: DISCONTINUED | OUTPATIENT
Start: 2021-08-31 | End: 2021-08-31 | Stop reason: ALTCHOICE

## 2021-08-31 RX ORDER — HEPARIN SODIUM (PORCINE) LOCK FLUSH IV SOLN 100 UNIT/ML 100 UNIT/ML
3 SOLUTION INTRAVENOUS EVERY 12 HOURS SCHEDULED
Status: DISCONTINUED | OUTPATIENT
Start: 2021-08-31 | End: 2021-09-01 | Stop reason: HOSPADM

## 2021-08-31 RX ORDER — SODIUM CHLORIDE 0.9 % (FLUSH) 0.9 %
5-40 SYRINGE (ML) INJECTION EVERY 12 HOURS SCHEDULED
Status: DISCONTINUED | OUTPATIENT
Start: 2021-08-31 | End: 2021-09-01 | Stop reason: HOSPADM

## 2021-08-31 RX ORDER — LIDOCAINE HYDROCHLORIDE 10 MG/ML
5 INJECTION, SOLUTION EPIDURAL; INFILTRATION; INTRACAUDAL; PERINEURAL ONCE
Status: COMPLETED | OUTPATIENT
Start: 2021-08-31 | End: 2021-08-31

## 2021-08-31 RX ORDER — SODIUM CHLORIDE 9 MG/ML
25 INJECTION, SOLUTION INTRAVENOUS PRN
Status: DISCONTINUED | OUTPATIENT
Start: 2021-08-31 | End: 2021-09-01 | Stop reason: HOSPADM

## 2021-08-31 RX ADMIN — SODIUM CHLORIDE, PRESERVATIVE FREE 10 ML: 5 INJECTION INTRAVENOUS at 14:32

## 2021-08-31 RX ADMIN — LIDOCAINE HYDROCHLORIDE 5 ML: 10 INJECTION, SOLUTION EPIDURAL; INFILTRATION; INTRACAUDAL; PERINEURAL at 12:17

## 2021-08-31 RX ADMIN — SODIUM CHLORIDE, PRESERVATIVE FREE 10 ML: 5 INJECTION INTRAVENOUS at 15:03

## 2021-08-31 RX ADMIN — Medication 500 UNITS: at 14:34

## 2021-08-31 RX ADMIN — Medication 500 UNITS: at 15:03

## 2021-08-31 RX ADMIN — CEFEPIME HYDROCHLORIDE 2000 MG: 2 INJECTION, POWDER, FOR SOLUTION INTRAVENOUS at 14:33

## 2021-08-31 RX ADMIN — SODIUM CHLORIDE, PRESERVATIVE FREE 10 ML: 5 INJECTION INTRAVENOUS at 15:02

## 2021-08-31 NOTE — PROGRESS NOTES
Respiratory cultures have turned out Pseudomonas aeruginosa. Sensitivities show that the are intermediate to quinolones, therefore oral antibiotics are no longer an option for her. She will need a PICC and start Cefepime 2 g IV every 12 hours and inhaled Tobramycin 3 mg twice daily for a minimum of 3 weeks. She will see her in the office in follow-up.     Tara Doherty MD

## 2021-08-31 NOTE — PROGRESS NOTES
Pt returned from PICC line placement [facility] PICC intact. Pt with no voiced complaints.  To continue at home with [facility]

## 2021-09-01 ENCOUNTER — CARE COORDINATION (OUTPATIENT)
Dept: CASE MANAGEMENT | Age: 76
End: 2021-09-01

## 2021-09-01 LAB
ALBUMIN SERPL-MCNC: 3.7 G/DL (ref 3.5–5.2)
ALP BLD-CCNC: 80 U/L (ref 35–104)
ALT SERPL-CCNC: 7 U/L (ref 0–32)
ANION GAP SERPL CALCULATED.3IONS-SCNC: 5 MMOL/L (ref 7–16)
AST SERPL-CCNC: 12 U/L (ref 0–31)
BASOPHILS ABSOLUTE: 0.04 E9/L (ref 0–0.2)
BASOPHILS RELATIVE PERCENT: 0.5 % (ref 0–2)
BILIRUB SERPL-MCNC: 0.5 MG/DL (ref 0–1.2)
BUN BLDV-MCNC: 10 MG/DL (ref 6–23)
C-REACTIVE PROTEIN: 6.4 MG/DL (ref 0–0.4)
CALCIUM SERPL-MCNC: 9.2 MG/DL (ref 8.6–10.2)
CHLORIDE BLD-SCNC: 95 MMOL/L (ref 98–107)
CO2: 37 MMOL/L (ref 22–29)
CREAT SERPL-MCNC: 0.7 MG/DL (ref 0.5–1)
EOSINOPHILS ABSOLUTE: 0.08 E9/L (ref 0.05–0.5)
EOSINOPHILS RELATIVE PERCENT: 1.1 % (ref 0–6)
GFR AFRICAN AMERICAN: >60
GFR NON-AFRICAN AMERICAN: >60 ML/MIN/1.73
GLUCOSE BLD-MCNC: 94 MG/DL (ref 74–99)
HCT VFR BLD CALC: 35.4 % (ref 34–48)
HEMOGLOBIN: 10.5 G/DL (ref 11.5–15.5)
IMMATURE GRANULOCYTES #: 0.04 E9/L
IMMATURE GRANULOCYTES %: 0.5 % (ref 0–5)
LYMPHOCYTES ABSOLUTE: 1.14 E9/L (ref 1.5–4)
LYMPHOCYTES RELATIVE PERCENT: 15.3 % (ref 20–42)
MCH RBC QN AUTO: 22.6 PG (ref 26–35)
MCHC RBC AUTO-ENTMCNC: 29.7 % (ref 32–34.5)
MCV RBC AUTO: 76.1 FL (ref 80–99.9)
MONOCYTES ABSOLUTE: 0.54 E9/L (ref 0.1–0.95)
MONOCYTES RELATIVE PERCENT: 7.3 % (ref 2–12)
NEUTROPHILS ABSOLUTE: 5.6 E9/L (ref 1.8–7.3)
NEUTROPHILS RELATIVE PERCENT: 75.3 % (ref 43–80)
PDW BLD-RTO: 18.8 FL (ref 11.5–15)
PLATELET # BLD: 256 E9/L (ref 130–450)
PMV BLD AUTO: 10 FL (ref 7–12)
POTASSIUM SERPL-SCNC: 4 MMOL/L (ref 3.5–5)
RBC # BLD: 4.65 E12/L (ref 3.5–5.5)
SODIUM BLD-SCNC: 137 MMOL/L (ref 132–146)
TOTAL PROTEIN: 7.1 G/DL (ref 6.4–8.3)
WBC # BLD: 7.4 E9/L (ref 4.5–11.5)

## 2021-09-01 NOTE — CARE COORDINATION
Attempted to reach patient for transitions of care follow up. Unable to reach patient. Patient: Liana Beltran Patient : 1945 MRN: [id number]    Last Discharge [facility]       Complaint Diagnosis Description Type Department Provider    21 Shortness of Breath COPD exacerbation ([facility] [address] . .. ED to Hosp-Admission (Discharged) (ADMITTED) KELVIN MeloW Mike Campbell DO; Veronica Mcintosh...             Noted following upcoming appointments from discharge chart review:   [facility] follow up appointment(s):   Future Appointments   Date Time Provider Sulaiman Casper   2021  9:00 AM Kyra Moore MD [facility] [place] [facility]   2022  1:30 PM MISHA Jackson - CNP [facility]     Non-[facility] follow up appointment(s):

## 2021-09-02 LAB — SEDIMENTATION RATE, ERYTHROCYTE: 43 MM/HR (ref 0–20)

## 2021-09-07 ENCOUNTER — CARE COORDINATION (OUTPATIENT)
Dept: CASE MANAGEMENT | Age: 76
End: 2021-09-07

## 2021-09-07 LAB
ALBUMIN SERPL-MCNC: 3.7 G/DL (ref 3.5–5.2)
ALP BLD-CCNC: 82 U/L (ref 35–104)
ALT SERPL-CCNC: 8 U/L (ref 0–32)
ANION GAP SERPL CALCULATED.3IONS-SCNC: 8 MMOL/L (ref 7–16)
AST SERPL-CCNC: 12 U/L (ref 0–31)
BASOPHILS ABSOLUTE: 0.06 E9/L (ref 0–0.2)
BASOPHILS RELATIVE PERCENT: 0.9 % (ref 0–2)
BILIRUB SERPL-MCNC: 0.3 MG/DL (ref 0–1.2)
BUN BLDV-MCNC: 13 MG/DL (ref 6–23)
C-REACTIVE PROTEIN: 0.8 MG/DL (ref 0–0.4)
CALCIUM SERPL-MCNC: 9.2 MG/DL (ref 8.6–10.2)
CHLORIDE BLD-SCNC: 101 MMOL/L (ref 98–107)
CO2: 31 MMOL/L (ref 22–29)
CREAT SERPL-MCNC: 0.6 MG/DL (ref 0.5–1)
EOSINOPHILS ABSOLUTE: 0.51 E9/L (ref 0.05–0.5)
EOSINOPHILS RELATIVE PERCENT: 7.3 % (ref 0–6)
GFR AFRICAN AMERICAN: >60
GFR NON-AFRICAN AMERICAN: >60 ML/MIN/1.73
GLUCOSE BLD-MCNC: 102 MG/DL (ref 74–99)
HCT VFR BLD CALC: 35.7 % (ref 34–48)
HEMOGLOBIN: 10.4 G/DL (ref 11.5–15.5)
IMMATURE GRANULOCYTES #: 0.04 E9/L
IMMATURE GRANULOCYTES %: 0.6 % (ref 0–5)
LYMPHOCYTES ABSOLUTE: 1.25 E9/L (ref 1.5–4)
LYMPHOCYTES RELATIVE PERCENT: 17.9 % (ref 20–42)
MCH RBC QN AUTO: 22.1 PG (ref 26–35)
MCHC RBC AUTO-ENTMCNC: 29.1 % (ref 32–34.5)
MCV RBC AUTO: 76 FL (ref 80–99.9)
MONOCYTES ABSOLUTE: 0.43 E9/L (ref 0.1–0.95)
MONOCYTES RELATIVE PERCENT: 6.2 % (ref 2–12)
NEUTROPHILS ABSOLUTE: 4.68 E9/L (ref 1.8–7.3)
NEUTROPHILS RELATIVE PERCENT: 67.1 % (ref 43–80)
PDW BLD-RTO: 18.8 FL (ref 11.5–15)
PLATELET # BLD: 220 E9/L (ref 130–450)
PMV BLD AUTO: 9.7 FL (ref 7–12)
POTASSIUM SERPL-SCNC: 4 MMOL/L (ref 3.5–5)
RBC # BLD: 4.7 E12/L (ref 3.5–5.5)
SEDIMENTATION RATE, ERYTHROCYTE: 30 MM/HR (ref 0–20)
SODIUM BLD-SCNC: 140 MMOL/L (ref 132–146)
TOTAL PROTEIN: 7 G/DL (ref 6.4–8.3)
WBC # BLD: 7 E9/L (ref 4.5–11.5)

## 2021-09-14 ENCOUNTER — CARE COORDINATION (OUTPATIENT)
Dept: CASE MANAGEMENT | Age: 76
End: 2021-09-14

## 2021-09-14 NOTE — CARE COORDINATION
[facility] Transitions Follow Up Call    2021    Patient: Dorene Urban  Patient : 1945   MRN: [id number]  Reason for Admission: COPD exacerbation, hypoxia  Discharge Date: 21 RARS: Readmission Risk Score: 14         Spoke with: Pt denies being any more sob than usual. States she continues to have daily IV antibiotics at home that her  was instructed to do and Madelyn Ross comes out a couple times a week. Reports her nurse was there today to check on her and reported everything looked okay and vitals were good. Pt reports she has a f/u with ID next Thursday. Denies any new concerns. Care Transitions Follow Up Call    Needs to be reviewed by the provider   Additional needs identified to be addressed with provider: No  none             Method of communication with provider : none      Care Transition Nurse (CTN) contacted the patient by telephone to follow up after admission on 21. Verified name and  with patient as identifiers. Addressed changes since last contact: none  Discussed follow-up appointments. If no appointment was previously scheduled, appointment scheduling offered: Yes. Is follow up appointment scheduled within 7 days of discharge? Yes. Advance Care Planning:   Does patient have an Advance Directive: not on file; education provided. CTN reviewed discharge instructions, medical action plan and red flags with patient and discussed any barriers to care and/or understanding of plan of care after discharge. Discussed appropriate site of care based on symptoms and resources available to patient including: PCP, Specialist and Home health. The patient agrees to contact the PCP office for questions related to their healthcare.      Patients top risk factors for readmission: medical condition-COPD and polypharmacy  Interventions to address risk factors: Obtained and reviewed discharge summary and/or continuity of care documents      Non-[facility] follow up appointment(s): CTN provided contact information for future needs. Plan for follow-up call in 5-7 days based on severity of symptoms and risk factors. Plan for next call: IV abx, f/u with ID           Care Transitions Subsequent and Final Call    Subsequent and Final Calls  Do you have any ongoing symptoms?: No  Have your medications changed?: No  Do you have any questions related to your medications?: No  Do you currently have any active services?: Yes  Are you currently active with any services?: Home Health  Do you have any needs or concerns that I can assist you with?: No  Identified Barriers: None  Care Transitions Interventions  Disease Association: Completed      Other Interventions:            Follow Up  Future Appointments   Date Time Provider Sulaiman Casper   9/23/2021  9:00 AM Jordan Tapia MD [facility]   6/2/2022  1:30 PM Keren Borrero APRN - CNP [facility]       Jacqueline Hull RN

## 2021-09-21 ENCOUNTER — CARE COORDINATION (OUTPATIENT)
Dept: CARE COORDINATION | Age: 76
End: 2021-09-21

## 2021-09-21 NOTE — CARE COORDINATION
[facility] Transitions Follow Up Call    2021    Patient: Jennifer Carias  Patient : 1945   MRN: <[id number]>  Reason for Admission: -21 COPD  Discharge Date: 21 RARS: Readmission Risk Score: 15         Spoke with: Attempted to contact patient for follow up [address] [place] Transition call. Left HIPPA compliant message requesting return call. Will attempt to reach again. HAMLET Salgado / [address]  [phone]      Care Transitions Subsequent and Final Call    Subsequent and Final Calls  Are you currently active with any services?: Home Health  Care Transitions Interventions  Disease Association: Completed      Other Interventions:            Follow Up  Future Appointments   Date Time Provider Sulaiman Casper   2021  9:00 AM Nino Mcfarland MD [facility]   2022  1:30 PM Deangelo Dickinson APRN - CNP [facility] Sulaiman Dorantes LPN

## 2021-09-30 ENCOUNTER — CARE COORDINATION (OUTPATIENT)
Dept: CARE COORDINATION | Age: 76
End: 2021-09-30

## 2021-09-30 NOTE — CARE COORDINATION
[facility] Transitions Follow Up Call    2021    Patient: Sheyla Noel  Patient : 1945   MRN: <[id number]>  Reason for Admission: -21 COPD  Discharge Date: 21 RARS: Readmission Risk Score: 15         Spoke with: Patient's , Ely Taylor states that the Pt is resting at the moment. He states IV antibiotics completed, PICC line removed,dressing C/D/I. Next ID appt scheduled 21. Denies needs and has no concerns at this time. Care Transitions Follow Up Call    Needs to be reviewed by the provider   Additional needs identified to be addressed with provider: No  none         Method of communication with provider : none      Care Transition Nurse (CTN) contacted the family by telephone to follow up after admission on 21. Verified name and  with family as identifiers. Addressed changes since last contact: IV antibiotic herapy completed. Picc line removed. Discussed follow-up appointments. If no appointment was previously scheduled, appointment scheduling offered: Yes. Is follow up appointment scheduled within 7 days of discharge? Yes. Advance Care Planning:   Does patient have an Advance Directive: not on file. CTN reviewed discharge instructions, medical action plan and red flags with family and discussed any barriers to care and/or understanding of plan of care after discharge. Discussed appropriate site of care based on symptoms and resources available to patient including: PCP, Specialist and When to call 911. The family agrees to contact the PCP office for questions related to their healthcare. Patients top risk factors for readmission: medical condition-COPD, recurring pneumonia  Interventions to address risk factors: Obtained and reviewed discharge summary and/or continuity of care documents      Non-[facility] follow up appointment(s):     CTN provided contact information for future needs.  Plan for follow-up call in 5-7 days based on severity of

## 2021-10-06 ENCOUNTER — CARE COORDINATION (OUTPATIENT)
Dept: CASE MANAGEMENT | Age: 76
End: 2021-10-06

## 2021-10-06 NOTE — CARE COORDINATION
[facility] Transitions Follow Up Call  [address]    10/6/2021    Patient: Patricia Diaz  Patient : 1945   MRN: [id number]  Reason for Admission: COPD  Discharge Date: 21 RARS: Readmission Risk Score: 14    NON Reached [facility] Care Transition Follow Up Call  Attempted to contact patient for follow up [facility] transition call. Left HIPAA Compliant message on Voice mail to call CTN (number given) with any questions and an update on your condition since discharge. Will continue to follow. Xavi Zaragoza LPN    [phone]  Jorge Alberto Bustamante / Maged [address] Transitions Subsequent and Final Call    Subsequent and Final Calls  Care Transitions Interventions  Other Interventions:            Follow Up  Future Appointments   Date Time Provider Sulaiman Casper   2021  9:00 AM Ken Davis MD [facility] [place]   2022  1:30 PM MISHA Mccray - CNP [facility]       Xavi Zaragoza LPN

## 2021-10-14 ENCOUNTER — CARE COORDINATION (OUTPATIENT)
Dept: CARE COORDINATION | Age: 76
End: 2021-10-14

## 2021-10-14 NOTE — CARE COORDINATION
[facility] Transitions Follow Up Call    10/14/2021    Patient: Mikayal Flower  Patient : 1945   MRN: <[id number]>  Reason for Admission: -21 COPD  Discharge Date: 21 RARS: Readmission Risk Score: 15         Spoke with: Attempted to contact patient for follow up [address] [place] Transition call. Left HIPPA compliant message requesting return call. Will attempt to reach again.     HAMLET Salgado / [address]  [phone]       Care Transitions Subsequent and Final Call    Subsequent and Final Calls  Are you currently active with any services?: Home Health  Care Transitions Interventions  Disease Association: Completed      Other Interventions:            Follow Up  Future Appointments   Date Time Provider Sulaiman Casper   2021  9:00 AM Xu Cam MD [facility] IN[facility]   2022  1:30 PM Krystle Rouse, APRN - CNP [facility] Sulaiman Dorantes LPN

## 2021-10-21 ENCOUNTER — CARE COORDINATION (OUTPATIENT)
Dept: CARE COORDINATION | Age: 76
End: 2021-10-21

## 2022-03-09 RX ORDER — ATORVASTATIN CALCIUM 20 MG/1
TABLET, FILM COATED ORAL
Qty: 90 TABLET | Refills: 0 | Status: SHIPPED
Start: 2022-03-09 | End: 2022-06-14

## 2022-04-06 ENCOUNTER — TELEPHONE (OUTPATIENT)
Dept: CARDIOLOGY CLINIC | Age: 77
End: 2022-04-06

## 2022-04-06 NOTE — TELEPHONE ENCOUNTER
Received an EKG from today from PCP. Last seen in our office in 2019, had virtual visit in 2020. Please review and advise.

## 2022-04-13 ENCOUNTER — OFFICE VISIT (OUTPATIENT)
Dept: CARDIOLOGY CLINIC | Age: 77
End: 2022-04-13
Payer: MEDICARE

## 2022-04-13 VITALS
BODY MASS INDEX: 31.16 KG/M2 | DIASTOLIC BLOOD PRESSURE: 70 MMHG | WEIGHT: 187 LBS | HEART RATE: 87 BPM | HEIGHT: 65 IN | SYSTOLIC BLOOD PRESSURE: 138 MMHG | RESPIRATION RATE: 14 BRPM

## 2022-04-13 DIAGNOSIS — I51.81 STRESS-INDUCED CARDIOMYOPATHY: Primary | ICD-10-CM

## 2022-04-13 PROCEDURE — 1036F TOBACCO NON-USER: CPT | Performed by: INTERNAL MEDICINE

## 2022-04-13 PROCEDURE — 99214 OFFICE O/P EST MOD 30 MIN: CPT | Performed by: INTERNAL MEDICINE

## 2022-04-13 PROCEDURE — 4040F PNEUMOC VAC/ADMIN/RCVD: CPT | Performed by: INTERNAL MEDICINE

## 2022-04-13 PROCEDURE — 1090F PRES/ABSN URINE INCON ASSESS: CPT | Performed by: INTERNAL MEDICINE

## 2022-04-13 PROCEDURE — G8427 DOCREV CUR MEDS BY ELIG CLIN: HCPCS | Performed by: INTERNAL MEDICINE

## 2022-04-13 PROCEDURE — G8400 PT W/DXA NO RESULTS DOC: HCPCS | Performed by: INTERNAL MEDICINE

## 2022-04-13 PROCEDURE — 1123F ACP DISCUSS/DSCN MKR DOCD: CPT | Performed by: INTERNAL MEDICINE

## 2022-04-13 PROCEDURE — G8417 CALC BMI ABV UP PARAM F/U: HCPCS | Performed by: INTERNAL MEDICINE

## 2022-04-13 PROCEDURE — 93000 ELECTROCARDIOGRAM COMPLETE: CPT | Performed by: INTERNAL MEDICINE

## 2022-04-13 RX ORDER — OMEPRAZOLE 20 MG/1
CAPSULE, DELAYED RELEASE ORAL
Status: ON HOLD | COMMUNITY
Start: 2022-03-09 | End: 2022-09-09

## 2022-04-13 RX ORDER — UMECLIDINIUM 62.5 UG/1
AEROSOL, POWDER ORAL
COMMUNITY
Start: 2022-04-07 | End: 2022-08-15 | Stop reason: SDUPTHER

## 2022-04-13 RX ORDER — ALENDRONATE SODIUM 70 MG/1
TABLET ORAL
COMMUNITY
Start: 2022-02-07

## 2022-04-13 NOTE — PATIENT INSTRUCTIONS
1. Continue metoprolol,Norvasc and Hyzaar for hypertension. Continue Toprol-XL 12.5 mg p.o. daily  2. Continue Atorvastatin 20 mg po daily and aspirin 81 mg p.o. daily due to underlying nonobstructive CAD. 3. Schedule for an echocardiogram to assess LV function due to ongoing dyspnea  4. Continue rest of the medication as before. 5. Follow up in 6 months.

## 2022-04-13 NOTE — PROGRESS NOTES
OUTPATIENT CARDIOLOGY FOLLOW-UP    Name: Heidy Boston    Age: 67 y.o. Primary Care Physician: MISHA Aguayo CNP    Date of Service: 4/13/2022    Chief Complaint:   Chief Complaint   Patient presents with    Atrial Fibrillation    Shortness of Breath       Interim History:   Mrs. Last is a 58-year-old female history of COPD, hypertension, migraines and history of peptic ulcer disease was recently admitted to the hospital for pneumonia from 7/22/2021 7/24/2001. On 7/3/2018, She was found have a 50% pneumothorax on the left side of the chest when she presented with acute chest pain and dyspnea. . She immediately underwent placement of a left-sided chest tube with a near complete resolution of her symptoms. She denied any history of diabetes, hyperlipidemia or premature coronary artery disease in her family. She was seen in the hospital as a new consult from 7/3/2018 for elevated troponin levels. She had an echocardiogram in the hospital which showed a new onset LV dysfunction with an EF of 30-35% with hyperdynamic basal segments and the severe anterior wall ischemia suggestive of stress-induced cardiomyopathy. She underwent a cardiac catheterization on 7/9/2018 which showed no significant coronary artery disease. She was discharged home on 7/10/2018. Her chest tube was removed prior to cardiac cath with a complete resolution of pneumothorax. She was hospitalized on 9/7/2018 with acute dyspnea secondary to moderate to large left pneumothorax on left side and underwent placement of chest tube. She also underwent pleurodesis without any complications. No further episodes of pneumothorax since then. She is compliant with medications, as well as salt and fluid intake. He does not take any over the arthritis medications. She was seen in the office on 5/8/2021, since her last visit, she was hospitalized with pneumonia in July 2021.     She has a chronic dyspnea secondary underlying COPD with chronic bronchiectasis and home O2. She did not notice any worsening of her dyspnea. Currently taking home oxygen at 3 L/m by nasal cannula. No new cardiac complaints since last cardiology evaluation. She denies recent chest pain, SOB, palpitations, lightheadedness, dizziness, syncope, PND, or orthopnea. SR on EKG.     Review of Systems:   Cardiac: As per HPI  General: No fever, chills  Pulmonary: As per HPI  HEENT: No visual disturbances, difficult swallowing  GI: No nausea, vomiting  Endocrine: No thyroid disease or DM  Musculoskeletal: CESPEDES x 4, no focal motor deficits  Skin: Intact, no rashes  Neuro/Psych: No headache or seizures    Past Medical History:  Past Medical History:   Diagnosis Date    COPD (chronic obstructive pulmonary disease) ([facility] [address]     Hypertension     Migraines     MRSA (methicillin resistant staph aureus) culture positive     Pneumonia     Ulcer        Past Surgical History:  Past Surgical History:   Procedure Laterality Date    BREAST LUMPECTOMY Right     PICC LINE INSERTION NURSE  8/31/2021         CT THORSC DX LUNGS/PERICAR/MED/PLEURAL SPACE W/O BX Left 9/12/2018    LEFT VIDEO ASSISTED THORACOSCOPY STAPLING OF BLEBS TALC PLEURODESIS WITH PLEURAL STRIPPING POSSIBLE THORACOTOMY performed by [facility] at [facility] OR    TONSILLECTOMY         Family History:  Family History   Problem Relation Age of Onset    Heart Disease Mother     Cancer Mother     Other Father     Cancer Sister        Social History:  Social History     Socioeconomic History    Marital status:      Spouse name: Not on file    Number of children: Not on file    Years of education: Not on file    Highest education level: Not on file   Occupational History    Occupation: retired     Comment:    Tobacco Use    Smoking status: Former Smoker     Packs/day: 1.00     Years: 35.00     Pack years: 35.00     Types: Cigarettes     Start date: 9/7/1972     Quit date: 1/1/2007     Years since quitting: 15.2    Smokeless tobacco: Never Used   Vaping Use    Vaping Use: Never used   Substance and Sexual Activity    Alcohol use: No     Alcohol/week: 0.0 standard drinks    Drug use: No    Sexual activity: Never   Other Topics Concern    Not on file   Social History Narrative    Not on file     Social Determinants of Health     Financial Resource Strain:     Difficulty of Paying Living Expenses: Not on file   Food Insecurity:     Worried About Running Out of Food in the Last Year: Not on file    Tamia of Food in the Last Year: Not on file   Transportation Needs:     Lack of Transportation (Medical): Not on file    Lack of Transportation (Non-Medical): Not on file   Physical Activity:     Days of Exercise per Week: Not on file    Minutes of Exercise per Session: Not on file   Stress:     Feeling of Stress : Not on file   Social Connections:     Frequency of Communication with Friends and Family: Not on file    Frequency of Social Gatherings with Friends and Family: Not on file    Attends Buddhism Services: Not on file    Active Member of [address] [place] [address] or Organizations: Not on file    Attends Club or Organization Meetings: Not on file    Marital Status: Not on file   Intimate Partner Violence:     Fear of Current or Ex-Partner: Not on file    Emotionally Abused: Not on file    Physically Abused: Not on file    Sexually Abused: Not on file   Housing Stability:     Unable to Pay for Housing in the Last Year: Not on file    Number of [place] in the Last Year: Not on file    Unstable Housing in the Last Year: Not on file       Allergies:   Allergies   Allergen Reactions    Dye [Iodides]        Current Medications:  Current Outpatient Medications   Medication Sig Dispense Refill    alendronate (FOSAMAX) 70 MG tablet TAKE ONE TABLET BY MOUTH ONCE A WEEK      Cholecalciferol 50 MCG (2000 UT) CAPS Take 2,000 Units by mouth daily      omeprazole (PRILOSEC) 20 MG delayed release capsule TAKE ONE CAPSULE BY MOUTH EVERY DAY      INCRUSE ELLIPTA 62.5 MCG/INH AEPB INHALE ONE PUFF BY MOUTH DAILY      atorvastatin (LIPITOR) 20 MG tablet TAKE ONE TABLET BY MOUTH NIGHTLY 90 tablet 0    PROAIR  (90 Base) MCG/ACT inhaler Inhale 2 puffs into the lungs every 6 hours as needed for Wheezing 1 Inhaler 5    Fluticasone Furoate-Vilanterol (BREO ELLIPTA IN) Inhale into the lungs daily      albuterol (PROVENTIL) (2.5 MG/3ML) 0.083% nebulizer solution USE 3ML IN NEBULIZER EVERY 4 HOURS AS NEEDED  11    diazepam (VALIUM) 10 MG tablet TAKE ONE TABLET BY MOUTH AT BEDTIME AS NEEDED  0    sertraline (ZOLOFT) 100 MG tablet TAKE ONE TABLET BY MOUTH AT BEDTIME  3    amLODIPine (NORVASC) 5 MG tablet Take 5 mg by mouth daily      formoterol (PERFOROMIST) 20 MCG/2ML nebulizer solution Take 2 mLs by nebulization 2 times daily 120 mL 2    ipratropium-albuterol (DUONEB) 0.5-2.5 (3) MG/3ML SOLN nebulizer solution Inhale 3 mLs into the lungs 4 times daily 360 mL 2    budesonide (PULMICORT) 0.25 MG/2ML nebulizer suspension Take 2 mLs by nebulization 2 times daily 60 ampule 3    metoprolol succinate (TOPROL XL) 25 MG extended release tablet Take 0.5 tablets by mouth daily 30 tablet 3    OXYGEN Inhale 3 L into the lungs continuous Had to increase to 5L d/t dyspnea      losartan-hydrochlorothiazide (HYZAAR) 100-12.5 MG per tablet Take 1 tablet by mouth every morning       aspirin 81 MG tablet Take 81 mg by mouth every morning       Multiple Vitamins-Minerals (OCUVITE PO) Take 1 tablet by mouth every morning       calcium carbonate 600 MG TABS tablet Take 1 tablet by mouth every morning       predniSONE (DELTASONE) 10 MG tablet Take orally 4 tabs on 7/25; then 3 tabs on 7/26; then 2 tabs on 7/27; then 1 tab on 7/28 (Patient not taking: Reported on 4/13/2022) 10 tablet 0    SPIRIVA HANDIHALER 18 MCG inhalation capsule INHALE 1 CAPSULE DAILY.  (Patient not taking: Reported on 4/13/2022)  11    Omega-3 Fatty Acids (FISH OIL) 1000 MG CPDR Take 1,000 mg by mouth every morning  (Patient not taking: Reported on 4/13/2022)       No current facility-administered medications for this visit. Physical Exam:  /70   Pulse 87   Resp 14   Ht 5' 5\" (1.651 m)   Wt 187 lb (84.8 kg)   BMI 31.12 kg/m²   Wt Readings from Last 3 Encounters:   04/13/22 187 lb (84.8 kg)   08/31/21 195 lb (88.5 kg)   07/22/21 198 lb (89.8 kg)       Appearance: Awake, alert and oriented x 3, no acute respiratory distress, on home O2 by nasal cannula  Skin: Intact, no rash  Head: Normocephalic, atraumatic  Eyes: EOMI, no conjunctival erythema  ENMT: No pharyngeal erythema, MMM, no rhinorrhea  Neck: Supple, no elevated JVP, no carotid bruits  Lungs: Clear to auscultation bilaterally. No wheezes, or rhonchi but has few rales over the bases.   Cardiac: Regular rate and rhythm, +S1S2, no murmurs apparent  Abdomen: Soft, nontender, +bowel sounds  Extremities: Moves all extremities x 4, no lower extremity edema  Neurologic: No focal motor deficits apparent, normal mood and affect, alert and oriented x 3  Peripheral Pulses: Intact posterior tibial pulses bilaterally    Laboratory Tests:  Lab Results   Component Value Date    CREATININE 0.6 09/24/2021    BUN 14 09/24/2021     09/24/2021    K 3.7 09/24/2021     09/24/2021    CO2 29 09/24/2021     No results found for: MG  Lab Results   Component Value Date    WBC 5.8 09/24/2021    HGB 10.5 (L) 09/24/2021    HCT 36.3 09/24/2021    MCV 75.6 (L) 09/24/2021     09/24/2021     Lab Results   Component Value Date    ALT 9 09/24/2021    AST 13 09/24/2021    ALKPHOS 84 09/24/2021    BILITOT 0.3 09/24/2021     Lab Results   Component Value Date    CKTOTAL 110 01/22/2015    TROPONINI <0.01 09/11/2018    TROPONINI <0.01 09/11/2018    TROPONINI <0.01 09/11/2018     Lab Results   Component Value Date    INR 1.0 09/07/2018    INR 1.0 06/08/2014    PROTIME 12.1 09/07/2018    PROTIME 10.8 06/08/2014     No results found for: TSHFT4, TSH  No results found for: LABA1C  No results found for: EAG  Lab Results   Component Value Date    CHOL 138 07/06/2018     Lab Results   Component Value Date    TRIG 91 07/06/2018     Lab Results   Component Value Date    HDL 67 07/06/2018     Lab Results   Component Value Date    LDLCALC 53 07/06/2018     Lab Results   Component Value Date    LABVLDL 18 07/06/2018     No results found for: CHOLHDLRATIO    Cardiac Tests:  ECG: NSR, with occasional premature atrial complexes, low voltage precordial leads, abnormal EKG    Echocardiogram:7/5/2018-LVEF 79-46%, stage I diastolic dysfunction and normal RV size and function, moderate pH. TTE-8/16/2018: LVEF 55%, normal RV size and function, mild pulmonary hypertension. TTE- 9/11/2018: LVEF 55-60%, mild MR and mild PH    TTE-2/4/2020 (out of hospital facility), EF 52% mild to moderate MR and TR    Cardiac catheterization: 7/9/2018  Left main: 0%  stenosis  LAD: 0 %  Stenosis  IR: 40% stenosis  Circumflex: 20-30 %   stenosis  RCA: Dominant. 30-40 %  stenosis  LV angio: 40%  ejection fraction  The ASCVD Risk score (Janice Doran, et al., 2013) failed to calculate for the following reasons:    Cannot find a previous HDL lab    Cannot find a previous total cholesterol lab        ASSESSMENT:  1. Stress cardiomyopathy with an EF of 30-35% with no significant coronary artery disease, Now, her LV function has recovered, HFpEF improved. - improved recent EF 52%  2. Non obstructive CAD based on cath on 7/9/2018  3. Recurrent tension pneumothorax. S/p pleurodesis, now, stable without any further episodes of pneumothorax. 4. Severe COPD and history of recent left-sided tension pneumothorax requiring chest tube placement resolved - on home O2  5. Nonanginal left-sided chest pain - resolved  6. Hypertension well controlled but slightly elevated, current blood pressure 138/70  7. Hyperlipidemia on statin well controlled.   8. History of bronchiectasis      Plan: 1. Continue metoprolol,Norvasc and Hyzaar for hypertension. Continue Toprol-XL 12.5 mg p.o. daily  2. Continue Atorvastatin 20 mg po daily and aspirin 81 mg p.o. daily due to underlying nonobstructive CAD. 3. Schedule for an echocardiogram to assess LV function due to ongoing dyspnea  4. Continue rest of the medication as before. 5. Follow up in 6 months. The patient's current medication list, allergies, problem list and results of all previously ordered testing were reviewed at today's visit.     Kate Christian MD  [facility]) Cardiology

## 2022-06-14 RX ORDER — ATORVASTATIN CALCIUM 20 MG/1
20 TABLET, FILM COATED ORAL DAILY
Qty: 90 TABLET | Refills: 3 | Status: SHIPPED | OUTPATIENT
Start: 2022-06-14

## 2022-09-09 ENCOUNTER — APPOINTMENT (OUTPATIENT)
Dept: GENERAL RADIOLOGY | Age: 77
DRG: 177 | End: 2022-09-09
Payer: MEDICARE

## 2022-09-09 ENCOUNTER — HOSPITAL ENCOUNTER (INPATIENT)
Age: 77
LOS: 5 days | Discharge: HOME | DRG: 177 | End: 2022-09-14
Attending: EMERGENCY MEDICINE | Admitting: FAMILY MEDICINE
Payer: MEDICARE

## 2022-09-09 ENCOUNTER — APPOINTMENT (OUTPATIENT)
Dept: CT IMAGING | Age: 77
DRG: 177 | End: 2022-09-09
Payer: MEDICARE

## 2022-09-09 DIAGNOSIS — J18.9 PNEUMONIA DUE TO INFECTIOUS ORGANISM, UNSPECIFIED LATERALITY, UNSPECIFIED PART OF LUNG: ICD-10-CM

## 2022-09-09 DIAGNOSIS — I26.99 PULMONARY EMBOLISM, UNSPECIFIED CHRONICITY, UNSPECIFIED PULMONARY EMBOLISM TYPE, UNSPECIFIED WHETHER ACUTE COR PULMONALE PRESENT (HCC): Primary | ICD-10-CM

## 2022-09-09 LAB
ALBUMIN SERPL-MCNC: 4.4 G/DL (ref 3.5–5.2)
ALP BLD-CCNC: 79 U/L (ref 35–104)
ALT SERPL-CCNC: 6 U/L (ref 0–32)
ANION GAP SERPL CALCULATED.3IONS-SCNC: 12 MMOL/L (ref 7–16)
AST SERPL-CCNC: 11 U/L (ref 0–31)
BASOPHILS ABSOLUTE: 0.05 E9/L (ref 0–0.2)
BASOPHILS RELATIVE PERCENT: 0.6 % (ref 0–2)
BILIRUB SERPL-MCNC: 0.3 MG/DL (ref 0–1.2)
BUN BLDV-MCNC: 12 MG/DL (ref 6–23)
CALCIUM SERPL-MCNC: 9.1 MG/DL (ref 8.6–10.2)
CHLORIDE BLD-SCNC: 98 MMOL/L (ref 98–107)
CO2: 32 MMOL/L (ref 22–29)
CREAT SERPL-MCNC: 0.7 MG/DL (ref 0.5–1)
EOSINOPHILS ABSOLUTE: 0.06 E9/L (ref 0.05–0.5)
EOSINOPHILS RELATIVE PERCENT: 0.7 % (ref 0–6)
GFR AFRICAN AMERICAN: >60
GFR NON-AFRICAN AMERICAN: >60 ML/MIN/1.73
GLUCOSE BLD-MCNC: 110 MG/DL (ref 74–99)
HCT VFR BLD CALC: 37.1 % (ref 34–48)
HEMOGLOBIN: 11.2 G/DL (ref 11.5–15.5)
IMMATURE GRANULOCYTES #: 0.04 E9/L
IMMATURE GRANULOCYTES %: 0.5 % (ref 0–5)
LACTIC ACID, SEPSIS: 0.9 MMOL/L (ref 0.5–1.9)
LIPASE: 19 U/L (ref 13–60)
LYMPHOCYTES ABSOLUTE: 1.66 E9/L (ref 1.5–4)
LYMPHOCYTES RELATIVE PERCENT: 20.2 % (ref 20–42)
MCH RBC QN AUTO: 23.6 PG (ref 26–35)
MCHC RBC AUTO-ENTMCNC: 30.2 % (ref 32–34.5)
MCV RBC AUTO: 78.3 FL (ref 80–99.9)
MONOCYTES ABSOLUTE: 0.39 E9/L (ref 0.1–0.95)
MONOCYTES RELATIVE PERCENT: 4.7 % (ref 2–12)
NEUTROPHILS ABSOLUTE: 6.02 E9/L (ref 1.8–7.3)
NEUTROPHILS RELATIVE PERCENT: 73.3 % (ref 43–80)
PDW BLD-RTO: 16.9 FL (ref 11.5–15)
PLATELET # BLD: 201 E9/L (ref 130–450)
PMV BLD AUTO: 9.8 FL (ref 7–12)
POTASSIUM REFLEX MAGNESIUM: 3.7 MMOL/L (ref 3.5–5)
PRO-BNP: 262 PG/ML (ref 0–450)
PROCALCITONIN: 0.05 NG/ML (ref 0–0.08)
RBC # BLD: 4.74 E12/L (ref 3.5–5.5)
SARS-COV-2, NAAT: NOT DETECTED
SODIUM BLD-SCNC: 142 MMOL/L (ref 132–146)
TOTAL PROTEIN: 7.6 G/DL (ref 6.4–8.3)
TROPONIN, HIGH SENSITIVITY: 11 NG/L (ref 0–9)
TROPONIN, HIGH SENSITIVITY: 12 NG/L (ref 0–9)
WBC # BLD: 8.2 E9/L (ref 4.5–11.5)

## 2022-09-09 PROCEDURE — 71275 CT ANGIOGRAPHY CHEST: CPT

## 2022-09-09 PROCEDURE — 83880 ASSAY OF NATRIURETIC PEPTIDE: CPT

## 2022-09-09 PROCEDURE — 84145 PROCALCITONIN (PCT): CPT

## 2022-09-09 PROCEDURE — 6360000002 HC RX W HCPCS: Performed by: FAMILY MEDICINE

## 2022-09-09 PROCEDURE — 0202U NFCT DS 22 TRGT SARS-COV-2: CPT

## 2022-09-09 PROCEDURE — 6370000000 HC RX 637 (ALT 250 FOR IP)

## 2022-09-09 PROCEDURE — 87635 SARS-COV-2 COVID-19 AMP PRB: CPT

## 2022-09-09 PROCEDURE — 2060000000 HC ICU INTERMEDIATE R&B

## 2022-09-09 PROCEDURE — 80053 COMPREHEN METABOLIC PANEL: CPT

## 2022-09-09 PROCEDURE — 6370000000 HC RX 637 (ALT 250 FOR IP): Performed by: FAMILY MEDICINE

## 2022-09-09 PROCEDURE — 93005 ELECTROCARDIOGRAM TRACING: CPT | Performed by: PHYSICIAN ASSISTANT

## 2022-09-09 PROCEDURE — 2580000003 HC RX 258: Performed by: FAMILY MEDICINE

## 2022-09-09 PROCEDURE — 99285 EMERGENCY DEPT VISIT HI MDM: CPT

## 2022-09-09 PROCEDURE — 2580000003 HC RX 258

## 2022-09-09 PROCEDURE — 84484 ASSAY OF TROPONIN QUANT: CPT

## 2022-09-09 PROCEDURE — 83605 ASSAY OF LACTIC ACID: CPT

## 2022-09-09 PROCEDURE — 83690 ASSAY OF LIPASE: CPT

## 2022-09-09 PROCEDURE — 6360000004 HC RX CONTRAST MEDICATION: Performed by: RADIOLOGY

## 2022-09-09 PROCEDURE — 71046 X-RAY EXAM CHEST 2 VIEWS: CPT

## 2022-09-09 PROCEDURE — 99223 1ST HOSP IP/OBS HIGH 75: CPT | Performed by: FAMILY MEDICINE

## 2022-09-09 PROCEDURE — 85025 COMPLETE CBC W/AUTO DIFF WBC: CPT

## 2022-09-09 PROCEDURE — 6360000002 HC RX W HCPCS

## 2022-09-09 RX ORDER — DOXYCYCLINE HYCLATE 100 MG/1
100 CAPSULE ORAL ONCE
Status: COMPLETED | OUTPATIENT
Start: 2022-09-09 | End: 2022-09-09

## 2022-09-09 RX ORDER — POLYETHYLENE GLYCOL 3350 17 G/17G
17 POWDER, FOR SOLUTION ORAL DAILY PRN
Status: DISCONTINUED | OUTPATIENT
Start: 2022-09-09 | End: 2022-09-14 | Stop reason: HOSPADM

## 2022-09-09 RX ORDER — IPRATROPIUM BROMIDE AND ALBUTEROL SULFATE 2.5; .5 MG/3ML; MG/3ML
1 SOLUTION RESPIRATORY (INHALATION)
Status: DISCONTINUED | OUTPATIENT
Start: 2022-09-09 | End: 2022-09-14 | Stop reason: HOSPADM

## 2022-09-09 RX ORDER — ENOXAPARIN SODIUM 100 MG/ML
1 INJECTION SUBCUTANEOUS 2 TIMES DAILY
Status: DISCONTINUED | OUTPATIENT
Start: 2022-09-09 | End: 2022-09-10

## 2022-09-09 RX ORDER — ASPIRIN 81 MG/1
81 TABLET ORAL EVERY MORNING
Status: DISCONTINUED | OUTPATIENT
Start: 2022-09-10 | End: 2022-09-14 | Stop reason: HOSPADM

## 2022-09-09 RX ORDER — CALCIUM CARBONATE 500(1250)
500 TABLET ORAL EVERY MORNING
Status: DISCONTINUED | OUTPATIENT
Start: 2022-09-10 | End: 2022-09-14 | Stop reason: HOSPADM

## 2022-09-09 RX ORDER — ACETAMINOPHEN 325 MG/1
650 TABLET ORAL EVERY 6 HOURS PRN
Status: DISCONTINUED | OUTPATIENT
Start: 2022-09-09 | End: 2022-09-14 | Stop reason: HOSPADM

## 2022-09-09 RX ORDER — PANTOPRAZOLE SODIUM 40 MG/1
40 TABLET, DELAYED RELEASE ORAL
Status: DISCONTINUED | OUTPATIENT
Start: 2022-09-10 | End: 2022-09-14 | Stop reason: HOSPADM

## 2022-09-09 RX ORDER — METOPROLOL SUCCINATE 25 MG/1
12.5 TABLET, EXTENDED RELEASE ORAL DAILY
Status: DISCONTINUED | OUTPATIENT
Start: 2022-09-09 | End: 2022-09-14 | Stop reason: HOSPADM

## 2022-09-09 RX ORDER — DIPHENHYDRAMINE HCL 25 MG
50 TABLET ORAL ONCE
Status: COMPLETED | OUTPATIENT
Start: 2022-09-09 | End: 2022-09-09

## 2022-09-09 RX ORDER — ARFORMOTEROL TARTRATE 15 UG/2ML
15 SOLUTION RESPIRATORY (INHALATION) 2 TIMES DAILY
Refills: 2 | Status: DISCONTINUED | OUTPATIENT
Start: 2022-09-09 | End: 2022-09-14 | Stop reason: HOSPADM

## 2022-09-09 RX ORDER — OMEPRAZOLE 20 MG/1
20 CAPSULE, DELAYED RELEASE ORAL DAILY
COMMUNITY

## 2022-09-09 RX ORDER — LOSARTAN POTASSIUM AND HYDROCHLOROTHIAZIDE 12.5; 1 MG/1; MG/1
1 TABLET ORAL EVERY MORNING
Status: DISCONTINUED | OUTPATIENT
Start: 2022-09-10 | End: 2022-09-09 | Stop reason: CLARIF

## 2022-09-09 RX ORDER — SODIUM CHLORIDE 0.9 % (FLUSH) 0.9 %
5-40 SYRINGE (ML) INJECTION PRN
Status: DISCONTINUED | OUTPATIENT
Start: 2022-09-09 | End: 2022-09-14 | Stop reason: HOSPADM

## 2022-09-09 RX ORDER — AMLODIPINE BESYLATE 5 MG/1
5 TABLET ORAL DAILY
Status: DISCONTINUED | OUTPATIENT
Start: 2022-09-09 | End: 2022-09-14 | Stop reason: HOSPADM

## 2022-09-09 RX ORDER — DIAZEPAM 5 MG/1
10 TABLET ORAL NIGHTLY
Status: DISCONTINUED | OUTPATIENT
Start: 2022-09-09 | End: 2022-09-14 | Stop reason: HOSPADM

## 2022-09-09 RX ORDER — PREDNISONE 20 MG/1
50 TABLET ORAL EVERY 6 HOURS
Status: COMPLETED | OUTPATIENT
Start: 2022-09-09 | End: 2022-09-10

## 2022-09-09 RX ORDER — SODIUM CHLORIDE 9 MG/ML
INJECTION, SOLUTION INTRAVENOUS CONTINUOUS
Status: DISCONTINUED | OUTPATIENT
Start: 2022-09-09 | End: 2022-09-11

## 2022-09-09 RX ORDER — HYDROCHLOROTHIAZIDE 12.5 MG/1
12.5 TABLET ORAL DAILY
Status: DISCONTINUED | OUTPATIENT
Start: 2022-09-10 | End: 2022-09-14 | Stop reason: HOSPADM

## 2022-09-09 RX ORDER — BUDESONIDE 0.25 MG/2ML
250 INHALANT ORAL 2 TIMES DAILY
Status: DISCONTINUED | OUTPATIENT
Start: 2022-09-09 | End: 2022-09-10

## 2022-09-09 RX ORDER — ENOXAPARIN SODIUM 100 MG/ML
1 INJECTION SUBCUTANEOUS ONCE
Status: COMPLETED | OUTPATIENT
Start: 2022-09-09 | End: 2022-09-09

## 2022-09-09 RX ORDER — SODIUM CHLORIDE 0.9 % (FLUSH) 0.9 %
5-40 SYRINGE (ML) INJECTION EVERY 12 HOURS SCHEDULED
Status: DISCONTINUED | OUTPATIENT
Start: 2022-09-09 | End: 2022-09-14 | Stop reason: HOSPADM

## 2022-09-09 RX ORDER — LANOLIN ALCOHOL/MO/W.PET/CERES
3 CREAM (GRAM) TOPICAL NIGHTLY PRN
Status: DISCONTINUED | OUTPATIENT
Start: 2022-09-09 | End: 2022-09-14 | Stop reason: HOSPADM

## 2022-09-09 RX ORDER — SERTRALINE HYDROCHLORIDE 100 MG/1
100 TABLET, FILM COATED ORAL DAILY
Status: DISCONTINUED | OUTPATIENT
Start: 2022-09-10 | End: 2022-09-10 | Stop reason: SDUPTHER

## 2022-09-09 RX ORDER — ACETAMINOPHEN 650 MG/1
650 SUPPOSITORY RECTAL EVERY 6 HOURS PRN
Status: DISCONTINUED | OUTPATIENT
Start: 2022-09-09 | End: 2022-09-14 | Stop reason: HOSPADM

## 2022-09-09 RX ORDER — ATORVASTATIN CALCIUM 20 MG/1
20 TABLET, FILM COATED ORAL DAILY
Status: DISCONTINUED | OUTPATIENT
Start: 2022-09-09 | End: 2022-09-14 | Stop reason: HOSPADM

## 2022-09-09 RX ORDER — PROCHLORPERAZINE EDISYLATE 5 MG/ML
10 INJECTION INTRAMUSCULAR; INTRAVENOUS EVERY 6 HOURS PRN
Status: DISCONTINUED | OUTPATIENT
Start: 2022-09-09 | End: 2022-09-14 | Stop reason: HOSPADM

## 2022-09-09 RX ORDER — LOSARTAN POTASSIUM 50 MG/1
100 TABLET ORAL DAILY
Status: DISCONTINUED | OUTPATIENT
Start: 2022-09-10 | End: 2022-09-14 | Stop reason: HOSPADM

## 2022-09-09 RX ORDER — SODIUM CHLORIDE 9 MG/ML
INJECTION, SOLUTION INTRAVENOUS PRN
Status: DISCONTINUED | OUTPATIENT
Start: 2022-09-09 | End: 2022-09-14 | Stop reason: HOSPADM

## 2022-09-09 RX ADMIN — ENOXAPARIN SODIUM 80 MG: 100 INJECTION SUBCUTANEOUS at 20:44

## 2022-09-09 RX ADMIN — PREDNISONE 50 MG: 20 TABLET ORAL at 12:54

## 2022-09-09 RX ADMIN — AMLODIPINE BESYLATE 5 MG: 5 TABLET ORAL at 20:45

## 2022-09-09 RX ADMIN — METOPROLOL SUCCINATE 12.5 MG: 25 TABLET, EXTENDED RELEASE ORAL at 20:45

## 2022-09-09 RX ADMIN — DIAZEPAM 10 MG: 5 TABLET ORAL at 20:44

## 2022-09-09 RX ADMIN — SODIUM CHLORIDE, PRESERVATIVE FREE 10 ML: 5 INJECTION INTRAVENOUS at 20:44

## 2022-09-09 RX ADMIN — ATORVASTATIN CALCIUM 20 MG: 20 TABLET, FILM COATED ORAL at 20:45

## 2022-09-09 RX ADMIN — DOXYCYCLINE HYCLATE 100 MG: 100 CAPSULE ORAL at 17:57

## 2022-09-09 RX ADMIN — SODIUM CHLORIDE: 9 INJECTION, SOLUTION INTRAVENOUS at 17:58

## 2022-09-09 RX ADMIN — ENOXAPARIN SODIUM 80 MG: 100 INJECTION SUBCUTANEOUS at 17:57

## 2022-09-09 RX ADMIN — DIPHENHYDRAMINE HCL 50 MG: 25 TABLET ORAL at 12:54

## 2022-09-09 RX ADMIN — SODIUM CHLORIDE: 9 INJECTION, SOLUTION INTRAVENOUS at 20:12

## 2022-09-09 RX ADMIN — CEFTRIAXONE 1000 MG: 1 INJECTION, POWDER, FOR SOLUTION INTRAMUSCULAR; INTRAVENOUS at 17:57

## 2022-09-09 RX ADMIN — IOPAMIDOL 75 ML: 755 INJECTION, SOLUTION INTRAVENOUS at 14:04

## 2022-09-09 RX ADMIN — PREDNISONE 50 MG: 20 TABLET ORAL at 20:45

## 2022-09-09 ASSESSMENT — ENCOUNTER SYMPTOMS
PHOTOPHOBIA: 0
BACK PAIN: 0
EYE DISCHARGE: 0
COUGH: 1
SINUS PRESSURE: 0
NAUSEA: 0
VOMITING: 0
BLOOD IN STOOL: 0
ABDOMINAL PAIN: 0
CONSTIPATION: 0
DIARRHEA: 0
EYE REDNESS: 0
RHINORRHEA: 0
SORE THROAT: 1
SHORTNESS OF BREATH: 1
ABDOMINAL DISTENTION: 0
WHEEZING: 0

## 2022-09-09 ASSESSMENT — PAIN SCALES - GENERAL: PAINLEVEL_OUTOF10: 0

## 2022-09-09 ASSESSMENT — LIFESTYLE VARIABLES
HOW MANY STANDARD DRINKS CONTAINING ALCOHOL DO YOU HAVE ON A TYPICAL DAY: PATIENT DOES NOT DRINK
HOW OFTEN DO YOU HAVE A DRINK CONTAINING ALCOHOL: NEVER

## 2022-09-09 ASSESSMENT — PAIN - FUNCTIONAL ASSESSMENT: PAIN_FUNCTIONAL_ASSESSMENT: NONE - DENIES PAIN

## 2022-09-09 NOTE — ED PROVIDER NOTES
[facility]  Department of Emergency Medicine     Written by: Victoriano Rivera MD  Patient Name: Kalli Hilario  Attending Provider: Ashley Guzmán DO  Admit Date: 2022 12:20 PM  MRN: [id number]                   : 1945        Chief Complaint   Patient presents with    Hemoptysis    Shortness of Breath     Sx began 4 days ago. Wears 4L O2    - Chief complaint    Patient is a 76-year-old female with past medical history of COPD on 4 L home oxygen and hypertension presenting from Dr. Fide Carter office for hemoptysis and shortness of breath for 4 days. Patient reports feeling short of breath with productive cough of brown sputum for over a week now. Over the last 2 days the patient is reporting that she is developed a blood-tinged sputum and having worsening shortness of breath. The patient is also reporting headaches, lightheadedness, sore throat, and chills. The patient was evaluated at Dr. Fide Carter office and was prompted to come to the emergency room after they found her to have diffuse rales and she was saturating at 78% on 4 L. NP called about this patient prior to their arrival reports that she is not on any blood thinners and has a history of recurrent Pseudomonas pneumonia. Of note the patient has left lower extremity edema which has been ongoing since prior surgery and not worsening acutely. Patient denies fevers, chest pain, nausea, vomiting, blurry vision, diaphoresis, dizziness, syncope, falls, abdominal pain, dysuria, hematuria, increasing or decreasing urinary frequency, blood in stool, constipation, diarrhea, leg pain, leg swelling worse than baseline, recent travel, recent surgery, recent illness, or sick contacts. Review of Systems   Constitutional:  Positive for chills and fatigue. Negative for activity change and fever. HENT:  Positive for sore throat. Negative for congestion, postnasal drip, rhinorrhea and sinus pressure.     Eyes:  Negative for photophobia, discharge, redness and visual disturbance. Respiratory:  Positive for cough and shortness of breath. Negative for wheezing. Cardiovascular:  Negative for chest pain, palpitations and leg swelling. Gastrointestinal:  Negative for abdominal distention, abdominal pain, blood in stool, constipation, diarrhea, nausea and vomiting. Endocrine: Negative for cold intolerance and heat intolerance. Genitourinary:  Negative for decreased urine volume, difficulty urinating, dysuria, flank pain, frequency, hematuria and urgency. Musculoskeletal:  Negative for arthralgias, back pain, joint swelling and myalgias. Skin:  Negative for rash and wound. Allergic/Immunologic: Negative for immunocompromised state. Neurological:  Positive for light-headedness and headaches. Negative for dizziness, syncope, facial asymmetry, weakness and numbness. Hematological:  Does not bruise/bleed easily. Psychiatric/Behavioral:  Negative for behavioral problems and hallucinations. Physical Exam  Constitutional:       General: She is not in acute distress. Appearance: Normal appearance. She is not ill-appearing or toxic-appearing. HENT:      Head: Normocephalic and atraumatic. Right Ear: Hearing normal.      Left Ear: Hearing normal.      Nose: Nose normal.      Mouth/Throat:      Lips: Pink. Mouth: Mucous membranes are moist.      Pharynx: Oropharynx is clear. Eyes:      Extraocular Movements: Extraocular movements intact. Conjunctiva/sclera: Conjunctivae normal.      Pupils: Pupils are equal, round, and reactive to light. Cardiovascular:      Rate and Rhythm: Normal rate and regular rhythm. Pulses: Normal pulses. Radial pulses are 2+ on the right side and 2+ on the left side. Dorsalis pedis pulses are 2+ on the right side and 2+ on the left side. Posterior tibial pulses are 2+ on the right side and 2+ on the left side.       Heart sounds: S1 normal and S2 normal. Comments: Patient has left lower extremity +1 pitting edema just above the ankle  Pulmonary:      Effort: Pulmonary effort is normal. No tachypnea, accessory muscle usage or respiratory distress. Breath sounds: Normal air entry. Examination of the left-lower field reveals decreased breath sounds, wheezing and rales. Decreased breath sounds, wheezing and rales present. No rhonchi. Abdominal:      General: Abdomen is flat. Bowel sounds are normal. There is no distension. Palpations: Abdomen is soft. There is no fluid wave or mass. Tenderness: There is no abdominal tenderness. There is no right CVA tenderness, left CVA tenderness or guarding. Musculoskeletal:         General: No swelling or tenderness. Normal range of motion. Cervical back: Normal range of motion and neck supple. Right lower leg: No edema. Left lower le+ Pitting Edema present. Skin:     General: Skin is warm and dry. Capillary Refill: Capillary refill takes less than 2 seconds. Findings: No bruising, lesion or rash. Neurological:      General: No focal deficit present. Mental Status: She is alert and oriented to person, place, and time. Mental status is at baseline. Motor: No weakness. Psychiatric:         Attention and Perception: Attention normal.         Mood and Affect: Mood and affect normal.         Behavior: Behavior is cooperative.       EKG Interpretation    Interpreted by emergency department physician    Rhythm: normal sinus   Rate: normal  Axis: left  Ectopy: none  Conduction: Qtc 508  ST Segments: no acute change  T Waves: no acute change  Q Waves: none    Clinical Impression: no acute changes when compared with prior on 22    Shanice Curtis MD    Procedures       MDM  Number of Diagnoses or Management Options  Pneumonia due to infectious organism, unspecified laterality, unspecified part of lung  Pulmonary embolism, unspecified chronicity, unspecified pulmonary embolism type, unspecified whether acute cor pulmonale present [facility])  Diagnosis management comments: Patient is a 76-year-old female with past medical history of COPD on 4 L home oxygen and hypertension presenting from Dr. Donna Spaulding office for hemoptysis and shortness of breath for 4 days. At the time of initial examination the patient is vitally stable and currently on 6 L nasal cannula saturating at 100%. We will move her down to her baseline 4 L and reevaluate. On reevaluation the patient saturating well at 96% on 4 L. Concern for pneumonia and PE in this patient. EKG as above. Chest x-ray showed pulmonary edema likely interstitial fibrosis with possible component of fibrotic lower lobe pneumonia as well. COVID is negative. CBC, CMP, proBNP, lipase, lactic acid were all reassuring. Initial troponin is 12. Repeat troponin delta is -1. Patient was premedicated prior to her imaging or documented dye allergy. CTA chest shows small partially occlusive pulmonary emboli in the lateral basal segment of the left lower lobe. Right upper lobe infiltrate likely infectious/inflammatory. Mucus opacification of the right lower lobe bronchi with mildly prominent mediastinal and hilar lymphadenopathy which is likely reactive and chronic. Severe emphysematous changes noted. Evidence of cholelithiasis seen. Patient was reevaluated multiple times while in the emergency room. She remains vitally stable during all encounters. She was explained the results of her blood work and imaging as a came back and has demonstrated good understanding of the results via found including evidence of pulmonary embolism and pneumonia. Patient understands need for admission and is agreeable. Patient was started on Lovenox and antibiotics for community-acquired pneumonia. Case was discussed with Dr. Terra Chung, hospitalist, who will admit the patient to intermediate care.   At time of admission, the patient is vitally stable, demonstrates good understanding of their condition, and has no questions. ED Course as of 09/10/22 1352   Fri Sep 09, 2022   [id number] Call out to Dr. Hannah Hale, will call me back when he is available. [VG]   1600 EKG: This EKG is signed and interpreted by the EP. Time: 14:31  Rate: 78  Rhythm: Sinus  Interpretation: NSR  Comparison: stable as compared to patient's most recent EKG   [CF]      ED Course User Index  [CF] January Wu DO  [VG] Kuldeep Dash MD          --------------------------------------------- PAST HISTORY ---------------------------------------------  Past Medical History:  has a past medical history of COPD (chronic obstructive pulmonary disease) ([facility] [address] Hypertension, Migraines, MRSA (methicillin resistant staph aureus) culture positive, Pneumonia, and Ulcer. 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 Social History:  reports that she quit smoking about 15 years ago. Her smoking use included cigarettes. She started smoking about 50 years ago. She has a 35.00 pack-year smoking history. She has never used smokeless tobacco. She reports that she does not drink alcohol and does not use drugs. Family History: family history includes Cancer in her mother and sister; Heart Disease in her mother; Other in her father. The patients home medications have been reviewed.     Allergies: Dye [iodides]    -------------------------------------------------- RESULTS -------------------------------------------------    LABS:  Results for orders placed or performed during the hospital encounter of 09/09/22   COVID-19, Rapid    Specimen: Nasopharyngeal Swab   Result Value Ref Range    SARS-CoV-2, NAAT Not Detected Not Detected   Respiratory Panel, Molecular, with COVID-19 (Restricted: peds pts or suitable admitted adults)    Specimen: Comprehensive Metabolic Panel w/ Reflex to MG   Result Value Ref Range    Sodium 142 132 - 146 mmol/L    Potassium reflex Magnesium 3.7 3.5 - 5.0 mmol/L    Chloride 98 98 - 107 mmol/L    CO2 32 (H) 22 - 29 mmol/L    Anion Gap 12 7 - 16 mmol/L    Glucose 110 (H) 74 - 99 mg/dL    BUN 12 6 - 23 mg/dL    Creatinine 0.7 0.5 - 1.0 mg/dL    GFR Non-African American >60 >=60 mL/min/1.73    GFR African American >60     Calcium 9.1 8.6 - 10.2 mg/dL    Total Protein 7.6 6.4 - 8.3 g/dL    Albumin 4.4 3.5 - 5.2 g/dL    Total Bilirubin 0.3 0.0 - 1.2 mg/dL    Alkaline Phosphatase 79 35 - 104 U/L    ALT 6 0 - 32 U/L    AST 11 0 - 31 U/L   Troponin   Result Value Ref Range    Troponin, High Sensitivity 12 (H) 0 - 9 ng/L   Brain Natriuretic Peptide   Result Value Ref Range    Pro- 0 - 450 pg/mL   Lipase   Result Value Ref Range    Lipase 19 13 - 60 U/L   Lactate, Sepsis   Result Value Ref Range    Lactic Acid, Sepsis 0.9 0.5 - 1.9 mmol/L   Troponin   Result Value Ref Range    Troponin, High Sensitivity 11 (H) 0 - 9 ng/L   Procalcitonin   Result Value Ref Range    Procalcitonin 0.05 0.00 - 0.08 ng/mL   CBC with Auto Differential   Result Value Ref Range    WBC 3.5 (L) 4.5 - 11.5 E9/L    RBC 4.53 3.50 - 5.50 E12/L    Hemoglobin 10.6 (L) 11.5 - 15.5 g/dL    Hematocrit 35.7 34.0 - 48.0 %    MCV 78.8 (L) 80.0 - 99.9 fL    MCH 23.4 (L) 26.0 - 35.0 pg    MCHC 29.7 (L) 32.0 - 34.5 %    RDW 17.0 (H) 11.5 - 15.0 fL    Platelets 664 738 - 129 E9/L    MPV 10.0 7.0 - 12.0 fL    Neutrophils % 74.1 43.0 - 80.0 %    Immature Granulocytes % 0.6 0.0 - 5.0 %    Lymphocytes % 23.8 20.0 - 42.0 %    Monocytes % 1.2 (L) 2.0 - 12.0 %    Eosinophils % 0.0 0.0 - 6.0 %    Basophils % 0.3 0.0 - 2.0 %    Neutrophils Absolute 2.56 1.80 - 7.30 E9/L    Immature Granulocytes # 0.02 E9/L    Lymphocytes Absolute 0.82 (L) 1.50 - 4.00 E9/L    Monocytes Absolute 0.04 (L) 0.10 - 0.95 E9/L    Eosinophils Absolute 0.00 (L) 0.05 - 0.50 E9/L    Basophils Absolute 0. 01 0.00 - 0.20 E9/L   Comprehensive Metabolic Panel w/ Reflex to MG   Result Value Ref Range    Sodium 140 132 - 146 mmol/L    Potassium reflex Magnesium 3.5 3.5 - 5.0 mmol/L    Chloride 100 98 - 107 mmol/L    CO2 31 (H) 22 - 29 mmol/L    Anion Gap 9 7 - 16 mmol/L    Glucose 133 (H) 74 - 99 mg/dL    BUN 13 6 - 23 mg/dL    Creatinine 0.6 0.5 - 1.0 mg/dL    GFR Non-African American >60 >=60 mL/min/1.73    GFR African American >60     Calcium 8.7 8.6 - 10.2 mg/dL    Total Protein 7.1 6.4 - 8.3 g/dL    Albumin 4.0 3.5 - 5.2 g/dL    Total Bilirubin 0.3 0.0 - 1.2 mg/dL    Alkaline Phosphatase 67 35 - 104 U/L    ALT 5 0 - 32 U/L    AST 10 0 - 31 U/L   Lipid, Fasting   Result Value Ref Range    Cholesterol, Fasting 129 0 - 199 mg/dL    Triglyceride, Fasting 89 0 - 149 mg/dL    HDL 54 >40 mg/dL    LDL Calculated 57 0 - 99 mg/dL    VLDL Cholesterol Calculated 18 mg/dL   Magnesium   Result Value Ref Range    Magnesium 2.0 1.6 - 2.6 mg/dL   EKG 12 Lead   Result Value Ref Range    Ventricular Rate 78 BPM    Atrial Rate 78 BPM    P-R Interval 156 ms    QRS Duration 76 ms    Q-T Interval 446 ms    QTc Calculation (Bazett) 508 ms    P Axis 88 degrees    R Axis -40 degrees    T Axis 19 degrees       RADIOLOGY:  CTA PULMONARY W CONTRAST   Final Result   1. Small, partially-occlusive PULMONARY EMBOLI in the lateral basal segment   of the left lower lobe. 2. Right lower lobe infiltrate likely INFECTIOUS/INFLAMMATORY. 3. Mucus opacification of the right lower lobe bronchi. 4. Mildly prominent mediastinal and hilar lymphadenopathy, which is likely   reactive and is chronic. Enlarged lymph nodes have been noted on multiple   prior CTs dating back to at least 10/30/2017.   5. Severe emphysematous changes. 6. Cholelithiasis. RECOMMENDATIONS:   Unavailable         XR CHEST (2 VW)   Final Result   Pulmonary emphysema and likely interstitial fibrosis with a possible   component of chronic right lower lobe pneumonia as well. ------------------------- NURSING NOTES AND VITALS REVIEWED ---------------------------  Date / Time Roomed:  9/9/2022 12:20 PM  ED Bed Assignment:  04/04    The nursing notes within the ED encounter and vital signs as below have been reviewed. Patient Vitals for the past 24 hrs:   BP Temp Temp src Pulse Resp SpO2 Weight   09/10/22 0949 -- -- -- 72 18 99 % --   09/10/22 0725 133/62 98.2 °F (36.8 °C) Oral 70 18 96 % --   09/10/22 0300 (!) 116/56 97.6 °F (36.4 °C) Oral 62 20 95 % --   09/10/22 0052 -- -- -- -- -- -- 180 lb (81.6 kg)   09/09/22 2000 -- -- -- -- -- 93 % --   09/1945 128/66 97.8 °F (36.6 °C) Oral 94 22 92 % 178 lb 6.4 oz (80.9 kg)   09/09/22 1807 139/65 98.1 °F (36.7 °C) Oral 75 15 98 % --   09/09/22 1442 (!) 115/49 -- -- 73 14 99 % --       Oxygen Saturation Interpretation: Normal    ------------------------------------------ PROGRESS NOTES ------------------------------------------  Re-evaluation(s):  Patient was reevaluated multiple times while in the emergency room. She remains vitally stable during all encounters. She was explained the results of her blood work and imaging as a came back and has demonstrated good understanding of the results via found including evidence of pulmonary embolism and pneumonia. Patient understands need for admission and is agreeable. Counseling:  I have spoken with the patient and spouse  and discussed todays results, in addition to providing specific details for the plan of care and counseling regarding the diagnosis and prognosis. Their questions are answered at this time and they are agreeable with the plan of admission.    --------------------------------- ADDITIONAL PROVIDER NOTES ---------------------------------  Consultations:  Time: 1657. Spoke with Dr. Kellen Sharma. Discussed case. They will admit the patient.   This patient's ED course included: a personal history and physicial examination, re-evaluation prior to disposition, multiple bedside re-evaluations, IV medications, cardiac monitoring, and continuous pulse oximetry    This patient has remained hemodynamically stable during their ED course. Diagnosis:  1. Pulmonary embolism, unspecified chronicity, unspecified pulmonary embolism type, unspecified whether acute cor pulmonale present ([facility] [address]    2. Pneumonia due to infectious organism, unspecified laterality, unspecified part of lung        Disposition:  Patient's disposition: Admit to telemetry  Patient's condition is stable. Patient was seen and evaluated by myself and my attending Isreal Irby DO. Assessment and Plan discussed with attending provider, please see attestation for final plan of care.      MD Ashtyn Echevarria MD  Resident  09/10/22 4795

## 2022-09-09 NOTE — H&P
TONSILLECTOMY         Medications Prior to Admission:    Prior to Admission medications    Medication Sig Start Date End Date Taking?  Authorizing Provider   INCRUSE ELLIPTA 62.5 MCG/INH AEPB Inhale 1 puff into the lungs daily INHALE ONE PUFF BY MOUTH DAILY 8/15/22   MISHA Dave NP   PROAIR  (45 Base) MCG/ACT inhaler Inhale 2 puffs into the lungs every 6 hours as needed for Wheezing 8/15/22   MISHA Dave NP   atorvastatin (LIPITOR) 20 MG tablet Take 1 tablet by mouth daily 6/14/22   Robin MD Cr   tiotropium (SPIRIVA RESPIMAT) 2.5 MCG/ACT AERS inhaler Inhale 2 puffs into the lungs daily 6/2/22   MISHA Ramos CNP   alendronate (FOSAMAX) 70 MG tablet TAKE ONE TABLET BY MOUTH ONCE A WEEK 2/7/22   Historical Provider, MD   Cholecalciferol 50 MCG (2000 UT) CAPS Take 2,000 Units by mouth daily  Patient not taking: Reported on 6/2/2022    Historical Provider, MD   omeprazole (PRILOSEC) 20 MG delayed release capsule TAKE ONE CAPSULE BY MOUTH EVERY DAY  Patient not taking: Reported on 6/2/2022 3/9/22   Historical Provider, MD   Fluticasone Furoate-Vilanterol (BREO ELLIPTA IN) Inhale into the lungs daily  Patient not taking: Reported on 6/2/2022    Historical Provider, MD   albuterol (PROVENTIL) (2.5 MG/3ML) 0.083% nebulizer solution USE 3ML IN NEBULIZER EVERY 4 HOURS AS NEEDED 6/23/19   Historical Provider, MD   diazepam (VALIUM) 10 MG tablet TAKE ONE TABLET BY MOUTH AT BEDTIME AS NEEDED 8/20/19   Historical Provider, MD   sertraline (ZOLOFT) 100 MG tablet TAKE ONE TABLET BY MOUTH AT BEDTIME 8/5/19   Historical Provider, MD   amLODIPine (NORVASC) 5 MG tablet Take 5 mg by mouth daily    Historical Provider, MD   formoterol (PERFOROMIST) 20 MCG/2ML nebulizer solution Take 2 mLs by nebulization 2 times daily 9/14/18   Rose Abdullahi MD   ipratropium-albuterol (DUONEB) 0.5-2.5 (3) MG/3ML SOLN nebulizer solution Inhale 3 mLs into the lungs 4 times daily 9/14/18   Rose Abdullahi S2 and no carotid bruits  Abdomen: soft, non-tender, non-distended, normal bowel sounds, no masses or organomegaly  Extremities: no cyanosis, no clubbing and no edema  Neurologic: no cranial nerve deficit and speech normal        LABS:  Recent Labs     09/09/22  1249      K 3.7   CL 98   CO2 32*   BUN 12   CREATININE 0.7   GLUCOSE 110*   CALCIUM 9.1       Recent Labs     09/09/22  1249   WBC 8.2   RBC 4.74   HGB 11.2*   HCT 37.1   MCV 78.3*   MCH 23.6*   MCHC 30.2*   RDW 16.9*      MPV 9.8     Radiology:   CTA PULMONARY W CONTRAST   Final Result   1. Small, partially-occlusive PULMONARY EMBOLI in the lateral basal segment   of the left lower lobe. 2. Right lower lobe infiltrate likely INFECTIOUS/INFLAMMATORY. 3. Mucus opacification of the right lower lobe bronchi. 4. Mildly prominent mediastinal and hilar lymphadenopathy, which is likely   reactive and is chronic. Enlarged lymph nodes have been noted on multiple   prior CTs dating back to at least 10/30/2017.   5. Severe emphysematous changes. 6. Cholelithiasis. RECOMMENDATIONS:   Unavailable         XR CHEST (2 VW)   Final Result   Pulmonary emphysema and likely interstitial fibrosis with a possible   component of chronic right lower lobe pneumonia as well. EKG:   Normal sinus rhythm  Left axis deviation  Pulmonary disease pattern  Abnormal ECG  When compared with ECG of 22-JUL-2021 13:51,  premature ventricular complexes are no longer present  Nonspecific T wave abnormality no longer evident in Lateral leads  QTC was 508    ASSESSMENT:      Principal Problem:    Acute pulmonary embolism without acute cor pulmonale, unspecified pulmonary embolism type (HCC)  Active Problems:    PE (pulmonary thromboembolism) ([facility] [address]  Resolved Problems:    * No resolved hospital problems. *      PLAN:    1. Left lower basal pulmonary embolism -Lovenox 1 mg/kg every 12 hours. Consult with pulmonary, Dr. Fei Godinez. Monitor labs.   Treat accordingly. 2.  Acute on chronic respiratory failure with hypoxia -continue oxygen support. Currently patient is on her baseline of 4L. Continue nebs. Patient has been given prednisone bolus of 50 mg every 6 hours for 3 doses. 3.  Right lower lobe infiltrate -patient was given Rocephin and doxycycline. Check a procalcitonin. If elevated will continue antibiotics. Patient gives history of Pseudomonas pneumonia in March 2022. Sputum gram stain and culture and a respiratory panel were ordered. COVID-19 test today negative. 4.  COPD - continue breathing treatments and medications. Monitor O2 sats. ABG done in the emergency room showed a pH of 7.39; PCO2 of 50.4; PO2 of 101; HCO3 30. This was done on noninvasive ventilation. Currently patient is on nasal cannula. We will continue same. 5.  GERD -symptoms are controlled with the PPI. Continue same. 6.  Hypertension - Continue meds. Monitor vitals and adjust accordingly  7. Hyperlipidemia -controlled with atorvastatin 20 mg daily. Continue same. Check a fasting lipid in the morning. 8.  Osteopenia/osteoporosis -continue Fosamax  9. Anxiety/depression -symptoms controlled on Zoloft. Continue same. Code Status: Full  DVT prophylaxis: Lovenox    Total care time: 40 minutes    NOTE: This report was transcribed using voice recognition software. Every effort was made to ensure accuracy; however, inadvertent computerized transcription errors may be present.     Electronically signed by Anai Finn MD on 9/9/2022 at 5:37 PM

## 2022-09-09 NOTE — ED NOTES
Department of Emergency Medicine    FIRST PROVIDER TRIAGE NOTE             Independent MLP           9/9/22  12:18 PM EDT    Date of Encounter: 9/9/22   MRN: [id number]    Vitals:    09/09/22 1214 09/09/22 1216   BP:  133/88   Pulse:  (!) 104   Resp:  24   Temp:  97.4 °F (36.3 °C)   TempSrc:  Temporal   SpO2: (!) 82% (!) 80%   Weight:  180 lb (81.6 kg)   Height:  5' 5\" (1.651 m)      HPI: Kalli Hilario is a 65 y.o. female who presents to the ED for Hemoptysis and Shortness of Breath (Sx began 4 days ago. Wears 4L O2)     ROS: Negative for cp or sob. Physical Exam:   Gen Appearance/Constitutional: alert  CV: tachycardia  Pulm: abnormal breath sounds auscultated  80% on 4L (home O2 tank)  96% on 6L      Initial Plan of Care: All treatment areas with department are currently occupied. Plan to order/Initiate the following while awaiting opening in ED: labs, EKG, and imaging studies.     Initial Plan of Care: Initiate Treatment-Testing, Proceed toTreatment Area When Bed Available for ED Attending/MLP to Continue Care    Electronically signed by Jarvis Palma PA-C   DD: 9/9/22       Jarvis Palma PA-C  09/09/22 8217

## 2022-09-10 ENCOUNTER — APPOINTMENT (OUTPATIENT)
Dept: ULTRASOUND IMAGING | Age: 77
DRG: 177 | End: 2022-09-10
Payer: MEDICARE

## 2022-09-10 LAB
ADENOVIRUS BY PCR: NOT DETECTED
ALBUMIN SERPL-MCNC: 4 G/DL (ref 3.5–5.2)
ALP BLD-CCNC: 67 U/L (ref 35–104)
ALT SERPL-CCNC: 5 U/L (ref 0–32)
ANION GAP SERPL CALCULATED.3IONS-SCNC: 9 MMOL/L (ref 7–16)
AST SERPL-CCNC: 10 U/L (ref 0–31)
BASOPHILS ABSOLUTE: 0.01 E9/L (ref 0–0.2)
BASOPHILS RELATIVE PERCENT: 0.3 % (ref 0–2)
BILIRUB SERPL-MCNC: 0.3 MG/DL (ref 0–1.2)
BORDETELLA PARAPERTUSSIS BY PCR: NOT DETECTED
BORDETELLA PERTUSSIS BY PCR: NOT DETECTED
BUN BLDV-MCNC: 13 MG/DL (ref 6–23)
CALCIUM SERPL-MCNC: 8.7 MG/DL (ref 8.6–10.2)
CHLAMYDOPHILIA PNEUMONIAE BY PCR: NOT DETECTED
CHLORIDE BLD-SCNC: 100 MMOL/L (ref 98–107)
CHOLESTEROL, FASTING: 129 MG/DL (ref 0–199)
CO2: 31 MMOL/L (ref 22–29)
CORONAVIRUS 229E BY PCR: NOT DETECTED
CORONAVIRUS HKU1 BY PCR: NOT DETECTED
CORONAVIRUS NL63 BY PCR: NOT DETECTED
CORONAVIRUS OC43 BY PCR: NOT DETECTED
CREAT SERPL-MCNC: 0.6 MG/DL (ref 0.5–1)
D DIMER: <200 NG/ML DDU
EOSINOPHILS ABSOLUTE: 0 E9/L (ref 0.05–0.5)
EOSINOPHILS RELATIVE PERCENT: 0 % (ref 0–6)
GFR AFRICAN AMERICAN: >60
GFR NON-AFRICAN AMERICAN: >60 ML/MIN/1.73
GLUCOSE BLD-MCNC: 133 MG/DL (ref 74–99)
HCT VFR BLD CALC: 35.7 % (ref 34–48)
HDLC SERPL-MCNC: 54 MG/DL
HEMOGLOBIN: 10.6 G/DL (ref 11.5–15.5)
HUMAN METAPNEUMOVIRUS BY PCR: NOT DETECTED
HUMAN RHINOVIRUS/ENTEROVIRUS BY PCR: NOT DETECTED
IMMATURE GRANULOCYTES #: 0.02 E9/L
IMMATURE GRANULOCYTES %: 0.6 % (ref 0–5)
INFLUENZA A BY PCR: NOT DETECTED
INFLUENZA B BY PCR: NOT DETECTED
LDL CHOLESTEROL CALCULATED: 57 MG/DL (ref 0–99)
LYMPHOCYTES ABSOLUTE: 0.82 E9/L (ref 1.5–4)
LYMPHOCYTES RELATIVE PERCENT: 23.8 % (ref 20–42)
MAGNESIUM: 2 MG/DL (ref 1.6–2.6)
MCH RBC QN AUTO: 23.4 PG (ref 26–35)
MCHC RBC AUTO-ENTMCNC: 29.7 % (ref 32–34.5)
MCV RBC AUTO: 78.8 FL (ref 80–99.9)
MONOCYTES ABSOLUTE: 0.04 E9/L (ref 0.1–0.95)
MONOCYTES RELATIVE PERCENT: 1.2 % (ref 2–12)
MYCOPLASMA PNEUMONIAE BY PCR: NOT DETECTED
NEUTROPHILS ABSOLUTE: 2.56 E9/L (ref 1.8–7.3)
NEUTROPHILS RELATIVE PERCENT: 74.1 % (ref 43–80)
PARAINFLUENZA VIRUS 1 BY PCR: NOT DETECTED
PARAINFLUENZA VIRUS 2 BY PCR: NOT DETECTED
PARAINFLUENZA VIRUS 3 BY PCR: NOT DETECTED
PARAINFLUENZA VIRUS 4 BY PCR: NOT DETECTED
PDW BLD-RTO: 17 FL (ref 11.5–15)
PLATELET # BLD: 185 E9/L (ref 130–450)
PMV BLD AUTO: 10 FL (ref 7–12)
POTASSIUM REFLEX MAGNESIUM: 3.5 MMOL/L (ref 3.5–5)
RBC # BLD: 4.53 E12/L (ref 3.5–5.5)
RESPIRATORY SYNCYTIAL VIRUS BY PCR: NOT DETECTED
SARS-COV-2, PCR: NOT DETECTED
SODIUM BLD-SCNC: 140 MMOL/L (ref 132–146)
TOTAL PROTEIN: 7.1 G/DL (ref 6.4–8.3)
TRIGLYCERIDE, FASTING: 89 MG/DL (ref 0–149)
VLDLC SERPL CALC-MCNC: 18 MG/DL
WBC # BLD: 3.5 E9/L (ref 4.5–11.5)

## 2022-09-10 PROCEDURE — 6370000000 HC RX 637 (ALT 250 FOR IP)

## 2022-09-10 PROCEDURE — 6360000002 HC RX W HCPCS: Performed by: FAMILY MEDICINE

## 2022-09-10 PROCEDURE — 36415 COLL VENOUS BLD VENIPUNCTURE: CPT

## 2022-09-10 PROCEDURE — 87186 SC STD MICRODIL/AGAR DIL: CPT

## 2022-09-10 PROCEDURE — 2700000000 HC OXYGEN THERAPY PER DAY

## 2022-09-10 PROCEDURE — 94640 AIRWAY INHALATION TREATMENT: CPT

## 2022-09-10 PROCEDURE — 2060000000 HC ICU INTERMEDIATE R&B

## 2022-09-10 PROCEDURE — 6370000000 HC RX 637 (ALT 250 FOR IP): Performed by: FAMILY MEDICINE

## 2022-09-10 PROCEDURE — 99232 SBSQ HOSP IP/OBS MODERATE 35: CPT | Performed by: PHYSICIAN ASSISTANT

## 2022-09-10 PROCEDURE — 80061 LIPID PANEL: CPT

## 2022-09-10 PROCEDURE — 93970 EXTREMITY STUDY: CPT

## 2022-09-10 PROCEDURE — 87205 SMEAR GRAM STAIN: CPT

## 2022-09-10 PROCEDURE — 6360000002 HC RX W HCPCS: Performed by: INTERNAL MEDICINE

## 2022-09-10 PROCEDURE — 87449 NOS EACH ORGANISM AG IA: CPT

## 2022-09-10 PROCEDURE — 85025 COMPLETE CBC W/AUTO DIFF WBC: CPT

## 2022-09-10 PROCEDURE — 87206 SMEAR FLUORESCENT/ACID STAI: CPT

## 2022-09-10 PROCEDURE — 6360000002 HC RX W HCPCS

## 2022-09-10 PROCEDURE — 87077 CULTURE AEROBIC IDENTIFY: CPT

## 2022-09-10 PROCEDURE — 94667 MNPJ CHEST WALL 1ST: CPT

## 2022-09-10 PROCEDURE — 83735 ASSAY OF MAGNESIUM: CPT

## 2022-09-10 PROCEDURE — 2580000003 HC RX 258: Performed by: INTERNAL MEDICINE

## 2022-09-10 PROCEDURE — 85378 FIBRIN DEGRADE SEMIQUANT: CPT

## 2022-09-10 PROCEDURE — 2580000003 HC RX 258: Performed by: FAMILY MEDICINE

## 2022-09-10 PROCEDURE — 80053 COMPREHEN METABOLIC PANEL: CPT

## 2022-09-10 PROCEDURE — 87070 CULTURE OTHR SPECIMN AEROBIC: CPT

## 2022-09-10 RX ORDER — SERTRALINE HYDROCHLORIDE 100 MG/1
100 TABLET, FILM COATED ORAL DAILY
Status: DISCONTINUED | OUTPATIENT
Start: 2022-09-11 | End: 2022-09-14 | Stop reason: HOSPADM

## 2022-09-10 RX ORDER — BUDESONIDE 0.5 MG/2ML
1000 INHALANT ORAL 2 TIMES DAILY
Status: DISCONTINUED | OUTPATIENT
Start: 2022-09-10 | End: 2022-09-14 | Stop reason: HOSPADM

## 2022-09-10 RX ADMIN — SERTRALINE 100 MG: 100 TABLET, FILM COATED ORAL at 08:51

## 2022-09-10 RX ADMIN — ARFORMOTEROL TARTRATE 15 MCG: 15 SOLUTION RESPIRATORY (INHALATION) at 19:57

## 2022-09-10 RX ADMIN — SODIUM CHLORIDE: 9 INJECTION, SOLUTION INTRAVENOUS at 12:09

## 2022-09-10 RX ADMIN — LOSARTAN POTASSIUM 100 MG: 50 TABLET, FILM COATED ORAL at 08:51

## 2022-09-10 RX ADMIN — IPRATROPIUM BROMIDE AND ALBUTEROL SULFATE 1 AMPULE: .5; 2.5 SOLUTION RESPIRATORY (INHALATION) at 19:57

## 2022-09-10 RX ADMIN — ARFORMOTEROL TARTRATE 15 MCG: 15 SOLUTION RESPIRATORY (INHALATION) at 09:46

## 2022-09-10 RX ADMIN — ASPIRIN 81 MG: 81 TABLET, COATED ORAL at 21:39

## 2022-09-10 RX ADMIN — IPRATROPIUM BROMIDE AND ALBUTEROL SULFATE 1 AMPULE: .5; 2.5 SOLUTION RESPIRATORY (INHALATION) at 16:54

## 2022-09-10 RX ADMIN — SODIUM CHLORIDE: 9 INJECTION, SOLUTION INTRAVENOUS at 03:16

## 2022-09-10 RX ADMIN — AMLODIPINE BESYLATE 5 MG: 5 TABLET ORAL at 08:51

## 2022-09-10 RX ADMIN — SODIUM CHLORIDE, PRESERVATIVE FREE 10 ML: 5 INJECTION INTRAVENOUS at 08:51

## 2022-09-10 RX ADMIN — ASPIRIN 81 MG: 81 TABLET, COATED ORAL at 08:51

## 2022-09-10 RX ADMIN — MEROPENEM 1000 MG: 1 INJECTION, POWDER, FOR SOLUTION INTRAVENOUS at 17:08

## 2022-09-10 RX ADMIN — METOPROLOL SUCCINATE 12.5 MG: 25 TABLET, EXTENDED RELEASE ORAL at 08:51

## 2022-09-10 RX ADMIN — HYDROCHLOROTHIAZIDE 12.5 MG: 12.5 TABLET ORAL at 08:51

## 2022-09-10 RX ADMIN — DIAZEPAM 10 MG: 5 TABLET ORAL at 21:39

## 2022-09-10 RX ADMIN — APIXABAN 10 MG: 5 TABLET, FILM COATED ORAL at 21:39

## 2022-09-10 RX ADMIN — PANTOPRAZOLE SODIUM 40 MG: 40 TABLET, DELAYED RELEASE ORAL at 07:45

## 2022-09-10 RX ADMIN — PREDNISONE 50 MG: 20 TABLET ORAL at 00:50

## 2022-09-10 RX ADMIN — BUDESONIDE 1000 MCG: 0.5 SUSPENSION RESPIRATORY (INHALATION) at 19:57

## 2022-09-10 RX ADMIN — CALCIUM 500 MG: 500 TABLET ORAL at 08:51

## 2022-09-10 RX ADMIN — ENOXAPARIN SODIUM 80 MG: 100 INJECTION SUBCUTANEOUS at 08:50

## 2022-09-10 RX ADMIN — IPRATROPIUM BROMIDE AND ALBUTEROL SULFATE 1 AMPULE: .5; 2.5 SOLUTION RESPIRATORY (INHALATION) at 09:46

## 2022-09-10 RX ADMIN — SODIUM CHLORIDE, PRESERVATIVE FREE 10 ML: 5 INJECTION INTRAVENOUS at 13:27

## 2022-09-10 RX ADMIN — BUDESONIDE 250 MCG: 0.25 SUSPENSION RESPIRATORY (INHALATION) at 09:46

## 2022-09-10 ASSESSMENT — PAIN SCALES - GENERAL
PAINLEVEL_OUTOF10: 0

## 2022-09-10 NOTE — PROGRESS NOTES
[address] Progress Note    Admitting Date and Time: 9/9/2022 12:20 PM  Admit Dx: PE (pulmonary thromboembolism) ([facility] [address] [I26.99]  Pneumonia due to infectious organism, unspecified laterality, unspecified part of lung [J18.9]  Acute pulmonary embolism without acute cor pulmonale, unspecified pulmonary embolism type ([address] [I26.99]  Pulmonary embolism, unspecified chronicity, unspecified pulmonary embolism type, unspecified whether acute cor pulmonale present ([address] [I26.99]    Subjective:  Patient is being followed for PE (pulmonary thromboembolism) ([facility] [address] [I26.99]  Pneumonia due to infectious organism, unspecified laterality, unspecified part of lung [J18.9]  Acute pulmonary embolism without acute cor pulmonale, unspecified pulmonary embolism type ([address] [I26.99]  Pulmonary embolism, unspecified chronicity, unspecified pulmonary embolism type, unspecified whether acute cor pulmonale present ([address] [I26.99]     Patient was lying in bed in no acute distress. Reports having a productive cough with brown/ blood tinged sputum. Has SOB but states this is not unusual for her.      ROS: denies fever, chills, cp, sob, n/v, HA unless stated above.     sodium chloride flush  5-40 mL IntraVENous 2 times per day    enoxaparin  1 mg/kg SubCUTAneous BID    pantoprazole  40 mg Oral QAM AC    ipratropium-albuterol  1 ampule Inhalation Q4H WA    amLODIPine  5 mg Oral Daily    aspirin  81 mg Oral QAM    atorvastatin  20 mg Oral Daily    budesonide  250 mcg Nebulization BID    calcium elemental  500 mg Oral QAM    diazePAM  10 mg Oral Nightly    Arformoterol Tartrate  15 mcg Nebulization BID    metoprolol succinate  12.5 mg Oral Daily    sertraline  100 mg Oral Daily    losartan  100 mg Oral Daily    And    hydroCHLOROthiazide  12.5 mg Oral Daily     sodium chloride flush, 5-40 mL, PRN  sodium chloride, , PRN  polyethylene glycol, 17 g, Daily PRN  acetaminophen, 650 mg, Q6H PRN   Or  acetaminophen, 650 mg, Q6H PRN  prochlorperazine, 10 mg, Q6H PRN  melatonin, 3 mg, Nightly PRN         Objective:    /62   Pulse 70   Temp 98.2 °F (36.8 °C) (Oral)   Resp 18   Ht 5' 5\" (1.651 m)   Wt 180 lb (81.6 kg)   SpO2 96%   BMI 29.95 kg/m²   General Appearance: alert and oriented to person, place and time and in no acute distress  Skin: warm and dry  Head: normocephalic and atraumatic  Eyes: pupils equal, round, and reactive to light, extraocular eye movements intact, conjunctivae normal  Neck: neck supple and non tender without mass   Pulmonary/Chest: clear to auscultation bilaterally- no wheezes, rales or rhonchi, normal air movement, no respiratory distress  Cardiovascular: normal rate, normal S1 and S2 and no carotid bruits  Abdomen: soft, non-tender, non-distended, normal bowel sounds, no masses or organomegaly  Extremities: no cyanosis, no clubbing and no edema  Neurologic: no cranial nerve deficit and speech normal        Recent Labs     09/09/22  1249 09/10/22  0708    140   K 3.7 3.5   CL 98 100   CO2 32* 31*   BUN 12 13   CREATININE 0.7 0.6   GLUCOSE 110* 133*   CALCIUM 9.1 8.7       Recent Labs     09/09/22  1249 09/10/22  0708   WBC 8.2 3.5*   RBC 4.74 4.53   HGB 11.2* 10.6*   HCT 37.1 35.7   MCV 78.3* 78.8*   MCH 23.6* 23.4*   MCHC 30.2* 29.7*   RDW 16.9* 17.0*    185   MPV 9.8 10.0       Radiology:   EXAMINATION:  CTA OF THE CHEST 9/9/2022 2:04 pm     TECHNIQUE:  CTA of the chest was performed after the administration of intravenous  contrast.  Multiplanar reformatted images are provided for review. MIP  images are provided for review. Automated exposure control, iterative  reconstruction, and/or weight based adjustment of the mA/kV was utilized to  reduce the radiation dose to as low as reasonably achievable. COMPARISON:  CTA of the chest, 06/22/2021.      HISTORY:  ORDERING SYSTEM PROVIDED HISTORY: hemoptysis; tachycardia  TECHNOLOGIST PROVIDED HISTORY:  Reason for exam:->hemoptysis; tachycardia  Decision Support Exception - unselect if not a suspected or confirmed  emergency medical condition->Emergency Medical Condition (MA)     FINDINGS:  Pulmonary Arteries: Small partially occlusive thrombi are seen in the lateral  basal segment of the left lower lobe (axial sequence, images 134 and 137). The main pulmonary artery is normal in caliber. Mediastinum: The heart is normal in size. Mild calcified atherosclerosis is  seen in the aorta. No aneurysm. Enlarged mediastinal and bilateral hilar  lymph nodes are noted. The largest in the right hilar region measures  approximately 1.7 x 1.7 cm. The largest in the precarinal mediastinal space  measures 2.4 x 1.6 cm. Small hiatal hernia. Lungs/pleura: Pulmonary infiltrate is seen in the right lower lobe. There is  opacification of right lower lobe bronchi with secretions. Scarring is seen  the upper lobes bilaterally. Severe emphysematous changes are seen. No  pneumothorax or pleural effusion is seen. Upper Abdomen: No acute abnormality seen. Cholelithiasis is noted. Soft Tissues/Bones: No acute bone or soft tissue abnormality. IMPRESSION:  1. Small, partially-occlusive PULMONARY EMBOLI in the lateral basal segment  of the left lower lobe. 2. Right lower lobe infiltrate likely INFECTIOUS/INFLAMMATORY. 3. Mucus opacification of the right lower lobe bronchi. 4. Mildly prominent mediastinal and hilar lymphadenopathy, which is likely  reactive and is chronic. Enlarged lymph nodes have been noted on multiple  prior CTs dating back to at least 10/30/2017.  5. Severe emphysematous changes. 6. Cholelithiasis. Assessment:    Principal Problem:    Acute pulmonary embolism without acute cor pulmonale, unspecified pulmonary embolism type (HCC)  Active Problems:    PE (pulmonary thromboembolism) ([address]  Resolved Problems:    * No resolved hospital problems. *      Plan:  1.   PE: CTA showing small, partially-occlusive PE in the lateal basal segment of the LLL. Lovenox 1 mg/kg q12h transitioned to Eliquis 10 mg for 1 week then 5 mg BID. Pulmonary consulted. 2. Acute on chronic respiratory failure with hypoxia: Was hypoxic in the ED at 82% on 4L. O2 was increased to 5L but has since been weaned 4L NC. This is patient's baseline O2. S/p Prednisone bolus 50 mg q6h x3.     3.  Right lower lobe infiltrate: Hx of pseudomonas PNA in March 2022. CTA showing RLL infiltrate, infectious vs inflammatory. Received Rocephin and doxycycline in ED. Procal 0.05. No further antibiotics. Sputum gram stain and culture ordered. Respiratory panel negative. 4. COPD: Continue Brovana and Pulmicort. Continue Duoneb. ABG while on NIV in ED showing pH of 7.39, PCO2 of 50.4, PO2 101, and HCO3 30. Continue oxygen supplementation. S/p Prednisone bolus 50 mg q6h x3.    5. GERD: Continue PPI. 6. Hypertension Continue Losartan-HCTZ    7. Hyperlipidemia: Continue Lipitor. Lipid panel unremarkable. 8. Osteopenia/osteoporosis: Continue Fosamax    9. Anxiety/depression: Continue Zoloft    NOTE: This report was transcribed using voice recognition software. Every effort was made to ensure accuracy; however, inadvertent computerized transcription errors may be present. Electronically signed by Kristen Lou PA-C on 9/10/2022 at 8:54 AM       30 minutes time spent reviewing patient chart, assessing patient, discussing plan of care with patient and family, discussing plan of care with collaborating physician, and charting.

## 2022-09-10 NOTE — CONSULTS
Pulmonary Consultation    Admit Date: 9/9/2022  Requesting Physician: MISHA Barrientos CNP    Reason for consultation:  Left base pulmonary embolism  HPI:  Patient is a 65year old female who presents to emergency room with complaints of hemoptysis and increased shortness of breath for over a week. She states she has had increased mucus production over the last week and it has been red/brown in color. She was found to an infiltrate and a pulmonary embolism on CT imaging. She was treated with intravenous antibiotics and subcutaneous lovenox. Patient is known to Dr. Soren Baxter for COPD. She has a smoking history. She is treated with maintenance nebulizer at home. She has had a history of pseudomonas in the past.     Patient is on 4 L nasal cannula. She denies any known fever. She does have a productive cough. The patient goes on to deny any asymmetric swelling of the lower extremities or sudden onset of shortness of breath. PMH:    Past Medical History:   Diagnosis Date    COPD (chronic obstructive pulmonary disease) (HCC)     Hypertension     Migraines     MRSA (methicillin resistant staph aureus) culture positive     Pneumonia     Ulcer      PSH:   Past Surgical History:   Procedure Laterality Date    BREAST LUMPECTOMY Right     PICC LINE INSERTION NURSE  8/31/2021         [address] DX LUNGS/PERICAR/MED/PLEURAL SPACE W/O BX Left 9/12/2018    LEFT VIDEO ASSISTED THORACOSCOPY STAPLING OF BLEBS TALC PLEURODESIS WITH PLEURAL STRIPPING POSSIBLE THORACOTOMY performed by Elina Sam MD at [place] [address]         Review of Systems:     Constitutional: As noted in the HPI. Eyes: No visual changes or diplopia. No scleral icterus. ENT: No headaches, hearing loss or vertigo. No nasal congestion, or sore throat. Cardiovascular: No chest pain, dyspnea on exertion, or palpitations. Respiratory: See above  Gastrointestinal: No abdominal pain, nausea or emesis.  No diarrhea or rectal bleeding or melena. No change in bowel habits. Genitourinary: No dysuria, urinary frequency, or incontinence. No hematuria. Musculoskeletal: No gait disturbance, weakness or joint complaints. Integumentary: No rash or pruritis. No abnormal pigmentation, hair or nail changes. Neurological: No headache, diplopia, dizziness, tremor, change in muscle strength, numbness or tingling. No change in gait, balance, coordination, mood, affect, memory, mentation, behavior. Psychiatric: No anxiety or depression. Endocrine: No temperature intolerance, excessive thirst, fluid intake, urinary frequency, excessive appetite, or recent weight change. Hematologic/Lymphatic: No abnormal bruising or bleeding, blood clots or swollen lymph nodes. No anemia, fever, chills, night sweats, or swollen glands. Allergic/Immunologic: No seasonal or perenial allergies. No history of hives or atopic dermatitis. Social History:  Alcohol:  denies  Tobacco:   quit in 2007. 35 pack year smoker.    Employment:  no silica or asbestos exposure  Family:  No family history of lung disease    Medications:   sodium chloride      sodium chloride 125 mL/hr at 09/10/22 0316      sodium chloride flush  5-40 mL IntraVENous 2 times per day    enoxaparin  1 mg/kg SubCUTAneous BID    pantoprazole  40 mg Oral QAM AC    ipratropium-albuterol  1 ampule Inhalation Q4H WA    amLODIPine  5 mg Oral Daily    aspirin  81 mg Oral QAM    atorvastatin  20 mg Oral Daily    budesonide  250 mcg Nebulization BID    calcium elemental  500 mg Oral QAM    diazePAM  10 mg Oral Nightly    Arformoterol Tartrate  15 mcg Nebulization BID    metoprolol succinate  12.5 mg Oral Daily    sertraline  100 mg Oral Daily    losartan  100 mg Oral Daily    And    hydroCHLOROthiazide  12.5 mg Oral Daily       Vitals:  Tmax:  VITALS:  /62   Pulse 72   Temp 98.2 °F (36.8 °C) (Oral)   Resp 18   Ht 5' 5\" (1.651 m)   Wt 180 lb (81.6 kg)   SpO2 99%   BMI 29.95 kg/m²   24HR INTAKE/OUTPUT:  No intake or output data in the 24 hours ending 09/10/22 1027  CURRENT PULSE OXIMETRY:  SpO2: 99 %  24HR PULSE OXIMETRY RANGE:  SpO2  Av.3 %  Min: 80 %  Max: 99 %    EXAM:  General: No distress. Alert. Eyes: PERRL. No sclera icterus. No conjunctival injection. ENT: No discharge. Pharynx clear. Neck: Trachea midline. Normal thyroid. No jvd, no hjr. Resp: bilateral wheezing. No accessory muscle use. Right lower rales. no rhonchi. CV: Regular rate. Regular rhythm. No murmur No rub. Abd: Non-tender. Non-distended. No masses. No organmegaly. Normal bowel sounds. Skin: Warm and dry. No nodule on exposed extremities. No rash on exposed extremities. Lymph: No cervical LAD. No supraclavicular LAD. Ext: No joint deformity. No clubbing. No cyanosis. No edema  Neuro: Awake. Follows commands. Positive pupils/gag/corneals. Normal pain response. Lab Results:  CBC:   Recent Labs     22  1249 09/10/22  0708   WBC 8.2 3.5*   HGB 11.2* 10.6*   HCT 37.1 35.7   MCV 78.3* 78.8*    185       BMP:  Recent Labs     22  1249 09/10/22  0708    140   K 3.7 3.5   CL 98 100   CO2 32* 31*   BUN 12 13   CREATININE 0.7 0.6    ALB:3,BILIDIR:3,BILITOT:3,ALKPHOS:3)@    PT/INR: No results for input(s): PROTIME, INR in the last 72 hours. Cultures:  Sputum: not available  Blood: not available    ABG:   No results for input(s): PH, PO2, PCO2, HCO3, BE, O2SAT, METHB, O2HB, COHB, O2CON, HHB, THB in the last 72 hours. Films:     CTA PULMONARY W CONTRAST   Final Result   1. Small, partially-occlusive PULMONARY EMBOLI in the lateral basal segment   of the left lower lobe. 2. Right lower lobe infiltrate likely INFECTIOUS/INFLAMMATORY. 3. Mucus opacification of the right lower lobe bronchi. 4. Mildly prominent mediastinal and hilar lymphadenopathy, which is likely   reactive and is chronic.   Enlarged lymph nodes have been noted on multiple   prior CTs dating back to at least 10/30/2017.   5. Severe emphysematous changes. 6. Cholelithiasis. RECOMMENDATIONS:   Unavailable         XR CHEST (2 VW)   Final Result   Pulmonary emphysema and likely interstitial fibrosis with a possible   component of chronic right lower lobe pneumonia as well. Assessment:  Community acquired pneumonia. History of pseudomonas with multiple admissions and follows closely with infectious disease. Pulmonary embolism left lower lobe. Not suspected 3-4 times daily but having clinical history. COPD. Plan:  Strep/legionella  Respiratory culture: In the past, the patient has had multidrug-resistant Pseudomonas cultured. Will cover for now with meropenem and strongly consider an infectious disease consult. Repeat sputum will be obtained. The first 1, according the patient, was small. Check ultrasounds of the lower extremities and D-dimer. Chest vest three times daily      Thanks for letting us see this patient in consultation. Total time in reviewing the previous admissions and records, reviewing the current x-rays, labs, and discussing with clinical staff including nursing and physicians, exceeded 50 minutes. Please contact us with any questions. Office [phone] or after hours through [employer], x [phone]. Please note that voice recognition technology was used (while wearing a Covid mandated mask) in the preparation of this note and make therefore it may contain inadvertent transcription errors. If the patient is a COVID 19 isolation patient, the above physical exam reflects that of the examining physician for the day.     MISHA Mari - NP    [facility]    [facility], [address] [place] [address], [address], [facility]  Office Visits:  [place], [place], [facility]

## 2022-09-11 LAB
24HR URINE VOLUME (ML): 2350 ML
ALBUMIN SERPL-MCNC: 3.7 G/DL (ref 3.5–5.2)
ALP BLD-CCNC: 63 U/L (ref 35–104)
ALT SERPL-CCNC: 5 U/L (ref 0–32)
ANION GAP SERPL CALCULATED.3IONS-SCNC: 10 MMOL/L (ref 7–16)
AST SERPL-CCNC: 9 U/L (ref 0–31)
B.E.: 7.1 MMOL/L (ref -3–3)
BASOPHILS ABSOLUTE: 0.03 E9/L (ref 0–0.2)
BASOPHILS RELATIVE PERCENT: 0.4 % (ref 0–2)
BILIRUB SERPL-MCNC: <0.2 MG/DL (ref 0–1.2)
BUN BLDV-MCNC: 12 MG/DL (ref 6–23)
CALCIUM SERPL-MCNC: 8.2 MG/DL (ref 8.6–10.2)
CHLORIDE BLD-SCNC: 105 MMOL/L (ref 98–107)
CO2: 30 MMOL/L (ref 22–29)
COHB: 2.7 % (ref 0–1.5)
CREAT SERPL-MCNC: 0.8 MG/DL (ref 0.5–1)
CREATININE 24 HOUR URINE: 729 MG/24H (ref 720–1510)
CRITICAL: ABNORMAL
DATE ANALYZED: ABNORMAL
DATE OF COLLECTION: ABNORMAL
EKG ATRIAL RATE: 78 BPM
EKG P AXIS: 88 DEGREES
EKG P-R INTERVAL: 156 MS
EKG Q-T INTERVAL: 446 MS
EKG QRS DURATION: 76 MS
EKG QTC CALCULATION (BAZETT): 508 MS
EKG R AXIS: -40 DEGREES
EKG T AXIS: 19 DEGREES
EKG VENTRICULAR RATE: 78 BPM
EOSINOPHILS ABSOLUTE: 0.01 E9/L (ref 0.05–0.5)
EOSINOPHILS RELATIVE PERCENT: 0.1 % (ref 0–6)
FERRITIN: 19 NG/ML
GFR AFRICAN AMERICAN: >60
GFR NON-AFRICAN AMERICAN: >60 ML/MIN/1.73
GLUCOSE BLD-MCNC: 104 MG/DL (ref 74–99)
GRAM STAIN ORDERABLE: NORMAL
HCO3: 33.3 MMOL/L (ref 22–26)
HCT VFR BLD CALC: 30.7 % (ref 34–48)
HEMOGLOBIN: 9.1 G/DL (ref 11.5–15.5)
HHB: 48.3 % (ref 0–5)
IGA: 120 MG/DL (ref 70–400)
IGG: 760 MG/DL (ref 700–1600)
IGM: 142 MG/DL (ref 40–230)
IMMATURE GRANULOCYTES #: 0.03 E9/L
IMMATURE GRANULOCYTES %: 0.4 % (ref 0–5)
IRON: 17 MCG/DL (ref 37–145)
L. PNEUMOPHILA SEROGP 1 UR AG: NORMAL
LAB: ABNORMAL
LYMPHOCYTES ABSOLUTE: 1.3 E9/L (ref 1.5–4)
LYMPHOCYTES RELATIVE PERCENT: 19 % (ref 20–42)
Lab: 24 HOURS
Lab: ABNORMAL
MAGNESIUM: 1.9 MG/DL (ref 1.6–2.6)
MCH RBC QN AUTO: 24.2 PG (ref 26–35)
MCHC RBC AUTO-ENTMCNC: 29.6 % (ref 32–34.5)
MCV RBC AUTO: 81.6 FL (ref 80–99.9)
METHB: 0.2 % (ref 0–1.5)
MODE: ABNORMAL
MONOCYTES ABSOLUTE: 0.47 E9/L (ref 0.1–0.95)
MONOCYTES RELATIVE PERCENT: 6.9 % (ref 2–12)
NEUTROPHILS ABSOLUTE: 5.02 E9/L (ref 1.8–7.3)
NEUTROPHILS RELATIVE PERCENT: 73.2 % (ref 43–80)
O2 CONTENT: 4.3 ML/DL
O2 SATURATION: 50.3 % (ref 92–98.5)
O2HB: 48.8 % (ref 94–97)
OPERATOR ID: 2244
PATIENT TEMP: 37 C
PCO2: 60.6 MMHG (ref 35–45)
PDW BLD-RTO: 17.4 FL (ref 11.5–15)
PH BLOOD GAS: 7.36 (ref 7.35–7.45)
PLATELET # BLD: 169 E9/L (ref 130–450)
PMV BLD AUTO: 10.5 FL (ref 7–12)
PO2: 29 MMHG (ref 75–100)
POTASSIUM REFLEX MAGNESIUM: 3.2 MMOL/L (ref 3.5–5)
PROTEIN 24 HOUR URINE: 0.12 G/24HR (ref 0–0.14)
RBC # BLD: 3.76 E12/L (ref 3.5–5.5)
SODIUM BLD-SCNC: 145 MMOL/L (ref 132–146)
SOURCE, BLOOD GAS: ABNORMAL
STREP PNEUMONIAE ANTIGEN, URINE: NORMAL
THB: 6.2 G/DL (ref 11.5–16.5)
TIME ANALYZED: 903
TOTAL PROTEIN: 6.4 G/DL (ref 6.4–8.3)
WBC # BLD: 6.9 E9/L (ref 4.5–11.5)

## 2022-09-11 PROCEDURE — 82784 ASSAY IGA/IGD/IGG/IGM EACH: CPT

## 2022-09-11 PROCEDURE — 83735 ASSAY OF MAGNESIUM: CPT

## 2022-09-11 PROCEDURE — 2580000003 HC RX 258: Performed by: FAMILY MEDICINE

## 2022-09-11 PROCEDURE — 2700000000 HC OXYGEN THERAPY PER DAY

## 2022-09-11 PROCEDURE — 6370000000 HC RX 637 (ALT 250 FOR IP): Performed by: FAMILY MEDICINE

## 2022-09-11 PROCEDURE — 6360000002 HC RX W HCPCS: Performed by: FAMILY MEDICINE

## 2022-09-11 PROCEDURE — 2060000000 HC ICU INTERMEDIATE R&B

## 2022-09-11 PROCEDURE — 83540 ASSAY OF IRON: CPT

## 2022-09-11 PROCEDURE — 82728 ASSAY OF FERRITIN: CPT

## 2022-09-11 PROCEDURE — 85025 COMPLETE CBC W/AUTO DIFF WBC: CPT

## 2022-09-11 PROCEDURE — 84166 PROTEIN E-PHORESIS/URINE/CSF: CPT

## 2022-09-11 PROCEDURE — 84156 ASSAY OF PROTEIN URINE: CPT

## 2022-09-11 PROCEDURE — 80053 COMPREHEN METABOLIC PANEL: CPT

## 2022-09-11 PROCEDURE — 2580000003 HC RX 258: Performed by: INTERNAL MEDICINE

## 2022-09-11 PROCEDURE — 82805 BLOOD GASES W/O2 SATURATION: CPT

## 2022-09-11 PROCEDURE — 6360000002 HC RX W HCPCS

## 2022-09-11 PROCEDURE — 99232 SBSQ HOSP IP/OBS MODERATE 35: CPT | Performed by: PHYSICIAN ASSISTANT

## 2022-09-11 PROCEDURE — 6360000002 HC RX W HCPCS: Performed by: INTERNAL MEDICINE

## 2022-09-11 PROCEDURE — 6370000000 HC RX 637 (ALT 250 FOR IP)

## 2022-09-11 PROCEDURE — 94668 MNPJ CHEST WALL SBSQ: CPT

## 2022-09-11 PROCEDURE — 94669 MECHANICAL CHEST WALL OSCILL: CPT

## 2022-09-11 PROCEDURE — 36415 COLL VENOUS BLD VENIPUNCTURE: CPT

## 2022-09-11 PROCEDURE — 94640 AIRWAY INHALATION TREATMENT: CPT

## 2022-09-11 RX ORDER — ENOXAPARIN SODIUM 100 MG/ML
40 INJECTION SUBCUTANEOUS DAILY
Status: DISCONTINUED | OUTPATIENT
Start: 2022-09-11 | End: 2022-09-14 | Stop reason: HOSPADM

## 2022-09-11 RX ORDER — LANOLIN ALCOHOL/MO/W.PET/CERES
400 CREAM (GRAM) TOPICAL ONCE
Status: COMPLETED | OUTPATIENT
Start: 2022-09-11 | End: 2022-09-11

## 2022-09-11 RX ORDER — POTASSIUM CHLORIDE 20 MEQ/1
40 TABLET, EXTENDED RELEASE ORAL
Status: DISCONTINUED | OUTPATIENT
Start: 2022-09-11 | End: 2022-09-14 | Stop reason: HOSPADM

## 2022-09-11 RX ADMIN — IPRATROPIUM BROMIDE AND ALBUTEROL SULFATE 1 AMPULE: .5; 2.5 SOLUTION RESPIRATORY (INHALATION) at 16:56

## 2022-09-11 RX ADMIN — Medication 400 MG: at 09:08

## 2022-09-11 RX ADMIN — BUDESONIDE 1000 MCG: 0.5 SUSPENSION RESPIRATORY (INHALATION) at 09:48

## 2022-09-11 RX ADMIN — APIXABAN 10 MG: 5 TABLET, FILM COATED ORAL at 08:50

## 2022-09-11 RX ADMIN — PANTOPRAZOLE SODIUM 40 MG: 40 TABLET, DELAYED RELEASE ORAL at 06:15

## 2022-09-11 RX ADMIN — DIAZEPAM 10 MG: 5 TABLET ORAL at 21:44

## 2022-09-11 RX ADMIN — SODIUM CHLORIDE: 9 INJECTION, SOLUTION INTRAVENOUS at 08:24

## 2022-09-11 RX ADMIN — SERTRALINE 100 MG: 100 TABLET, FILM COATED ORAL at 21:44

## 2022-09-11 RX ADMIN — MEROPENEM 1000 MG: 1 INJECTION, POWDER, FOR SOLUTION INTRAVENOUS at 16:50

## 2022-09-11 RX ADMIN — IPRATROPIUM BROMIDE AND ALBUTEROL SULFATE 1 AMPULE: .5; 2.5 SOLUTION RESPIRATORY (INHALATION) at 19:43

## 2022-09-11 RX ADMIN — ATORVASTATIN CALCIUM 20 MG: 20 TABLET, FILM COATED ORAL at 08:50

## 2022-09-11 RX ADMIN — IPRATROPIUM BROMIDE AND ALBUTEROL SULFATE 1 AMPULE: .5; 2.5 SOLUTION RESPIRATORY (INHALATION) at 09:48

## 2022-09-11 RX ADMIN — IPRATROPIUM BROMIDE AND ALBUTEROL SULFATE 1 AMPULE: .5; 2.5 SOLUTION RESPIRATORY (INHALATION) at 12:42

## 2022-09-11 RX ADMIN — POTASSIUM CHLORIDE 40 MEQ: 1500 TABLET, EXTENDED RELEASE ORAL at 08:49

## 2022-09-11 RX ADMIN — SODIUM CHLORIDE, PRESERVATIVE FREE 10 ML: 5 INJECTION INTRAVENOUS at 21:45

## 2022-09-11 RX ADMIN — HYDROCHLOROTHIAZIDE 12.5 MG: 12.5 TABLET ORAL at 08:49

## 2022-09-11 RX ADMIN — METOPROLOL SUCCINATE 12.5 MG: 25 TABLET, EXTENDED RELEASE ORAL at 08:49

## 2022-09-11 RX ADMIN — CALCIUM 500 MG: 500 TABLET ORAL at 09:08

## 2022-09-11 RX ADMIN — ASPIRIN 81 MG: 81 TABLET, COATED ORAL at 21:44

## 2022-09-11 RX ADMIN — LOSARTAN POTASSIUM 100 MG: 50 TABLET, FILM COATED ORAL at 08:49

## 2022-09-11 RX ADMIN — AMLODIPINE BESYLATE 5 MG: 5 TABLET ORAL at 08:49

## 2022-09-11 RX ADMIN — BUDESONIDE 1000 MCG: 0.5 SUSPENSION RESPIRATORY (INHALATION) at 19:43

## 2022-09-11 RX ADMIN — ARFORMOTEROL TARTRATE 15 MCG: 15 SOLUTION RESPIRATORY (INHALATION) at 19:43

## 2022-09-11 RX ADMIN — ENOXAPARIN SODIUM 40 MG: 100 INJECTION SUBCUTANEOUS at 21:44

## 2022-09-11 RX ADMIN — MEROPENEM 1000 MG: 1 INJECTION, POWDER, FOR SOLUTION INTRAVENOUS at 00:33

## 2022-09-11 RX ADMIN — ARFORMOTEROL TARTRATE 15 MCG: 15 SOLUTION RESPIRATORY (INHALATION) at 09:48

## 2022-09-11 RX ADMIN — MEROPENEM 1000 MG: 1 INJECTION, POWDER, FOR SOLUTION INTRAVENOUS at 09:57

## 2022-09-11 RX ADMIN — SODIUM CHLORIDE, PRESERVATIVE FREE 10 ML: 5 INJECTION INTRAVENOUS at 09:08

## 2022-09-11 ASSESSMENT — PAIN SCALES - GENERAL
PAINLEVEL_OUTOF10: 0
PAINLEVEL_OUTOF10: 0

## 2022-09-11 NOTE — CONSULTS
Marilynn and Doug Louise M.D. Patient Name: Pako Arango  YOB: 1945  PCP: MISHA Paez CNP   Referring Provider:      Reason for Consultation:   Chief Complaint   Patient presents with    Hemoptysis    Shortness of Breath     Sx began 4 days ago. Wears 4L O2        History of Present Illness:  65years old female with history of COPD bronchiectasis  admitted with COPD exacerbation. CT chest on admission showed a possible PE and lateral basal segment of left lower lobe in addition to right lower lobe infiltrate and mediastinal lymphadenopathy up to 2 cm (mediastinal lymphadenopathy is likely reactive and is chronic and stable when compared to prior CT scans over the past few years). Patient had a normal D-dimer and lower extremity ultrasound did not show any DVT. Anticoagulation was therefore discontinued. Oncology was consulted for possibl diagnosis of C VID considering recurrent Pseudomonas pneumonias. Quantitative immunoglobulins were sent. IgA IgM and IgG are within normal limits. Hemoglobin was 9 with MCV of 81. Labs are unremarkable otherwise. Review of systems: Over 10 systems were reviewed and all were negative except as mentioned above.     Diagnostic Data:     Past Medical History:   Diagnosis Date    COPD (chronic obstructive pulmonary disease) (HCC)     Hypertension     Migraines     MRSA (methicillin resistant staph aureus) culture positive     Pneumonia     Ulcer        Patient Active Problem List    Diagnosis Date Noted    Acute pulmonary embolism without acute cor pulmonale, unspecified pulmonary embolism type ([address] 09/09/2022    PE (pulmonary thromboembolism) ([address] 09/09/2022    Pneumonia due to Pseudomonas ([address] 08/30/2021    Infection due to parainfluenza virus 3     Chronic respiratory failure with hypoxia (HCC)     COPD exacerbation ([address] 07/22/2021    Dysphagia 05/07/2019    Pneumothorax 09/07/2018 Cardiomyopathy ([facility] 07/04/2018    Pneumothorax on left 07/03/2018    Exacerbation of bronchiectasis due to infection ([facility] 02/25/2018    Cryptogenic organizing pneumonia ([facility] 07/22/2015    Tachycardia 01/22/2015    HTN (hypertension) 01/22/2015    Hypoxia 01/22/2015    COPD (chronic obstructive pulmonary disease) ([facility] 01/22/2015        Past Surgical History:   Procedure Laterality Date    BREAST LUMPECTOMY Right     PICC LINE INSERTION NURSE  8/31/2021         [facility] [address] DX LUNGS/PERICAR/MED/PLEURAL SPACE W/O BX Left 9/12/2018    LEFT VIDEO ASSISTED THORACOSCOPY STAPLING OF BLEBS TALC PLEURODESIS WITH PLEURAL STRIPPING POSSIBLE THORACOTOMY performed by Elmer Carmen MD at [facility]         Family History  Family History   Problem Relation Age of Onset    Heart Disease Mother     Cancer Mother     Other Father     Cancer Sister        Social History  Social History     Socioeconomic History    Marital status:      Spouse name: Not on file    Number of children: Not on file    Years of education: Not on file    Highest education level: Not on file   Occupational History    Occupation: retired     Comment:    Tobacco Use    Smoking status: Former     Packs/day: 1.00     Years: 35.00     Pack years: 35.00     Types: Cigarettes     Start date: 9/7/1972     Quit date: 1/1/2007     Years since quitting: 15.7    Smokeless tobacco: Never   Vaping Use    Vaping Use: Never used   Substance and Sexual Activity    Alcohol use: No     Alcohol/week: 0.0 standard drinks    Drug use: No    Sexual activity: Never   Other Topics Concern    Not on file   Social History Narrative    Not on file     Social Determinants of Health     Financial Resource Strain: Not on file   Food Insecurity: Not on file   Transportation Needs: Not on file   Physical Activity: Not on file   Stress: Not on file   Social Connections: Not on file   Intimate Partner Violence: Not on file   Housing Stability: Not on file        TOBACCO:   reports that she quit smoking about 15 years ago. Her smoking use included cigarettes. She started smoking about 50 years ago. She has a 35.00 pack-year smoking history. She has never used smokeless tobacco.  ETOH:   reports no history of alcohol use. Home Medications  Prior to Admission medications    Medication Sig Start Date End Date Taking?  Authorizing Provider   omeprazole (PRILOSEC) 20 MG delayed release capsule Take 20 mg by mouth Daily   Yes Historical Provider, MD   INCRUSE ELLIPTA 62.5 MCG/INH AEPB Inhale 1 puff into the lungs daily INHALE ONE PUFF BY MOUTH DAILY 8/15/22   Alessiaanetta AidMISHA NP   PROAIR  ([address]) MCG/ACT inhaler Inhale 2 puffs into the lungs every 6 hours as needed for Wheezing 8/15/22   [place] MISHA Reddy NP   atorvastatin (LIPITOR) 20 MG tablet Take 1 tablet by mouth daily 6/14/22   Bill Gramajo MD   alendronate (FOSAMAX) 70 MG tablet TAKE ONE TABLET BY MOUTH ONCE A WEEK 2/7/22   Historical Provider, MD   Cholecalciferol 50 MCG (2000 UT) CAPS Take 2,000 Units by mouth daily  Patient not taking: No sig reported    Historical Provider, MD   Fluticasone Furoate-Vilanterol (BREO ELLIPTA IN) Inhale into the lungs daily  Patient not taking: No sig reported    Historical Provider, MD   albuterol (PROVENTIL) (2.5 MG/3ML) 0.083% nebulizer solution USE 3ML IN NEBULIZER EVERY 4 HOURS AS NEEDED 6/23/19   Historical Provider, MD   diazepam (VALIUM) 10 MG tablet TAKE ONE TABLET BY MOUTH AT BEDTIME AS NEEDED 8/20/19   Historical Provider, MD   sertraline (ZOLOFT) 100 MG tablet TAKE ONE TABLET BY MOUTH AT BEDTIME 8/5/19   Historical Provider, MD   amLODIPine (NORVASC) 5 MG tablet Take 5 mg by mouth daily    Historical Provider, MD   formoterol (PERFOROMIST) 20 MCG/2ML nebulizer solution Take 2 mLs by nebulization 2 times daily 9/14/18   Brad Kelly MD   budesonide (PULMICORT) 0.25 MG/2ML nebulizer suspension Take 2 mLs by nebulization 2 times daily 7/10/18   Luis E Martinez MD   metoprolol succinate (TOPROL XL) 25 MG extended release tablet Take 0.5 tablets by mouth daily 7/11/18   Luis E Martinez MD   OXYGEN Inhale 3 L into the lungs continuous Had to increase to 5L d/t dyspnea    Historical Provider, MD   losartan-hydrochlorothiazide (HYZAAR) 100-12.5 MG per tablet Take 1 tablet by mouth every morning     Historical Provider, MD   aspirin 81 MG tablet Take 81 mg by mouth every morning     Historical Provider, MD   Multiple Vitamins-Minerals (OCUVITE PO) Take 1 tablet by mouth every morning   Patient not taking: No sig reported    Historical Provider, MD   calcium carbonate 600 MG TABS tablet Take 1 tablet by mouth every morning     Historical Provider, MD       Allergies  Allergies   Allergen Reactions    Dye [Iodides]            Objective  /71   Pulse 89   Temp 97.5 °F (36.4 °C) (Oral)   Resp 18   Ht 5' 5\" (1.651 m)   Wt 180 lb (81.6 kg)   SpO2 93%   BMI 29.95 kg/m²     Physical Exam:   Performance Status:  General: AAO to person, place, time, in no acute distress, pleasant, on 3 L nasal cannula. Head and neck : PERRLA, EOMI . Sclera non icteric. Oropharynx : Clear  Neck: no JVD,  no adenopathy, no carotid bruit  LYMPHATICS : No peripheral lymphadenopathy. Heart: Regular rate and regular rhythm, no murmur  Lungs: Clear to auscultation   Extremities: No edema,no cyanosis, no clubbing. Abdomen: Soft, non-tender;no masses, no organomegaly  Skin:  No rash. Neurologic:Cranial nerves grossly intact. No focal motor or sensory deficits .     Recent Laboratory Data-   Lab Results   Component Value Date    WBC 6.9 09/11/2022    HGB 9.1 (L) 09/11/2022    HCT 30.7 (L) 09/11/2022    MCV 81.6 09/11/2022     09/11/2022    LYMPHOPCT 19.0 (L) 09/11/2022    RBC 3.76 09/11/2022    MCH 24.2 (L) 09/11/2022    MCHC 29.6 (L) 09/11/2022    RDW 17.4 (H) 09/11/2022    NEUTOPHILPCT 73.2 09/11/2022    MONOPCT 6.9 09/11/2022    BASOPCT 0.4 09/11/2022 NEUTROABS 5.02 09/11/2022    LYMPHSABS 1.30 (L) 09/11/2022    MONOSABS 0.47 09/11/2022    EOSABS 0.01 (L) 09/11/2022    BASOSABS 0.03 09/11/2022       Lab Results   Component Value Date     09/11/2022    K 3.2 (L) 09/11/2022     09/11/2022    CO2 30 (H) 09/11/2022    BUN 12 09/11/2022    CREATININE 0.8 09/11/2022    GLUCOSE 104 (H) 09/11/2022    CALCIUM 8.2 (L) 09/11/2022    PROT 6.4 09/11/2022    LABALBU 3.7 09/11/2022    BILITOT <0.2 09/11/2022    ALKPHOS 63 09/11/2022    AST 9 09/11/2022    ALT 5 09/11/2022    LABGLOM >60 09/11/2022    GFRAA >60 09/11/2022       No results found for: IRON, TIBC, FERRITIN        Radiology-    US DUP LOWER EXTREMITIES BILATERAL VENOUS   Final Result   No evidence of DVT in either lower extremity. CTA PULMONARY W CONTRAST   Final Result   1. Small, partially-occlusive PULMONARY EMBOLI in the lateral basal segment   of the left lower lobe. 2. Right lower lobe infiltrate likely INFECTIOUS/INFLAMMATORY. 3. Mucus opacification of the right lower lobe bronchi. 4. Mildly prominent mediastinal and hilar lymphadenopathy, which is likely   reactive and is chronic. Enlarged lymph nodes have been noted on multiple   prior CTs dating back to at least 10/30/2017.   5. Severe emphysematous changes. 6. Cholelithiasis. RECOMMENDATIONS:   Unavailable         XR CHEST (2 VW)   Final Result   Pulmonary emphysema and likely interstitial fibrosis with a possible   component of chronic right lower lobe pneumonia as well. ASSESSMENT/PLAN :       65years old female with COPD bronchiectasis recurrent Pseudomonas pneumonia admitted with COPD exacerbation. Oncology was consulted for possible evaluation of immunodeficiency/C VID considering recurrent Pseudomonas pneumonias. Immunoglobulin levels are normal.  Patient has a history of bronchiectasis and follows with [facility]. CT chest showed mediastinal and hilar lymphadenopathy up to 2 cm. Likely reactive. Chronic and stable when compared to prior CT scans over the past few years. No further work-up is required. Possible segmental/subsegmental PE. Anticoagulation discontinued due to normal D-dimer. Anemia with hemoglobin of 9 MCV 81. Likely anemia of chronic inflammation. Iron panel requested. Thank you for the consult.       Electronically signed by Germán Garcia MD on 9/11/2022 at 4:25 PM

## 2022-09-11 NOTE — PROGRESS NOTES
Pulmonary Progress Note    Admit Date: 2022  Hospital day                               PCP: MISHA Collado CNP    Chief Complaint (s):  Patient Active Problem List   Diagnosis    Tachycardia    HTN (hypertension)    Hypoxia    COPD (chronic obstructive pulmonary disease) ([address]    Cryptogenic organizing pneumonia ([address]    Pneumothorax on left    Cardiomyopathy ([address]    Pneumothorax    Dysphagia    Exacerbation of bronchiectasis due to infection ([address]    COPD exacerbation ([address]    Infection due to parainfluenza virus 3    Chronic respiratory failure with hypoxia ([address]    Pneumonia due to Pseudomonas ([address]    Acute pulmonary embolism without acute cor pulmonale, unspecified pulmonary embolism type ([address]    PE (pulmonary thromboembolism) [facility]       Subjective:  Patient seen with daughter at bedside. Reviewed ultrasound and lab work with them. Patient still with productive sputum that is light brown in color.        Vitals:  VITALS:  BP (!) 121/58   Pulse 86   Temp 97.8 °F (36.6 °C) (Oral)   Resp 18   Ht 5' 5\" (1.651 m)   Wt 180 lb (81.6 kg)   SpO2 98%   BMI 29.95 kg/m²     24HR INTAKE/OUTPUT:    No intake or output data in the 24 hours ending 22 1141    24HR PULSE OXIMETRY RANGE:    SpO2  Av.3 %  Min: 91 %  Max: 98 %    Medications:  IV:   sodium chloride         Scheduled Meds:   potassium chloride  40 mEq Oral Daily with breakfast    enoxaparin  40 mg SubCUTAneous Daily    budesonide  1,000 mcg Nebulization BID    sertraline  100 mg Oral Daily    meropenem  1,000 mg IntraVENous Q8H    sodium chloride flush  5-40 mL IntraVENous 2 times per day    pantoprazole  40 mg Oral QAM AC    ipratropium-albuterol  1 ampule Inhalation Q4H WA    amLODIPine  5 mg Oral Daily    aspirin  81 mg Oral QAM    atorvastatin  20 mg Oral Daily    calcium elemental  500 mg Oral QAM    diazePAM  10 mg Oral Nightly    Arformoterol Tartrate  15 mcg Nebulization BID    metoprolol succinate  12.5 mg Oral Daily    losartan  100 mg Oral Daily    And    hydroCHLOROthiazide  12.5 mg Oral Daily       Diet:   ADULT DIET; Regular     EXAM:  General: No distress. Alert. Eyes: PERRL. No sclera icterus. No conjunctival injection. ENT: No discharge. Pharynx clear. Neck: Trachea midline. Normal thyroid. Resp: No accessory muscle use. bilateral rales. no wheezing. no rhonchi. CV: Regular rate. Regular rhythm. No murmur or rub. Abd: Non-tender. Non-distended. No masses. No organomegaly. Normal bowel sounds. Skin: Warm and dry. No nodule on exposed extremities. No rash on exposed extremities. Ext: No cyanosis, clubbing, edema  Lymph: No cervical LAD. No supraclavicular LAD. M/S: No cyanosis. No joint deformity. No clubbing. Neuro: Awake. Follows commands. Positive pupils/gag/corneals. Normal pain response. Results:  CBC:   Recent Labs     09/09/22 1249 09/10/22  0708 09/11/22  0255   WBC 8.2 3.5* 6.9   HGB 11.2* 10.6* 9.1*   HCT 37.1 35.7 30.7*   MCV 78.3* 78.8* 81.6    185 169     BMP:   Recent Labs     09/09/22  1249 09/10/22  0708 09/11/22  0255    140 145   K 3.7 3.5 3.2*   CL 98 100 105   CO2 32* 31* 30*   BUN 12 13 12   CREATININE 0.7 0.6 0.8     LIVER PROFILE:   Recent Labs     09/09/22 1249 09/10/22  0708 09/11/22  0255   AST 11 10 9   ALT 6 5 5   LIPASE 19  --   --    BILITOT 0.3 0.3 <0.2   ALKPHOS 79 67 63     PT/INR: No results for input(s): PROTIME, INR in the last 72 hours. APTT: No results for input(s): APTT in the last 72 hours. Pathology:  N/A      Microbiology:  OX+ GNR (likely pseudomonas)    Recent ABG:   Recent Labs     09/11/22  0903   PH 7.358   PO2 29.0*   PCO2 60.6*   HCO3 33.3*   BE 7.1*   O2SAT 50.3*   METHB 0.2   O2HB 48.8*   COHB 2.7*   O2CON 4.3   HHB 48.3*   THB 6.2*             Recent Films:  US DUP LOWER EXTREMITIES BILATERAL VENOUS   Final Result   No evidence of DVT in either lower extremity. CTA PULMONARY W CONTRAST   Final Result   1.  Small, partially-occlusive PULMONARY EMBOLI in the lateral basal segment   of the left lower lobe. 2. Right lower lobe infiltrate likely INFECTIOUS/INFLAMMATORY. 3. Mucus opacification of the right lower lobe bronchi. 4. Mildly prominent mediastinal and hilar lymphadenopathy, which is likely   reactive and is chronic. Enlarged lymph nodes have been noted on multiple   prior CTs dating back to at least 10/30/2017.   5. Severe emphysematous changes. 6. Cholelithiasis. RECOMMENDATIONS:   Unavailable         XR CHEST (2 VW)   Final Result   Pulmonary emphysema and likely interstitial fibrosis with a possible   component of chronic right lower lobe pneumonia as well. Assessment:  Community acquired pneumonia. History of pseudomonas with multiple admissions and follows closely with infectious disease. There is no evidence of an underlying immunodeficiency. The patient has advanced structural lung disease on the basis of bronchiectasis. Please refer to notes from [facility] in [address] [place] [address] Loop below and their adult cystic fibrosis unit. Oncology consultation is not warranted. Immunoglobulin deficiency is not suspected. AG ratio appears normal.  There is no urinalysis looking for proteinuria. Pulmonary embolism left lower lobe. D-dimer is < 200, Well's is 0, U/S negative. There is no reason to suspect PTE.  COPD. Plan:  Stop eliquis, lovenox for DVT prophylaxis  Respiratory culture pending, GNR, ox +  Continue meropenem  Chest vest three times daily. Time at the bedside, reviewing labs and radiographs, reviewing updated notes and consultations, discussing with staff and family was more than 35 minutes. Please note that voice recognition technology was used in the preparation of this note and make therefore it may contain inadvertent transcription errors. If the patient is a COVID 19 isolation patient, the above physical exam reflects that of the examining physician for the day.         Bobbi Muir MISHA Sebastian - VIVIANA  Attestation    The patient was seen and personally examined. I have discussed the case, including pertinent history and exam findings with the nurse practitioner. I have reviewed meds and pertinent labs and the key elements of the encounter have been performed by me. I agree with the assessment, plan and orders as documented by the nurse practitioner. Additions and corrections are reflected in the signed note. Charlie Benjamin MD,  M.ISAIAS., F.C.C.P. [facility], [facility] [place], [address] [place] [address], [facility]  Office visits:  [employer], [address] [facility] EVALUATION: COPD, bronchiectasis     HISTORY:   Patient is a 67year old female known with very severe COPD with emphysema, complicated by bronchiectasis and recurrent pseudomonal pneumonia and recurrent left pneumothoraces s/p pleurodesis in sept 2018 presents for follow up. Patient admitted in September 2018 with recurrent left sided pneumothorax s/p chest tube followed by pleurodesis completed in [facility] . Of note, patient known with hx of reurrent pseudomonas pneumonia, resistant to levofloxacin and finally required Impimenem. Further underwent bronchoscopy with TBBx and negative AFBs. Interval hx 6/3/2021  Last visit on 9/2020 she had sputum production and was placed on 7 day course of cefotetan and prednisone which she completed. She reports increased sputum production in the last 6 weeks. No change in color of sputum, or fevers. In terms of clearance she has been using budesonide, albuterol and hypertonic nebulizer BID. She has been using flutter valve as well. She also report some coughing with food and dysphagia that she has noticed in the last few years.  She has been evaluated for aspiration and was told it was normal. She uses 3L of oxygen at rest but has noticed that she requires more when she 15 mLs by mouth 2 times daily    diazePAM (VALIUM) 10 mg oral tablet      fluticasone (FLONASE) 50 mcg/actuation nasal spray      INCRUSE ELLIPTA 62.5 mcg/actuation inhl blister with device INHALE ONE PUFF BY MOUTH DAILY    losartan-hydrochlorothiazide (HYZAAR) 100-12.5 mg oral tablet Take by mouth    metoprolol succinate (TOPROL-XL) 25 mg oral extended-release tablet Take by mouth    omega 3-dha-epa-fish oil (FISH OIL) 300-1,000 mg oral capsule,delayed release(DR/EC) Take by mouth    OXYGEN GAS Take 3 L    PERFOROMIST 20 mcg/2 mL inhl nebulizer solution INHALE 2 ML BY NEBULIZATION 2 TIMES DAILY    sertraline (ZOLOFT) 25 mg oral tablet      sodium chloride 3 % nebulizer solution Take 15 mL by nebulization 2 times a day    STARCH (THICK-IT ORAL) Take by mouth    Vit C-Vit E-Lutein-Min-OM-3 (OCUVITE) 962-58-3-150 mg-unit-mg-mg oral capsule Take by mouth      No facility-administered medications prior to visit. 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 There is no height or weight on file to calculate BMI. GA: NAD, on NC 3 L   HEENT: Dentition: good, no arched Palatetongue grossly normal  HEART: Regular rate and rhythm, Normal S1 and S2, no murmurs, mild pitting edema  LUNGS: no conversational dyspnea, mild crackles on the right base.   MUSCULOSKELETAL: no clubbing, no cyanosis seen, no upper extremity digital deformity noted  NEURO: Gait is normal, Motor Power grossly normal  PSYCH: normal mood and affect      CUMULATIVE DATA:     PFTs:   Date FVC (%) FEV-1 FEV1/FVC FRC DLCO Flow loop/comment   12/8/17 2.86 (100%) 1.29 (64%) 45%   31%                        Microbiology:   Sputum culture (7/2018): pseudomonas resistant to levofloxacin and sensitive to cefepime, gentamycin, impienem, zosyn, and tobramycin. Radiology:   Study/  Location Date Findings    CXR 11/21/2019 1. Severely emphysematous lungs with chronic right upper lobe fibrosis with associated volume loss and traction bronchiectasis. 2. Prior nodular opacity in left upper lobe decreased, residual perihilar opacities predominantly linear and likely represents scarring. CT chest 3/10/2018 1. Widespread inflammatory and tree-in-bud opacities are present   throughout both lungs, findings which may be clinically correlated   for superimposed multifocal bronchopneumonia. 2.  Advanced changes of emphysema, without significant interval   progression from prior study. Chronic areas of fibrosis within both   upper lobes are unchanged in appearance, and are most likely   postinflammatory. 3.  Indeterminate left lower lobe nodule may be inflammatory as well. 12 mm in size. This finding is new from prior examination. 4.  Calcified plaque along the coronary arteries. 5.  Small hiatal hernia. CT chest in [facility] 5/30/2019 noted for advanced diffusely emphsyematous changes with even bullous formation in upper lobes, no nodules can be identified on latter imaging. No consolidation. Lower lobe central bronchial wall thickening, no tractions         ASSESSMENT/PLAN: A 67year old year old with known history of very severe COPD and bronchiectasis, recurrent respiratory exacerbations in past 2' to pseudomonas.       Bronchiectasis exacerbation   -increased sputum production and dyspnea concerning for bronchiectatic flare  -last lung imaging has been awhile   -will obtain sputum culture prior to initiation of any antibiotics  -repeat CT chest (attempt to have done here, as lives and in [address])  -continue air way clearance with hypertonic, albuterol, budesonide nebulizer and flutter valve    Aspiration:  -some concern for chronic aspiration given reporrts of coughing and dysphagia  -will send for modified barium swallow  -continue with aspiration precaution including head of bed elevation   -avoidance of food 2-3hrs before bed    Chronic hypoxic respiratory failure on 3L via NC  -concern for increased O2 requirement especially on ambulation   -will send for desaturation walk test   -continue Incruse ellipta  Continue nasal rinse and flonase for post nasal drip       Plan of care discussed with patient      Patient instructed to contact our office for any concerns or questions. Apple Garner D.O.,  [facility] Fellow    I have personally seen and examined this patient, and have reviewed patient's medical record, as well as the laboratory work, and imaging studies. I have discussed plan of care and management with [facility] Fellow and agree with the following documentation of findings, assessment, and recommendations. This is a 76-year-old female patient with very severe COPD with advanced emphysema, bronchiectasis and recurrent pseudomonal pneumonia (levofloxacin , recurrent left pneumothoraces status post pleurodesis, presents in follow-up. On interval, patient reports overall worsening exertional tolerance in past 6 months to 1 year; she 's maintained on 3L continuous via NC with desats with minimal exertion occasionally. Further reports copious green sputum expectoration in past 6 weeks. Thus far, had been compliant with hypertonic saline once daily and flutter valve, has not been suing chest vest.   She further admits to worsening dysphagia as well as periprandial cough. Continues to take budesonide/Perforomist nebulized twice daily as well as albuterol nebulized solution twice daily as well. Impression most concerning for acute bronchiectasis exacerbation with possible pneumonia.  Otherwise underlying very severe COPD and chronic respiratory failure on continuous O2.     continued lumbar/ICS twice daily. Recommend intensifying pulmonary clearance regimen to include hypertonic saline/albuterol 2-3 times daily, flutter valve use 4-5 times daily/10 breaths each. Obtain CT chest  Patient provided with sterile cups for sputum culture, post encounter if possible   Refer for modified barium swallow followed by speech therapy eval as indicated   Patient to obtain PFTs and desaturation study prior to follow up. Empiric aspiration and acid reflux precautions discussed with patient to include:  -head of bed elevation at least 30 degrees; discussed obtaining wedge pillow of possible   -space meal time from bedtime, no food for 2-3 hours before bedtime  -avoid acid exacerbating food types likes tomato sauce or pasta and other  -take omeprazole on empty stomach and no food for half hour after   -in case of heartburn, take pepcid as needed     Plan of care discussed with patient, with her  at bedside; she expressed understanding. Physician face-to-face time was 60 minutes with 50% devoted to counseling or coordination of care, reviewing medical records or charting.     [facility]  4th Floor  [address], [address] [facility]  Phone [phone]  Fax [phone]    Electronically signed by Radha Conn MD at 06/07/2021 12:30 PM EDT    Back to top of Progress Notes

## 2022-09-11 NOTE — PROGRESS NOTES
[facility] Progress Note    Admitting Date and Time: 9/9/2022 12:20 PM  Admit Dx: PE (pulmonary thromboembolism) ([facility] [I26.99]  Pneumonia due to infectious organism, unspecified laterality, unspecified part of lung [J18.9]  Acute pulmonary embolism without acute cor pulmonale, unspecified pulmonary embolism type ([address] [I26.99]  Pulmonary embolism, unspecified chronicity, unspecified pulmonary embolism type, unspecified whether acute cor pulmonale present ([address] [I26.99]    Subjective:  Patient is being followed for PE (pulmonary thromboembolism) ([facility] [I26.99]  Pneumonia due to infectious organism, unspecified laterality, unspecified part of lung [J18.9]  Acute pulmonary embolism without acute cor pulmonale, unspecified pulmonary embolism type ([address] [I26.99]  Pulmonary embolism, unspecified chronicity, unspecified pulmonary embolism type, unspecified whether acute cor pulmonale present ([address] [I26.99]     Patient was lying in bed in no acute distress. Continues to report cough but states sputum is getting lighter. Shortness of breath is unchanged. ROS: denies fever, chills, cp, sob, n/v, HA unless stated above.      potassium chloride  40 mEq Oral Daily with breakfast    budesonide  1,000 mcg Nebulization BID    sertraline  100 mg Oral Daily    apixaban  10 mg Oral BID    Followed by    Dennard Gosselin ON 9/17/2022] apixaban  5 mg Oral BID    meropenem  1,000 mg IntraVENous Q8H    sodium chloride flush  5-40 mL IntraVENous 2 times per day    pantoprazole  40 mg Oral QAM AC    ipratropium-albuterol  1 ampule Inhalation Q4H WA    amLODIPine  5 mg Oral Daily    aspirin  81 mg Oral QAM    atorvastatin  20 mg Oral Daily    calcium elemental  500 mg Oral QAM    diazePAM  10 mg Oral Nightly    Arformoterol Tartrate  15 mcg Nebulization BID    metoprolol succinate  12.5 mg Oral Daily    losartan  100 mg Oral Daily    And    hydroCHLOROthiazide  12.5 mg Oral Daily     sodium chloride flush, 5-40 mL, PRN  sodium chloride, , PRN  polyethylene glycol, 17 g, Daily PRN  acetaminophen, 650 mg, Q6H PRN   Or  acetaminophen, 650 mg, Q6H PRN  prochlorperazine, 10 mg, Q6H PRN  melatonin, 3 mg, Nightly PRN       Objective:    /60   Pulse 89   Temp 97.8 °F (36.6 °C)   Resp 20   Ht 5' 5\" (1.651 m)   Wt 180 lb (81.6 kg)   SpO2 91%   BMI 29.95 kg/m²   General Appearance: alert and oriented to person, place and time and in no acute distress  Skin: warm and dry  Head: normocephalic and atraumatic  Eyes: pupils equal, round, and reactive to light, extraocular eye movements intact, conjunctivae normal  Neck: neck supple and non tender without mass   Pulmonary/Chest: clear to auscultation bilaterally- no wheezes, rales or rhonchi, normal air movement, no respiratory distress  Cardiovascular: normal rate, normal S1 and S2 and no carotid bruits  Abdomen: soft, non-tender, non-distended, normal bowel sounds, no masses or organomegaly  Extremities: no cyanosis, no clubbing and no edema  Neurologic: no cranial nerve deficit and speech normal        Recent Labs     09/09/22  1249 09/10/22  0708 09/11/22  0255    140 145   K 3.7 3.5 3.2*   CL 98 100 105   CO2 32* 31* 30*   BUN 12 13 12   CREATININE 0.7 0.6 0.8   GLUCOSE 110* 133* 104*   CALCIUM 9.1 8.7 8.2*         Recent Labs     09/09/22  1249 09/10/22  0708 09/11/22  0255   WBC 8.2 3.5* 6.9   RBC 4.74 4.53 3.76   HGB 11.2* 10.6* 9.1*   HCT 37.1 35.7 30.7*   MCV 78.3* 78.8* 81.6   MCH 23.6* 23.4* 24.2*   MCHC 30.2* 29.7* 29.6*   RDW 16.9* 17.0* 17.4*    185 169   MPV 9.8 10.0 10.5         Radiology:   EXAMINATION:  CTA OF THE CHEST 9/9/2022 2:04 pm     TECHNIQUE:  CTA of the chest was performed after the administration of intravenous  contrast.  Multiplanar reformatted images are provided for review. MIP  images are provided for review.  Automated exposure control, iterative  reconstruction, and/or weight based adjustment of the mA/kV was utilized to  reduce the radiation dose to as low as reasonably achievable. COMPARISON:  CTA of the chest, 06/22/2021. HISTORY:  ORDERING SYSTEM PROVIDED HISTORY: hemoptysis; tachycardia  TECHNOLOGIST PROVIDED HISTORY:  Reason for exam:->hemoptysis; tachycardia  Decision Support Exception - unselect if not a suspected or confirmed  emergency medical condition->Emergency Medical Condition (MA)     FINDINGS:  Pulmonary Arteries: Small partially occlusive thrombi are seen in the lateral  basal segment of the left lower lobe (axial sequence, images 134 and 137). The main pulmonary artery is normal in caliber. Mediastinum: The heart is normal in size. Mild calcified atherosclerosis is  seen in the aorta. No aneurysm. Enlarged mediastinal and bilateral hilar  lymph nodes are noted. The largest in the right hilar region measures  approximately 1.7 x 1.7 cm. The largest in the precarinal mediastinal space  measures 2.4 x 1.6 cm. Small hiatal hernia. Lungs/pleura: Pulmonary infiltrate is seen in the right lower lobe. There is  opacification of right lower lobe bronchi with secretions. Scarring is seen  the upper lobes bilaterally. Severe emphysematous changes are seen. No  pneumothorax or pleural effusion is seen. Upper Abdomen: No acute abnormality seen. Cholelithiasis is noted. Soft Tissues/Bones: No acute bone or soft tissue abnormality. IMPRESSION:  1. Small, partially-occlusive PULMONARY EMBOLI in the lateral basal segment  of the left lower lobe. 2. Right lower lobe infiltrate likely INFECTIOUS/INFLAMMATORY. 3. Mucus opacification of the right lower lobe bronchi. 4. Mildly prominent mediastinal and hilar lymphadenopathy, which is likely  reactive and is chronic. Enlarged lymph nodes have been noted on multiple  prior CTs dating back to at least 10/30/2017.  5. Severe emphysematous changes. 6. Cholelithiasis.     Assessment:    Principal Problem:    Acute pulmonary embolism without acute cor pulmonale, unspecified pulmonary embolism type (HCC)  Active Problems:    PE (pulmonary thromboembolism) ([address]  Resolved Problems:    * No resolved hospital problems. *      Plan:  1. PE: CTA showing small, partially-occlusive PE in the lateal basal segment of the LLL. Lovenox 1 mg/kg q12h transitioned to Eliquis 10 mg, per pulmonary may not have PE switched back to Lovenox at prophylaxis dose. Pulmonary consulted. 2. Acute on chronic respiratory failure with hypoxia: Was hypoxic in the ED at 82% on 4L. O2 was increased to 5L but has since been weaned 4L NC. This is patient's baseline O2. S/p Prednisone bolus 50 mg q6h x3.     3.  Right lower lobe infiltrate: Hx of pseudomonas PNA in March 2022. CTA showing RLL infiltrate, infectious vs inflammatory. Received Rocephin and doxycycline in ED. Procal 0.05. Doppler of LE negative for PE. No further antibiotics. Sputum gram stain showing abducent gram negative rods, rare gram positive diplococci and abundant polymorphonuclear leukocytes and culture ordered. Respiratory panel negative. Continue Meropenem. Oncology consulted for frequent PNA. 4. COPD: Continue Brovana and Pulmicort. Continue Duoneb. ABG while on NIV in ED showing pH of 7.39, PCO2 of 50.4, PO2 101, and HCO3 30. Continue oxygen supplementation. S/p Prednisone bolus 50 mg q6h x3.    5. GERD: Continue PPI. 6. Hypertension Continue Losartan-HCTZ    7. Hyperlipidemia: Continue Lipitor. Lipid panel unremarkable. 8. Osteopenia/osteoporosis: Continue Fosamax    9. Anxiety/depression: Continue Zoloft    10. Hypokalemia: supplement. Monitor. NOTE: This report was transcribed using voice recognition software. Every effort was made to ensure accuracy; however, inadvertent computerized transcription errors may be present.   Electronically signed by Elizabeth Shipman PA-C on 9/11/2022 at 8:11 AM       23 minutes time spent reviewing patient chart, assessing patient, discussing plan of care with patient and family, discussing plan of care with collaborating physician, and charting.

## 2022-09-11 NOTE — PROGRESS NOTES
Patient assisted back to bed from bathroom noted to be very short of breath Pulse ox checked, oxygen saturation 64%, oxygen increased to 6L, patient recovered to 94%. Oxygen weaned back to 4L, patient breathing comfortably oxygen saturation 93%.

## 2022-09-12 ENCOUNTER — APPOINTMENT (OUTPATIENT)
Dept: GENERAL RADIOLOGY | Age: 77
DRG: 177 | End: 2022-09-12
Payer: MEDICARE

## 2022-09-12 LAB
ADDENDUM ELECTROPHORESIS URINE RANDOM: NORMAL
ALBUMIN SERPL-MCNC: 3 G/DL (ref 3.5–4.7)
ALBUMIN SERPL-MCNC: 3.8 G/DL (ref 3.5–5.2)
ALP BLD-CCNC: 69 U/L (ref 35–104)
ALPHA-1-GLOBULIN: 0.3 G/DL (ref 0.2–0.4)
ALPHA-2-GLOBULIN: 0.9 G/DL (ref 0.5–1)
ALT SERPL-CCNC: 5 U/L (ref 0–32)
ANION GAP SERPL CALCULATED.3IONS-SCNC: 11 MMOL/L (ref 7–16)
AST SERPL-CCNC: 10 U/L (ref 0–31)
BACTERIA: ABNORMAL /HPF
BASOPHILS ABSOLUTE: 0.04 E9/L (ref 0–0.2)
BASOPHILS RELATIVE PERCENT: 0.6 % (ref 0–2)
BETA GLOBULIN: 1 G/DL (ref 0.8–1.3)
BILIRUB SERPL-MCNC: <0.2 MG/DL (ref 0–1.2)
BILIRUBIN URINE: NEGATIVE
BLOOD, URINE: NEGATIVE
BUN BLDV-MCNC: 7 MG/DL (ref 6–23)
CALCIUM SERPL-MCNC: 8.6 MG/DL (ref 8.6–10.2)
CHLORIDE BLD-SCNC: 101 MMOL/L (ref 98–107)
CLARITY: CLEAR
CO2: 31 MMOL/L (ref 22–29)
COLOR: YELLOW
CREAT SERPL-MCNC: 0.7 MG/DL (ref 0.5–1)
CULTURE, RESPIRATORY: ABNORMAL
CULTURE, RESPIRATORY: ABNORMAL
ELECTROPHORESIS: ABNORMAL
EOSINOPHILS ABSOLUTE: 0.03 E9/L (ref 0.05–0.5)
EOSINOPHILS RELATIVE PERCENT: 0.5 % (ref 0–6)
GAMMA GLOBULIN: 1.1 G/DL (ref 0.7–1.6)
GFR AFRICAN AMERICAN: >60
GFR NON-AFRICAN AMERICAN: >60 ML/MIN/1.73
GLUCOSE BLD-MCNC: 105 MG/DL (ref 74–99)
GLUCOSE URINE: NEGATIVE MG/DL
HCT VFR BLD CALC: 32 % (ref 34–48)
HEMOGLOBIN: 9.7 G/DL (ref 11.5–15.5)
IMMATURE GRANULOCYTES #: 0.04 E9/L
IMMATURE GRANULOCYTES %: 0.6 % (ref 0–5)
KETONES, URINE: NEGATIVE MG/DL
LEUKOCYTE ESTERASE, URINE: ABNORMAL
LYMPHOCYTES ABSOLUTE: 1.6 E9/L (ref 1.5–4)
LYMPHOCYTES RELATIVE PERCENT: 25.1 % (ref 20–42)
MAGNESIUM: 2 MG/DL (ref 1.6–2.6)
MCH RBC QN AUTO: 23.8 PG (ref 26–35)
MCHC RBC AUTO-ENTMCNC: 30.3 % (ref 32–34.5)
MCV RBC AUTO: 78.6 FL (ref 80–99.9)
MONOCYTES ABSOLUTE: 0.42 E9/L (ref 0.1–0.95)
MONOCYTES RELATIVE PERCENT: 6.6 % (ref 2–12)
NEUTROPHILS ABSOLUTE: 4.24 E9/L (ref 1.8–7.3)
NEUTROPHILS RELATIVE PERCENT: 66.6 % (ref 43–80)
NITRITE, URINE: NEGATIVE
ORGANISM: ABNORMAL
PDW BLD-RTO: 17.7 FL (ref 11.5–15)
PH UA: 6.5 (ref 5–9)
PLATELET # BLD: 171 E9/L (ref 130–450)
PMV BLD AUTO: 9.6 FL (ref 7–12)
POTASSIUM REFLEX MAGNESIUM: 3.1 MMOL/L (ref 3.5–5)
PROTEIN UA: NEGATIVE MG/DL
RBC # BLD: 4.07 E12/L (ref 3.5–5.5)
RBC UA: ABNORMAL /HPF (ref 0–2)
SMEAR, RESPIRATORY: ABNORMAL
SODIUM BLD-SCNC: 143 MMOL/L (ref 132–146)
SPECIFIC GRAVITY UA: 1.01 (ref 1–1.03)
TOTAL PROTEIN: 6.3 G/DL (ref 6.4–8.3)
TOTAL PROTEIN: 6.6 G/DL (ref 6.4–8.3)
UROBILINOGEN, URINE: 0.2 E.U./DL
WBC # BLD: 6.4 E9/L (ref 4.5–11.5)
WBC UA: ABNORMAL /HPF (ref 0–5)

## 2022-09-12 PROCEDURE — 83735 ASSAY OF MAGNESIUM: CPT

## 2022-09-12 PROCEDURE — 74230 X-RAY XM SWLNG FUNCJ C+: CPT

## 2022-09-12 PROCEDURE — 6360000002 HC RX W HCPCS: Performed by: FAMILY MEDICINE

## 2022-09-12 PROCEDURE — 94669 MECHANICAL CHEST WALL OSCILL: CPT

## 2022-09-12 PROCEDURE — 2700000000 HC OXYGEN THERAPY PER DAY

## 2022-09-12 PROCEDURE — 6370000000 HC RX 637 (ALT 250 FOR IP): Performed by: FAMILY MEDICINE

## 2022-09-12 PROCEDURE — 6370000000 HC RX 637 (ALT 250 FOR IP): Performed by: PHYSICIAN ASSISTANT

## 2022-09-12 PROCEDURE — 2580000003 HC RX 258: Performed by: FAMILY MEDICINE

## 2022-09-12 PROCEDURE — 2500000003 HC RX 250 WO HCPCS: Performed by: RADIOLOGY

## 2022-09-12 PROCEDURE — 94668 MNPJ CHEST WALL SBSQ: CPT

## 2022-09-12 PROCEDURE — 6360000002 HC RX W HCPCS

## 2022-09-12 PROCEDURE — 2060000000 HC ICU INTERMEDIATE R&B

## 2022-09-12 PROCEDURE — 94640 AIRWAY INHALATION TREATMENT: CPT

## 2022-09-12 PROCEDURE — 6370000000 HC RX 637 (ALT 250 FOR IP): Performed by: SPECIALIST

## 2022-09-12 PROCEDURE — APPSS30 APP SPLIT SHARED TIME 16-30 MINUTES: Performed by: PHYSICIAN ASSISTANT

## 2022-09-12 PROCEDURE — 36415 COLL VENOUS BLD VENIPUNCTURE: CPT

## 2022-09-12 PROCEDURE — 80053 COMPREHEN METABOLIC PANEL: CPT

## 2022-09-12 PROCEDURE — 92611 MOTION FLUOROSCOPY/SWALLOW: CPT | Performed by: SPEECH-LANGUAGE PATHOLOGIST

## 2022-09-12 PROCEDURE — 84165 PROTEIN E-PHORESIS SERUM: CPT

## 2022-09-12 PROCEDURE — 6360000002 HC RX W HCPCS: Performed by: INTERNAL MEDICINE

## 2022-09-12 PROCEDURE — 2580000003 HC RX 258: Performed by: INTERNAL MEDICINE

## 2022-09-12 PROCEDURE — 85025 COMPLETE CBC W/AUTO DIFF WBC: CPT

## 2022-09-12 PROCEDURE — 92526 ORAL FUNCTION THERAPY: CPT | Performed by: SPEECH-LANGUAGE PATHOLOGIST

## 2022-09-12 PROCEDURE — 81001 URINALYSIS AUTO W/SCOPE: CPT

## 2022-09-12 PROCEDURE — 99232 SBSQ HOSP IP/OBS MODERATE 35: CPT | Performed by: INTERNAL MEDICINE

## 2022-09-12 RX ORDER — POTASSIUM CHLORIDE 20 MEQ/1
40 TABLET, EXTENDED RELEASE ORAL ONCE
Status: COMPLETED | OUTPATIENT
Start: 2022-09-12 | End: 2022-09-12

## 2022-09-12 RX ORDER — CIPROFLOXACIN 500 MG/1
500 TABLET, FILM COATED ORAL EVERY 12 HOURS SCHEDULED
Status: DISCONTINUED | OUTPATIENT
Start: 2022-09-12 | End: 2022-09-14 | Stop reason: HOSPADM

## 2022-09-12 RX ADMIN — IPRATROPIUM BROMIDE AND ALBUTEROL SULFATE 1 AMPULE: .5; 2.5 SOLUTION RESPIRATORY (INHALATION) at 09:35

## 2022-09-12 RX ADMIN — IPRATROPIUM BROMIDE AND ALBUTEROL SULFATE 1 AMPULE: .5; 2.5 SOLUTION RESPIRATORY (INHALATION) at 16:07

## 2022-09-12 RX ADMIN — SERTRALINE 100 MG: 100 TABLET, FILM COATED ORAL at 20:31

## 2022-09-12 RX ADMIN — PANTOPRAZOLE SODIUM 40 MG: 40 TABLET, DELAYED RELEASE ORAL at 05:52

## 2022-09-12 RX ADMIN — ASPIRIN 81 MG: 81 TABLET, COATED ORAL at 20:31

## 2022-09-12 RX ADMIN — MEROPENEM 1000 MG: 1 INJECTION, POWDER, FOR SOLUTION INTRAVENOUS at 00:23

## 2022-09-12 RX ADMIN — AMLODIPINE BESYLATE 5 MG: 5 TABLET ORAL at 08:57

## 2022-09-12 RX ADMIN — BARIUM SULFATE 10 ML: 400 PASTE ORAL at 14:29

## 2022-09-12 RX ADMIN — LOSARTAN POTASSIUM 100 MG: 50 TABLET, FILM COATED ORAL at 08:56

## 2022-09-12 RX ADMIN — BUDESONIDE 1000 MCG: 0.5 SUSPENSION RESPIRATORY (INHALATION) at 09:35

## 2022-09-12 RX ADMIN — BARIUM SULFATE 120 ML: 400 SUSPENSION ORAL at 14:29

## 2022-09-12 RX ADMIN — CALCIUM 500 MG: 500 TABLET ORAL at 08:57

## 2022-09-12 RX ADMIN — ARFORMOTEROL TARTRATE 15 MCG: 15 SOLUTION RESPIRATORY (INHALATION) at 19:18

## 2022-09-12 RX ADMIN — MEROPENEM 1000 MG: 1 INJECTION, POWDER, FOR SOLUTION INTRAVENOUS at 09:02

## 2022-09-12 RX ADMIN — POTASSIUM CHLORIDE 40 MEQ: 1500 TABLET, EXTENDED RELEASE ORAL at 09:05

## 2022-09-12 RX ADMIN — POTASSIUM CHLORIDE 40 MEQ: 1500 TABLET, EXTENDED RELEASE ORAL at 08:57

## 2022-09-12 RX ADMIN — BUDESONIDE 1000 MCG: 0.5 SUSPENSION RESPIRATORY (INHALATION) at 19:18

## 2022-09-12 RX ADMIN — HYDROCHLOROTHIAZIDE 12.5 MG: 12.5 TABLET ORAL at 08:56

## 2022-09-12 RX ADMIN — METOPROLOL SUCCINATE 12.5 MG: 25 TABLET, EXTENDED RELEASE ORAL at 08:56

## 2022-09-12 RX ADMIN — CIPROFLOXACIN 500 MG: 500 TABLET, FILM COATED ORAL at 20:31

## 2022-09-12 RX ADMIN — ATORVASTATIN CALCIUM 20 MG: 20 TABLET, FILM COATED ORAL at 08:56

## 2022-09-12 RX ADMIN — IPRATROPIUM BROMIDE AND ALBUTEROL SULFATE 1 AMPULE: .5; 2.5 SOLUTION RESPIRATORY (INHALATION) at 19:18

## 2022-09-12 RX ADMIN — ARFORMOTEROL TARTRATE 15 MCG: 15 SOLUTION RESPIRATORY (INHALATION) at 09:35

## 2022-09-12 RX ADMIN — ENOXAPARIN SODIUM 40 MG: 100 INJECTION SUBCUTANEOUS at 20:31

## 2022-09-12 RX ADMIN — MEROPENEM 1000 MG: 1 INJECTION, POWDER, FOR SOLUTION INTRAVENOUS at 16:37

## 2022-09-12 RX ADMIN — SODIUM CHLORIDE, PRESERVATIVE FREE 10 ML: 5 INJECTION INTRAVENOUS at 20:31

## 2022-09-12 RX ADMIN — SODIUM CHLORIDE, PRESERVATIVE FREE 10 ML: 5 INJECTION INTRAVENOUS at 08:57

## 2022-09-12 RX ADMIN — DIAZEPAM 10 MG: 5 TABLET ORAL at 20:31

## 2022-09-12 RX ADMIN — BARIUM SULFATE 70 G: 0.81 POWDER, FOR SUSPENSION ORAL at 14:29

## 2022-09-12 RX ADMIN — IPRATROPIUM BROMIDE AND ALBUTEROL SULFATE 1 AMPULE: .5; 2.5 SOLUTION RESPIRATORY (INHALATION) at 12:44

## 2022-09-12 NOTE — PROGRESS NOTES
Pulmonary Progress Note    Admit Date: 2022  Hospital day                               PCP: MISHA Morgan CNP    Chief Complaint (s):  Patient Active Problem List   Diagnosis    Tachycardia    HTN (hypertension)    Hypoxia    COPD (chronic obstructive pulmonary disease) ([facility]    Cryptogenic organizing pneumonia ([facility]    Pneumothorax on left    Cardiomyopathy ([facility]    Pneumothorax    Dysphagia    Exacerbation of bronchiectasis due to infection ([facility]    COPD exacerbation ([facility]    Infection due to parainfluenza virus 3    Chronic respiratory failure with hypoxia ([facility]    Pneumonia due to Pseudomonas ([facility]    Acute pulmonary embolism without acute cor pulmonale, unspecified pulmonary embolism type ([facility]    PE (pulmonary thromboembolism) ([facility]       Subjective:  Discussed with infectious disease. Oncology input reviewed. Appreciated. Discussed with infectious disease.       Vitals:  VITALS:  BP (!) 140/66   Pulse (!) 101   Temp 97.6 °F (36.4 °C) (Oral)   Resp 18   Ht 5' 5\" (1.651 m)   Wt 186 lb (84.4 kg)   SpO2 97%   BMI 30.95 kg/m²     24HR INTAKE/OUTPUT:      Intake/Output Summary (Last 24 hours) at 2022 1507  Last data filed at 2022 0933  Gross per 24 hour   Intake 340 ml   Output 550 ml   Net -210 ml       24HR PULSE OXIMETRY RANGE:    SpO2  Av.5 %  Min: 92 %  Max: 97 %    Medications:  IV:   sodium chloride         Scheduled Meds:   ciprofloxacin  500 mg Oral 2 times per day    potassium chloride  40 mEq Oral Daily with breakfast    enoxaparin  40 mg SubCUTAneous Daily    budesonide  1,000 mcg Nebulization BID    sertraline  100 mg Oral Daily    meropenem  1,000 mg IntraVENous Q8H    sodium chloride flush  5-40 mL IntraVENous 2 times per day    pantoprazole  40 mg Oral QAM AC    ipratropium-albuterol  1 ampule Inhalation Q4H WA    amLODIPine  5 mg Oral Daily    aspirin  81 mg Oral QAM    atorvastatin  20 mg Oral Daily    calcium elemental  500 mg Oral QAM    diazePAM 10 mg Oral Nightly    Arformoterol Tartrate  15 mcg Nebulization BID    metoprolol succinate  12.5 mg Oral Daily    losartan  100 mg Oral Daily    And    hydroCHLOROthiazide  12.5 mg Oral Daily       Diet:   ADULT DIET; Regular     EXAM:  General: No distress. Alert. Eyes: PERRL. No sclera icterus. No conjunctival injection. ENT: No discharge. Pharynx clear. Neck: Trachea midline. Normal thyroid. Resp: No accessory muscle use. bilateral rales. no wheezing. no rhonchi. CV: Regular rate. Regular rhythm. No murmur or rub. Abd: Non-tender. Non-distended. No masses. No organomegaly. Normal bowel sounds. Skin: Warm and dry. No nodule on exposed extremities. No rash on exposed extremities. Ext: No cyanosis, clubbing, edema  Lymph: No cervical LAD. No supraclavicular LAD. M/S: No cyanosis. No joint deformity. No clubbing. Neuro: Awake. Follows commands. Positive pupils/gag/corneals. Normal pain response. Results:  CBC:   Recent Labs     09/10/22  0708 09/11/22 0255 09/12/22 0220   WBC 3.5* 6.9 6.4   HGB 10.6* 9.1* 9.7*   HCT 35.7 30.7* 32.0*   MCV 78.8* 81.6 78.6*    169 171       BMP:   Recent Labs     09/10/22  0708 09/11/22 0255 09/12/22 0220    145 143   K 3.5 3.2* 3.1*    105 101   CO2 31* 30* 31*   BUN 13 12 7   CREATININE 0.6 0.8 0.7       LIVER PROFILE:   Recent Labs     09/10/22  0708 09/11/22 0255 09/12/22 0220   AST 10 9 10   ALT 5 5 5   BILITOT 0.3 <0.2 <0.2   ALKPHOS 67 63 69       PT/INR: No results for input(s): PROTIME, INR in the last 72 hours. APTT: No results for input(s): APTT in the last 72 hours. Pathology:  N/A      Microbiology:  Pansensitive Pseudomonas.     Recent ABG:   Recent Labs     09/11/22 0903   PH 7.358   PO2 29.0*   PCO2 60.6*   HCO3 33.3*   BE 7.1*   O2SAT 50.3*   METHB 0.2   O2HB 48.8*   COHB 2.7*   O2CON 4.3   HHB 48.3*   THB 6.2*               Recent Films:  US DUP LOWER EXTREMITIES BILATERAL VENOUS   Final Result   No evidence of DVT in either lower extremity. CTA PULMONARY W CONTRAST   Final Result   1. Small, partially-occlusive PULMONARY EMBOLI in the lateral basal segment   of the left lower lobe. 2. Right lower lobe infiltrate likely INFECTIOUS/INFLAMMATORY. 3. Mucus opacification of the right lower lobe bronchi. 4. Mildly prominent mediastinal and hilar lymphadenopathy, which is likely   reactive and is chronic. Enlarged lymph nodes have been noted on multiple   prior CTs dating back to at least 10/30/2017.   5. Severe emphysematous changes. 6. Cholelithiasis. RECOMMENDATIONS:   Unavailable         XR CHEST (2 VW)   Final Result   Pulmonary emphysema and likely interstitial fibrosis with a possible   component of chronic right lower lobe pneumonia as well. Fluoroscopy modified barium swallow with video    (Results Pending)       Assessment:  Community acquired pneumonia. History of pseudomonas with multiple admissions and follows closely with infectious disease. There is no evidence of an underlying immunodeficiency. The patient has advanced structural lung disease on the basis of bronchiectasis. Please refer to notes from [facility] in [facility] below and their adult cystic fibrosis unit. Oncology consultation is not warranted. Immunoglobulin deficiency is not suspected. AG ratio appears normal.  There is no urinalysis looking for proteinuria. Pulmonary embolism left lower lobe. D-dimer is < 200, Well's is 0, U/S negative. There is no reason to suspect PTE.  COPD. Plan:  Lovenox for DVT prophylaxis  Respiratory culture sensitive Pseudomonas  Switch to Cipro  Chest vest three times daily. Time at the bedside, reviewing labs and radiographs, reviewing updated notes and consultations, discussing with staff and family was more than 35 minutes.     Please note that voice recognition technology was used in the preparation of this note and make therefore it may contain inadvertent transcription errors. If the patient is a COVID 19 isolation patient, the above physical exam reflects that of the examining physician for the day. Ross Fairbanks MD,  M.D., F.C.C.P. [facility], [address] [facility], [address], [facility], [facility]  Office visits:  [employer], [address] [facility] [place] EVALUATION: COPD, bronchiectasis     HISTORY:   Patient is a 67year old female known with very severe COPD with emphysema, complicated by bronchiectasis and recurrent pseudomonal pneumonia and recurrent left pneumothoraces s/p pleurodesis in sept 2018 presents for follow up. Patient admitted in September 2018 with recurrent left sided pneumothorax s/p chest tube followed by pleurodesis completed in [place] [address] . Of note, patient known with hx of reurrent pseudomonas pneumonia, resistant to levofloxacin and finally required Impimenem. Further underwent bronchoscopy with TBBx and negative AFBs. Interval hx 6/3/2021  Last visit on 9/2020 she had sputum production and was placed on 7 day course of cefotetan and prednisone which she completed. She reports increased sputum production in the last 6 weeks. No change in color of sputum, or fevers. In terms of clearance she has been using budesonide, albuterol and hypertonic nebulizer BID. She has been using flutter valve as well. She also report some coughing with food and dysphagia that she has noticed in the last few years. She has been evaluated for aspiration and was told it was normal. She uses 3L of oxygen at rest but has noticed that she requires more when she ambulates or it takes awhile to catch her breath. No more admissions since last seen.       REVIEW OF SYSTEM:   Constitutional: Denies fatigue, malaise, fever, chills, weight loss, change in appetite  Eyes: Denies vision changes, new pain, scotomas  ENT/mouth: Denies Nose bleeds, dental caries, dental problems, jaw pain  Resp: Denies hemoptysis, wheeze. Otherwise per HPI.    CV: Denies chest pain, diaphoresis, ankle edema, PND, syncope  GI: Denies emesis, dysphagia, heartburn, abdominal pain, diarrhea, melena  : Denies change in urinary habits, hematuria, dysuria  Musc: Denies myalgias, recent trauma, bony fractures, arthralgias, joint swelling  Skin: Denies rashes, new masses or skin lesions, increased sensitivity to sun  Neuro: Denies Seizures, episodic or chronic muscle weakness, numbness or tingling in LE and UE  Endo: Denies Hair loss, polydipsia, cold or heat intolerance   Heme/lymph: Denies bleeding gums, unusual bruising, swollen lymph nodes  Allergy/Immunologic: Denies Sinus problems, recurrent infections, itching in eyes, sneezing, PND, runny nose, nasal congestion  Psych: Denies Mood changes, agitation, psychosis, delirium, dementia, depression, anxiety     PAST MEDICAL HISTORY:  Past Medical History     Past Medical History:   Diagnosis Date    [facility] 1/22/2015           PAST SURGICAL HISTORY:  Bronchoscopy 3/2018      MEDICATIONS:    Outpatient Medications Prior to Visit   Medication Sig    albuterol sulfate 2.5 mg /3 mL (0.083 %) nebulizer solution Take 3 mL by nebulization every 4 hours as needed    albuterol sulfate 90 mcg/actuation inhl inhaler Take    amLODIPine (NORVASC) 5 mg oral tablet Take by mouth    atorvastatin (LIPITOR) 40 mg oral tablet Take 40 mg by mouth daily    budesonide (PULMICORT) 0.25 mg/2 mL nebulization solution Take 2 mLs by nebulization 2 times daily    CALCIUM ORAL Take by mouth    chlorhexidine (PERIDEX) 0.12 % MM solution Take 15 mLs by mouth 2 times daily    diazePAM (VALIUM) 10 mg oral tablet      fluticasone (FLONASE) 50 mcg/actuation nasal spray      INCRUSE ELLIPTA 62.5 mcg/actuation inhl blister with device INHALE ONE PUFF BY MOUTH DAILY    losartan-hydrochlorothiazide (HYZAAR) 100-12.5 mg oral tablet Take by mouth metoprolol succinate (TOPROL-XL) 25 mg oral extended-release tablet Take by mouth    omega 3-dha-epa-fish oil (FISH OIL) 300-1,000 mg oral capsule,delayed release(DR/EC) Take by mouth    OXYGEN GAS Take 3 L    PERFOROMIST 20 mcg/2 mL inhl nebulizer solution INHALE 2 ML BY NEBULIZATION 2 TIMES DAILY    sertraline (ZOLOFT) 25 mg oral tablet      sodium chloride 3 % nebulizer solution Take 15 mL by nebulization 2 times a day    STARCH (THICK-IT ORAL) Take by mouth    Vit C-Vit E-Lutein-Min-OM-3 (OCUVITE) 520-72-7-150 mg-unit-mg-mg oral capsule Take by mouth      No facility-administered medications prior to visit. ALLERGY:    Allergies   Allergen Reactions    Iodinated Contrast Media      Iodine           FAMILY HISTORY:  Mother with COPD      SOCIAL HISTORY:   Former smoker, quit 2007, 50 pack-year   Lives in [address], former  for OB-gyn office     IMMUNIZATION:    Immunization History   Administered Date(s) Administered    High Dose Influenza 11/08/2016            PHYSICAL EXAMINATION:  There were no vitals filed for this visit. There is no height or weight on file to calculate BMI. GA: NAD, on NC 3 L   HEENT: Dentition: good, no arched Palatetongue grossly normal  HEART: Regular rate and rhythm, Normal S1 and S2, no murmurs, mild pitting edema  LUNGS: no conversational dyspnea, mild crackles on the right base. MUSCULOSKELETAL: no clubbing, no cyanosis seen, no upper extremity digital deformity noted  NEURO: Gait is normal, Motor Power grossly normal  PSYCH: normal mood and affect      CUMULATIVE DATA:     PFTs:   Date FVC (%) FEV-1 FEV1/FVC FRC DLCO Flow loop/comment   12/8/17 2.86 (100%) 1.29 (64%) 45%   31%                        Microbiology:   Sputum culture (7/2018): pseudomonas resistant to levofloxacin and sensitive to cefepime, gentamycin, impienem, zosyn, and tobramycin. Radiology:   Study/  Location Date Findings    CXR 11/21/2019 1.  Severely emphysematous lungs with chronic right upper lobe fibrosis with associated volume loss and traction bronchiectasis. 2. Prior nodular opacity in left upper lobe decreased, residual perihilar opacities predominantly linear and likely represents scarring. CT chest 3/10/2018 1. Widespread inflammatory and tree-in-bud opacities are present   throughout both lungs, findings which may be clinically correlated   for superimposed multifocal bronchopneumonia. 2.  Advanced changes of emphysema, without significant interval   progression from prior study. Chronic areas of fibrosis within both   upper lobes are unchanged in appearance, and are most likely   postinflammatory. 3.  Indeterminate left lower lobe nodule may be inflammatory as well. 12 mm in size. This finding is new from prior examination. 4.  Calcified plaque along the coronary arteries. 5.  Small hiatal hernia. CT chest in [facility] 5/30/2019 noted for advanced diffusely emphsyematous changes with even bullous formation in upper lobes, no nodules can be identified on latter imaging. No consolidation. Lower lobe central bronchial wall thickening, no tractions         ASSESSMENT/PLAN: A 67year old year old with known history of very severe COPD and bronchiectasis, recurrent respiratory exacerbations in past 2' to pseudomonas.       Bronchiectasis exacerbation   -increased sputum production and dyspnea concerning for bronchiectatic flare  -last lung imaging has been awhile   -will obtain sputum culture prior to initiation of any antibiotics  -repeat CT chest (attempt to have done here, as lives and in [facility] [address])  -continue air way clearance with hypertonic, albuterol, budesonide nebulizer and flutter valve    Aspiration:  -some concern for chronic aspiration given reporrts of coughing and dysphagia  -will send for modified barium swallow  -continue with aspiration precaution including head of bed elevation   -avoidance of food 2-3hrs before bed    Chronic hypoxic respiratory failure on 3L via NC  -concern for increased O2 requirement especially on ambulation   -will send for desaturation walk test   -continue Incruse ellipta  Continue nasal rinse and flonase for post nasal drip       Plan of care discussed with patient      Patient instructed to contact our office for any concerns or questions. Barbara Man D.O.,  [facility] Fellow    I have personally seen and examined this patient, and have reviewed patient's medical record, as well as the laboratory work, and imaging studies. I have discussed plan of care and management with [facility] Fellow and agree with the following documentation of findings, assessment, and recommendations. This is a 76-year-old female patient with very severe COPD with advanced emphysema, bronchiectasis and recurrent pseudomonal pneumonia (levofloxacin , recurrent left pneumothoraces status post pleurodesis, presents in follow-up. On interval, patient reports overall worsening exertional tolerance in past 6 months to 1 year; she 's maintained on 3L continuous via NC with desats with minimal exertion occasionally. Further reports copious green sputum expectoration in past 6 weeks. Thus far, had been compliant with hypertonic saline once daily and flutter valve, has not been suing chest vest.   She further admits to worsening dysphagia as well as periprandial cough. Continues to take budesonide/Perforomist nebulized twice daily as well as albuterol nebulized solution twice daily as well. Impression most concerning for acute bronchiectasis exacerbation with possible pneumonia. Otherwise underlying very severe COPD and chronic respiratory failure on continuous O2.     continued lumbar/ICS twice daily. Recommend intensifying pulmonary clearance regimen to include hypertonic saline/albuterol 2-3 times daily, flutter valve use 4-5 times daily/10 breaths each.   Obtain CT chest  Patient provided with sterile cups for sputum culture, post encounter if possible   Refer for modified barium swallow followed by speech therapy eval as indicated   Patient to obtain PFTs and desaturation study prior to follow up. Empiric aspiration and acid reflux precautions discussed with patient to include:  -head of bed elevation at least 30 degrees; discussed obtaining wedge pillow of possible   -space meal time from bedtime, no food for 2-3 hours before bedtime  -avoid acid exacerbating food types likes tomato sauce or pasta and other  -take omeprazole on empty stomach and no food for half hour after   -in case of heartburn, take pepcid as needed     Plan of care discussed with patient, with her  at bedside; she expressed understanding. Physician face-to-face time was 60 minutes with 50% devoted to counseling or coordination of care, reviewing medical records or charting.     [address] [facility]  4th Floor  [address], [address] [facility]  Phone [phone]  Fax [phone]    Electronically signed by Paulette Gonzalez MD at 06/07/2021 12:30 PM EDT    Back to top of Progress Notes

## 2022-09-12 NOTE — PROGRESS NOTES
SPEECH/LANGUAGE PATHOLOGY  VIDEOFLUOROSCOPIC STUDY OF SWALLOWING (MBS)   and PLAN OF CARE    PATIENT NAME:  Annie Riggins  (female)     MRN:  [id number]    :  1945  (65 y.o.)  STATUS:  Inpatient: Room [facility]1/0431-A    TODAY'S DATE:  2022  REFERRING PROVIDER:   22 1215    SLP video swallow  Start:  22 1215,   End:  22 1215,   ONE TIME,   Standing Count:  1 Occurrences,   R         Amal Varghese Bran PA-C    REASON FOR REFERRAL: hx of dysphagia   EVALUATING THERAPIST: SELVIN Lerner      RESULTS:      DYSPHAGIA DIAGNOSIS:  mild-moderate oropharyngeal phase dysphagia including presence of a pharyngocele that is increasing risk of laryngeal penetration/aspiration           Pt was able to clear laryngeal penetration with throat clear    DIET RECOMMENDATIONS:  Soft and bite size consistency solids (IDDSI level 6) with thin liquids (IDDSI level 0) with throat clear every 2-3 sips    MEDICATION ADMINISTRATION: Per patient choice/nursing judgement    FEEDING RECOMMENDATIONS:    Assistance level:  Stand by assistance is needed during all oral intake     Compensatory strategies recommended: Small bites/sips and Throat clear     Discussed recommendations with nursing and/or faxed report to referring provider: Yes    Laryngeal Penetration and Aspiration:  Penetration WITHOUT aspiration was observed in today's study with  thin liquid    SPEECH THERAPY  PLAN OF CARE   The dysphagia POC is established based on physician order and dysphagia diagnosis    Skilled SLP intervention for dysphagia management on acute care 3-5 x per week until goals met, pt plateaus in function and/or discharged from hospital      Conditions Requiring Skilled Therapeutic Intervention for dysphagia:    Reduced laryngeal closure resulting in penetration  Swallow triggered when bolus head at level of laryngeal surface of epiglottis increasing risk of aspiration    SPECIFIC DYSPHAGIA INTERVENTIONS TO INCLUDE:     Compensatory strategy training   Trials of upgraded diet/liquid     Specific instructions for next treatment:  therapeutic po trial to determine safety of advanced diet textures and consistencies and initiate instruction of compensatory strategies  Treatment Goals:    Short Term Goals:  Pt will implement identified compensatory swallowing strategies on 90% of opportunities or greater to improve airway protection and swallow function. Pt will complete PO trials of upgraded diet textures with SLP only to determine the least restrictive PO diet to maintain adequate nutrition/hydration with no more than 1 overt s/s of pen/asp. Long Term Goals:   Pt will maintain adequate nutrition/hydration via PO intake of the least restrictive oral diet with implementation of safe swallow/ compensatory strategies and decrease signs/symptoms of aspiration to less than 1 x/day. Patient/family Goal:    Did not state. Will further assess during treatment.     Plan of care discussed with Patient   The Patient understand(s) the diagnosis, prognosis and plan of care     Rehabilitation Potential/Prognosis: good                      ADMITTING DIAGNOSIS: PE (pulmonary thromboembolism) ([address] [I26.99]  Pneumonia due to infectious organism, unspecified laterality, unspecified part of lung [J18.9]  Acute pulmonary embolism without acute cor pulmonale, unspecified pulmonary embolism type ([address] [I26.99]  Pulmonary embolism, unspecified chronicity, unspecified pulmonary embolism type, unspecified whether acute cor pulmonale present ([address] [I26.99]     VISIT DIAGNOSIS:   Visit Diagnoses         Codes    Pulmonary embolism, unspecified chronicity, unspecified pulmonary embolism type, unspecified whether acute cor pulmonale present ([address]    -  Primary I26.99    Pneumonia due to infectious organism, unspecified laterality, unspecified part of lung     J18.9                PATIENT REPORT/COMPLAINT: coughing with liquids    PRIOR LEVEL OF SWALLOW FUNCTION:    Past History of Dysphagia?:  yes    Home diet: Regular consistency solids (IDDSI level 7) with  thin liquids (IDDSI level 0) with chin tuck  Current Diet Order:  ADULT DIET; Regular    PROCEDURE:  Consistencies Administered During the Evaluation   Liquids: thin liquid and nectar thick liquid   Solids:  pureed foods and solid foods      Method of Intake:   cup, straw, spoon  Self fed      Position:   Seated, upright, Lateral plane, A-P plane    INSTRUMENTAL ASSESSMENT:    ORAL PREP/ ORAL PHASE:    Decreased mastication due to:  poor/missing dentition   and decreased lingual control  Delayed A-P transit due to: reduced lingual strength      PHARYNGEAL PHASE:     ONSET TIME       Delayed initiation of the pharyngeal swallow was noted with swallow reflex triggered at the level of the vallecula        PHARYNGEAL RESIDUALS        Vallecula/Pharyngeal Wall           No significant residuals were noted in the vallecula      Pyriform Sinuses      No significant residuals were noted in the pyriform sinuses     LARYNGEAL PENETRATION   Laryngeal penetration occurred in the absence of aspiration BEFORE the swallow for thin liquid due to delayed pharyngeal swallow which cleared from the laryngeal vestibule with a cued, re-directive throat clear . Laryngeal penetration was mild and occurred inconsistently     Laryngeal penetration occurred in the absence of aspiration AFTER the swallow for thin liquid due to pharyngeal residuals which cleared from the laryngeal vestibule with a cued, re-directive throat clear . Laryngeal penetration was mild and occurred inconsistently.       ASPIRATION  Aspiration  was not present during this evaluation however there is high risk for aspiration  of thin liquid due to laryngeal penetration    PENETRATION-ASPIRATION SCALE (PAS):  THIN 2 = Material enters the airway, remains above vocal folds, and is ejected from the airway with throat clear  MILDLY THICK 1 = Material does not enter the airway  MODERATELY THICK item not administered  PUREE 1 = Material does not enter the airway  HARD SOLID 1 = Material does not enter the airway       COMPENSATORY STRATEGIES    Compensatory strategies that were beneficial included Small bites/sips and Throat clear      STRUCTURAL/FUNCTIONAL ANOMALIES   pharyngocele    CERVICAL ESOPHAGEAL STAGE :     The cervical esophagus appeared adequate          ___________    Cognition:   Follows 1 - step directions appropriate for this assessment    Oral Peripheral Examination   Generalized oral weakness      Parameters of Speech Production  Respiration:  Adequate for speech production  Quality:   Within functional limits  Intensity: Within functional limits    Pain: No pain reported. EDUCATION:   The Speech Language Pathologist (SLP) completed education regarding results of evaluation and that intervention is warranted at this time. Learner: Patient  Education: Reviewed results and recommendations of this evaluation  Evaluation of Education:  Leonard Foot understanding    This plan may be re-evaluated and revised as warranted. Evaluation Time includes thorough review of current medical information, gathering information on past medical history/social history and prior level of function, completion of standardized testing/informal observation of tasks, assessment of data and education on plan of care and goals. INTERVENTION    Pt/family educated on above results and plan of care. Pt/family trained on compensatory strategies for safe swallow and signs and symptoms of aspiration (throat clearing, coughing/choking, wet vocal quality. , etc.) and encouraged to notify staff if observed. Handouts provided as warranted. Pt/family encouraged to engage in question/answer session. All questions answered and pt/family verbalized understanding of above. [x]The admitting diagnosis and active problem list, have been reviewed prior to initiation of this evaluation.     CPT Code: 10186  dysphagia study, 65906 dysphagia therapy    ACTIVE PROBLEM LIST:   Patient Active Problem List   Diagnosis    Tachycardia    HTN (hypertension)    Hypoxia    COPD (chronic obstructive pulmonary disease) ([address]    Cryptogenic organizing pneumonia ([address]    Pneumothorax on left    Cardiomyopathy ([address]    Pneumothorax    Dysphagia    Exacerbation of bronchiectasis due to infection ([address]    COPD exacerbation ([address]    Infection due to parainfluenza virus 3    Chronic respiratory failure with hypoxia ([address]    Pneumonia due to Pseudomonas ([address]    Acute pulmonary embolism without acute cor pulmonale, unspecified pulmonary embolism type ([address]    PE (pulmonary thromboembolism) ([address]       Cheri TEE. CCC/SLP [id number]  Speech-Language Pathologist'

## 2022-09-12 NOTE — CARE COORDINATION
CASE MANAGEMENT. ... Met with patient and daughter at the bedside. Mrs Taylor Sawyer voices being independent at home with her . [place] with basement. Wears home o2 4lnc thru [employer]-confirmed with Ish Guardado ([phone]). Concentrator and tank supply up to 5 liters. Patient interested in equipment that could accommodate > 5lnc. In order to obtain concentrator/tanks  > 5lnc, patient would have to desat on 4 or 5 liters. Therefore, o2 testing would be required and new order if needed. Requested for nursing to test. Mrs Taylor Sawyer has history with [facility]. No [facility] history. She is being followed by Pulm. Speech/ID new consults. Video swallow ordered. She is on iv merrem q8hrs, aerosols-new and po cipro was started today. Plan is home. Will follow for needs.

## 2022-09-12 NOTE — PROGRESS NOTES
[facility] Progress Note    Admitting Date and Time: 9/9/2022 12:20 PM  Admit Dx: PE (pulmonary thromboembolism) ([facility] [address] [I26.99]  Pneumonia due to infectious organism, unspecified laterality, unspecified part of lung [J18.9]  Acute pulmonary embolism without acute cor pulmonale, unspecified pulmonary embolism type ([address] [I26.99]  Pulmonary embolism, unspecified chronicity, unspecified pulmonary embolism type, unspecified whether acute cor pulmonale present ([address] [I26.99]    Subjective:  Patient is being followed for PE (pulmonary thromboembolism) ([facility] [address] [I26.99]  Pneumonia due to infectious organism, unspecified laterality, unspecified part of lung [J18.9]  Acute pulmonary embolism without acute cor pulmonale, unspecified pulmonary embolism type ([address] [I26.99]  Pulmonary embolism, unspecified chronicity, unspecified pulmonary embolism type, unspecified whether acute cor pulmonale present ([address] [I26.99]     Patient was lying in bed in no acute distress. Continues to report cough with brown sputum production. Cough is less frequent and less severe. ROS: denies fever, chills, cp, sob, n/v, HA unless stated above.      potassium chloride  40 mEq Oral Daily with breakfast    enoxaparin  40 mg SubCUTAneous Daily    budesonide  1,000 mcg Nebulization BID    sertraline  100 mg Oral Daily    meropenem  1,000 mg IntraVENous Q8H    sodium chloride flush  5-40 mL IntraVENous 2 times per day    pantoprazole  40 mg Oral QAM AC    ipratropium-albuterol  1 ampule Inhalation Q4H WA    amLODIPine  5 mg Oral Daily    aspirin  81 mg Oral QAM    atorvastatin  20 mg Oral Daily    calcium elemental  500 mg Oral QAM    diazePAM  10 mg Oral Nightly    Arformoterol Tartrate  15 mcg Nebulization BID    metoprolol succinate  12.5 mg Oral Daily    losartan  100 mg Oral Daily    And    hydroCHLOROthiazide  12.5 mg Oral Daily     sodium chloride flush, 5-40 mL, PRN  sodium chloride, , PRN  polyethylene glycol, 17 g, Daily PRN  acetaminophen, 650 mg, Q6H PRN   Or  acetaminophen, 650 mg, Q6H PRN  prochlorperazine, 10 mg, Q6H PRN  melatonin, 3 mg, Nightly PRN       Objective:    BP (!) 140/66   Pulse 95   Temp 97.6 °F (36.4 °C) (Oral)   Resp 18   Ht 5' 5\" (1.651 m)   Wt 186 lb (84.4 kg)   SpO2 92%   BMI 30.95 kg/m²   General Appearance: alert and oriented to person, place and time and in no acute distress  Skin: warm and dry  Head: normocephalic and atraumatic  Eyes: pupils equal, round, and reactive to light, extraocular eye movements intact, conjunctivae normal  Neck: neck supple and non tender without mass   Pulmonary/Chest: clear to auscultation bilaterally- no wheezes, rales or rhonchi, normal air movement, no respiratory distress  Cardiovascular: normal rate, normal S1 and S2 and no carotid bruits  Abdomen: soft, non-tender, non-distended, normal bowel sounds, no masses or organomegaly  Extremities: no cyanosis, no clubbing and no edema  Neurologic: no cranial nerve deficit and speech normal        Recent Labs     09/10/22  0708 09/11/22  0255 09/12/22  0220    145 143   K 3.5 3.2* 3.1*    105 101   CO2 31* 30* 31*   BUN 13 12 7   CREATININE 0.6 0.8 0.7   GLUCOSE 133* 104* 105*   CALCIUM 8.7 8.2* 8.6         Recent Labs     09/10/22  0708 09/11/22  0255 09/12/22  0220   WBC 3.5* 6.9 6.4   RBC 4.53 3.76 4.07   HGB 10.6* 9.1* 9.7*   HCT 35.7 30.7* 32.0*   MCV 78.8* 81.6 78.6*   MCH 23.4* 24.2* 23.8*   MCHC 29.7* 29.6* 30.3*   RDW 17.0* 17.4* 17.7*    169 171   MPV 10.0 10.5 9.6         Radiology:   EXAMINATION:  CTA OF THE CHEST 9/9/2022 2:04 pm     TECHNIQUE:  CTA of the chest was performed after the administration of intravenous  contrast.  Multiplanar reformatted images are provided for review. MIP  images are provided for review.  Automated exposure control, iterative  reconstruction, and/or weight based adjustment of the mA/kV was utilized to  reduce the radiation dose to as low as reasonably achievable. COMPARISON:  CTA of the chest, 06/22/2021. HISTORY:  ORDERING SYSTEM PROVIDED HISTORY: hemoptysis; tachycardia  TECHNOLOGIST PROVIDED HISTORY:  Reason for exam:->hemoptysis; tachycardia  Decision Support Exception - unselect if not a suspected or confirmed  emergency medical condition->Emergency Medical Condition (MA)     FINDINGS:  Pulmonary Arteries: Small partially occlusive thrombi are seen in the lateral  basal segment of the left lower lobe (axial sequence, images 134 and 137). The main pulmonary artery is normal in caliber. Mediastinum: The heart is normal in size. Mild calcified atherosclerosis is  seen in the aorta. No aneurysm. Enlarged mediastinal and bilateral hilar  lymph nodes are noted. The largest in the right hilar region measures  approximately 1.7 x 1.7 cm. The largest in the precarinal mediastinal space  measures 2.4 x 1.6 cm. Small hiatal hernia. Lungs/pleura: Pulmonary infiltrate is seen in the right lower lobe. There is  opacification of right lower lobe bronchi with secretions. Scarring is seen  the upper lobes bilaterally. Severe emphysematous changes are seen. No  pneumothorax or pleural effusion is seen. Upper Abdomen: No acute abnormality seen. Cholelithiasis is noted. Soft Tissues/Bones: No acute bone or soft tissue abnormality. IMPRESSION:  1. Small, partially-occlusive PULMONARY EMBOLI in the lateral basal segment  of the left lower lobe. 2. Right lower lobe infiltrate likely INFECTIOUS/INFLAMMATORY. 3. Mucus opacification of the right lower lobe bronchi. 4. Mildly prominent mediastinal and hilar lymphadenopathy, which is likely  reactive and is chronic. Enlarged lymph nodes have been noted on multiple  prior CTs dating back to at least 10/30/2017.  5. Severe emphysematous changes. 6. Cholelithiasis.     Assessment:    Principal Problem:    Acute pulmonary embolism without acute cor pulmonale, unspecified pulmonary embolism type [facility])  Active Problems:    PE (pulmonary thromboembolism) ([address]  Resolved Problems:    * No resolved hospital problems. *      Plan:  1. PE: CTA showing small, partially-occlusive PE in the lateal basal segment of the LLL. Lovenox 1 mg/kg q12h transitioned to Eliquis 10 mg, per pulmonary may not have PE switched back to Lovenox at prophylaxis dose. Pulmonary consulted. 2. Acute on chronic respiratory failure with hypoxia: Was hypoxic in the ED at 82% on 4L. O2 was increased to 5L but has since been weaned 4L NC. This is patient's baseline O2. S/p Prednisone bolus 50 mg q6h x3.     3.  Right lower lobe infiltrate: Hx of pseudomonas PNA in March 2022. CTA showing RLL infiltrate, infectious vs inflammatory. Received Rocephin and doxycycline in ED. Procal 0.05. Doppler of LE negative for PE. No further antibiotics. Sputum gram stain growing pseudomonas. Respiratory panel negative. Oncology consulted for frequent PNA. No further work up, per Oncology. MBS and speech eval ordered to r/o aspiration. ID consulted. Meropenem switched to Ciprofloxacin. 4. COPD: Continue Brovana and Pulmicort. Continue Duoneb. ABG while on NIV in ED showing pH of 7.39, PCO2 of 50.4, PO2 101, and HCO3 30. Continue oxygen supplementation. S/p Prednisone bolus 50 mg q6h x3.    5. GERD: Continue PPI. 6. Hypertension Continue Losartan-HCTZ    7. Hyperlipidemia: Continue Lipitor. Lipid panel unremarkable. 8. Osteopenia/osteoporosis: Continue Fosamax    9. Anxiety/depression: Continue Zoloft    10. Hypokalemia: supplement. Daily supplement with breakfast. Monitor. 11. Anemia: Likely anemia of chronic disease. Hgb low. MCV 81. Iron panel noted. NOTE: This report was transcribed using voice recognition software. Every effort was made to ensure accuracy; however, inadvertent computerized transcription errors may be present.   Electronically signed by Devang Dorantes PA-C on 9/12/2022 at 8:12 AM       25 minutes time spent reviewing patient chart, assessing patient, discussing plan of care with patient and family, discussing plan of care with collaborating physician, and charting.

## 2022-09-12 NOTE — CONSULTS
[address] [place] [address] [facility]  Consultation Note     Admit Date: 9/9/2022 12:20 PM    Reason for Consult:   History of Pseudomonas pneumonia.  + Respiratory culture    Attending Physician:  Juana Hickey MD    HISTORY OF PRESENT ILLNESS:             The history is obtained from extensive review of available past medical records. The patient is a 65 y.o. female who is previously known to the ID service. The patient has a history of bronchiectasis and has been treated in the past for infected bronchiectasis. She also has severe COPD and is now oxygen dependent. She has been treated for aspiration and dysphagia in the past and was on thickened liquids for some time. Her last treatment was with Cefepime in August 2021 for infected bronchiectasis with Pseudomonas. The patient presents to the ED at [facility] on 9/9/2022 with worsening shortness of breath and a productive brownish/reddish sputum. She was afebrile but slightly tachycardic. White count was normal. CT angiogram surgery small partially occlusive pulmonary embolism on the left lower lobe and a right lower lobe infiltrate. Respiratory cultures are growing pansensitive Pseudomonas. She denies having any fever. She says that she will let the conditioning go for a longer period of time without seeking medical attention because she was concerned about her . Past Medical History:        Diagnosis Date    COPD (chronic obstructive pulmonary disease) ([address]     Hypertension     Migraines     MRSA (methicillin resistant staph aureus) culture positive     Pneumonia     Ulcer      November 2022. Seen in the office in follow-up. She was doing relatively well and having occasional cough with grayish sputum. No systemic signs of infection. August 2021. Seen in the office in follow-up. She was reportedly more sleepy and having a productive sputum. Respiratory culture grew Pseudomonas aeruginosa.   She was treated with Cefepime and inhaled Tobramycin. Seen in follow-up. She was doing better and tolerating Cefepime. She completed 4 weeks of Cefepime. July 2021. Admitted to [facility] with fever, shortness of breath and a productive cough. Seen by ID for bronchiectasis with Pseudomonas infection. Treated with Zosyn and inhaled Tobramycin. A respiratory panel showed parainfluenza virus 3 infection. Antibiotics were discontinued and she was discharged. June 2021. Seen in the office in follow-up. She had been seen by pulmonary at [facility] and was growing Pseudomonas in the respiratory culture which was sensitive to Levofloxacin but resistant to Imipenem. We opted to try Ciprofloxacin. She was seen in follow-up on 6/29/2021. The cough had improved. We opted to complete a 6-week treatment. February 2021. Seen in the office in follow-up. She reported a chronic productive cough and dyspnea on exertion. She had received her SARS-CoV-2 vaccine the week before. August 2020. Seen in the office in follow-up she was now on oxygen by nasal cannula at 3 L. She had not required admissions to the hospital.     November 2019. Seen in the office. She was no longer using the chest vest but rather a flutter valve. She had a respiratory culture and has been treated with Doxycycline. Respiratory culture found Pseudomonas and she was treated with Ciprofloxacin. May 2019. Seen in the office. She had been at [facility] and treated with Imipenem for pneumonia. July 2018. She was admitted to the hospital in [place] with a pneumothorax and a non-STEMI. He was found to have Pseudomonas in the sputum, resistant to quinolones. Peridex was stopped by PCP. March 2018. Prescribed Levofloxacin for pneumonia as an outpatient. November 2017. Seen in follow-up up in the office she had been on one course of Augmentin.   She was being followed by pulmonary and was using a chest vest.  She is also been prescribed Peridex. May 2017. She was referred back to the office for recurrent pneumonia she was treated in December with Levofloxacin. This was changed over to Doxycycline and she made improvement. Treated with Augmentin in March 2016. She was diagnosed with dysphagia and was taken thickened fluids. January 2015. Admitted to [facility] with right upper lobe pneumonia. Seen by ID. Treated with Vancomycin. Respiratory cultures grew MRSA. She was discharged on 4 weeks of Vancomycin.     Past Surgical History:        Procedure Laterality Date    BREAST LUMPECTOMY Right     PICC LINE INSERTION NURSE  8/31/2021         IL THORSC DX LUNGS/PERICAR/MED/PLEURAL SPACE W/O BX Left 9/12/2018    LEFT VIDEO ASSISTED THORACOSCOPY STAPLING OF BLEBS TALC PLEURODESIS WITH PLEURAL STRIPPING POSSIBLE THORACOTOMY performed by Oli Stacy MD at [place] [address]       Current Medications:   Scheduled Meds:   potassium chloride  40 mEq Oral Daily with breakfast    enoxaparin  40 mg SubCUTAneous Daily    budesonide  1,000 mcg Nebulization BID    sertraline  100 mg Oral Daily    meropenem  1,000 mg IntraVENous Q8H    sodium chloride flush  5-40 mL IntraVENous 2 times per day    pantoprazole  40 mg Oral QAM AC    ipratropium-albuterol  1 ampule Inhalation Q4H WA    amLODIPine  5 mg Oral Daily    aspirin  81 mg Oral QAM    atorvastatin  20 mg Oral Daily    calcium elemental  500 mg Oral QAM    diazePAM  10 mg Oral Nightly    Arformoterol Tartrate  15 mcg Nebulization BID    metoprolol succinate  12.5 mg Oral Daily    losartan  100 mg Oral Daily    And    hydroCHLOROthiazide  12.5 mg Oral Daily     Continuous Infusions:   sodium chloride       PRN Meds:sodium chloride flush, sodium chloride, polyethylene glycol, acetaminophen **OR** acetaminophen, prochlorperazine, melatonin    Allergies:  Dye [iodides]    Social History:   Social History     Socioeconomic History    Marital status:  hepatosplenomegaly. Extremities: No clubbing, no cyanosis, no edema.   Lines: Peripheral.      CBC+dif:  Recent Labs     09/10/22  0708 09/11/22  0255 09/12/22  0220   WBC 3.5* 6.9 6.4   HGB 10.6* 9.1* 9.7*   HCT 35.7 30.7* 32.0*   MCV 78.8* 81.6 78.6*    169 171   NEUTROABS 2.56 5.02 4.24     Lab Results   Component Value Date    CRP 0.4 09/21/2021    CRP 0.3 09/15/2021    CRP 0.8 (H) 09/07/2021      No results found for: CRPHS  Lab Results   Component Value Date    SEDRATE 40 (H) 09/21/2021    SEDRATE 37 (H) 09/15/2021    SEDRATE 30 (H) 09/07/2021     Lab Results   Component Value Date    ALT 5 09/12/2022    AST 10 09/12/2022    ALKPHOS 69 09/12/2022    BILITOT <0.2 09/12/2022     Lab Results   Component Value Date/Time     09/12/2022 02:20 AM    K 3.1 09/12/2022 02:20 AM     09/12/2022 02:20 AM    CO2 31 09/12/2022 02:20 AM    BUN 7 09/12/2022 02:20 AM    CREATININE 0.7 09/12/2022 02:20 AM    GFRAA >60 09/12/2022 02:20 AM    LABGLOM >60 09/12/2022 02:20 AM    GLUCOSE 105 09/12/2022 02:20 AM    PROT 6.6 09/12/2022 02:20 AM    LABALBU 3.8 09/12/2022 02:20 AM    CALCIUM 8.6 09/12/2022 02:20 AM    BILITOT <0.2 09/12/2022 02:20 AM    ALKPHOS 69 09/12/2022 02:20 AM    AST 10 09/12/2022 02:20 AM    ALT 5 09/12/2022 02:20 AM       Lab Results   Component Value Date/Time    PROTIME 12.1 09/07/2018 10:11 AM    INR 1.0 09/07/2018 10:11 AM       No results found for: TSH    Lab Results   Component Value Date/Time    COLORU Yellow 09/12/2022 06:00 AM    PHUR 6.5 09/12/2022 06:00 AM    WBCUA 0-1 09/12/2022 06:00 AM    RBCUA NONE 09/12/2022 06:00 AM    BACTERIA NONE SEEN 09/12/2022 06:00 AM    CLARITYU Clear 09/12/2022 06:00 AM    SPECGRAV 1.015 09/12/2022 06:00 AM    LEUKOCYTESUR TRACE 09/12/2022 06:00 AM    UROBILINOGEN 0.2 09/12/2022 06:00 AM    BILIRUBINUR Negative 09/12/2022 06:00 AM    BLOODU Negative 09/12/2022 06:00 AM    GLUCOSEU Negative 09/12/2022 06:00 AM       Lab Results   Component Value Date/Time    HCO3 33.3 09/11/2022 09:03 AM    BE 7.1 09/11/2022 09:03 AM    O2SAT 50.3 09/11/2022 09:03 AM    PH 7.358 09/11/2022 09:03 AM    PCO2 60.6 09/11/2022 09:03 AM    PO2 29.0 09/11/2022 09:03 AM     Radiology:  Noted    Microbiology:  Pending  No results for input(s): BC in the last 72 hours. Recent Labs     09/10/22  1254 09/10/22  1558   ORG Pseudomonas aeruginosa* Pseudomonas aeruginosa*     No results for input(s): BLOODCULT2 in the last 72 hours. Recent Labs     09/10/22  1920   STREPNEUMAGU Presumptive negative- suggests no current or recent  pneumococcal infection. Infection due to Strep pneumoniae cannot be  ruled out since the antigen present in the sample  may be below the detection limit of the test.  Normal Range:Presumptive Negative       Recent Labs     09/10/22  1920   LP1UAG Presumptive Negative -suggesting no recent or current infections  with Legionella pneumophila serogroup 1. Infection to Legionella cannot be ruled out since other serogroups  and species may cause infection, antigen may not be present in  early infection, or level of antigen may be below the  detection limit. Normal Range: Presumptive Negative       No results for input(s): ASO in the last 72 hours. Recent Labs     09/10/22  1254 09/10/22  1558   CULTRESP Oral Pharyngeal Ama reduced*  Heavy growth  Refer to previous culture CXRES 9/10/2022 1558 for susceptibility  results   Oral Pharyngeal Ama absent  Oral Pharyngeal Ama absent  *  Heavy growth  Identification and sensitivity to follow       Recent Labs     09/09/22  1249   PROCAL 0.05       Assessment:  Recurrent Pseudomonas pneumonia versus infected bronchiectasis  Exacerbation COPD  Probable recurrent dysphagia. She has been on thickened liquids in the past  No evidence of immune deficiency    Plan:    Start oral Ciprofloxacin. She has done well with Ciprofloxacin, as recently as June 2022, even though she has been on metoprolol.   Check QTC  Check cultures, baseline ESR, CRP  Swallow evaluation  Will follow with you    Thank you for having us see this patient in consultation. Case discussed with Dr. Alex Branham. Spoke with nursing. Spoke with daughter.     Werner Marroquin MD  12:32 PM  9/12/2022

## 2022-09-13 LAB
ALBUMIN SERPL-MCNC: 3.5 G/DL (ref 3.5–5.2)
ALP BLD-CCNC: 61 U/L (ref 35–104)
ALT SERPL-CCNC: 6 U/L (ref 0–32)
ANION GAP SERPL CALCULATED.3IONS-SCNC: 8 MMOL/L (ref 7–16)
AST SERPL-CCNC: 10 U/L (ref 0–31)
BASOPHILS ABSOLUTE: 0.04 E9/L (ref 0–0.2)
BASOPHILS RELATIVE PERCENT: 0.7 % (ref 0–2)
BILIRUB SERPL-MCNC: 0.2 MG/DL (ref 0–1.2)
BUN BLDV-MCNC: 7 MG/DL (ref 6–23)
CALCIUM SERPL-MCNC: 8.8 MG/DL (ref 8.6–10.2)
CHLORIDE BLD-SCNC: 102 MMOL/L (ref 98–107)
CO2: 30 MMOL/L (ref 22–29)
CREAT SERPL-MCNC: 0.6 MG/DL (ref 0.5–1)
CULTURE, RESPIRATORY: ABNORMAL
CULTURE, RESPIRATORY: ABNORMAL
EKG ATRIAL RATE: 75 BPM
EKG Q-T INTERVAL: 416 MS
EKG QRS DURATION: 68 MS
EKG QTC CALCULATION (BAZETT): 464 MS
EKG R AXIS: -45 DEGREES
EKG T AXIS: 0 DEGREES
EKG VENTRICULAR RATE: 75 BPM
EOSINOPHILS ABSOLUTE: 0.11 E9/L (ref 0.05–0.5)
EOSINOPHILS RELATIVE PERCENT: 1.9 % (ref 0–6)
GFR AFRICAN AMERICAN: >60
GFR NON-AFRICAN AMERICAN: >60 ML/MIN/1.73
GLUCOSE BLD-MCNC: 105 MG/DL (ref 74–99)
HCT VFR BLD CALC: 30.8 % (ref 34–48)
HEMOGLOBIN: 9.2 G/DL (ref 11.5–15.5)
IMMATURE GRANULOCYTES #: 0.04 E9/L
IMMATURE GRANULOCYTES %: 0.7 % (ref 0–5)
LYMPHOCYTES ABSOLUTE: 1.59 E9/L (ref 1.5–4)
LYMPHOCYTES RELATIVE PERCENT: 27.7 % (ref 20–42)
MCH RBC QN AUTO: 23.7 PG (ref 26–35)
MCHC RBC AUTO-ENTMCNC: 29.9 % (ref 32–34.5)
MCV RBC AUTO: 79.2 FL (ref 80–99.9)
MONOCYTES ABSOLUTE: 0.34 E9/L (ref 0.1–0.95)
MONOCYTES RELATIVE PERCENT: 5.9 % (ref 2–12)
NEUTROPHILS ABSOLUTE: 3.63 E9/L (ref 1.8–7.3)
NEUTROPHILS RELATIVE PERCENT: 63.1 % (ref 43–80)
ORGANISM: ABNORMAL
PDW BLD-RTO: 17.8 FL (ref 11.5–15)
PLATELET # BLD: 157 E9/L (ref 130–450)
PMV BLD AUTO: 9.8 FL (ref 7–12)
POTASSIUM REFLEX MAGNESIUM: 3.8 MMOL/L (ref 3.5–5)
RBC # BLD: 3.89 E12/L (ref 3.5–5.5)
SMEAR, RESPIRATORY: ABNORMAL
SODIUM BLD-SCNC: 140 MMOL/L (ref 132–146)
TOTAL PROTEIN: 6 G/DL (ref 6.4–8.3)
WBC # BLD: 5.8 E9/L (ref 4.5–11.5)

## 2022-09-13 PROCEDURE — 2700000000 HC OXYGEN THERAPY PER DAY

## 2022-09-13 PROCEDURE — 36415 COLL VENOUS BLD VENIPUNCTURE: CPT

## 2022-09-13 PROCEDURE — 80053 COMPREHEN METABOLIC PANEL: CPT

## 2022-09-13 PROCEDURE — 2580000003 HC RX 258: Performed by: INTERNAL MEDICINE

## 2022-09-13 PROCEDURE — 94640 AIRWAY INHALATION TREATMENT: CPT

## 2022-09-13 PROCEDURE — 85025 COMPLETE CBC W/AUTO DIFF WBC: CPT

## 2022-09-13 PROCEDURE — 93005 ELECTROCARDIOGRAM TRACING: CPT | Performed by: SPECIALIST

## 2022-09-13 PROCEDURE — 6370000000 HC RX 637 (ALT 250 FOR IP): Performed by: SPECIALIST

## 2022-09-13 PROCEDURE — 6360000002 HC RX W HCPCS: Performed by: INTERNAL MEDICINE

## 2022-09-13 PROCEDURE — 2060000000 HC ICU INTERMEDIATE R&B

## 2022-09-13 PROCEDURE — APPSS30 APP SPLIT SHARED TIME 16-30 MINUTES: Performed by: PHYSICIAN ASSISTANT

## 2022-09-13 PROCEDURE — 99232 SBSQ HOSP IP/OBS MODERATE 35: CPT | Performed by: INTERNAL MEDICINE

## 2022-09-13 PROCEDURE — 6360000002 HC RX W HCPCS

## 2022-09-13 PROCEDURE — 94668 MNPJ CHEST WALL SBSQ: CPT

## 2022-09-13 PROCEDURE — 94669 MECHANICAL CHEST WALL OSCILL: CPT

## 2022-09-13 PROCEDURE — 6370000000 HC RX 637 (ALT 250 FOR IP): Performed by: FAMILY MEDICINE

## 2022-09-13 PROCEDURE — 2580000003 HC RX 258: Performed by: FAMILY MEDICINE

## 2022-09-13 PROCEDURE — 6360000002 HC RX W HCPCS: Performed by: FAMILY MEDICINE

## 2022-09-13 RX ADMIN — SODIUM CHLORIDE, PRESERVATIVE FREE 10 ML: 5 INJECTION INTRAVENOUS at 09:23

## 2022-09-13 RX ADMIN — CIPROFLOXACIN 500 MG: 500 TABLET, FILM COATED ORAL at 09:20

## 2022-09-13 RX ADMIN — IPRATROPIUM BROMIDE AND ALBUTEROL SULFATE 1 AMPULE: .5; 2.5 SOLUTION RESPIRATORY (INHALATION) at 16:08

## 2022-09-13 RX ADMIN — IPRATROPIUM BROMIDE AND ALBUTEROL SULFATE 1 AMPULE: .5; 2.5 SOLUTION RESPIRATORY (INHALATION) at 19:25

## 2022-09-13 RX ADMIN — IPRATROPIUM BROMIDE AND ALBUTEROL SULFATE 1 AMPULE: .5; 2.5 SOLUTION RESPIRATORY (INHALATION) at 07:44

## 2022-09-13 RX ADMIN — ARFORMOTEROL TARTRATE 15 MCG: 15 SOLUTION RESPIRATORY (INHALATION) at 19:25

## 2022-09-13 RX ADMIN — METOPROLOL SUCCINATE 12.5 MG: 25 TABLET, EXTENDED RELEASE ORAL at 09:20

## 2022-09-13 RX ADMIN — HYDROCHLOROTHIAZIDE 12.5 MG: 12.5 TABLET ORAL at 09:20

## 2022-09-13 RX ADMIN — BUDESONIDE 1000 MCG: 0.5 SUSPENSION RESPIRATORY (INHALATION) at 19:25

## 2022-09-13 RX ADMIN — LOSARTAN POTASSIUM 100 MG: 50 TABLET, FILM COATED ORAL at 09:20

## 2022-09-13 RX ADMIN — PANTOPRAZOLE SODIUM 40 MG: 40 TABLET, DELAYED RELEASE ORAL at 06:15

## 2022-09-13 RX ADMIN — AMLODIPINE BESYLATE 5 MG: 5 TABLET ORAL at 09:20

## 2022-09-13 RX ADMIN — POTASSIUM CHLORIDE 40 MEQ: 1500 TABLET, EXTENDED RELEASE ORAL at 09:20

## 2022-09-13 RX ADMIN — ARFORMOTEROL TARTRATE 15 MCG: 15 SOLUTION RESPIRATORY (INHALATION) at 07:44

## 2022-09-13 RX ADMIN — DIAZEPAM 10 MG: 5 TABLET ORAL at 19:53

## 2022-09-13 RX ADMIN — IPRATROPIUM BROMIDE AND ALBUTEROL SULFATE 1 AMPULE: .5; 2.5 SOLUTION RESPIRATORY (INHALATION) at 12:28

## 2022-09-13 RX ADMIN — SODIUM CHLORIDE, PRESERVATIVE FREE 10 ML: 5 INJECTION INTRAVENOUS at 19:54

## 2022-09-13 RX ADMIN — SERTRALINE 100 MG: 100 TABLET, FILM COATED ORAL at 19:53

## 2022-09-13 RX ADMIN — CIPROFLOXACIN 500 MG: 500 TABLET, FILM COATED ORAL at 19:53

## 2022-09-13 RX ADMIN — CALCIUM 500 MG: 500 TABLET ORAL at 09:20

## 2022-09-13 RX ADMIN — MEROPENEM 1000 MG: 1 INJECTION, POWDER, FOR SOLUTION INTRAVENOUS at 00:49

## 2022-09-13 RX ADMIN — ATORVASTATIN CALCIUM 20 MG: 20 TABLET, FILM COATED ORAL at 09:19

## 2022-09-13 RX ADMIN — ENOXAPARIN SODIUM 40 MG: 100 INJECTION SUBCUTANEOUS at 19:53

## 2022-09-13 RX ADMIN — BUDESONIDE 1000 MCG: 0.5 SUSPENSION RESPIRATORY (INHALATION) at 07:44

## 2022-09-13 RX ADMIN — ASPIRIN 81 MG: 81 TABLET, COATED ORAL at 19:53

## 2022-09-13 NOTE — PROGRESS NOTES
[address] [place] [address] [facility]  [facility]  Progress Note    SUBJECTIVE:  Chief Complaint   Patient presents with    Hemoptysis    Shortness of Breath     Sx began 4 days ago. Wears 4L O2     Patient is tolerating medications. No reported adverse drug reactions. No nausea, vomiting, diarrhea. Feels that her cough is improved tremendously, sputum is white/yellow   No fevers     Review of systems:  As stated above in the chief complaint, otherwise negative. Medications:  Scheduled Meds:   ciprofloxacin  500 mg Oral 2 times per day    potassium chloride  40 mEq Oral Daily with breakfast    enoxaparin  40 mg SubCUTAneous Daily    budesonide  1,000 mcg Nebulization BID    sertraline  100 mg Oral Daily    sodium chloride flush  5-40 mL IntraVENous 2 times per day    pantoprazole  40 mg Oral QAM AC    ipratropium-albuterol  1 ampule Inhalation Q4H WA    amLODIPine  5 mg Oral Daily    aspirin  81 mg Oral QAM    atorvastatin  20 mg Oral Daily    calcium elemental  500 mg Oral QAM    diazePAM  10 mg Oral Nightly    Arformoterol Tartrate  15 mcg Nebulization BID    metoprolol succinate  12.5 mg Oral Daily    losartan  100 mg Oral Daily    And    hydroCHLOROthiazide  12.5 mg Oral Daily     Continuous Infusions:   sodium chloride       PRN Meds:sodium chloride flush, sodium chloride, polyethylene glycol, acetaminophen **OR** acetaminophen, prochlorperazine, melatonin    OBJECTIVE:  /66   Pulse 82   Temp 98.1 °F (36.7 °C) (Oral)   Resp 16   Ht 5' 5\" (1.651 m)   Wt 186 lb (84.4 kg)   SpO2 98%   BMI 30.95 kg/m²   Temp  Av.9 °F (36.6 °C)  Min: 97.7 °F (36.5 °C)  Max: 98.1 °F (36.7 °C)  Constitutional: The patient is awake, alert, and oriented. In no distress. 4LNC  Skin: Warm and dry. No rashes were noted. HEENT: Round and reactive pupils. Moist mucous membranes. No ulcerations or thrush. Neck: Supple to movements. Chest: No use of accessory muscles to breathe. Symmetrical expansion. Bilateral rhonchi   Cardiovascular: S1 and S2 are rhythmic and regular. No murmurs appreciated. Abdomen: Positive bowel sounds to auscultation. Benign to palpation. No masses felt. Extremities: No  edema.   Lines: peripheral    Laboratory and Tests Review:  Lab Results   Component Value Date    WBC 5.8 09/13/2022    WBC 6.4 09/12/2022    WBC 6.9 09/11/2022    HGB 9.2 (L) 09/13/2022    HCT 30.8 (L) 09/13/2022    MCV 79.2 (L) 09/13/2022     09/13/2022     Lab Results   Component Value Date    NEUTROABS 3.63 09/13/2022    NEUTROABS 4.24 09/12/2022    NEUTROABS 5.02 09/11/2022     No results found for: [facility]  Lab Results   Component Value Date    ALT 6 09/13/2022    AST 10 09/13/2022    ALKPHOS 61 09/13/2022    BILITOT 0.2 09/13/2022     Lab Results   Component Value Date/Time     09/13/2022 02:55 AM    K 3.8 09/13/2022 02:55 AM     09/13/2022 02:55 AM    CO2 30 09/13/2022 02:55 AM    BUN 7 09/13/2022 02:55 AM    CREATININE 0.6 09/13/2022 02:55 AM    CREATININE 0.7 09/12/2022 02:20 AM    CREATININE 0.8 09/11/2022 02:55 AM    GFRAA >60 09/13/2022 02:55 AM    LABGLOM >60 09/13/2022 02:55 AM    GLUCOSE 105 09/13/2022 02:55 AM    PROT 6.0 09/13/2022 02:55 AM    LABALBU 3.5 09/13/2022 02:55 AM    CALCIUM 8.8 09/13/2022 02:55 AM    BILITOT 0.2 09/13/2022 02:55 AM    ALKPHOS 61 09/13/2022 02:55 AM    AST 10 09/13/2022 02:55 AM    ALT 6 09/13/2022 02:55 AM     Lab Results   Component Value Date    CRP 0.4 09/21/2021    CRP 0.3 09/15/2021    CRP 0.8 (H) 09/07/2021     Lab Results   Component Value Date    SEDRATE 40 (H) 09/21/2021    SEDRATE 37 (H) 09/15/2021    SEDRATE 30 (H) 09/07/2021     Radiology:  Reviewed     Microbiology:   Respiratory cultures 9/10/2022: Pseudomonas aeruginosa (Pansensitive)  Streptococcus pneumoniae/Legionella urine Ag: negative  Respiratory Panel: negative       ASSESSMENT:  Recurrent Pseudomonas pneumonia versus infected bronchiectasis  Exacerbation COPD  Probable recurrent dysphagia. She has been on thickened liquids in the past  No evidence of immune deficiency    PLAN:  Continue Ciprofloxacin - QTc- 464  Planning for 3 weeks of oral antibiotics   Stop Merrem  Speech eval reviewed  Labs and cultures reviewed     MISHA Carbajal CNP  1:08 PM  9/13/2022     Patient seen and examined. I had a face to face encounter with the patient. Agree with exam.  Assessment and plan as outlined above and directed by me. Addition and corrections were done as deemed appropriate. My exam and plan include: The patient is doing well and tolerating current antibiotics. Meropenem has been discontinued. She can be discharged on oral Ciprofloxacin by tomorrow.     Angeli Monahan MD  9/13/2022  1:35 PM

## 2022-09-13 NOTE — PROGRESS NOTES
Pulse ox was 92% on 4L   Ambulated patient on 4L  Oxygen saturation on 4 L 82% while ambulating  On 4L  Recovery pulse ox was 89% on 6 liters of oxygen while ambulating.

## 2022-09-13 NOTE — PROGRESS NOTES
Pulmonary Progress Note    Admit Date: 2022  Hospital day                               PCP: Hussain Velazquez, MISHA - CNP    Chief Complaint (s):  Patient Active Problem List   Diagnosis    Tachycardia    HTN (hypertension)    Hypoxia    COPD (chronic obstructive pulmonary disease) ([facility]    Cryptogenic organizing pneumonia ([facility]    Pneumothorax on left    Cardiomyopathy ([facility]    Pneumothorax    Dysphagia    Exacerbation of bronchiectasis due to infection ([facility]    COPD exacerbation ([facility]    Infection due to parainfluenza virus 3    Chronic respiratory failure with hypoxia ([facility]    Pneumonia due to Pseudomonas ([facility]    Acute pulmonary embolism without acute cor pulmonale, unspecified pulmonary embolism type ([facility]    Pulmonary embolism (HCC)       Subjective:  Patient seen on 4L nasal cannula. She states her sputum is less frequently produced and white/yellow in color.        Vitals:  VITALS:  /66   Pulse (!) 102   Temp 98.1 °F (36.7 °C) (Oral)   Resp 16   Ht 5' 5\" (1.651 m)   Wt 186 lb (84.4 kg)   SpO2 91%   BMI 30.95 kg/m²     24HR INTAKE/OUTPUT:      Intake/Output Summary (Last 24 hours) at 2022 1138  Last data filed at 2022 1733  Gross per 24 hour   Intake 580 ml   Output --   Net 580 ml         24HR PULSE OXIMETRY RANGE:    SpO2  Av.2 %  Min: 91 %  Max: 97 %    Medications:  IV:   sodium chloride         Scheduled Meds:   ciprofloxacin  500 mg Oral 2 times per day    potassium chloride  40 mEq Oral Daily with breakfast    enoxaparin  40 mg SubCUTAneous Daily    budesonide  1,000 mcg Nebulization BID    sertraline  100 mg Oral Daily    meropenem  1,000 mg IntraVENous Q8H    sodium chloride flush  5-40 mL IntraVENous 2 times per day    pantoprazole  40 mg Oral QAM AC    ipratropium-albuterol  1 ampule Inhalation Q4H WA    amLODIPine  5 mg Oral Daily    aspirin  81 mg Oral QAM    atorvastatin  20 mg Oral Daily    calcium elemental  500 mg Oral QAM    diazePAM  10 mg Oral Nightly    Arformoterol Tartrate  15 mcg Nebulization BID    metoprolol succinate  12.5 mg Oral Daily    losartan  100 mg Oral Daily    And    hydroCHLOROthiazide  12.5 mg Oral Daily       Diet:   ADULT DIET; Dysphagia - Soft and Bite Sized     EXAM:  General: No distress. Alert. Eyes: PERRL. No sclera icterus. No conjunctival injection. ENT: No discharge. Pharynx clear. Neck: Trachea midline. Normal thyroid. Resp: No accessory muscle use. bilateral rales. no wheezing. no rhonchi. CV: Regular rate. Regular rhythm. No murmur or rub. Abd: Non-tender. Non-distended. No masses. No organomegaly. Normal bowel sounds. Skin: Warm and dry. No nodule on exposed extremities. No rash on exposed extremities. Ext: No cyanosis, clubbing, edema  Lymph: No cervical LAD. No supraclavicular LAD. M/S: No cyanosis. No joint deformity. No clubbing. Neuro: Awake. Follows commands. Positive pupils/gag/corneals. Normal pain response. Results:  CBC:   Recent Labs     09/11/22 0255 09/12/22 0220 09/13/22 0255   WBC 6.9 6.4 5.8   HGB 9.1* 9.7* 9.2*   HCT 30.7* 32.0* 30.8*   MCV 81.6 78.6* 79.2*    171 157       BMP:   Recent Labs     09/11/22 0255 09/12/22 0220 09/13/22 0255    143 140   K 3.2* 3.1* 3.8    101 102   CO2 30* 31* 30*   BUN 12 7 7   CREATININE 0.8 0.7 0.6       LIVER PROFILE:   Recent Labs     09/11/22 0255 09/12/22 0220 09/13/22 0255   AST 9 10 10   ALT 5 5 6   BILITOT <0.2 <0.2 0.2   ALKPHOS 63 69 61       PT/INR: No results for input(s): PROTIME, INR in the last 72 hours. APTT: No results for input(s): APTT in the last 72 hours. Pathology:  N/A      Microbiology:  Pansensitive Pseudomonas.     Recent ABG:   Recent Labs     09/11/22 0903   PH 7.358   PO2 29.0*   PCO2 60.6*   HCO3 33.3*   BE 7.1*   O2SAT 50.3*   METHB 0.2   O2HB 48.8*   COHB 2.7*   O2CON 4.3   HHB 48.3*   THB 6.2*               Recent Films:  Fluoroscopy modified barium swallow with video   Final Result Penetration but not aspiration to thin consistencies of barium contrast.      Please see separate speech pathology report for full discussion of findings   and recommendations. US DUP LOWER EXTREMITIES BILATERAL VENOUS   Final Result   No evidence of DVT in either lower extremity. CTA PULMONARY W CONTRAST   Final Result   1. Small, partially-occlusive PULMONARY EMBOLI in the lateral basal segment   of the left lower lobe. 2. Right lower lobe infiltrate likely INFECTIOUS/INFLAMMATORY. 3. Mucus opacification of the right lower lobe bronchi. 4. Mildly prominent mediastinal and hilar lymphadenopathy, which is likely   reactive and is chronic. Enlarged lymph nodes have been noted on multiple   prior CTs dating back to at least 10/30/2017.   5. Severe emphysematous changes. 6. Cholelithiasis. RECOMMENDATIONS:   Unavailable         XR CHEST (2 VW)   Final Result   Pulmonary emphysema and likely interstitial fibrosis with a possible   component of chronic right lower lobe pneumonia as well. Assessment:  Community acquired pneumonia. History of pseudomonas with multiple admissions and follows closely with infectious disease. There is no evidence of an underlying immunodeficiency. The patient has advanced structural lung disease on the basis of bronchiectasis. [facility] and their adult cystic fibrosis unit. Oncology consultation is not warranted. Immunoglobulin deficiency is not suspected. AG ratio appears normal.  There is no urinalysis looking for proteinuria. Pulmonary embolism left lower lobe. D-dimer is < 200, Well's is 0, U/S negative. There is no reason to suspect PTE.  COPD. Plan:  Lovenox for DVT prophylaxis  Respiratory culture sensitive Pseudomonas  Continue Cipro  Chest vest three times daily.     *pt seen and examined  Agree with APRN note and recommendations  Cont with antibiotics as per ID, cont with nebs, cont with chest vest (has at home but not clear if using it consistently and frequently), possible discharge home tomorrow if doing better. Time at the bedside, reviewing labs and radiographs, reviewing updated notes and consultations, discussing with staff and family was more than 35 minutes. Please note that voice recognition technology was used in the preparation of this note and make therefore it may contain inadvertent transcription errors. If the patient is a COVID 19 isolation patient, the above physical exam reflects that of the examining physician for the day. MISHA Acosta - NP    [facility], [address] [facility]", [address] [place] [address], [facility]  Office visits:  [facility] [facility], [address] [address] [address] EVALUATION: COPD, bronchiectasis     HISTORY:   Patient is a 67year old female known with very severe COPD with emphysema, complicated by bronchiectasis and recurrent pseudomonal pneumonia and recurrent left pneumothoraces s/p pleurodesis in sept 2018 presents for follow up. Patient admitted in September 2018 with recurrent left sided pneumothorax s/p chest tube followed by pleurodesis completed in [facility] . Of note, patient known with hx of reurrent pseudomonas pneumonia, resistant to levofloxacin and finally required Impimenem. Further underwent bronchoscopy with TBBx and negative AFBs. Interval hx 6/3/2021  Last visit on 9/2020 she had sputum production and was placed on 7 day course of cefotetan and prednisone which she completed. She reports increased sputum production in the last 6 weeks. No change in color of sputum, or fevers. In terms of clearance she has been using budesonide, albuterol and hypertonic nebulizer BID. She has been using flutter valve as well. She also report some coughing with food and dysphagia that she has noticed in the last few years.  She has been solution Take 2 mLs by nebulization 2 times daily    CALCIUM ORAL Take by mouth    chlorhexidine (PERIDEX) 0.12 % MM solution Take 15 mLs by mouth 2 times daily    diazePAM (VALIUM) 10 mg oral tablet      fluticasone (FLONASE) 50 mcg/actuation nasal spray      INCRUSE ELLIPTA 62.5 mcg/actuation inhl blister with device INHALE ONE PUFF BY MOUTH DAILY    losartan-hydrochlorothiazide (HYZAAR) 100-12.5 mg oral tablet Take by mouth    metoprolol succinate (TOPROL-XL) 25 mg oral extended-release tablet Take by mouth    omega 3-dha-epa-fish oil (FISH OIL) 300-1,000 mg oral capsule,delayed release(DR/EC) Take by mouth    OXYGEN GAS Take 3 L    PERFOROMIST 20 mcg/2 mL inhl nebulizer solution INHALE 2 ML BY NEBULIZATION 2 TIMES DAILY    sertraline (ZOLOFT) 25 mg oral tablet      sodium chloride 3 % nebulizer solution Take 15 mL by nebulization 2 times a day    STARCH (THICK-IT ORAL) Take by mouth    Vit C-Vit E-Lutein-Min-OM-3 (OCUVITE) 896-42-6-150 mg-unit-mg-mg oral capsule Take by mouth      No facility-administered medications prior to visit. 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 There is no height or weight on file to calculate BMI. GA: NAD, on NC 3 L   HEENT: Dentition: good, no arched Palatetongue grossly normal  HEART: Regular rate and rhythm, Normal S1 and S2, no murmurs, mild pitting edema  LUNGS: no conversational dyspnea, mild crackles on the right base.   MUSCULOSKELETAL: no clubbing, no cyanosis seen, no upper extremity digital deformity noted  NEURO: Gait is normal, Motor Power grossly normal  PSYCH: normal mood and affect      CUMULATIVE DATA:     PFTs:   Date FVC (%) FEV-1 FEV1/FVC FRC DLCO Flow loop/comment   12/8/17 2.86 (100%) 1.29 (64%) 45%   31%                        Microbiology:   Sputum culture (7/2018): pseudomonas resistant to levofloxacin and sensitive to cefepime, gentamycin, impienem, zosyn, and tobramycin. Radiology:   Study/  Location Date Findings    CXR 11/21/2019 1. Severely emphysematous lungs with chronic right upper lobe fibrosis with associated volume loss and traction bronchiectasis. 2. Prior nodular opacity in left upper lobe decreased, residual perihilar opacities predominantly linear and likely represents scarring. CT chest 3/10/2018 1. Widespread inflammatory and tree-in-bud opacities are present   throughout both lungs, findings which may be clinically correlated   for superimposed multifocal bronchopneumonia. 2.  Advanced changes of emphysema, without significant interval   progression from prior study. Chronic areas of fibrosis within both   upper lobes are unchanged in appearance, and are most likely   postinflammatory. 3.  Indeterminate left lower lobe nodule may be inflammatory as well. 12 mm in size. This finding is new from prior examination. 4.  Calcified plaque along the coronary arteries. 5.  Small hiatal hernia. CT chest in [facility] 5/30/2019 noted for advanced diffusely emphsyematous changes with even bullous formation in upper lobes, no nodules can be identified on latter imaging. No consolidation. Lower lobe central bronchial wall thickening, no tractions         ASSESSMENT/PLAN: A 67year old year old with known history of very severe COPD and bronchiectasis, recurrent respiratory exacerbations in past 2' to pseudomonas.       Bronchiectasis exacerbation   -increased sputum production and dyspnea concerning for bronchiectatic flare  -last lung imaging has been awhile   -will obtain sputum culture prior to initiation of any antibiotics  -repeat CT chest (attempt to have done here, as lives and in [address])  -continue air way clearance with hypertonic, albuterol, budesonide nebulizer and flutter valve    Aspiration:  -some concern for chronic aspiration given reporrts of coughing and dysphagia  -will send for modified barium swallow  -continue with aspiration precaution including head of bed elevation   -avoidance of food 2-3hrs before bed    Chronic hypoxic respiratory failure on 3L via NC  -concern for increased O2 requirement especially on ambulation   -will send for desaturation walk test   -continue Incruse ellipta  Continue nasal rinse and flonase for post nasal drip       Plan of care discussed with patient      Patient instructed to contact our office for any concerns or questions. Cara Batista D.O.,  [facility]    I have personally seen and examined this patient, and have reviewed patient's medical record, as well as the laboratory work, and imaging studies. I have discussed plan of care and management with [facility] and agree with the following documentation of findings, assessment, and recommendations. This is a 70-year-old female patient with very severe COPD with advanced emphysema, bronchiectasis and recurrent pseudomonal pneumonia (levofloxacin , recurrent left pneumothoraces status post pleurodesis, presents in follow-up. On interval, patient reports overall worsening exertional tolerance in past 6 months to 1 year; she 's maintained on 3L continuous via NC with desats with minimal exertion occasionally. Further reports copious green sputum expectoration in past 6 weeks. Thus far, had been compliant with hypertonic saline once daily and flutter valve, has not been suing chest vest.   She further admits to worsening dysphagia as well as periprandial cough.  Continues to take budesonide/Perforomist nebulized twice daily as well as albuterol nebulized solution twice daily as

## 2022-09-13 NOTE — PROGRESS NOTES
12.5 mg Oral Daily     sodium chloride flush, 5-40 mL, PRN  sodium chloride, , PRN  polyethylene glycol, 17 g, Daily PRN  acetaminophen, 650 mg, Q6H PRN   Or  acetaminophen, 650 mg, Q6H PRN  prochlorperazine, 10 mg, Q6H PRN  melatonin, 3 mg, Nightly PRN       Objective:    /64   Pulse 79   Temp 97.9 °F (36.6 °C) (Oral)   Resp 16   Ht 5' 5\" (1.651 m)   Wt 186 lb (84.4 kg)   SpO2 97%   BMI 30.95 kg/m²   General Appearance: alert and oriented to person, place and time and in no acute distress  Skin: warm and dry  Head: normocephalic and atraumatic  Eyes: pupils equal, round, and reactive to light, extraocular eye movements intact, conjunctivae normal  Neck: neck supple and non tender without mass   Pulmonary/Chest: clear to auscultation bilaterally- no wheezes, rales or rhonchi, normal air movement, no respiratory distress  Cardiovascular: normal rate, normal S1 and S2 and no carotid bruits  Abdomen: soft, non-tender, non-distended, normal bowel sounds, no masses or organomegaly  Extremities: no cyanosis, no clubbing and no edema  Neurologic: no cranial nerve deficit and speech normal        Recent Labs     09/11/22 0255 09/12/22 0220 09/13/22 0255    143 140   K 3.2* 3.1* 3.8    101 102   CO2 30* 31* 30*   BUN 12 7 7   CREATININE 0.8 0.7 0.6   GLUCOSE 104* 105* 105*   CALCIUM 8.2* 8.6 8.8         Recent Labs     09/11/22 0255 09/12/22 0220 09/13/22  0255   WBC 6.9 6.4 5.8   RBC 3.76 4.07 3.89   HGB 9.1* 9.7* 9.2*   HCT 30.7* 32.0* 30.8*   MCV 81.6 78.6* 79.2*   MCH 24.2* 23.8* 23.7*   MCHC 29.6* 30.3* 29.9*   RDW 17.4* 17.7* 17.8*    171 157   MPV 10.5 9.6 9.8         Radiology:   EXAMINATION:  CTA OF THE CHEST 9/9/2022 2:04 pm     TECHNIQUE:  CTA of the chest was performed after the administration of intravenous  contrast.  Multiplanar reformatted images are provided for review. MIP  images are provided for review.  Automated exposure control, iterative  reconstruction, and/or Problem:    Acute pulmonary embolism without acute cor pulmonale, unspecified pulmonary embolism type (HCC)  Active Problems:    Pulmonary embolism (HCC)  Resolved Problems:    * No resolved hospital problems. *      Plan:  1. PE: CTA showing small, partially-occlusive PE in the lateal basal segment of the LLL. Lovenox 1 mg/kg q12h transitioned to Eliquis 10 mg, per pulmonary may not have PE switched back to Lovenox at prophylaxis dose. Pulmonary consulted. 2. Acute on chronic respiratory failure with hypoxia: Was hypoxic in the ED at 82% on 4L. O2 was increased to 5L but has since been weaned 4L NC. This is patient's baseline O2. S/p Prednisone bolus 50 mg q6h x3.     3.  Right lower lobe infiltrate: Hx of pseudomonas PNA in March 2022. CTA showing RLL infiltrate, infectious vs inflammatory. Received Rocephin and doxycycline in ED. Procal 0.05. Doppler of LE negative for PE. No further antibiotics. Sputum gram stain growing pseudomonas. Respiratory panel negative. Oncology consulted for frequent PNA. No further work up, per Oncology. MBS showing mild-moderate dysphagia, including pharyngocele. Speech eval suggesting soft and bite size consistency solids, with thin liquids. ID consulted. Stop Meropenem. Continue Ciprofloxacin for at least 3 weeks. 4. COPD: Continue Brovana and Pulmicort. Continue Duoneb. ABG while on NIV in ED showing pH of 7.39, PCO2 of 50.4, PO2 101, and HCO3 30. Continue oxygen supplementation. S/p Prednisone bolus 50 mg q6h x3.    5. GERD: Continue PPI. 6. Hypertension Continue Losartan-HCTZ    7. Hyperlipidemia: Continue Lipitor. Lipid panel unremarkable. 8. Osteopenia/osteoporosis: Continue Fosamax    9. Anxiety/depression: Continue Zoloft    10. Hypokalemia: Daily supplement with breakfast. Monitor. 11. Anemia: Likely anemia of chronic disease. Hgb low. MCV 81. Iron panel noted. Dispo: Will likely discharge tomorrow if okay with all specialities.      NOTE: This report was transcribed using voice recognition software. Every effort was made to ensure accuracy; however, inadvertent computerized transcription errors may be present. Electronically signed by Nancie Pike PA-C on 9/13/2022 at 8:33 AM       26 minutes time spent reviewing patient chart, assessing patient, discussing plan of care with patient and family, discussing plan of care with collaborating physician, and charting.

## 2022-09-13 NOTE — CARE COORDINATION
CASE MANAGEMENT. .. Chart reviewed. Per pulm, anticipate dc tomorrow pending progress. Also, noted that patient has chest vest at home-not sure if using as instructed. Per pulse ox testing, patient will require increased o2 needs. Order obtained and faxed to [facility] with Paras Kelly [phone]. Mrs Rodger Huang updated on the above. Will follow.

## 2022-09-14 VITALS
RESPIRATION RATE: 16 BRPM | OXYGEN SATURATION: 95 % | HEIGHT: 65 IN | BODY MASS INDEX: 30.49 KG/M2 | TEMPERATURE: 98.6 F | WEIGHT: 183 LBS | DIASTOLIC BLOOD PRESSURE: 61 MMHG | HEART RATE: 83 BPM | SYSTOLIC BLOOD PRESSURE: 116 MMHG

## 2022-09-14 PROBLEM — F41.9 ANXIETY: Status: ACTIVE | Noted: 2022-09-14

## 2022-09-14 PROBLEM — E78.5 HYPERLIPIDEMIA: Status: ACTIVE | Noted: 2022-09-14

## 2022-09-14 PROBLEM — J96.21 ACUTE ON CHRONIC RESPIRATORY FAILURE WITH HYPOXIA (HCC): Status: ACTIVE | Noted: 2022-09-14

## 2022-09-14 PROBLEM — F32.A DEPRESSION: Status: ACTIVE | Noted: 2022-09-14

## 2022-09-14 PROBLEM — M85.80 OSTEOPENIA: Status: ACTIVE | Noted: 2022-09-14

## 2022-09-14 PROBLEM — K21.9 GASTROESOPHAGEAL REFLUX DISEASE: Status: ACTIVE | Noted: 2022-09-14

## 2022-09-14 PROBLEM — R91.8 RIGHT LOWER LOBE PULMONARY INFILTRATE: Status: ACTIVE | Noted: 2022-09-14

## 2022-09-14 LAB
ALBUMIN SERPL-MCNC: 3.8 G/DL (ref 3.5–5.2)
ALP BLD-CCNC: 63 U/L (ref 35–104)
ALT SERPL-CCNC: 9 U/L (ref 0–32)
ANION GAP SERPL CALCULATED.3IONS-SCNC: 8 MMOL/L (ref 7–16)
AST SERPL-CCNC: 13 U/L (ref 0–31)
BASOPHILS ABSOLUTE: 0.04 E9/L (ref 0–0.2)
BASOPHILS RELATIVE PERCENT: 0.7 % (ref 0–2)
BILIRUB SERPL-MCNC: 0.3 MG/DL (ref 0–1.2)
BUN BLDV-MCNC: 7 MG/DL (ref 6–23)
CALCIUM SERPL-MCNC: 9.1 MG/DL (ref 8.6–10.2)
CHLORIDE BLD-SCNC: 101 MMOL/L (ref 98–107)
CO2: 31 MMOL/L (ref 22–29)
CREAT SERPL-MCNC: 0.7 MG/DL (ref 0.5–1)
EOSINOPHILS ABSOLUTE: 0.2 E9/L (ref 0.05–0.5)
EOSINOPHILS RELATIVE PERCENT: 3.3 % (ref 0–6)
GFR AFRICAN AMERICAN: >60
GFR NON-AFRICAN AMERICAN: >60 ML/MIN/1.73
GLUCOSE BLD-MCNC: 118 MG/DL (ref 74–99)
HCT VFR BLD CALC: 32.8 % (ref 34–48)
HEMOGLOBIN: 9.8 G/DL (ref 11.5–15.5)
IMMATURE GRANULOCYTES #: 0.04 E9/L
IMMATURE GRANULOCYTES %: 0.7 % (ref 0–5)
LYMPHOCYTES ABSOLUTE: 1.33 E9/L (ref 1.5–4)
LYMPHOCYTES RELATIVE PERCENT: 22 % (ref 20–42)
MCH RBC QN AUTO: 23.7 PG (ref 26–35)
MCHC RBC AUTO-ENTMCNC: 29.9 % (ref 32–34.5)
MCV RBC AUTO: 79.4 FL (ref 80–99.9)
MONOCYTES ABSOLUTE: 0.37 E9/L (ref 0.1–0.95)
MONOCYTES RELATIVE PERCENT: 6.1 % (ref 2–12)
NEUTROPHILS ABSOLUTE: 4.06 E9/L (ref 1.8–7.3)
NEUTROPHILS RELATIVE PERCENT: 67.2 % (ref 43–80)
PDW BLD-RTO: 17.4 FL (ref 11.5–15)
PLATELET # BLD: 164 E9/L (ref 130–450)
PMV BLD AUTO: 9.4 FL (ref 7–12)
POTASSIUM REFLEX MAGNESIUM: 4.4 MMOL/L (ref 3.5–5)
RBC # BLD: 4.13 E12/L (ref 3.5–5.5)
SODIUM BLD-SCNC: 140 MMOL/L (ref 132–146)
TOTAL PROTEIN: 6.6 G/DL (ref 6.4–8.3)
WBC # BLD: 6 E9/L (ref 4.5–11.5)

## 2022-09-14 PROCEDURE — 6370000000 HC RX 637 (ALT 250 FOR IP): Performed by: FAMILY MEDICINE

## 2022-09-14 PROCEDURE — APPSS45 APP SPLIT SHARED TIME 31-45 MINUTES: Performed by: PHYSICIAN ASSISTANT

## 2022-09-14 PROCEDURE — 99239 HOSP IP/OBS DSCHRG MGMT >30: CPT | Performed by: INTERNAL MEDICINE

## 2022-09-14 PROCEDURE — 36415 COLL VENOUS BLD VENIPUNCTURE: CPT

## 2022-09-14 PROCEDURE — 94640 AIRWAY INHALATION TREATMENT: CPT

## 2022-09-14 PROCEDURE — 85025 COMPLETE CBC W/AUTO DIFF WBC: CPT

## 2022-09-14 PROCEDURE — 6370000000 HC RX 637 (ALT 250 FOR IP): Performed by: SPECIALIST

## 2022-09-14 PROCEDURE — 94669 MECHANICAL CHEST WALL OSCILL: CPT

## 2022-09-14 PROCEDURE — 6360000002 HC RX W HCPCS

## 2022-09-14 PROCEDURE — 6360000002 HC RX W HCPCS: Performed by: FAMILY MEDICINE

## 2022-09-14 PROCEDURE — 2700000000 HC OXYGEN THERAPY PER DAY

## 2022-09-14 PROCEDURE — 93005 ELECTROCARDIOGRAM TRACING: CPT | Performed by: SPECIALIST

## 2022-09-14 PROCEDURE — 80053 COMPREHEN METABOLIC PANEL: CPT

## 2022-09-14 PROCEDURE — 2580000003 HC RX 258: Performed by: FAMILY MEDICINE

## 2022-09-14 RX ORDER — CIPROFLOXACIN 500 MG/1
500 TABLET, FILM COATED ORAL EVERY 12 HOURS SCHEDULED
Qty: 42 TABLET | Refills: 0 | Status: SHIPPED | OUTPATIENT
Start: 2022-09-14 | End: 2022-10-05

## 2022-09-14 RX ADMIN — METOPROLOL SUCCINATE 12.5 MG: 25 TABLET, EXTENDED RELEASE ORAL at 09:07

## 2022-09-14 RX ADMIN — IPRATROPIUM BROMIDE AND ALBUTEROL SULFATE 1 AMPULE: .5; 2.5 SOLUTION RESPIRATORY (INHALATION) at 07:46

## 2022-09-14 RX ADMIN — CALCIUM 500 MG: 500 TABLET ORAL at 09:07

## 2022-09-14 RX ADMIN — HYDROCHLOROTHIAZIDE 12.5 MG: 12.5 TABLET ORAL at 09:07

## 2022-09-14 RX ADMIN — BUDESONIDE 1000 MCG: 0.5 SUSPENSION RESPIRATORY (INHALATION) at 07:46

## 2022-09-14 RX ADMIN — ATORVASTATIN CALCIUM 20 MG: 20 TABLET, FILM COATED ORAL at 09:07

## 2022-09-14 RX ADMIN — ARFORMOTEROL TARTRATE 15 MCG: 15 SOLUTION RESPIRATORY (INHALATION) at 07:46

## 2022-09-14 RX ADMIN — CIPROFLOXACIN 500 MG: 500 TABLET, FILM COATED ORAL at 09:07

## 2022-09-14 RX ADMIN — PANTOPRAZOLE SODIUM 40 MG: 40 TABLET, DELAYED RELEASE ORAL at 06:01

## 2022-09-14 RX ADMIN — AMLODIPINE BESYLATE 5 MG: 5 TABLET ORAL at 09:07

## 2022-09-14 RX ADMIN — SODIUM CHLORIDE, PRESERVATIVE FREE 10 ML: 5 INJECTION INTRAVENOUS at 09:07

## 2022-09-14 RX ADMIN — POTASSIUM CHLORIDE 40 MEQ: 1500 TABLET, EXTENDED RELEASE ORAL at 09:07

## 2022-09-14 RX ADMIN — LOSARTAN POTASSIUM 100 MG: 50 TABLET, FILM COATED ORAL at 09:07

## 2022-09-14 ASSESSMENT — PAIN SCALES - GENERAL: PAINLEVEL_OUTOF10: 0

## 2022-09-14 NOTE — PROGRESS NOTES
[facility] [address] [facility]  [address] [facility], [address] [place] [address], [address] [facility]      Pulmonary Progress Note      SUBJECTIVE:  claims doing ok with breathing, less cough/sputum production overall compared to admission, on 4 li NC which appears to be what her baseline is at home.     OBJECTIVE    Medications    Continuous Infusions:   sodium chloride         Scheduled Meds:   ciprofloxacin  500 mg Oral 2 times per day    potassium chloride  40 mEq Oral Daily with breakfast    enoxaparin  40 mg SubCUTAneous Daily    budesonide  1,000 mcg Nebulization BID    sertraline  100 mg Oral Daily    sodium chloride flush  5-40 mL IntraVENous 2 times per day    pantoprazole  40 mg Oral QAM AC    ipratropium-albuterol  1 ampule Inhalation Q4H WA    amLODIPine  5 mg Oral Daily    aspirin  81 mg Oral QAM    atorvastatin  20 mg Oral Daily    calcium elemental  500 mg Oral QAM    diazePAM  10 mg Oral Nightly    Arformoterol Tartrate  15 mcg Nebulization BID    metoprolol succinate  12.5 mg Oral Daily    losartan  100 mg Oral Daily    And    hydroCHLOROthiazide  12.5 mg Oral Daily       PRN Meds:sodium chloride flush, sodium chloride, polyethylene glycol, acetaminophen **OR** acetaminophen, prochlorperazine, melatonin    Physical    VITALS:  /61   Pulse 83   Temp 98.6 °F (37 °C) (Oral)   Resp 16   Ht 5' 5\" (1.651 m)   Wt 183 lb (83 kg)   SpO2 95%   BMI 30.45 kg/m²     24HR INTAKE/OUTPUT:      Intake/Output Summary (Last 24 hours) at 2022 0839  Last data filed at 2022 1836  Gross per 24 hour   Intake 960 ml   Output --   Net 960 ml       24HR PULSE OXIMETRY RANGE:    SpO2  Av.5 %  Min: 91 %  Max: 98 %    General appearance: alert, appears stated age, and cooperative  Lungs: rhonchi bilaterally with cough  Heart: regular rate and rhythm, S1, S2 normal, no murmur, click, rub or gallop  Abdomen: soft, non-tender; bowel sounds normal; no masses,  no organomegaly  Extremities: extremities normal, atraumatic, no cyanosis or edema  Neurologic: Mental status: Alert, oriented, thought content appropriate    Data    CBC:   Recent Labs     09/12/22 0220 09/13/22 0255 09/14/22 0315   WBC 6.4 5.8 6.0   HGB 9.7* 9.2* 9.8*   HCT 32.0* 30.8* 32.8*   MCV 78.6* 79.2* 79.4*    157 164       BMP:  Recent Labs     09/12/22 0220 09/13/22 0255 09/14/22 0315    140 140   K 3.1* 3.8 4.4    102 101   CO2 31* 30* 31*   BUN 7 7 7   CREATININE 0.7 0.6 0.7    ALB:3,BILIDIR:3,BILITOT:3,ALKPHOS:3)@    PT/INR: No results for input(s): PROTIME, INR in the last 72 hours. ABG:   Recent Labs     09/11/22 0903   PH 7.358   PO2 29.0*   PCO2 60.6*   HCO3 33.3*   BE 7.1*   O2SAT 50.3*   METHB 0.2   O2HB 48.8*   COHB 2.7*   O2CON 4.3   HHB 48.3*   THB 6.2*             Radiology/Other tests reviewed: none    Assessment:     Principal Problem:    Acute pulmonary embolism without acute cor pulmonale, unspecified pulmonary embolism type (HCC)  Active Problems:    Pulmonary embolism (HCC)  Resolved Problems:    * No resolved hospital problems. *      Plan:       Cont with antibiotics as per ID  Cont with nebs and chest vest, should be able to resume usual medications and chest vest (has at home already) once discharged  Cont with oxygen, taper as tolerated  Can be discharged from pulmonary pov      Time at the bedside, reviewing labs and radiographs, reviewing notes and consultations, discussing with staff and family was more than 35 minutes. Thanks for letting us see this patient in consultation. Please contact us with any questions. Office [phone] or after hours through [employer], [phone].

## 2022-09-14 NOTE — PROGRESS NOTES
Physician Progress Note      PATIENT:               Johann Rodríguez  CSN #:                  [id number]  :                       1945  ADMIT DATE:       2022 12:20 PM  [address] [place] DATE:        2022 1:10 PM  RESPONDING  PROVIDER #:        Pranav Bello MD          QUERY TEXT:    Dear Attending Physician,    Pt admitted with hemoptysis and SOB. Noted documentation of \"? Recurrent   Pseudomonas pneumonia versus infected bronchiectasis  . Nicole Schneider Excerbation COPD \" by   ordered ID consultant . Per [facility] consultant  \" Community acquired   pneumonia \" and \" Acute on chronic respiratory failure with hypoxia. .. Right   lower lobe infiltrate \"  per PCP . If possible, please document in   progress notes and discharge summary if you are evaluating and /or treating   any of the following: The medical record reflects the following:  Risk Factors: recurrent Pneumonia ,dysphagia,  Clinical Indicators: Per PCP ,\". Nicole Schneider Pneumonia due to ?P. Aeruginosa:   continue meropenem. Due to recurrent pneumonia . Nicole Schneider \"   Per [employer]   ,\". Nasimail Wyatt Schneider Community acquired pneumonia . Nasimail Wyatt Rivera Schneider \"   Per ID ,\". .. -? Recurrent   Pseudomonas pneumonia versus infected bronchiectasis . .. Excerbation   COPD. Nasimail Wyatt Schneider ? Continue Ciprofloxacin - Planning for 3 weeks of oral antibiotics. Nicole Schneider \"    Per PCP ,\". .. Acute on chronic respiratory failure with hypoxia. .. Right   lower lobe infiltrate . Nicole Schneider \"  Treatment: Antibiotics    Thank you,  Noelle Aragon RN CCDS  Clinical Documentation Improvement Specialist  Options provided:  -- Pneumonia was confirmed present on admission . Bronchiectasis and COPD   exacerbation were ruled out  -- Pneumonia, infected bronchiectasis and COPD exacerbation were all confirmed   and present on admission  -- Infected Bronchiectasis and COPD exacerbation were confirmed.  Pneumonia was   ruled out  -- Other - I will add my own diagnosis  -- Disagree - Not applicable / Not valid  -- Disagree - Clinically unable to determine / Unknown  -- Refer to Clinical Documentation Reviewer    PROVIDER RESPONSE TEXT:    After study, Pneumonia was confirmed present on admission.  Bronchiectasis and   COPD exacerbation were ruled out    Query created by: Sophie Sawant on 9/13/2022 5:59 PM      Electronically signed by:  Alisia Tamez MD 9/14/2022 4:48 PM

## 2022-09-14 NOTE — CARE COORDINATION
CASE MANAGEMENT. .. Noted patient is stable for discharge home today. Confirmed with Ms Kira Munguia that she will return home. She is requiring increased o2 needs. Verified with Joleen Daley from [facility] that updated home o2 orders were received. States tank will be delivered to patients room for transport home. She also confirmed that [facility] [address] [facility] will be able to provide new o2 tubing weekly and will arrange for a f/u home visit. Mrs Kira Munguia and her daughter updated.

## 2022-09-14 NOTE — PLAN OF CARE
Problem: Discharge Planning  Goal: Discharge to home or other facility with appropriate resources  9/14/2022 1202 by Janye Tillman RN  Outcome: Completed  9/14/2022 1202 by Janey Tillman RN  Outcome: Progressing     Problem: Safety - Adult  Goal: Free from fall injury  9/14/2022 1202 by Janey Tillman RN  Outcome: Completed  9/14/2022 1202 by Janey Tillman RN  Outcome: Progressing     Problem: ABCDS Injury Assessment  Goal: Absence of physical injury  9/14/2022 1202 by Janey Tillman RN  Outcome: Completed  9/14/2022 1202 by Janey Tillman RN  Outcome: Progressing     Problem: Pain  Goal: Verbalizes/displays adequate comfort level or baseline comfort level  9/14/2022 1202 by Janey Tillman RN  Outcome: Completed  9/14/2022 1202 by Janey Tillman RN  Outcome: Progressing

## 2022-09-14 NOTE — PROGRESS NOTES
[address] [place] [address] [facility]  Progress Note    SUBJECTIVE:  Chief Complaint   Patient presents with    Hemoptysis    Shortness of Breath     Sx began 4 days ago. Wears 4L O2     Patient is tolerating medications. No reported adverse drug reactions. No nausea, vomiting, diarrhea. Sitting up in bed, having lunch  Daughter at bedside  Says she is feeling well - still with cough but it is improved     Review of systems:  As stated above in the chief complaint, otherwise negative. Medications:  Scheduled Meds:   ciprofloxacin  500 mg Oral 2 times per day    potassium chloride  40 mEq Oral Daily with breakfast    enoxaparin  40 mg SubCUTAneous Daily    budesonide  1,000 mcg Nebulization BID    sertraline  100 mg Oral Daily    sodium chloride flush  5-40 mL IntraVENous 2 times per day    pantoprazole  40 mg Oral QAM AC    ipratropium-albuterol  1 ampule Inhalation Q4H WA    amLODIPine  5 mg Oral Daily    aspirin  81 mg Oral QAM    atorvastatin  20 mg Oral Daily    calcium elemental  500 mg Oral QAM    diazePAM  10 mg Oral Nightly    Arformoterol Tartrate  15 mcg Nebulization BID    metoprolol succinate  12.5 mg Oral Daily    losartan  100 mg Oral Daily    And    hydroCHLOROthiazide  12.5 mg Oral Daily     Continuous Infusions:   sodium chloride       PRN Meds:sodium chloride flush, sodium chloride, polyethylene glycol, acetaminophen **OR** acetaminophen, prochlorperazine, melatonin    OBJECTIVE:  /61   Pulse 83   Temp 98.6 °F (37 °C) (Oral)   Resp 16   Ht 5' 5\" (1.651 m)   Wt 183 lb (83 kg)   SpO2 95%   BMI 30.45 kg/m²   Temp  Av.7 °F (37.1 °C)  Min: 98.5 °F (36.9 °C)  Max: 98.9 °F (37.2 °C)  Constitutional: The patient is awake, alert, and oriented. In no distress. 4LNC. Daughter at bedside. Skin: Warm and dry. No rashes were noted. HEENT: Round and reactive pupils. Moist mucous membranes. No ulcerations or thrush. Neck: Supple to movements.    Chest: No use of accessory muscles to breathe. Symmetrical expansion. Bilateral rhonchi   Cardiovascular: S1 and S2 are rhythmic and regular. No murmurs appreciated. Abdomen: Positive bowel sounds to auscultation. Benign to palpation. No masses felt. Extremities: No edema.   Lines: peripheral    Laboratory and Tests Review:  Lab Results   Component Value Date    WBC 6.0 09/14/2022    WBC 5.8 09/13/2022    WBC 6.4 09/12/2022    HGB 9.8 (L) 09/14/2022    HCT 32.8 (L) 09/14/2022    MCV 79.4 (L) 09/14/2022     09/14/2022     Lab Results   Component Value Date    NEUTROABS 4.06 09/14/2022    NEUTROABS 3.63 09/13/2022    NEUTROABS 4.24 09/12/2022     No results found for: [facility]  Lab Results   Component Value Date    ALT 9 09/14/2022    AST 13 09/14/2022    ALKPHOS 63 09/14/2022    BILITOT 0.3 09/14/2022     Lab Results   Component Value Date/Time     09/14/2022 03:15 AM    K 4.4 09/14/2022 03:15 AM     09/14/2022 03:15 AM    CO2 31 09/14/2022 03:15 AM    BUN 7 09/14/2022 03:15 AM    CREATININE 0.7 09/14/2022 03:15 AM    CREATININE 0.6 09/13/2022 02:55 AM    CREATININE 0.7 09/12/2022 02:20 AM    GFRAA >60 09/14/2022 03:15 AM    LABGLOM >60 09/14/2022 03:15 AM    GLUCOSE 118 09/14/2022 03:15 AM    PROT 6.6 09/14/2022 03:15 AM    LABALBU 3.8 09/14/2022 03:15 AM    CALCIUM 9.1 09/14/2022 03:15 AM    BILITOT 0.3 09/14/2022 03:15 AM    ALKPHOS 63 09/14/2022 03:15 AM    AST 13 09/14/2022 03:15 AM    ALT 9 09/14/2022 03:15 AM     Lab Results   Component Value Date    CRP 0.4 09/21/2021    CRP 0.3 09/15/2021    CRP 0.8 (H) 09/07/2021     Lab Results   Component Value Date    SEDRATE 40 (H) 09/21/2021    SEDRATE 37 (H) 09/15/2021    SEDRATE 30 (H) 09/07/2021     Radiology:  Reviewed     Microbiology:   Respiratory cultures 9/10/2022: Pseudomonas aeruginosa (Pansensitive)  Streptococcus pneumoniae/Legionella urine Ag: negative  Respiratory Panel: negative       ASSESSMENT:  Recurrent Pseudomonas pneumonia versus infected bronchiectasis  Exacerbation COPD  Probable recurrent dysphagia. She has been on thickened liquids in the past  No evidence of immune deficiency    PLAN:  Continue Ciprofloxacin - reconciled   Planning for 3 weeks of oral antibiotics   Labs and cultures reviewed   Ok to discharge from ID stand point - follow up in the office in 3-4 weeks    Farnaz GodfreyMISHA - CNP  11:35 AM  9/14/2022     Patient seen and examined. I had a face to face encounter with the patient. Agree with exam.  Assessment and plan as outlined above and directed by me. Addition and corrections were done as deemed appropriate. My exam and plan include: The patient is dressed up and ready to go. Daughter in room. Instructed to complete course of antibiotics and follow-up in the office with us.     Francisco Perez MD  9/14/2022  12:40 PM

## 2022-09-14 NOTE — DISCHARGE SUMMARY
[facility] Physician Discharge Summary       Lilly Leaver, APRN - CNP  [address] [phone]    Schedule an appointment as soon as possible for a visit      Aneesh Gamez MD  [address] [facility]  [address]  [phone]    Schedule an appointment as soon as possible for a visit      Tanvir Sanz MD  [address] [place] [address]  [phone]    Schedule an appointment as soon as possible for a visit in 4 week(s)  For outpatient follow up      Activity level: As tolerated     Dispo: Home     Condition on discharge: Stable     Diet:  Soft and bite size consistency solids with thin liquids with throat clear every 2-3 sips    Patient ID:  Dayne Fitzgerald  [id number]  65 y.o.  1945    Admit date: 9/9/2022    Discharge date and time:  9/14/2022  11:55 AM    Admission Diagnoses: Principal Problem:    Acute pulmonary embolism without acute cor pulmonale, unspecified pulmonary embolism type ([address]  Active Problems:    Pulmonary embolism ([address]  Resolved Problems:    * No resolved hospital problems. *      Discharge Diagnoses: Principal Problem:    Acute pulmonary embolism without acute cor pulmonale, unspecified pulmonary embolism type (HCC)  Active Problems:    Pulmonary embolism (HCC)  Resolved Problems:    * No resolved hospital problems.  *      Consults:  IP CONSULT TO PULMONOLOGY  IP CONSULT TO IV TEAM  IP CONSULT TO ONCOLOGY  IP CONSULT TO INFECTIOUS DISEASES    Procedures: None    Hospital Course:   Patient Dayne Fitzgerald is a 65 y.o. presented with PE (pulmonary thromboembolism) ([address] [I26.99]  Pneumonia due to infectious organism, unspecified laterality, unspecified part of lung [J18.9]  Acute pulmonary embolism without acute cor pulmonale, unspecified pulmonary embolism type ([address] [I26.99]  Pulmonary embolism, unspecified chronicity, unspecified pulmonary embolism type, unspecified whether acute cor pulmonale present [facility]) []    65year old female with a past medical history of hx of frequent pseudomonas infection, COPD, GERD, hypertension, hyperlipidemia, osteopenia/ osteoporosis, and anxiety/ depression. She was admitted and treated as below. 1.  PE: CTA showing small, partially-occlusive PE in the lateal basal segment of the LLL. Lovenox 1 mg/kg q12h transitioned to Eliquis 10 mg, per pulmonary no reason to suspect PE, so switched back to Lovenox at prophylaxis dose. D-dimer <200, Well's is 0 and LE doppler negative. Pulmonary consulted. 2. Acute on chronic respiratory failure with hypoxia: Was hypoxic in the ED at 82% on 4L. O2 was increased to 5L but has since been weaned 4L NC. This is patient's baseline O2. S/p Prednisone bolus 50 mg q6h x3. Patient is requiring up to 6L NC after ambulation for recovery. Increased home O2 ordered. 3.  Right lower lobe infiltrate: Hx of pseudomonas PNA in March 2022. CTA showing RLL infiltrate, infectious vs inflammatory. Received Rocephin and doxycycline in ED. Procal 0.05. Doppler of LE negative for PE. Sputum gram stain growing pseudomonas. Respiratory panel negative. Oncology consulted for frequent PNA. No further work up, per Oncology. Patient has advanced structural lung disease on the basis of bronchiectasis. MBS showing mild-moderate dysphagia, including pharyngocele. Speech eval suggesting soft and bite size consistency solids with thin liquids with throat clear every 2-3 sips. ID consulted. Stop Meropenem. Continue Ciprofloxacin for at least 3 weeks. 4. COPD: Patient has advanced structural lung disease on the basis of bronchiectasis. Continue Brovana and Pulmicort. Continue Duoneb. ABG while on NIV in ED showing pH of 7.39, PCO2 of 50.4, PO2 101, and HCO3 30. Continue oxygen supplementation. S/p Prednisone bolus 50 mg q6h x3.    5. GERD: Continue PPI. 6. Hypertension Continue Losartan-HCTZ     7. Hyperlipidemia: Continue Lipitor.  Lipid panel unremarkable. 8. Osteopenia/osteoporosis: Continue Fosamax     9. Anxiety/depression: Continue Zoloft     10. Hypokalemia: Daily supplement with breakfast. Monitor. 11. Anemia: Likely anemia of chronic disease. Hgb low. MCV 81. Iron panel noted. Patient is hemodynamically stable and ready for discharge. She is to follow up with PCP, Pulmonary and ID. Discharge Exam:  General Appearance: alert and oriented to person, place and time and in no acute distress  Skin: warm and dry  Head: normocephalic and atraumatic  Eyes: pupils equal, round, and reactive to light, extraocular eye movements intact, conjunctivae normal  Neck: neck supple and non tender without mass   Pulmonary/Chest: clear to auscultation bilaterally- no wheezes, rales or rhonchi, normal air movement, no respiratory distress  Cardiovascular: normal rate, normal S1 and S2 and no carotid bruits  Abdomen: soft, non-tender, non-distended, normal bowel sounds, no masses or organomegaly  Extremities: no cyanosis, no clubbing and no edema  Neurologic: no cranial nerve deficit and speech normal    I/O last 3 completed shifts: In: 960 [P.O.:960]  Out: -   No intake/output data recorded. LABS:  Recent Labs     09/12/22 0220 09/13/22 0255 09/14/22  0315    140 140   K 3.1* 3.8 4.4    102 101   CO2 31* 30* 31*   BUN 7 7 7   CREATININE 0.7 0.6 0.7   GLUCOSE 105* 105* 118*   CALCIUM 8.6 8.8 9.1       Recent Labs     09/12/22 0220 09/13/22 0255 09/14/22  0315   WBC 6.4 5.8 6.0   RBC 4.07 3.89 4.13   HGB 9.7* 9.2* 9.8*   HCT 32.0* 30.8* 32.8*   MCV 78.6* 79.2* 79.4*   MCH 23.8* 23.7* 23.7*   MCHC 30.3* 29.9* 29.9*   RDW 17.7* 17.8* 17.4*    157 164   MPV 9.6 9.8 9.4       No results for input(s): POCGLU in the last 72 hours.     Imaging:  XR CHEST (2 VW)    Result Date: 9/9/2022  EXAMINATION: TWO XRAY VIEWS OF THE CHEST 9/9/2022 12:48 pm COMPARISON: 31 August 2021 HISTORY: ORDERING SYSTEM PROVIDED HISTORY: Shortness of breath TECHNOLOGIST PROVIDED HISTORY: Reason for exam:->Shortness of breath FINDINGS: There is pulmonary emphysema. Bilateral lung opacities are again noted in both upper and lower lung zones which are relatively stable and likely represent pulmonary interstitial fibrosis. A component of chronic right lower lobe pneumonia is possible as well. Pulmonary emphysema and likely interstitial fibrosis with a possible component of chronic right lower lobe pneumonia as well. CTA PULMONARY W CONTRAST    Result Date: 9/9/2022  EXAMINATION: CTA OF THE CHEST 9/9/2022 2:04 pm TECHNIQUE: CTA of the chest was performed after the administration of intravenous contrast.  Multiplanar reformatted images are provided for review. MIP images are provided for review. Automated exposure control, iterative reconstruction, and/or weight based adjustment of the mA/kV was utilized to reduce the radiation dose to as low as reasonably achievable. COMPARISON: CTA of the chest, 06/22/2021. HISTORY: ORDERING SYSTEM PROVIDED HISTORY: hemoptysis; tachycardia TECHNOLOGIST PROVIDED HISTORY: Reason for exam:->hemoptysis; tachycardia Decision Support Exception - unselect if not a suspected or confirmed emergency medical condition->Emergency Medical Condition (MA) FINDINGS: Pulmonary Arteries: Small partially occlusive thrombi are seen in the lateral basal segment of the left lower lobe (axial sequence, images 134 and 137). The main pulmonary artery is normal in caliber. Mediastinum: The heart is normal in size. Mild calcified atherosclerosis is seen in the aorta. No aneurysm. Enlarged mediastinal and bilateral hilar lymph nodes are noted. The largest in the right hilar region measures approximately 1.7 x 1.7 cm. The largest in the precarinal mediastinal space measures 2.4 x 1.6 cm. Small hiatal hernia. Lungs/pleura: Pulmonary infiltrate is seen in the right lower lobe. There is opacification of right lower lobe bronchi with secretions. Scarring is seen the upper lobes bilaterally. Severe emphysematous changes are seen. No pneumothorax or pleural effusion is seen. Upper Abdomen: No acute abnormality seen. Cholelithiasis is noted. Soft Tissues/Bones: No acute bone or soft tissue abnormality. 1. Small, partially-occlusive PULMONARY EMBOLI in the lateral basal segment of the left lower lobe. 2. Right lower lobe infiltrate likely INFECTIOUS/INFLAMMATORY. 3. Mucus opacification of the right lower lobe bronchi. 4. Mildly prominent mediastinal and hilar lymphadenopathy, which is likely reactive and is chronic. Enlarged lymph nodes have been noted on multiple prior CTs dating back to at least 10/30/2017. 5. Severe emphysematous changes. 6. Cholelithiasis. RECOMMENDATIONS: Unavailable     US DUP LOWER EXTREMITIES BILATERAL VENOUS    Result Date: 9/10/2022  EXAMINATION: DUPLEX VENOUS ULTRASOUND OF THE BILATERAL LOWER EXTREMITIES9/10/2022 5:40 pm TECHNIQUE: Duplex ultrasound using B-mode/gray scaled imaging, Doppler spectral analysis and color flow Doppler was obtained of the deep venous structures of the lower bilateral extremities. COMPARISON: None. HISTORY: ORDERING SYSTEM PROVIDED HISTORY: PE TECHNOLOGIST PROVIDED HISTORY: Reason for exam:->PE What reading provider will be dictating this exam?->CRC FINDINGS: The visualized veins of the bilateral lower extremities are patent and free of echogenic thrombus. The veins demonstrate good compressibility with normal color flow study and spectral analysis. No evidence of DVT in either lower extremity.        Patient Instructions:      Medication List        START taking these medications      ciprofloxacin 500 MG tablet  Commonly known as: CIPRO  Take 1 tablet by mouth every 12 hours for 21 days            CONTINUE taking these medications      * albuterol (2.5 MG/3ML) 0.083% nebulizer solution  Commonly known as: PROVENTIL     * ProAir  (90 Base) MCG/ACT inhaler  Generic drug: albuterol sulfate HFA  Inhale 2 puffs into the lungs every 6 hours as needed for Wheezing     alendronate 70 MG tablet  Commonly known as: FOSAMAX     amLODIPine 5 MG tablet  Commonly known as: NORVASC     aspirin 81 MG tablet     atorvastatin 20 MG tablet  Commonly known as: LIPITOR  Take 1 tablet by mouth daily     budesonide 0.25 MG/2ML nebulizer suspension  Commonly known as: Pulmicort  Take 2 mLs by nebulization 2 times daily     calcium carbonate 600 MG Tabs tablet     diazePAM 10 MG tablet  Commonly known as: VALIUM     formoterol 20 MCG/2ML nebulizer solution  Commonly known as: PERFOROMIST  Take 2 mLs by nebulization 2 times daily     Incruse Ellipta 62.5 MCG/INH Aepb  Generic drug: Umeclidinium Bromide  Inhale 1 puff into the lungs daily INHALE ONE PUFF BY MOUTH DAILY     losartan-hydroCHLOROthiazide 100-12.5 MG per tablet  Commonly known as: HYZAAR     metoprolol succinate 25 MG extended release tablet  Commonly known as: TOPROL XL  Take 0.5 tablets by mouth daily     omeprazole 20 MG delayed release capsule  Commonly known as: PRILOSEC     OXYGEN     sertraline 100 MG tablet  Commonly known as: ZOLOFT           * This list has 2 medication(s) that are the same as other medications prescribed for you. Read the directions carefully, and ask your doctor or other care provider to review them with you. STOP taking these medications      JUAN MEIER IN     Cholecalciferol 50 MCG (2000 UT) Caps     OCUVITE PO               Where to Get Your Medications        These medications were sent to [address] [address]      Phone: [phone]   ciprofloxacin 500 MG tablet         35 minutes time spent reviewing patient chart, assessing patient, discussing plan of care with patient and family, discussing plan of care with collaborating physician, and charting.       Signed:  Electronically signed by Vamsi Espitia

## 2022-09-16 LAB
EKG ATRIAL RATE: 85 BPM
EKG P AXIS: 40 DEGREES
EKG P-R INTERVAL: 134 MS
EKG Q-T INTERVAL: 396 MS
EKG QRS DURATION: 72 MS
EKG QTC CALCULATION (BAZETT): 471 MS
EKG R AXIS: -38 DEGREES
EKG T AXIS: 27 DEGREES
EKG VENTRICULAR RATE: 85 BPM

## 2023-04-11 ENCOUNTER — HOSPITAL ENCOUNTER (INPATIENT)
Age: 78
LOS: 4 days | Discharge: HOME | DRG: 189 | End: 2023-04-15
Attending: EMERGENCY MEDICINE | Admitting: INTERNAL MEDICINE
Payer: MEDICARE

## 2023-04-11 ENCOUNTER — APPOINTMENT (OUTPATIENT)
Dept: GENERAL RADIOLOGY | Age: 78
DRG: 189 | End: 2023-04-11
Payer: MEDICARE

## 2023-04-11 ENCOUNTER — APPOINTMENT (OUTPATIENT)
Dept: CT IMAGING | Age: 78
DRG: 189 | End: 2023-04-11
Payer: MEDICARE

## 2023-04-11 DIAGNOSIS — J44.1 COPD EXACERBATION (HCC): Primary | ICD-10-CM

## 2023-04-11 LAB
ALBUMIN SERPL-MCNC: 3.7 G/DL (ref 3.5–5.2)
ALP SERPL-CCNC: 70 U/L (ref 35–104)
ALT SERPL-CCNC: 6 U/L (ref 0–32)
ANION GAP SERPL CALCULATED.3IONS-SCNC: 11 MMOL/L (ref 7–16)
AST SERPL-CCNC: 10 U/L (ref 0–31)
B PARAP IS1001 DNA NPH QL NAA+NON-PROBE: NOT DETECTED
B PERT.PT PRMT NPH QL NAA+NON-PROBE: NOT DETECTED
B.E.: 4.3 MMOL/L (ref -3–3)
BASOPHILS # BLD: 0.04 E9/L (ref 0–0.2)
BASOPHILS NFR BLD: 0.4 % (ref 0–2)
BILIRUB SERPL-MCNC: 0.4 MG/DL (ref 0–1.2)
BNP BLD-MCNC: 3212 PG/ML (ref 0–450)
BUN SERPL-MCNC: 15 MG/DL (ref 6–23)
C PNEUM DNA NPH QL NAA+NON-PROBE: NOT DETECTED
CALCIUM SERPL-MCNC: 8.7 MG/DL (ref 8.6–10.2)
CHLORIDE SERPL-SCNC: 98 MMOL/L (ref 98–107)
CO2 SERPL-SCNC: 32 MMOL/L (ref 22–29)
COHB: 1.3 % (ref 0–1.5)
CREAT SERPL-MCNC: 1.1 MG/DL (ref 0.5–1)
CRITICAL: ABNORMAL
DATE ANALYZED: ABNORMAL
DATE OF COLLECTION: ABNORMAL
EKG ATRIAL RATE: 95 BPM
EKG P AXIS: 67 DEGREES
EKG P-R INTERVAL: 120 MS
EKG Q-T INTERVAL: 384 MS
EKG QRS DURATION: 72 MS
EKG QTC CALCULATION (BAZETT): 482 MS
EKG R AXIS: 157 DEGREES
EKG T AXIS: 69 DEGREES
EKG VENTRICULAR RATE: 95 BPM
EOSINOPHIL # BLD: 0 E9/L (ref 0.05–0.5)
EOSINOPHIL NFR BLD: 0 % (ref 0–6)
ERYTHROCYTE [DISTWIDTH] IN BLOOD BY AUTOMATED COUNT: 16.2 FL (ref 11.5–15)
FLUAV RNA NPH QL NAA+NON-PROBE: NOT DETECTED
FLUBV RNA NPH QL NAA+NON-PROBE: NOT DETECTED
GLUCOSE SERPL-MCNC: 151 MG/DL (ref 74–99)
HADV DNA NPH QL NAA+NON-PROBE: NOT DETECTED
HCO3: 29.7 MMOL/L (ref 22–26)
HCOV 229E RNA NPH QL NAA+NON-PROBE: NOT DETECTED
HCOV HKU1 RNA NPH QL NAA+NON-PROBE: NOT DETECTED
HCOV NL63 RNA NPH QL NAA+NON-PROBE: NOT DETECTED
HCOV OC43 RNA NPH QL NAA+NON-PROBE: NOT DETECTED
HCT VFR BLD AUTO: 32.8 % (ref 34–48)
HGB BLD-MCNC: 9.7 G/DL (ref 11.5–15.5)
HHB: 19.6 % (ref 0–5)
HMPV RNA NPH QL NAA+NON-PROBE: NOT DETECTED
HPIV1 RNA NPH QL NAA+NON-PROBE: NOT DETECTED
HPIV2 RNA NPH QL NAA+NON-PROBE: NOT DETECTED
HPIV3 RNA NPH QL NAA+NON-PROBE: NOT DETECTED
HPIV4 RNA NPH QL NAA+NON-PROBE: NOT DETECTED
IMM GRANULOCYTES # BLD: 0.09 E9/L
IMM GRANULOCYTES NFR BLD: 0.8 % (ref 0–5)
INFLUENZA A BY PCR: NOT DETECTED
INFLUENZA B BY PCR: NOT DETECTED
LAB: ABNORMAL
LYMPHOCYTES # BLD: 1.38 E9/L (ref 1.5–4)
LYMPHOCYTES NFR BLD: 12.3 % (ref 20–42)
Lab: ABNORMAL
M PNEUMO DNA NPH QL NAA+NON-PROBE: NOT DETECTED
MCH RBC QN AUTO: 23.3 PG (ref 26–35)
MCHC RBC AUTO-ENTMCNC: 29.6 % (ref 32–34.5)
MCV RBC AUTO: 78.7 FL (ref 80–99.9)
METHB: 0.3 % (ref 0–1.5)
MODE: ABNORMAL
MONOCYTES # BLD: 0.5 E9/L (ref 0.1–0.95)
MONOCYTES NFR BLD: 4.5 % (ref 2–12)
NEUTROPHILS # BLD: 9.18 E9/L (ref 1.8–7.3)
NEUTS SEG NFR BLD: 82 % (ref 43–80)
O2 CONTENT: 12.5 ML/DL
O2 SATURATION: 80.1 % (ref 92–98.5)
O2HB: 78.8 % (ref 94–97)
OPERATOR ID: 7294
PATIENT TEMP: 37 C
PCO2: 47.9 MMHG (ref 35–45)
PH BLOOD GAS: 7.41 (ref 7.35–7.45)
PLATELET # BLD AUTO: 237 E9/L (ref 130–450)
PMV BLD AUTO: 10.2 FL (ref 7–12)
PO2: 48.8 MMHG (ref 75–100)
POTASSIUM SERPL-SCNC: 3.9 MMOL/L (ref 3.5–5)
PROCALCITONIN: 0.13 NG/ML (ref 0–0.08)
PROT SERPL-MCNC: 7 G/DL (ref 6.4–8.3)
RBC # BLD AUTO: 4.17 E12/L (ref 3.5–5.5)
RSV RNA NPH QL NAA+NON-PROBE: NOT DETECTED
RV+EV RNA NPH QL NAA+NON-PROBE: NOT DETECTED
SARS-COV-2 RDRP RESP QL NAA+PROBE: NOT DETECTED
SARS-COV-2 RNA NPH QL NAA+NON-PROBE: NOT DETECTED
SODIUM SERPL-SCNC: 141 MMOL/L (ref 132–146)
SOURCE, BLOOD GAS: ABNORMAL
THB: 11.3 G/DL (ref 11.5–16.5)
TIME ANALYZED: 1114
TROPONIN, HIGH SENSITIVITY: 36 NG/L (ref 0–9)
TROPONIN, HIGH SENSITIVITY: 52 NG/L (ref 0–9)
WBC # BLD: 11.2 E9/L (ref 4.5–11.5)

## 2023-04-11 PROCEDURE — 83880 ASSAY OF NATRIURETIC PEPTIDE: CPT

## 2023-04-11 PROCEDURE — 0202U NFCT DS 22 TRGT SARS-COV-2: CPT

## 2023-04-11 PROCEDURE — 6360000004 HC RX CONTRAST MEDICATION: Performed by: RADIOLOGY

## 2023-04-11 PROCEDURE — 99222 1ST HOSP IP/OBS MODERATE 55: CPT | Performed by: NURSE PRACTITIONER

## 2023-04-11 PROCEDURE — 87449 NOS EACH ORGANISM AG IA: CPT

## 2023-04-11 PROCEDURE — 71275 CT ANGIOGRAPHY CHEST: CPT

## 2023-04-11 PROCEDURE — 2500000003 HC RX 250 WO HCPCS: Performed by: STUDENT IN AN ORGANIZED HEALTH CARE EDUCATION/TRAINING PROGRAM

## 2023-04-11 PROCEDURE — 2580000003 HC RX 258: Performed by: FAMILY MEDICINE

## 2023-04-11 PROCEDURE — 96374 THER/PROPH/DIAG INJ IV PUSH: CPT

## 2023-04-11 PROCEDURE — 87635 SARS-COV-2 COVID-19 AMP PRB: CPT

## 2023-04-11 PROCEDURE — 6370000000 HC RX 637 (ALT 250 FOR IP): Performed by: FAMILY MEDICINE

## 2023-04-11 PROCEDURE — 6360000002 HC RX W HCPCS: Performed by: FAMILY MEDICINE

## 2023-04-11 PROCEDURE — 6370000000 HC RX 637 (ALT 250 FOR IP): Performed by: NURSE PRACTITIONER

## 2023-04-11 PROCEDURE — 96375 TX/PRO/DX INJ NEW DRUG ADDON: CPT

## 2023-04-11 PROCEDURE — 2580000003 HC RX 258: Performed by: RADIOLOGY

## 2023-04-11 PROCEDURE — 6360000002 HC RX W HCPCS: Performed by: STUDENT IN AN ORGANIZED HEALTH CARE EDUCATION/TRAINING PROGRAM

## 2023-04-11 PROCEDURE — 2060000000 HC ICU INTERMEDIATE R&B

## 2023-04-11 PROCEDURE — 6370000000 HC RX 637 (ALT 250 FOR IP): Performed by: STUDENT IN AN ORGANIZED HEALTH CARE EDUCATION/TRAINING PROGRAM

## 2023-04-11 PROCEDURE — 80053 COMPREHEN METABOLIC PANEL: CPT

## 2023-04-11 PROCEDURE — 71045 X-RAY EXAM CHEST 1 VIEW: CPT

## 2023-04-11 PROCEDURE — A4216 STERILE WATER/SALINE, 10 ML: HCPCS | Performed by: STUDENT IN AN ORGANIZED HEALTH CARE EDUCATION/TRAINING PROGRAM

## 2023-04-11 PROCEDURE — 84484 ASSAY OF TROPONIN QUANT: CPT

## 2023-04-11 PROCEDURE — 82805 BLOOD GASES W/O2 SATURATION: CPT

## 2023-04-11 PROCEDURE — 93005 ELECTROCARDIOGRAM TRACING: CPT | Performed by: STUDENT IN AN ORGANIZED HEALTH CARE EDUCATION/TRAINING PROGRAM

## 2023-04-11 PROCEDURE — 84145 PROCALCITONIN (PCT): CPT

## 2023-04-11 PROCEDURE — 94664 DEMO&/EVAL PT USE INHALER: CPT

## 2023-04-11 PROCEDURE — 2580000003 HC RX 258: Performed by: STUDENT IN AN ORGANIZED HEALTH CARE EDUCATION/TRAINING PROGRAM

## 2023-04-11 PROCEDURE — 87502 INFLUENZA DNA AMP PROBE: CPT

## 2023-04-11 PROCEDURE — 85025 COMPLETE CBC W/AUTO DIFF WBC: CPT

## 2023-04-11 PROCEDURE — 93010 ELECTROCARDIOGRAM REPORT: CPT | Performed by: INTERNAL MEDICINE

## 2023-04-11 PROCEDURE — 94640 AIRWAY INHALATION TREATMENT: CPT

## 2023-04-11 PROCEDURE — 99285 EMERGENCY DEPT VISIT HI MDM: CPT

## 2023-04-11 RX ORDER — METOPROLOL SUCCINATE 25 MG/1
12.5 TABLET, EXTENDED RELEASE ORAL DAILY
Status: DISCONTINUED | OUTPATIENT
Start: 2023-04-11 | End: 2023-04-15 | Stop reason: HOSPADM

## 2023-04-11 RX ORDER — AMLODIPINE BESYLATE 5 MG/1
5 TABLET ORAL DAILY
Status: DISCONTINUED | OUTPATIENT
Start: 2023-04-11 | End: 2023-04-15 | Stop reason: HOSPADM

## 2023-04-11 RX ORDER — SODIUM CHLORIDE 9 MG/ML
INJECTION, SOLUTION INTRAVENOUS PRN
Status: DISCONTINUED | OUTPATIENT
Start: 2023-04-11 | End: 2023-04-15 | Stop reason: HOSPADM

## 2023-04-11 RX ORDER — ATORVASTATIN CALCIUM 20 MG/1
20 TABLET, FILM COATED ORAL NIGHTLY
Status: DISCONTINUED | OUTPATIENT
Start: 2023-04-11 | End: 2023-04-15 | Stop reason: HOSPADM

## 2023-04-11 RX ORDER — LACTOBACILLUS RHAMNOSUS GG 10B CELL
1 CAPSULE ORAL DAILY
Status: DISCONTINUED | OUTPATIENT
Start: 2023-04-11 | End: 2023-04-15 | Stop reason: HOSPADM

## 2023-04-11 RX ORDER — SODIUM CHLORIDE 0.9 % (FLUSH) 0.9 %
5-40 SYRINGE (ML) INJECTION EVERY 12 HOURS SCHEDULED
Status: DISCONTINUED | OUTPATIENT
Start: 2023-04-11 | End: 2023-04-15 | Stop reason: HOSPADM

## 2023-04-11 RX ORDER — ALENDRONATE SODIUM 70 MG/1
70 TABLET ORAL WEEKLY
Status: DISCONTINUED | OUTPATIENT
Start: 2023-04-12 | End: 2023-04-11 | Stop reason: CLARIF

## 2023-04-11 RX ORDER — 0.9 % SODIUM CHLORIDE 0.9 %
1000 INTRAVENOUS SOLUTION INTRAVENOUS ONCE
Status: COMPLETED | OUTPATIENT
Start: 2023-04-11 | End: 2023-04-11

## 2023-04-11 RX ORDER — METHYLPREDNISOLONE SODIUM SUCCINATE 125 MG/2ML
60 INJECTION, POWDER, LYOPHILIZED, FOR SOLUTION INTRAMUSCULAR; INTRAVENOUS ONCE
Status: COMPLETED | OUTPATIENT
Start: 2023-04-11 | End: 2023-04-11

## 2023-04-11 RX ORDER — ACETAMINOPHEN 325 MG/1
650 TABLET ORAL EVERY 6 HOURS PRN
Status: DISCONTINUED | OUTPATIENT
Start: 2023-04-11 | End: 2023-04-15 | Stop reason: HOSPADM

## 2023-04-11 RX ORDER — ENOXAPARIN SODIUM 100 MG/ML
40 INJECTION SUBCUTANEOUS DAILY
Status: DISCONTINUED | OUTPATIENT
Start: 2023-04-11 | End: 2023-04-15 | Stop reason: HOSPADM

## 2023-04-11 RX ORDER — LOSARTAN POTASSIUM AND HYDROCHLOROTHIAZIDE 12.5; 1 MG/1; MG/1
1 TABLET ORAL EVERY MORNING
Status: DISCONTINUED | OUTPATIENT
Start: 2023-04-12 | End: 2023-04-11 | Stop reason: CLARIF

## 2023-04-11 RX ORDER — SODIUM CHLORIDE 0.9 % (FLUSH) 0.9 %
10 SYRINGE (ML) INJECTION PRN
Status: COMPLETED | OUTPATIENT
Start: 2023-04-11 | End: 2023-04-11

## 2023-04-11 RX ORDER — PREDNISONE 20 MG/1
40 TABLET ORAL DAILY
Status: DISCONTINUED | OUTPATIENT
Start: 2023-04-12 | End: 2023-04-14

## 2023-04-11 RX ORDER — IPRATROPIUM BROMIDE AND ALBUTEROL SULFATE 2.5; .5 MG/3ML; MG/3ML
3 SOLUTION RESPIRATORY (INHALATION) ONCE
Status: COMPLETED | OUTPATIENT
Start: 2023-04-11 | End: 2023-04-11

## 2023-04-11 RX ORDER — HYDROCHLOROTHIAZIDE 12.5 MG/1
12.5 TABLET ORAL EVERY MORNING
Status: DISCONTINUED | OUTPATIENT
Start: 2023-04-12 | End: 2023-04-15 | Stop reason: HOSPADM

## 2023-04-11 RX ORDER — SERTRALINE HYDROCHLORIDE 100 MG/1
100 TABLET, FILM COATED ORAL NIGHTLY
Status: DISCONTINUED | OUTPATIENT
Start: 2023-04-11 | End: 2023-04-15 | Stop reason: HOSPADM

## 2023-04-11 RX ORDER — IPRATROPIUM BROMIDE AND ALBUTEROL SULFATE 2.5; .5 MG/3ML; MG/3ML
1 SOLUTION RESPIRATORY (INHALATION)
Status: DISCONTINUED | OUTPATIENT
Start: 2023-04-11 | End: 2023-04-15 | Stop reason: HOSPADM

## 2023-04-11 RX ORDER — ONDANSETRON 2 MG/ML
4 INJECTION INTRAMUSCULAR; INTRAVENOUS EVERY 6 HOURS PRN
Status: DISCONTINUED | OUTPATIENT
Start: 2023-04-11 | End: 2023-04-13

## 2023-04-11 RX ORDER — PANTOPRAZOLE SODIUM 40 MG/1
40 TABLET, DELAYED RELEASE ORAL
Status: DISCONTINUED | OUTPATIENT
Start: 2023-04-12 | End: 2023-04-15 | Stop reason: HOSPADM

## 2023-04-11 RX ORDER — ACETAMINOPHEN 650 MG/1
650 SUPPOSITORY RECTAL EVERY 6 HOURS PRN
Status: DISCONTINUED | OUTPATIENT
Start: 2023-04-11 | End: 2023-04-15 | Stop reason: HOSPADM

## 2023-04-11 RX ORDER — ASPIRIN 81 MG/1
81 TABLET, CHEWABLE ORAL EVERY MORNING
Status: DISCONTINUED | OUTPATIENT
Start: 2023-04-12 | End: 2023-04-15 | Stop reason: HOSPADM

## 2023-04-11 RX ORDER — POLYETHYLENE GLYCOL 3350 17 G/17G
17 POWDER, FOR SOLUTION ORAL DAILY PRN
Status: DISCONTINUED | OUTPATIENT
Start: 2023-04-11 | End: 2023-04-15 | Stop reason: HOSPADM

## 2023-04-11 RX ORDER — LANOLIN ALCOHOL/MO/W.PET/CERES
3 CREAM (GRAM) TOPICAL NIGHTLY PRN
Status: DISCONTINUED | OUTPATIENT
Start: 2023-04-11 | End: 2023-04-15 | Stop reason: HOSPADM

## 2023-04-11 RX ORDER — LOSARTAN POTASSIUM 50 MG/1
100 TABLET ORAL EVERY MORNING
Status: DISCONTINUED | OUTPATIENT
Start: 2023-04-12 | End: 2023-04-15 | Stop reason: HOSPADM

## 2023-04-11 RX ORDER — METHYLPREDNISOLONE SODIUM SUCCINATE 125 MG/2ML
125 INJECTION, POWDER, LYOPHILIZED, FOR SOLUTION INTRAMUSCULAR; INTRAVENOUS ONCE
Status: DISCONTINUED | OUTPATIENT
Start: 2023-04-11 | End: 2023-04-11

## 2023-04-11 RX ORDER — CALCIUM CARBONATE 200(500)MG
1 TABLET,CHEWABLE ORAL EVERY MORNING
Status: DISCONTINUED | OUTPATIENT
Start: 2023-04-12 | End: 2023-04-15 | Stop reason: HOSPADM

## 2023-04-11 RX ORDER — ONDANSETRON 4 MG/1
4 TABLET, ORALLY DISINTEGRATING ORAL EVERY 8 HOURS PRN
Status: DISCONTINUED | OUTPATIENT
Start: 2023-04-11 | End: 2023-04-13

## 2023-04-11 RX ORDER — DIAZEPAM 5 MG/1
10 TABLET ORAL NIGHTLY PRN
Status: DISCONTINUED | OUTPATIENT
Start: 2023-04-11 | End: 2023-04-15 | Stop reason: HOSPADM

## 2023-04-11 RX ORDER — DIPHENHYDRAMINE HYDROCHLORIDE 50 MG/ML
25 INJECTION INTRAMUSCULAR; INTRAVENOUS ONCE
Status: COMPLETED | OUTPATIENT
Start: 2023-04-11 | End: 2023-04-11

## 2023-04-11 RX ORDER — PREDNISONE 20 MG/1
40 TABLET ORAL DAILY
Status: DISCONTINUED | OUTPATIENT
Start: 2023-04-12 | End: 2023-04-11

## 2023-04-11 RX ORDER — SODIUM CHLORIDE 0.9 % (FLUSH) 0.9 %
5-40 SYRINGE (ML) INJECTION PRN
Status: DISCONTINUED | OUTPATIENT
Start: 2023-04-11 | End: 2023-04-15 | Stop reason: HOSPADM

## 2023-04-11 RX ADMIN — ATORVASTATIN CALCIUM 20 MG: 20 TABLET, FILM COATED ORAL at 20:17

## 2023-04-11 RX ADMIN — IPRATROPIUM BROMIDE AND ALBUTEROL SULFATE 3 AMPULE: .5; 3 SOLUTION RESPIRATORY (INHALATION) at 11:23

## 2023-04-11 RX ADMIN — SERTRALINE 100 MG: 100 TABLET, FILM COATED ORAL at 20:17

## 2023-04-11 RX ADMIN — Medication 1 CAPSULE: at 17:32

## 2023-04-11 RX ADMIN — METOPROLOL SUCCINATE 12.5 MG: 25 TABLET, EXTENDED RELEASE ORAL at 17:32

## 2023-04-11 RX ADMIN — IOPAMIDOL 75 ML: 755 INJECTION, SOLUTION INTRAVENOUS at 14:42

## 2023-04-11 RX ADMIN — METHYLPREDNISOLONE SODIUM SUCCINATE 60 MG: 125 INJECTION, POWDER, FOR SOLUTION INTRAMUSCULAR; INTRAVENOUS at 12:07

## 2023-04-11 RX ADMIN — FAMOTIDINE 20 MG: 10 INJECTION, SOLUTION INTRAVENOUS at 14:04

## 2023-04-11 RX ADMIN — SODIUM CHLORIDE 1000 ML: 9 INJECTION, SOLUTION INTRAVENOUS at 11:58

## 2023-04-11 RX ADMIN — ENOXAPARIN SODIUM 40 MG: 100 INJECTION SUBCUTANEOUS at 17:32

## 2023-04-11 RX ADMIN — LEVOFLOXACIN 750 MG: 500 TABLET, FILM COATED ORAL at 17:32

## 2023-04-11 RX ADMIN — SODIUM CHLORIDE, PRESERVATIVE FREE 10 ML: 5 INJECTION INTRAVENOUS at 14:40

## 2023-04-11 RX ADMIN — DIPHENHYDRAMINE HYDROCHLORIDE 25 MG: 50 INJECTION, SOLUTION INTRAMUSCULAR; INTRAVENOUS at 14:04

## 2023-04-11 RX ADMIN — IPRATROPIUM BROMIDE AND ALBUTEROL SULFATE 1 AMPULE: .5; 2.5 SOLUTION RESPIRATORY (INHALATION) at 21:10

## 2023-04-11 RX ADMIN — Medication 10 ML: at 20:17

## 2023-04-11 ASSESSMENT — PAIN - FUNCTIONAL ASSESSMENT
PAIN_FUNCTIONAL_ASSESSMENT: NONE - DENIES PAIN
PAIN_FUNCTIONAL_ASSESSMENT: NONE - DENIES PAIN

## 2023-04-11 NOTE — ED PROVIDER NOTES
abnormality. There is no acute abnormality of the thoracic aorta. Lungs/pleura: Stable right lower lobe opacity. No focal consolidation or pulmonary edema. No evidence of pleural effusion or pneumothorax. Emphysematous changes. Upper Abdomen: Small hiatal hernia. Soft Tissues/Bones: No acute bone or soft tissue abnormality. No evidence of pulmonary embolism or acute pulmonary abnormality. Mediastinal hilar adenopathy. Emphysematous changes. Stable right lower lobe opacity unchanged compared to 09/09/2022 Unavailable       No results found. PROCEDURES   Unless otherwise noted below, none     CRITICAL CARE TIME (.cct)   Per attending attestation    PAST MEDICAL HISTORY/Chronic Conditions Affecting Care    has a past medical history of COPD (chronic obstructive pulmonary disease) ([facility] Hyperlipidemia (9/14/2022), Hypertension, Migraines, MRSA (methicillin resistant staph aureus) culture positive, Pneumonia, and Ulcer.      EMERGENCY DEPARTMENT COURSE    Vitals:    Vitals:    04/11/23 1330 04/11/23 1409 04/11/23 1440 04/11/23 1510   BP: 91/76 104/63 98/62 110/66   Pulse: 83 87 (!) 108 85   Resp: 19 19 23 24   Temp:       TempSrc:       SpO2: 95% 94%  92%   Weight:       Height:           Patient was given the following medications:  Medications   ipratropium-albuterol (DUONEB) nebulizer solution 3 ampule (3 ampules Inhalation Given 4/11/23 1123)   0.9 % sodium chloride bolus (0 mLs IntraVENous Stopped 4/11/23 1329)   methylPREDNISolone sodium (SOLU-MEDROL) injection 60 mg (60 mg IntraVENous Given 4/11/23 1207)   diphenhydrAMINE (BENADRYL) injection 25 mg (25 mg IntraVENous Given 4/11/23 1404)   famotidine (PEPCID) 20 mg in sodium chloride (PF) 0.9 % 10 mL injection (20 mg IntraVENous Given 4/11/23 1404)   iopamidol (ISOVUE-370) 76 % injection 75 mL (75 mLs IntraVENous Given 4/11/23 1442)   sodium chloride flush 0.9 % injection 10 mL (10 mLs IntraVENous Given 4/11/23 1440)         Is this patient to be included

## 2023-04-11 NOTE — H&P
CREATININE 1.1*   GLUCOSE 151*   CALCIUM 8.7       Recent Labs     04/11/23  1111   WBC 11.2   RBC 4.17   HGB 9.7*   HCT 32.8*   MCV 78.7*   MCH 23.3*   MCHC 29.6*   RDW 16.2*      MPV 10.2       No results for input(s): POCGLU in the last 72 hours. Radiology:   CTA PULMONARY W CONTRAST   Final Result   No evidence of pulmonary embolism or acute pulmonary abnormality. Mediastinal hilar adenopathy. Emphysematous changes. Stable right lower lobe opacity unchanged compared to 09/09/2022      Unavailable         XR CHEST PORTABLE   Final Result   Obstructive airways disease and likely interstitial fibrosis. EKG:     ASSESSMENT:      Principal Problem:    COPD exacerbation ([address]  Resolved Problems:    * No resolved hospital problems. *      PLAN:    Acute on chronic hypoxic respiratory failure: CTA shows no evidence of PE, mediastinal hilar adenopathy, emphysematous changes, stable right lower lobe opacity that is unchanged from 9/2022. CXR shows obstructive airway disease and likely interstitial fibrosis. ABGs in the ED pH 7.41/PCO2 47.9/PO2 48.8/HCO3 29.7 on 4 L nasal cannula. Oxygen is 4 L at home now up to 8L. Given 60 mg Solumedrol and Duoneb treatments. We will start patient on prednisone 40 mg daily. Pulmonology consulted. COPD exacerbation with a Hx bronchiectasis: On home O2 with home breathing treatments. Donita Tucker outpatient with Dr Job Hinkle seen inpatient 9/2022. Also follows outpatient with ID for recurrent Pseudomonas bronchiectasis-has been treated with Cipro and cefepime in the past.  Hx left-sided tension pneumothorax: S/p pleurodesis  HFpEF: Follows with [facility] cardiology. Hx stress cardiomyopathy with an EF of 30 to 35%-now recovering recent EF 52%. Continue metoprolol. Monitor I&O. HTN with recent hypotension: on admission- given 1L NS in ED. blood pressure now stable. Will hold antihypertensives for now-takes Norvasc, Hyzaar, Toprol-XL at home.

## 2023-04-11 NOTE — ED NOTES
Radiology Procedure Waiver   Name: Eric Vaca  : [id number]  MRN: [id number]    Date:  23    Time: 2:30 PM EDT    Benefits of immediately proceeding with Radiology exam(s) without pre-testing outweigh the risks or are not indicated as specified below and therefore the following is/are being waived:    [] Pregnancy test   [] Patients LMP on-time and regular.   [] Patient had Tubal Ligation or has other Contraception Device. [] Patient  is Menopausal or Premenarcheal.    [] Patient had Full or Partial Hysterectomy. [x] Protocol for Iodine allergy    [] MRI Questionnaire     [] BUN/Creatinine   [] Patient age w/no hx of renal dysfunction. [] Patient on Dialysis. [] Recent Normal Labs.   Electronically signed by Stephie Man DO on 23 at 2:30 PM EDT               Matthew Oneill DO  Resident  23 [phone]

## 2023-04-11 NOTE — ED NOTES
ED to Inpatient Handoff Report    Notified RN that electronic handoff available and patient ready for transport to room 423. Safety Risks: Risk of falls    Patient in Restraints: no    Constant Observer or Patient : no    Telemetry Monitoring Ordered :Yes           Order to transfer to unit without monitor:NO    Last MEWS: 1 Time completed: 1632    Vitals:    04/11/23 1409 04/11/23 1440 04/11/23 1510 04/11/23 1632   BP: 104/63 98/62 110/66 117/67   Pulse: 87 (!) 108 85 84   Resp: 19 23 24 18   Temp:    97.9 °F (36.6 °C)   TempSrc:    Oral   SpO2: 94%  92% 94%   Weight:       Height:           Opportunity for questions and clarification was provided.         Marcela Troncoso RN  04/11/23 0973

## 2023-04-12 PROBLEM — J47.1 BRONCHIECTASIS WITH ACUTE EXACERBATION (HCC): Status: ACTIVE | Noted: 2023-04-12

## 2023-04-12 LAB
ALBUMIN SERPL-MCNC: 3.5 G/DL (ref 3.5–5.2)
ALP SERPL-CCNC: 63 U/L (ref 35–104)
ALT SERPL-CCNC: 12 U/L (ref 0–32)
ANION GAP SERPL CALCULATED.3IONS-SCNC: 10 MMOL/L (ref 7–16)
ANISOCYTOSIS: ABNORMAL
AST SERPL-CCNC: 17 U/L (ref 0–31)
BASOPHILS # BLD: 0.01 E9/L (ref 0–0.2)
BASOPHILS NFR BLD: 0.1 % (ref 0–2)
BILIRUB SERPL-MCNC: <0.2 MG/DL (ref 0–1.2)
BNP BLD-MCNC: 5303 PG/ML (ref 0–450)
BUN SERPL-MCNC: 21 MG/DL (ref 6–23)
CALCIUM SERPL-MCNC: 8.3 MG/DL (ref 8.6–10.2)
CHLORIDE SERPL-SCNC: 102 MMOL/L (ref 98–107)
CO2 SERPL-SCNC: 30 MMOL/L (ref 22–29)
CREAT SERPL-MCNC: 1.1 MG/DL (ref 0.5–1)
CRP SERPL HS-MCNC: 2.2 MG/DL (ref 0–0.4)
EOSINOPHIL # BLD: 0 E9/L (ref 0.05–0.5)
EOSINOPHIL NFR BLD: 0 % (ref 0–6)
ERYTHROCYTE [DISTWIDTH] IN BLOOD BY AUTOMATED COUNT: 16.2 FL (ref 11.5–15)
ERYTHROCYTE [SEDIMENTATION RATE] IN BLOOD BY WESTERGREN METHOD: 37 MM/HR (ref 0–20)
GLUCOSE SERPL-MCNC: 127 MG/DL (ref 74–99)
HCT VFR BLD AUTO: 30.8 % (ref 34–48)
HGB BLD-MCNC: 9 G/DL (ref 11.5–15.5)
HYPOCHROMIA: ABNORMAL
IMM GRANULOCYTES # BLD: 0.08 E9/L
IMM GRANULOCYTES NFR BLD: 1.1 % (ref 0–5)
LEGIONELLA AG UR QL: NORMAL
LYMPHOCYTES # BLD: 0.92 E9/L (ref 1.5–4)
LYMPHOCYTES NFR BLD: 12.2 % (ref 20–42)
MCH RBC QN AUTO: 23.4 PG (ref 26–35)
MCHC RBC AUTO-ENTMCNC: 29.2 % (ref 32–34.5)
MCV RBC AUTO: 80.2 FL (ref 80–99.9)
MONOCYTES # BLD: 0.46 E9/L (ref 0.1–0.95)
MONOCYTES NFR BLD: 6.1 % (ref 2–12)
NEUTROPHILS # BLD: 6.07 E9/L (ref 1.8–7.3)
NEUTS SEG NFR BLD: 80.5 % (ref 43–80)
PLATELET # BLD AUTO: 212 E9/L (ref 130–450)
PMV BLD AUTO: 10 FL (ref 7–12)
POLYCHROMASIA: ABNORMAL
POTASSIUM SERPL-SCNC: 4.2 MMOL/L (ref 3.5–5)
PROT SERPL-MCNC: 6.7 G/DL (ref 6.4–8.3)
RBC # BLD AUTO: 3.84 E12/L (ref 3.5–5.5)
S PNEUM AG SPEC QL: NORMAL
SODIUM SERPL-SCNC: 142 MMOL/L (ref 132–146)
WBC # BLD: 7.5 E9/L (ref 4.5–11.5)

## 2023-04-12 PROCEDURE — 2580000003 HC RX 258: Performed by: FAMILY MEDICINE

## 2023-04-12 PROCEDURE — 2060000000 HC ICU INTERMEDIATE R&B

## 2023-04-12 PROCEDURE — 87070 CULTURE OTHR SPECIMN AEROBIC: CPT

## 2023-04-12 PROCEDURE — 6370000000 HC RX 637 (ALT 250 FOR IP): Performed by: NURSE PRACTITIONER

## 2023-04-12 PROCEDURE — 86140 C-REACTIVE PROTEIN: CPT

## 2023-04-12 PROCEDURE — 87206 SMEAR FLUORESCENT/ACID STAI: CPT

## 2023-04-12 PROCEDURE — 85651 RBC SED RATE NONAUTOMATED: CPT

## 2023-04-12 PROCEDURE — 83880 ASSAY OF NATRIURETIC PEPTIDE: CPT

## 2023-04-12 PROCEDURE — 6370000000 HC RX 637 (ALT 250 FOR IP): Performed by: FAMILY MEDICINE

## 2023-04-12 PROCEDURE — 94669 MECHANICAL CHEST WALL OSCILL: CPT

## 2023-04-12 PROCEDURE — 87077 CULTURE AEROBIC IDENTIFY: CPT

## 2023-04-12 PROCEDURE — 6370000000 HC RX 637 (ALT 250 FOR IP): Performed by: SPECIALIST

## 2023-04-12 PROCEDURE — 80053 COMPREHEN METABOLIC PANEL: CPT

## 2023-04-12 PROCEDURE — 6370000000 HC RX 637 (ALT 250 FOR IP): Performed by: INTERNAL MEDICINE

## 2023-04-12 PROCEDURE — 94640 AIRWAY INHALATION TREATMENT: CPT

## 2023-04-12 PROCEDURE — 36415 COLL VENOUS BLD VENIPUNCTURE: CPT

## 2023-04-12 PROCEDURE — 85025 COMPLETE CBC W/AUTO DIFF WBC: CPT

## 2023-04-12 PROCEDURE — 99231 SBSQ HOSP IP/OBS SF/LOW 25: CPT | Performed by: NURSE PRACTITIONER

## 2023-04-12 PROCEDURE — 6360000002 HC RX W HCPCS: Performed by: FAMILY MEDICINE

## 2023-04-12 PROCEDURE — 87186 SC STD MICRODIL/AGAR DIL: CPT

## 2023-04-12 PROCEDURE — 93005 ELECTROCARDIOGRAM TRACING: CPT | Performed by: SPECIALIST

## 2023-04-12 RX ORDER — CIPROFLOXACIN 500 MG/1
500 TABLET, FILM COATED ORAL EVERY 12 HOURS SCHEDULED
Status: DISCONTINUED | OUTPATIENT
Start: 2023-04-12 | End: 2023-04-15 | Stop reason: HOSPADM

## 2023-04-12 RX ORDER — GUAIFENESIN 400 MG/1
400 TABLET ORAL 3 TIMES DAILY
Status: DISCONTINUED | OUTPATIENT
Start: 2023-04-12 | End: 2023-04-15 | Stop reason: HOSPADM

## 2023-04-12 RX ORDER — ZOLPIDEM TARTRATE 5 MG/1
5 TABLET ORAL NIGHTLY PRN
Status: DISCONTINUED | OUTPATIENT
Start: 2023-04-12 | End: 2023-04-15 | Stop reason: HOSPADM

## 2023-04-12 RX ADMIN — SERTRALINE 100 MG: 100 TABLET, FILM COATED ORAL at 20:52

## 2023-04-12 RX ADMIN — ENOXAPARIN SODIUM 40 MG: 100 INJECTION SUBCUTANEOUS at 07:46

## 2023-04-12 RX ADMIN — ATORVASTATIN CALCIUM 20 MG: 20 TABLET, FILM COATED ORAL at 20:51

## 2023-04-12 RX ADMIN — PANTOPRAZOLE SODIUM 40 MG: 40 TABLET, DELAYED RELEASE ORAL at 06:05

## 2023-04-12 RX ADMIN — Medication 10 ML: at 07:48

## 2023-04-12 RX ADMIN — CALCIUM CARBONATE 500 MG: 500 TABLET, CHEWABLE ORAL at 07:47

## 2023-04-12 RX ADMIN — GUAIFENESIN 400 MG: 400 TABLET ORAL at 13:04

## 2023-04-12 RX ADMIN — IPRATROPIUM BROMIDE AND ALBUTEROL SULFATE 1 AMPULE: .5; 2.5 SOLUTION RESPIRATORY (INHALATION) at 08:20

## 2023-04-12 RX ADMIN — HYDROCHLOROTHIAZIDE 12.5 MG: 12.5 TABLET ORAL at 07:46

## 2023-04-12 RX ADMIN — IPRATROPIUM BROMIDE AND ALBUTEROL SULFATE 1 AMPULE: .5; 2.5 SOLUTION RESPIRATORY (INHALATION) at 20:58

## 2023-04-12 RX ADMIN — METOPROLOL SUCCINATE 12.5 MG: 25 TABLET, EXTENDED RELEASE ORAL at 07:46

## 2023-04-12 RX ADMIN — PREDNISONE 40 MG: 20 TABLET ORAL at 07:46

## 2023-04-12 RX ADMIN — LOSARTAN POTASSIUM 100 MG: 50 TABLET, FILM COATED ORAL at 07:46

## 2023-04-12 RX ADMIN — ZOLPIDEM TARTRATE 5 MG: 5 TABLET ORAL at 23:23

## 2023-04-12 RX ADMIN — ASPIRIN 81 MG CHEWABLE TABLET 81 MG: 81 TABLET CHEWABLE at 07:46

## 2023-04-12 RX ADMIN — IPRATROPIUM BROMIDE AND ALBUTEROL SULFATE 1 AMPULE: .5; 2.5 SOLUTION RESPIRATORY (INHALATION) at 13:17

## 2023-04-12 RX ADMIN — GUAIFENESIN 400 MG: 400 TABLET ORAL at 20:51

## 2023-04-12 RX ADMIN — Medication 1 CAPSULE: at 07:46

## 2023-04-12 RX ADMIN — Medication 10 ML: at 20:51

## 2023-04-12 RX ADMIN — CIPROFLOXACIN 500 MG: 500 TABLET, FILM COATED ORAL at 20:51

## 2023-04-12 NOTE — PLAN OF CARE
Problem: Safety - Adult  Goal: Free from fall injury  4/12/2023 1309 by Tyesha Lagunas RN  Outcome: Progressing  4/12/2023 0101 by Renée Graff RN  Outcome: Progressing

## 2023-04-12 NOTE — CONSULTS
[facility]  [address] [facility], [address]  [facility], [address] [facility]    Pulmonary Consultation      Reason for Consult:  sob and cough    Requesting Physician:  MISHA Perry CNP    CHIEF COMPLAINT:  sob and cough    History Obtained From:  patient    HISTORY OF PRESENT ILLNESS:                The patient is a 65 y.o. female with significant past medical history of Bronchiectasis and COPD who presents with gradual worsening of sob for the past 3 weeks. When asked about cough/sputum production, she didn't think that had been getting worse. Has chest vest at home but (?) told to use only once a week about a month ago, compliant with medications and oxygen use continuously at home. Last seen (telemedicine) at [facility] for bronchiectasis in 6/2021.  Currently on 6 li HFNC sitting up in bed eating breakfast and claims feeling some better with breathing, similar/slightly less with cough/sputum production    Past Medical History:        Diagnosis Date    COPD (chronic obstructive pulmonary disease) ([address]     Hyperlipidemia 9/14/2022    Hypertension     Migraines     MRSA (methicillin resistant staph aureus) culture positive     Pneumonia     Ulcer        Past Surgical History:        Procedure Laterality Date    BREAST LUMPECTOMY Right     PICC LINE INSERTION NURSE  8/31/2021         AR THORSC DX LUNGS/PERICAR/MED/PLEURAL SPACE W/O BX Left 9/12/2018    LEFT VIDEO ASSISTED THORACOSCOPY STAPLING OF BLEBS TALC PLEURODESIS WITH PLEURAL STRIPPING POSSIBLE THORACOTOMY performed by Tere Poon MD at [place] [address]         Current Medications:    Current Facility-Administered Medications: sodium chloride flush 0.9 % injection 5-40 mL, 5-40 mL, IntraVENous, 2 times per day  sodium chloride flush 0.9 % injection 5-40 mL, 5-40 mL, IntraVENous, PRN  0.9 % sodium chloride infusion, , IntraVENous, PRN  ondansetron (ZOFRAN-ODT) disintegrating tablet 4
movements. No lymphadenopathy. Chest: No use of accessory muscles to breathe. Symmetrical expansion. Auscultation reveals bilateral and bibasilar posterior crackles and wheezing. Cardiovascular: S1 and S2 are rhythmic and regular. No murmurs appreciated. Abdomen: Positive bowel sounds to auscultation. Benign to palpation. No masses felt. No hepatosplenomegaly. Extremities: No clubbing, no cyanosis, no edema.   Lines: Peripheral.      CBC+dif:  Recent Labs     04/11/23  1111 04/12/23  0240   WBC 11.2 7.5   HGB 9.7* 9.0*   HCT 32.8* 30.8*   MCV 78.7* 80.2    212   NEUTROABS 9.18* 6.07     Lab Results   Component Value Date    CRP 0.4 09/21/2021    CRP 0.3 09/15/2021    CRP 0.8 (H) 09/07/2021      No results found for: CRPHS  Lab Results   Component Value Date    SEDRATE 40 (H) 09/21/2021    SEDRATE 37 (H) 09/15/2021    SEDRATE 30 (H) 09/07/2021     Lab Results   Component Value Date    ALT 12 04/12/2023    AST 17 04/12/2023    ALKPHOS 63 04/12/2023    BILITOT <0.2 04/12/2023     Lab Results   Component Value Date/Time     04/12/2023 02:40 AM    K 4.2 04/12/2023 02:40 AM     04/12/2023 02:40 AM    CO2 30 04/12/2023 02:40 AM    BUN 21 04/12/2023 02:40 AM    CREATININE 1.1 04/12/2023 02:40 AM    GFRAA >60 09/14/2022 03:15 AM    LABGLOM 51 04/12/2023 02:40 AM    GLUCOSE 127 04/12/2023 02:40 AM    PROT 6.7 04/12/2023 02:40 AM    LABALBU 3.5 04/12/2023 02:40 AM    CALCIUM 8.3 04/12/2023 02:40 AM    BILITOT <0.2 04/12/2023 02:40 AM    ALKPHOS 63 04/12/2023 02:40 AM    AST 17 04/12/2023 02:40 AM    ALT 12 04/12/2023 02:40 AM       Lab Results   Component Value Date/Time    PROTIME 12.1 09/07/2018 10:11 AM    INR 1.0 09/07/2018 10:11 AM       No results found for: TSH    Lab Results   Component Value Date/Time    COLORU Yellow 09/12/2022 06:00 AM    PHUR 6.5 09/12/2022 06:00 AM    WBCUA 0-1 09/12/2022 06:00 AM    RBCUA NONE 09/12/2022 06:00 AM    BACTERIA NONE SEEN 09/12/2022 06:00 AM    CLARITYU Clear

## 2023-04-12 NOTE — ACP (ADVANCE CARE PLANNING)
Advance Care Planning   Healthcare Decision Maker:    Primary Decision Maker: Mauro Maddoxnarda Child - [phone]    Secondary Decision Maker: Jena Aleman - Spouse - [phone]    Click here to complete Healthcare Decision Makers including selection of the Healthcare Decision Maker Relationship (ie \"Primary\").

## 2023-04-12 NOTE — CARE COORDINATION
Met w/ patient. Explained role of  and plan of care. [place]/ her  [place]3 steps to entrance. Requiring O2 6 liters high flow NC- verified wears home O2 4lNC provided by [facility]. Has home chest vest, nebulizer. [facility]. PCP is Rajesh Naqvi NP and pharmacy is [facility]. Continues on po abx- ID following. Per pt, plan is to return home on discharge. Will follow Jseus Wray RN case manager    Case Management Assessment  Initial Evaluation    Date/Time of Evaluation: 4/12/2023 2:22 PM  Assessment Completed by: Jesus Wray RN    If patient is discharged prior to next notation, then this note serves as note for discharge by case management. Patient Name: Cristobal Ramos                   YOB: 1945  Diagnosis: COPD exacerbation [facility]) [J44.1]                   Date / Time: 4/11/2023 11:05 AM    Patient Admission Status: Inpatient   Readmission Risk (Low < 19, Mod (19-27), High > 27): Readmission Risk Score: 14.1    Current PCP: MISHA Dawson CNP  PCP verified by CM? Yes John Casillas NP)    Chart Reviewed: Yes      History Provided by: Patient  Patient Orientation: Alert and Oriented    Patient Cognition: Alert    Hospitalization in the last 30 days (Readmission):  No    If yes, Readmission Assessment in CM Navigator will be completed.     Advance Directives:      Code Status: Full Code   Patient's Primary Decision Maker is: Legal Next of Kin    Primary Decision MakerCora John Bee - [phone]    Secondary Decision Maker: Sushma Servin - [phone]    Discharge Planning:    Patient lives with: Spouse/Significant Other Type of Home: House  Primary Care Giver: Self  Patient Support Systems include: Spouse/Significant Other, Children   Current Financial resources:    Current community resources:    Current services prior to admission: Oxygen Therapy (4 liters [facility]are)            Current DME:              Type of Home Care services:

## 2023-04-13 PROBLEM — R94.31 PROLONGED Q-T INTERVAL ON ECG: Status: ACTIVE | Noted: 2023-04-13

## 2023-04-13 PROBLEM — I10 ESSENTIAL HYPERTENSION: Status: ACTIVE | Noted: 2023-04-13

## 2023-04-13 LAB
ALBUMIN SERPL-MCNC: 3.7 G/DL (ref 3.5–5.2)
ALP SERPL-CCNC: 58 U/L (ref 35–104)
ALT SERPL-CCNC: 10 U/L (ref 0–32)
ANION GAP SERPL CALCULATED.3IONS-SCNC: 8 MMOL/L (ref 7–16)
AST SERPL-CCNC: 14 U/L (ref 0–31)
BASOPHILS # BLD: 0.02 E9/L (ref 0–0.2)
BASOPHILS NFR BLD: 0.3 % (ref 0–2)
BILIRUB SERPL-MCNC: <0.2 MG/DL (ref 0–1.2)
BNP BLD-MCNC: 2673 PG/ML (ref 0–450)
BUN SERPL-MCNC: 17 MG/DL (ref 6–23)
CALCIUM SERPL-MCNC: 8.3 MG/DL (ref 8.6–10.2)
CHLORIDE SERPL-SCNC: 101 MMOL/L (ref 98–107)
CO2 SERPL-SCNC: 31 MMOL/L (ref 22–29)
CREAT SERPL-MCNC: 0.7 MG/DL (ref 0.5–1)
EKG ATRIAL RATE: 83 BPM
EKG ATRIAL RATE: 99 BPM
EKG P AXIS: 48 DEGREES
EKG P AXIS: 53 DEGREES
EKG P-R INTERVAL: 116 MS
EKG P-R INTERVAL: 128 MS
EKG Q-T INTERVAL: 392 MS
EKG Q-T INTERVAL: 418 MS
EKG QRS DURATION: 82 MS
EKG QRS DURATION: 86 MS
EKG QTC CALCULATION (BAZETT): 491 MS
EKG QTC CALCULATION (BAZETT): 503 MS
EKG R AXIS: -43 DEGREES
EKG R AXIS: -44 DEGREES
EKG T AXIS: -10 DEGREES
EKG T AXIS: -16 DEGREES
EKG VENTRICULAR RATE: 83 BPM
EKG VENTRICULAR RATE: 99 BPM
EOSINOPHIL # BLD: 0 E9/L (ref 0.05–0.5)
EOSINOPHIL NFR BLD: 0 % (ref 0–6)
ERYTHROCYTE [DISTWIDTH] IN BLOOD BY AUTOMATED COUNT: 16.4 FL (ref 11.5–15)
GLUCOSE SERPL-MCNC: 107 MG/DL (ref 74–99)
HCT VFR BLD AUTO: 30.3 % (ref 34–48)
HGB BLD-MCNC: 8.9 G/DL (ref 11.5–15.5)
IMM GRANULOCYTES # BLD: 0.07 E9/L
IMM GRANULOCYTES NFR BLD: 1.1 % (ref 0–5)
LYMPHOCYTES # BLD: 1.19 E9/L (ref 1.5–4)
LYMPHOCYTES NFR BLD: 17.9 % (ref 20–42)
MCH RBC QN AUTO: 23.5 PG (ref 26–35)
MCHC RBC AUTO-ENTMCNC: 29.4 % (ref 32–34.5)
MCV RBC AUTO: 80.2 FL (ref 80–99.9)
MONOCYTES # BLD: 0.41 E9/L (ref 0.1–0.95)
MONOCYTES NFR BLD: 6.2 % (ref 2–12)
NEUTROPHILS # BLD: 4.95 E9/L (ref 1.8–7.3)
NEUTS SEG NFR BLD: 74.5 % (ref 43–80)
PLATELET # BLD AUTO: 192 E9/L (ref 130–450)
PMV BLD AUTO: 9.6 FL (ref 7–12)
POTASSIUM SERPL-SCNC: 3.7 MMOL/L (ref 3.5–5)
PROT SERPL-MCNC: 6.6 G/DL (ref 6.4–8.3)
RBC # BLD AUTO: 3.78 E12/L (ref 3.5–5.5)
SODIUM SERPL-SCNC: 140 MMOL/L (ref 132–146)
WBC # BLD: 6.6 E9/L (ref 4.5–11.5)

## 2023-04-13 PROCEDURE — 94667 MNPJ CHEST WALL 1ST: CPT

## 2023-04-13 PROCEDURE — 2580000003 HC RX 258: Performed by: FAMILY MEDICINE

## 2023-04-13 PROCEDURE — 80053 COMPREHEN METABOLIC PANEL: CPT

## 2023-04-13 PROCEDURE — 85025 COMPLETE CBC W/AUTO DIFF WBC: CPT

## 2023-04-13 PROCEDURE — 6370000000 HC RX 637 (ALT 250 FOR IP): Performed by: NURSE PRACTITIONER

## 2023-04-13 PROCEDURE — 94669 MECHANICAL CHEST WALL OSCILL: CPT

## 2023-04-13 PROCEDURE — 93005 ELECTROCARDIOGRAM TRACING: CPT | Performed by: SPECIALIST

## 2023-04-13 PROCEDURE — 2700000000 HC OXYGEN THERAPY PER DAY

## 2023-04-13 PROCEDURE — 6370000000 HC RX 637 (ALT 250 FOR IP): Performed by: INTERNAL MEDICINE

## 2023-04-13 PROCEDURE — 6360000002 HC RX W HCPCS: Performed by: FAMILY MEDICINE

## 2023-04-13 PROCEDURE — 93010 ELECTROCARDIOGRAM REPORT: CPT | Performed by: INTERNAL MEDICINE

## 2023-04-13 PROCEDURE — 6370000000 HC RX 637 (ALT 250 FOR IP): Performed by: SPECIALIST

## 2023-04-13 PROCEDURE — 97165 OT EVAL LOW COMPLEX 30 MIN: CPT

## 2023-04-13 PROCEDURE — 99231 SBSQ HOSP IP/OBS SF/LOW 25: CPT | Performed by: NURSE PRACTITIONER

## 2023-04-13 PROCEDURE — 36415 COLL VENOUS BLD VENIPUNCTURE: CPT

## 2023-04-13 PROCEDURE — 6370000000 HC RX 637 (ALT 250 FOR IP): Performed by: FAMILY MEDICINE

## 2023-04-13 PROCEDURE — 83880 ASSAY OF NATRIURETIC PEPTIDE: CPT

## 2023-04-13 PROCEDURE — 94640 AIRWAY INHALATION TREATMENT: CPT

## 2023-04-13 PROCEDURE — 2060000000 HC ICU INTERMEDIATE R&B

## 2023-04-13 RX ORDER — PROCHLORPERAZINE EDISYLATE 5 MG/ML
10 INJECTION INTRAMUSCULAR; INTRAVENOUS EVERY 6 HOURS PRN
Status: DISCONTINUED | OUTPATIENT
Start: 2023-04-13 | End: 2023-04-15 | Stop reason: HOSPADM

## 2023-04-13 RX ADMIN — METOPROLOL SUCCINATE 12.5 MG: 25 TABLET, EXTENDED RELEASE ORAL at 07:44

## 2023-04-13 RX ADMIN — HYDROCHLOROTHIAZIDE 12.5 MG: 12.5 TABLET ORAL at 07:45

## 2023-04-13 RX ADMIN — IPRATROPIUM BROMIDE AND ALBUTEROL SULFATE 1 AMPULE: .5; 2.5 SOLUTION RESPIRATORY (INHALATION) at 15:48

## 2023-04-13 RX ADMIN — Medication 10 ML: at 07:44

## 2023-04-13 RX ADMIN — IPRATROPIUM BROMIDE AND ALBUTEROL SULFATE 1 AMPULE: .5; 2.5 SOLUTION RESPIRATORY (INHALATION) at 09:34

## 2023-04-13 RX ADMIN — ENOXAPARIN SODIUM 40 MG: 100 INJECTION SUBCUTANEOUS at 07:44

## 2023-04-13 RX ADMIN — PANTOPRAZOLE SODIUM 40 MG: 40 TABLET, DELAYED RELEASE ORAL at 06:27

## 2023-04-13 RX ADMIN — IPRATROPIUM BROMIDE AND ALBUTEROL SULFATE 1 AMPULE: .5; 2.5 SOLUTION RESPIRATORY (INHALATION) at 20:15

## 2023-04-13 RX ADMIN — CIPROFLOXACIN 500 MG: 500 TABLET, FILM COATED ORAL at 07:44

## 2023-04-13 RX ADMIN — Medication 10 ML: at 20:47

## 2023-04-13 RX ADMIN — SERTRALINE 100 MG: 100 TABLET, FILM COATED ORAL at 20:46

## 2023-04-13 RX ADMIN — ASPIRIN 81 MG CHEWABLE TABLET 81 MG: 81 TABLET CHEWABLE at 07:44

## 2023-04-13 RX ADMIN — LOSARTAN POTASSIUM 100 MG: 50 TABLET, FILM COATED ORAL at 07:44

## 2023-04-13 RX ADMIN — ATORVASTATIN CALCIUM 20 MG: 20 TABLET, FILM COATED ORAL at 20:46

## 2023-04-13 RX ADMIN — GUAIFENESIN 400 MG: 400 TABLET ORAL at 20:46

## 2023-04-13 RX ADMIN — PREDNISONE 40 MG: 20 TABLET ORAL at 07:45

## 2023-04-13 RX ADMIN — CALCIUM CARBONATE 500 MG: 500 TABLET, CHEWABLE ORAL at 07:44

## 2023-04-13 RX ADMIN — CIPROFLOXACIN 500 MG: 500 TABLET, FILM COATED ORAL at 20:46

## 2023-04-13 RX ADMIN — GUAIFENESIN 400 MG: 400 TABLET ORAL at 07:44

## 2023-04-13 RX ADMIN — IPRATROPIUM BROMIDE AND ALBUTEROL SULFATE 1 AMPULE: .5; 2.5 SOLUTION RESPIRATORY (INHALATION) at 13:36

## 2023-04-13 RX ADMIN — GUAIFENESIN 400 MG: 400 TABLET ORAL at 13:44

## 2023-04-13 RX ADMIN — ZOLPIDEM TARTRATE 5 MG: 5 TABLET ORAL at 20:46

## 2023-04-13 RX ADMIN — Medication 1 CAPSULE: at 07:45

## 2023-04-13 ASSESSMENT — PAIN SCALES - GENERAL
PAINLEVEL_OUTOF10: 0

## 2023-04-13 NOTE — CARE COORDINATION
O2 increased to 5l NC after working w/ PT- wears home O2 4l NC provided by [employer]- has home chest vest and nebulizer. Continues on po abx- ID following. PT/OT evals pending. Plan remains to return home w/ her  on discharge.  Will follow Latanya Snell RN case manager

## 2023-04-14 PROBLEM — R19.7 DIARRHEA: Status: ACTIVE | Noted: 2023-04-14

## 2023-04-14 PROBLEM — I50.30 HEART FAILURE WITH PRESERVED EJECTION FRACTION (HCC): Status: ACTIVE | Noted: 2023-04-14

## 2023-04-14 LAB
ALBUMIN SERPL-MCNC: 3.9 G/DL (ref 3.5–5.2)
ALP SERPL-CCNC: 59 U/L (ref 35–104)
ALT SERPL-CCNC: 13 U/L (ref 0–32)
ANION GAP SERPL CALCULATED.3IONS-SCNC: 8 MMOL/L (ref 7–16)
ANISOCYTOSIS: ABNORMAL
AST SERPL-CCNC: 11 U/L (ref 0–31)
BASOPHILS # BLD: 0 E9/L (ref 0–0.2)
BASOPHILS NFR BLD: 0 % (ref 0–2)
BILIRUB SERPL-MCNC: <0.2 MG/DL (ref 0–1.2)
BNP BLD-MCNC: 2117 PG/ML (ref 0–450)
BUN SERPL-MCNC: 15 MG/DL (ref 6–23)
CALCIUM SERPL-MCNC: 8.7 MG/DL (ref 8.6–10.2)
CHLORIDE SERPL-SCNC: 98 MMOL/L (ref 98–107)
CO2 SERPL-SCNC: 36 MMOL/L (ref 22–29)
CREAT SERPL-MCNC: 0.7 MG/DL (ref 0.5–1)
EKG ATRIAL RATE: 67 BPM
EKG P AXIS: 54 DEGREES
EKG P-R INTERVAL: 130 MS
EKG Q-T INTERVAL: 446 MS
EKG QRS DURATION: 80 MS
EKG QTC CALCULATION (BAZETT): 471 MS
EKG R AXIS: -42 DEGREES
EKG T AXIS: -14 DEGREES
EKG VENTRICULAR RATE: 67 BPM
EOSINOPHIL # BLD: 0 E9/L (ref 0.05–0.5)
EOSINOPHIL NFR BLD: 0 % (ref 0–6)
ERYTHROCYTE [DISTWIDTH] IN BLOOD BY AUTOMATED COUNT: 16.1 FL (ref 11.5–15)
GLUCOSE SERPL-MCNC: 103 MG/DL (ref 74–99)
HCT VFR BLD AUTO: 32.5 % (ref 34–48)
HGB BLD-MCNC: 9.4 G/DL (ref 11.5–15.5)
HYPOCHROMIA: ABNORMAL
LYMPHOCYTES # BLD: 1.14 E9/L (ref 1.5–4)
LYMPHOCYTES NFR BLD: 17.1 % (ref 20–42)
MCH RBC QN AUTO: 23.2 PG (ref 26–35)
MCHC RBC AUTO-ENTMCNC: 28.9 % (ref 32–34.5)
MCV RBC AUTO: 80.2 FL (ref 80–99.9)
MONOCYTES # BLD: 0.54 E9/L (ref 0.1–0.95)
MONOCYTES NFR BLD: 7.7 % (ref 2–12)
NEUTROPHILS # BLD: 5.03 E9/L (ref 1.8–7.3)
NEUTS SEG NFR BLD: 75.2 % (ref 43–80)
NRBC BLD-RTO: 0 /100 WBC
OVALOCYTES: ABNORMAL
PLATELET # BLD AUTO: 214 E9/L (ref 130–450)
PMV BLD AUTO: 9.8 FL (ref 7–12)
POIKILOCYTES: ABNORMAL
POLYCHROMASIA: ABNORMAL
POTASSIUM SERPL-SCNC: 3.6 MMOL/L (ref 3.5–5)
PROT SERPL-MCNC: 6.7 G/DL (ref 6.4–8.3)
RBC # BLD AUTO: 4.05 E12/L (ref 3.5–5.5)
SODIUM SERPL-SCNC: 142 MMOL/L (ref 132–146)
WBC # BLD: 6.7 E9/L (ref 4.5–11.5)

## 2023-04-14 PROCEDURE — 36415 COLL VENOUS BLD VENIPUNCTURE: CPT

## 2023-04-14 PROCEDURE — 85025 COMPLETE CBC W/AUTO DIFF WBC: CPT

## 2023-04-14 PROCEDURE — 94640 AIRWAY INHALATION TREATMENT: CPT

## 2023-04-14 PROCEDURE — 6370000000 HC RX 637 (ALT 250 FOR IP): Performed by: FAMILY MEDICINE

## 2023-04-14 PROCEDURE — 2700000000 HC OXYGEN THERAPY PER DAY

## 2023-04-14 PROCEDURE — 6370000000 HC RX 637 (ALT 250 FOR IP): Performed by: SPECIALIST

## 2023-04-14 PROCEDURE — 6370000000 HC RX 637 (ALT 250 FOR IP): Performed by: INTERNAL MEDICINE

## 2023-04-14 PROCEDURE — 97161 PT EVAL LOW COMPLEX 20 MIN: CPT

## 2023-04-14 PROCEDURE — 99231 SBSQ HOSP IP/OBS SF/LOW 25: CPT | Performed by: NURSE PRACTITIONER

## 2023-04-14 PROCEDURE — 2060000000 HC ICU INTERMEDIATE R&B

## 2023-04-14 PROCEDURE — 80053 COMPREHEN METABOLIC PANEL: CPT

## 2023-04-14 PROCEDURE — 83880 ASSAY OF NATRIURETIC PEPTIDE: CPT

## 2023-04-14 PROCEDURE — 6370000000 HC RX 637 (ALT 250 FOR IP): Performed by: NURSE PRACTITIONER

## 2023-04-14 PROCEDURE — 94669 MECHANICAL CHEST WALL OSCILL: CPT

## 2023-04-14 PROCEDURE — 2580000003 HC RX 258: Performed by: FAMILY MEDICINE

## 2023-04-14 PROCEDURE — 6360000002 HC RX W HCPCS: Performed by: FAMILY MEDICINE

## 2023-04-14 RX ORDER — PREDNISONE 10 MG/1
TABLET ORAL
Qty: 12 TABLET | Refills: 0 | Status: SHIPPED | OUTPATIENT
Start: 2023-04-14

## 2023-04-14 RX ADMIN — ATORVASTATIN CALCIUM 20 MG: 20 TABLET, FILM COATED ORAL at 21:21

## 2023-04-14 RX ADMIN — Medication 10 ML: at 08:36

## 2023-04-14 RX ADMIN — GUAIFENESIN 400 MG: 400 TABLET ORAL at 08:35

## 2023-04-14 RX ADMIN — CIPROFLOXACIN 500 MG: 500 TABLET, FILM COATED ORAL at 08:35

## 2023-04-14 RX ADMIN — HYDROCHLOROTHIAZIDE 12.5 MG: 12.5 TABLET ORAL at 08:33

## 2023-04-14 RX ADMIN — CIPROFLOXACIN 500 MG: 500 TABLET, FILM COATED ORAL at 21:21

## 2023-04-14 RX ADMIN — ASPIRIN 81 MG CHEWABLE TABLET 81 MG: 81 TABLET CHEWABLE at 08:34

## 2023-04-14 RX ADMIN — GUAIFENESIN 400 MG: 400 TABLET ORAL at 21:21

## 2023-04-14 RX ADMIN — IPRATROPIUM BROMIDE AND ALBUTEROL SULFATE 1 AMPULE: .5; 2.5 SOLUTION RESPIRATORY (INHALATION) at 12:30

## 2023-04-14 RX ADMIN — METOPROLOL SUCCINATE 12.5 MG: 25 TABLET, EXTENDED RELEASE ORAL at 08:34

## 2023-04-14 RX ADMIN — PANTOPRAZOLE SODIUM 40 MG: 40 TABLET, DELAYED RELEASE ORAL at 05:28

## 2023-04-14 RX ADMIN — ENOXAPARIN SODIUM 40 MG: 100 INJECTION SUBCUTANEOUS at 08:36

## 2023-04-14 RX ADMIN — PREDNISONE 40 MG: 20 TABLET ORAL at 08:37

## 2023-04-14 RX ADMIN — Medication 1 CAPSULE: at 08:35

## 2023-04-14 RX ADMIN — Medication 10 ML: at 21:21

## 2023-04-14 RX ADMIN — LOSARTAN POTASSIUM 100 MG: 50 TABLET, FILM COATED ORAL at 08:34

## 2023-04-14 RX ADMIN — AMLODIPINE BESYLATE 5 MG: 5 TABLET ORAL at 08:35

## 2023-04-14 RX ADMIN — IPRATROPIUM BROMIDE AND ALBUTEROL SULFATE 1 AMPULE: .5; 2.5 SOLUTION RESPIRATORY (INHALATION) at 20:00

## 2023-04-14 RX ADMIN — CALCIUM CARBONATE 500 MG: 500 TABLET, CHEWABLE ORAL at 08:36

## 2023-04-14 RX ADMIN — SERTRALINE 100 MG: 100 TABLET, FILM COATED ORAL at 21:21

## 2023-04-14 RX ADMIN — IPRATROPIUM BROMIDE AND ALBUTEROL SULFATE 1 AMPULE: .5; 2.5 SOLUTION RESPIRATORY (INHALATION) at 15:56

## 2023-04-14 RX ADMIN — IPRATROPIUM BROMIDE AND ALBUTEROL SULFATE 1 AMPULE: .5; 2.5 SOLUTION RESPIRATORY (INHALATION) at 09:39

## 2023-04-14 RX ADMIN — GUAIFENESIN 400 MG: 400 TABLET ORAL at 15:35

## 2023-04-14 NOTE — CARE COORDINATION
Requiring O2 4lNC which is pt's home O2 baseline provided by [facility]- has home chest vest and nebulizer. Continues on po abx- ID awaiting final clx results. Plan remains to return home w/ her  on discharge. No needs.  Will follow Priti Galloway RN case manager

## 2023-04-14 NOTE — PLAN OF CARE
Problem: Discharge Planning  Goal: Discharge to home or other facility with appropriate resources  Outcome: Progressing     Problem: Safety - Adult  Goal: Free from fall injury  4/13/2023 2248 by Martha Cr RN  Outcome: Progressing  4/13/2023 1310 by Corinne Royals, RN  Outcome: Progressing     Problem: Pain  Goal: Verbalizes/displays adequate comfort level or baseline comfort level  4/13/2023 2248 by Martha Cr RN  Outcome: Progressing  4/13/2023 1310 by Corinne Royals, RN  Outcome: Progressing

## 2023-04-15 VITALS
HEART RATE: 68 BPM | WEIGHT: 190 LBS | DIASTOLIC BLOOD PRESSURE: 62 MMHG | OXYGEN SATURATION: 93 % | RESPIRATION RATE: 18 BRPM | TEMPERATURE: 97.6 F | BODY MASS INDEX: 31.65 KG/M2 | SYSTOLIC BLOOD PRESSURE: 120 MMHG | HEIGHT: 65 IN

## 2023-04-15 LAB
ALBUMIN SERPL-MCNC: 3.7 G/DL (ref 3.5–5.2)
ALP SERPL-CCNC: 71 U/L (ref 35–104)
ALT SERPL-CCNC: 6 U/L (ref 0–32)
ANION GAP SERPL CALCULATED.3IONS-SCNC: 11 MMOL/L (ref 7–16)
AST SERPL-CCNC: 10 U/L (ref 0–31)
BACTERIA SPEC RESP CULT: ABNORMAL
BACTERIA SPEC RESP CULT: ABNORMAL
BASOPHILS # BLD: 0.05 E9/L (ref 0–0.2)
BASOPHILS NFR BLD: 0.8 % (ref 0–2)
BILIRUB SERPL-MCNC: <0.2 MG/DL (ref 0–1.2)
BNP BLD-MCNC: 1105 PG/ML (ref 0–450)
BUN SERPL-MCNC: 23 MG/DL (ref 6–23)
CALCIUM SERPL-MCNC: 9.1 MG/DL (ref 8.6–10.2)
CHLORIDE SERPL-SCNC: 100 MMOL/L (ref 98–107)
CHOLESTEROL, FASTING: 109 MG/DL (ref 0–199)
CO2 SERPL-SCNC: 33 MMOL/L (ref 22–29)
CREAT SERPL-MCNC: 0.8 MG/DL (ref 0.5–1)
EOSINOPHIL # BLD: 0.07 E9/L (ref 0.05–0.5)
EOSINOPHIL NFR BLD: 1.1 % (ref 0–6)
ERYTHROCYTE [DISTWIDTH] IN BLOOD BY AUTOMATED COUNT: 16.1 FL (ref 11.5–15)
GLUCOSE SERPL-MCNC: 109 MG/DL (ref 74–99)
HCT VFR BLD AUTO: 32.5 % (ref 34–48)
HDLC SERPL-MCNC: 55 MG/DL
HGB BLD-MCNC: 9.5 G/DL (ref 11.5–15.5)
IMM GRANULOCYTES # BLD: 0.03 E9/L
IMM GRANULOCYTES NFR BLD: 0.5 % (ref 0–5)
LDL CHOLESTEROL CALCULATED: 23 MG/DL (ref 0–99)
LYMPHOCYTES # BLD: 1.71 E9/L (ref 1.5–4)
LYMPHOCYTES NFR BLD: 26.1 % (ref 20–42)
MCH RBC QN AUTO: 23.1 PG (ref 26–35)
MCHC RBC AUTO-ENTMCNC: 29.2 % (ref 32–34.5)
MCV RBC AUTO: 79.1 FL (ref 80–99.9)
MONOCYTES # BLD: 0.49 E9/L (ref 0.1–0.95)
MONOCYTES NFR BLD: 7.5 % (ref 2–12)
NEUTROPHILS # BLD: 4.21 E9/L (ref 1.8–7.3)
NEUTS SEG NFR BLD: 64 % (ref 43–80)
ORGANISM: ABNORMAL
PLATELET # BLD AUTO: 208 E9/L (ref 130–450)
PMV BLD AUTO: 9.6 FL (ref 7–12)
POTASSIUM SERPL-SCNC: 3.7 MMOL/L (ref 3.5–5)
PROT SERPL-MCNC: 6.4 G/DL (ref 6.4–8.3)
RBC # BLD AUTO: 4.11 E12/L (ref 3.5–5.5)
SMEAR, RESPIRATORY: ABNORMAL
SODIUM SERPL-SCNC: 144 MMOL/L (ref 132–146)
TRIGLYCERIDE, FASTING: 157 MG/DL (ref 0–149)
VLDLC SERPL CALC-MCNC: 31 MG/DL
WBC # BLD: 6.6 E9/L (ref 4.5–11.5)

## 2023-04-15 PROCEDURE — 6370000000 HC RX 637 (ALT 250 FOR IP): Performed by: FAMILY MEDICINE

## 2023-04-15 PROCEDURE — 2700000000 HC OXYGEN THERAPY PER DAY

## 2023-04-15 PROCEDURE — 85025 COMPLETE CBC W/AUTO DIFF WBC: CPT

## 2023-04-15 PROCEDURE — 99239 HOSP IP/OBS DSCHRG MGMT >30: CPT | Performed by: NURSE PRACTITIONER

## 2023-04-15 PROCEDURE — 83880 ASSAY OF NATRIURETIC PEPTIDE: CPT

## 2023-04-15 PROCEDURE — 36415 COLL VENOUS BLD VENIPUNCTURE: CPT

## 2023-04-15 PROCEDURE — 80061 LIPID PANEL: CPT

## 2023-04-15 PROCEDURE — 80053 COMPREHEN METABOLIC PANEL: CPT

## 2023-04-15 PROCEDURE — 6370000000 HC RX 637 (ALT 250 FOR IP): Performed by: INTERNAL MEDICINE

## 2023-04-15 PROCEDURE — 6370000000 HC RX 637 (ALT 250 FOR IP): Performed by: NURSE PRACTITIONER

## 2023-04-15 PROCEDURE — 6370000000 HC RX 637 (ALT 250 FOR IP): Performed by: SPECIALIST

## 2023-04-15 PROCEDURE — 94668 MNPJ CHEST WALL SBSQ: CPT

## 2023-04-15 PROCEDURE — 6360000002 HC RX W HCPCS: Performed by: FAMILY MEDICINE

## 2023-04-15 PROCEDURE — 2580000003 HC RX 258: Performed by: FAMILY MEDICINE

## 2023-04-15 PROCEDURE — 94640 AIRWAY INHALATION TREATMENT: CPT

## 2023-04-15 RX ORDER — LACTOBACILLUS RHAMNOSUS GG 10B CELL
1 CAPSULE ORAL DAILY
Qty: 30 CAPSULE | Refills: 0 | Status: SHIPPED | OUTPATIENT
Start: 2023-04-15

## 2023-04-15 RX ORDER — GUAIFENESIN 400 MG/1
400 TABLET ORAL 3 TIMES DAILY
Qty: 21 TABLET | Refills: 0 | Status: SHIPPED | OUTPATIENT
Start: 2023-04-15 | End: 2023-04-22

## 2023-04-15 RX ORDER — CIPROFLOXACIN 500 MG/1
500 TABLET, FILM COATED ORAL EVERY 12 HOURS SCHEDULED
Qty: 14 TABLET | Refills: 0 | Status: SHIPPED | OUTPATIENT
Start: 2023-04-15 | End: 2023-04-22

## 2023-04-15 RX ADMIN — IPRATROPIUM BROMIDE AND ALBUTEROL SULFATE 1 AMPULE: .5; 2.5 SOLUTION RESPIRATORY (INHALATION) at 12:40

## 2023-04-15 RX ADMIN — CALCIUM CARBONATE 500 MG: 500 TABLET, CHEWABLE ORAL at 10:19

## 2023-04-15 RX ADMIN — Medication 1 CAPSULE: at 10:19

## 2023-04-15 RX ADMIN — PANTOPRAZOLE SODIUM 40 MG: 40 TABLET, DELAYED RELEASE ORAL at 06:59

## 2023-04-15 RX ADMIN — IPRATROPIUM BROMIDE AND ALBUTEROL SULFATE 1 AMPULE: .5; 2.5 SOLUTION RESPIRATORY (INHALATION) at 09:47

## 2023-04-15 RX ADMIN — AMLODIPINE BESYLATE 5 MG: 5 TABLET ORAL at 10:19

## 2023-04-15 RX ADMIN — METOPROLOL SUCCINATE 12.5 MG: 25 TABLET, EXTENDED RELEASE ORAL at 10:31

## 2023-04-15 RX ADMIN — GUAIFENESIN 400 MG: 400 TABLET ORAL at 10:18

## 2023-04-15 RX ADMIN — LOSARTAN POTASSIUM 100 MG: 50 TABLET, FILM COATED ORAL at 10:19

## 2023-04-15 RX ADMIN — ENOXAPARIN SODIUM 40 MG: 100 INJECTION SUBCUTANEOUS at 10:19

## 2023-04-15 RX ADMIN — PREDNISONE 30 MG: 5 TABLET ORAL at 10:25

## 2023-04-15 RX ADMIN — Medication 10 ML: at 10:26

## 2023-04-15 RX ADMIN — CIPROFLOXACIN 500 MG: 500 TABLET, FILM COATED ORAL at 10:19

## 2023-04-15 RX ADMIN — HYDROCHLOROTHIAZIDE 12.5 MG: 12.5 TABLET ORAL at 10:19

## 2023-04-15 RX ADMIN — ASPIRIN 81 MG CHEWABLE TABLET 81 MG: 81 TABLET CHEWABLE at 10:18

## 2023-04-15 ASSESSMENT — PAIN SCALES - GENERAL
PAINLEVEL_OUTOF10: 0

## 2023-04-15 NOTE — PROGRESS NOTES
[address] [place] [address] [facility]  Progress Note    SUBJECTIVE:  Chief Complaint   Patient presents with    Shortness of Breath     50% on normal 4L at home per dtr, arrived per EMS on cpap, alert and oriented      The patient says she is feeling a lot better  She would like to go home  Tolerating antibiotics  No fevers    Review of systems:  As stated above in the chief complaint, otherwise negative. Medications:  Scheduled Meds:   predniSONE  30 mg Oral Daily    guaiFENesin  400 mg Oral TID    ciprofloxacin  500 mg Oral 2 times per day    sodium chloride flush  5-40 mL IntraVENous 2 times per day    enoxaparin  40 mg SubCUTAneous Daily    ipratropium-albuterol  1 ampule Inhalation Q4H WA    pantoprazole  40 mg Oral QAM AC    lactobacillus  1 capsule Oral Daily    amLODIPine  5 mg Oral Daily    aspirin  81 mg Oral QAM    atorvastatin  20 mg Oral Nightly    calcium carbonate  1 tablet Oral QAM    metoprolol succinate  12.5 mg Oral Daily    sertraline  100 mg Oral Nightly    losartan  100 mg Oral QAM    And    hydroCHLOROthiazide  12.5 mg Oral QAM     Continuous Infusions:   sodium chloride       PRN Meds:prochlorperazine, zolpidem, sodium chloride flush, sodium chloride, polyethylene glycol, acetaminophen **OR** acetaminophen, melatonin, diazePAM    OBJECTIVE:  BP (!) 119/58   Pulse 73   Temp 97.6 °F (36.4 °C) (Temporal)   Resp 18   Ht 5' 5\" (1.651 m)   Wt 190 lb (86.2 kg)   SpO2 92%   BMI 31.62 kg/m²   Temp  Av.6 °F (36.4 °C)  Min: 97.1 °F (36.2 °C)  Max: 98.2 °F (36.8 °C)  Constitutional: The patient is awake, alert, and oriented. On 4LNC  Skin: Warm and dry. No rashes were noted. HEENT: Round and reactive pupils. Moist mucous membranes. No ulcerations or thrush. Neck: Supple to movements. Chest: No respiratory distress. Some crackles at the bases. Cardiovascular: S1 and S2 are rhythmic and regular. No murmurs appreciated. Abdomen: Positive bowel sounds to auscultation.
[address] [place] [address] [facility]  Progress Note    SUBJECTIVE:  Chief Complaint   Patient presents with    Shortness of Breath     50% on normal 4L at home per dtr, arrived per EMS on cpap, alert and oriented      The patient is feeling better. She is tolerating Ciprofloxacin. She still has a productive sputum. Review of systems:  As stated above in the chief complaint, otherwise negative. Medications:  Scheduled Meds:   guaiFENesin  400 mg Oral TID    ciprofloxacin  500 mg Oral 2 times per day    sodium chloride flush  5-40 mL IntraVENous 2 times per day    enoxaparin  40 mg SubCUTAneous Daily    ipratropium-albuterol  1 ampule Inhalation Q4H WA    pantoprazole  40 mg Oral QAM AC    lactobacillus  1 capsule Oral Daily    predniSONE  40 mg Oral Daily    amLODIPine  5 mg Oral Daily    aspirin  81 mg Oral QAM    atorvastatin  20 mg Oral Nightly    calcium carbonate  1 tablet Oral QAM    metoprolol succinate  12.5 mg Oral Daily    sertraline  100 mg Oral Nightly    losartan  100 mg Oral QAM    And    hydroCHLOROthiazide  12.5 mg Oral QAM     Continuous Infusions:   sodium chloride       PRN Meds:prochlorperazine, zolpidem, sodium chloride flush, sodium chloride, polyethylene glycol, acetaminophen **OR** acetaminophen, melatonin, diazePAM    OBJECTIVE:  /60   Pulse 66   Temp 98.1 °F (36.7 °C) (Oral)   Resp 18   Ht 5' 5\" (1.651 m)   Wt 190 lb 6.4 oz (86.4 kg)   SpO2 97%   BMI 31.68 kg/m²   Temp  Av.8 °F (36.6 °C)  Min: 97.5 °F (36.4 °C)  Max: 98.2 °F (36.8 °C)  Constitutional: The patient is awake, alert, and oriented. Skin: Warm and dry. No rashes were noted. HEENT: Round and reactive pupils. Moist mucous membranes. No ulcerations or thrush. Neck: Supple to movements. Chest: No respiratory distress. Scattered crackles and rhonchi on bases. Cardiovascular: S1 and S2 are rhythmic and regular. No murmurs appreciated. Abdomen: Positive bowel sounds to auscultation.
[address] [place] [address] [facility]  Progress Note    SUBJECTIVE:  Chief Complaint   Patient presents with    Shortness of Breath     50% on normal 4L at home per dtr, arrived per EMS on cpap, alert and oriented      Patient is tolerating medications. No reported adverse drug reactions. No nausea, vomiting, diarrhea. Feeling better  No sob. Productive cough  Review of systems:  As stated above in the chief complaint, otherwise negative. Medications:  Scheduled Meds:   guaiFENesin  400 mg Oral TID    ciprofloxacin  500 mg Oral 2 times per day    sodium chloride flush  5-40 mL IntraVENous 2 times per day    enoxaparin  40 mg SubCUTAneous Daily    ipratropium-albuterol  1 ampule Inhalation Q4H WA    pantoprazole  40 mg Oral QAM AC    lactobacillus  1 capsule Oral Daily    predniSONE  40 mg Oral Daily    amLODIPine  5 mg Oral Daily    aspirin  81 mg Oral QAM    atorvastatin  20 mg Oral Nightly    calcium carbonate  1 tablet Oral QAM    metoprolol succinate  12.5 mg Oral Daily    sertraline  100 mg Oral Nightly    losartan  100 mg Oral QAM    And    hydroCHLOROthiazide  12.5 mg Oral QAM     Continuous Infusions:   sodium chloride       PRN Meds:prochlorperazine, zolpidem, sodium chloride flush, sodium chloride, polyethylene glycol, acetaminophen **OR** acetaminophen, melatonin, diazePAM    OBJECTIVE:  BP (!) 143/76   Pulse 76   Temp 97.5 °F (36.4 °C) (Temporal)   Resp 20   Ht 5' 5\" (1.651 m)   Wt 191 lb 4.8 oz (86.8 kg)   SpO2 92%   BMI 31.83 kg/m²   Temp  Av.7 °F (36.5 °C)  Min: 97 °F (36.1 °C)  Max: 98.1 °F (36.7 °C)  Constitutional: The patient is awake, alert, and oriented. Skin: Warm and dry. No rashes were noted. HEENT: Round and reactive pupils. Moist mucous membranes. No ulcerations or thrush. Neck: Supple to movements. Chest: No use of accessory muscles to breathe. Symmetrical expansion. + wheezing and crackles   Cardiovascular: S1 and S2 are rhythmic and regular.  No
[facility] Progress Note    Admitting Date and Time: 4/11/2023 11:05 AM  Admit Dx: COPD exacerbation ([facility] [J44.1]    Subjective:  Patient is being followed for COPD exacerbation ([facility] [J44.1]   Seen laying in bed doing flutter valve. She is alert and in no apparent distress. Respirations are unlabored. Patient has no complaints of shortness of breath at rest but does report some with exertion. She is currently on 7 L nasal cannula. Still having complaints of a moist productive cough. No acute issues overnight.     ROS: denies fever, chills, cp, sob, n/v, HA unless stated above.      sodium chloride flush  5-40 mL IntraVENous 2 times per day    enoxaparin  40 mg SubCUTAneous Daily    ipratropium-albuterol  1 ampule Inhalation Q4H WA    pantoprazole  40 mg Oral QAM AC    lactobacillus  1 capsule Oral Daily    predniSONE  40 mg Oral Daily    levoFLOXacin  750 mg Oral Q48H    [Held by provider] amLODIPine  5 mg Oral Daily    aspirin  81 mg Oral QAM    atorvastatin  20 mg Oral Nightly    calcium carbonate  1 tablet Oral QAM    metoprolol succinate  12.5 mg Oral Daily    sertraline  100 mg Oral Nightly    losartan  100 mg Oral QAM    And    hydroCHLOROthiazide  12.5 mg Oral QAM     zolpidem, 5 mg, Nightly PRN  sodium chloride flush, 5-40 mL, PRN  sodium chloride, , PRN  ondansetron, 4 mg, Q8H PRN   Or  ondansetron, 4 mg, Q6H PRN  polyethylene glycol, 17 g, Daily PRN  acetaminophen, 650 mg, Q6H PRN   Or  acetaminophen, 650 mg, Q6H PRN  melatonin, 3 mg, Nightly PRN  diazePAM, 10 mg, Nightly PRN         Objective:    /72   Pulse 66   Temp 98.1 °F (36.7 °C) (Temporal)   Resp 20   Ht 5' 5\" (1.651 m)   Wt 192 lb 6 oz (87.3 kg)   SpO2 95%   BMI 32.01 kg/m²     General Appearance: alert and oriented to person, place and time and in no acute distress  Skin: warm and dry  Head: normocephalic and atraumatic  Eyes: pupils equal, round, and reactive to light, extraocular eye movements intact,
[facility] Loc[facility] within [facility]  [address] [facility], [address]  [place], 17 [facility]      Pulmonary Progress Note      SUBJECTIVE:  claims feeling slightly better with breathing, on RA and ambulating in room and doing ok with that. Has been getting vest therapy here and feels helping with expectoration.     OBJECTIVE    Medications    Continuous Infusions:   sodium chloride         Scheduled Meds:   guaiFENesin  400 mg Oral TID    ciprofloxacin  500 mg Oral 2 times per day    sodium chloride flush  5-40 mL IntraVENous 2 times per day    enoxaparin  40 mg SubCUTAneous Daily    ipratropium-albuterol  1 ampule Inhalation Q4H WA    pantoprazole  40 mg Oral QAM AC    lactobacillus  1 capsule Oral Daily    predniSONE  40 mg Oral Daily    amLODIPine  5 mg Oral Daily    aspirin  81 mg Oral QAM    atorvastatin  20 mg Oral Nightly    calcium carbonate  1 tablet Oral QAM    metoprolol succinate  12.5 mg Oral Daily    sertraline  100 mg Oral Nightly    losartan  100 mg Oral QAM    And    hydroCHLOROthiazide  12.5 mg Oral QAM       PRN Meds:prochlorperazine, zolpidem, sodium chloride flush, sodium chloride, polyethylene glycol, acetaminophen **OR** acetaminophen, melatonin, diazePAM    Physical    VITALS:  BP (!) 99/59   Pulse 79   Temp 98.1 °F (36.7 °C) (Oral)   Resp 18   Ht 5' 5\" (1.651 m)   Wt 190 lb 6.4 oz (86.4 kg)   SpO2 97%   BMI 31.68 kg/m²     24HR INTAKE/OUTPUT:      Intake/Output Summary (Last 24 hours) at 2023 0944  Last data filed at 2023 1350  Gross per 24 hour   Intake 180 ml   Output 500 ml   Net -320 ml         24HR PULSE OXIMETRY RANGE:    SpO2  Av.8 %  Min: 92 %  Max: 98 %    General appearance: alert, appears stated age, and cooperative  Lungs: rhonchi bilaterally worse with cough  Heart: regular rate and rhythm, S1, S2 normal, no murmur, click, rub or gallop  Abdomen: soft, non-tender; bowel sounds normal; no masses,  no
[facility]  [address] [facility], [address]  [facility], 17 [facility]      Pulmonary Progress Note      SUBJECTIVE:  claims feeling slightly better with breathing, coughing and sputum production she feels is less. Has been getting vest therapy here and feels helping with expectoration.     OBJECTIVE    Medications    Continuous Infusions:   sodium chloride         Scheduled Meds:   guaiFENesin  400 mg Oral TID    ciprofloxacin  500 mg Oral 2 times per day    sodium chloride flush  5-40 mL IntraVENous 2 times per day    enoxaparin  40 mg SubCUTAneous Daily    ipratropium-albuterol  1 ampule Inhalation Q4H WA    pantoprazole  40 mg Oral QAM AC    lactobacillus  1 capsule Oral Daily    predniSONE  40 mg Oral Daily    [Held by provider] amLODIPine  5 mg Oral Daily    aspirin  81 mg Oral QAM    atorvastatin  20 mg Oral Nightly    calcium carbonate  1 tablet Oral QAM    metoprolol succinate  12.5 mg Oral Daily    sertraline  100 mg Oral Nightly    losartan  100 mg Oral QAM    And    hydroCHLOROthiazide  12.5 mg Oral QAM       PRN Meds:prochlorperazine, zolpidem, sodium chloride flush, sodium chloride, polyethylene glycol, acetaminophen **OR** acetaminophen, melatonin, diazePAM    Physical    VITALS:  BP (!) 143/79   Pulse 76   Temp 97.8 °F (36.6 °C) (Temporal)   Resp 20   Ht 5' 5\" (1.651 m)   Wt 191 lb 4.8 oz (86.8 kg)   SpO2 95%   BMI 31.83 kg/m²     24HR INTAKE/OUTPUT:      Intake/Output Summary (Last 24 hours) at 2023 0803  Last data filed at 2023 0459  Gross per 24 hour   Intake 320 ml   Output 400 ml   Net -80 ml       24HR PULSE OXIMETRY RANGE:    SpO2  Av.4 %  Min: 90 %  Max: 95 %    General appearance: alert, appears stated age, and cooperative  Lungs: rhonchi bilaterally worse with cough  Heart: regular rate and rhythm, S1, S2 normal, no murmur, click, rub or gallop  Abdomen: soft, non-tender; bowel sounds normal; no masses,  no
[facility]  [address] [facility] [address], [address]  [facility], 17 [facility]      Pulmonary Progress Note      SUBJECTIVE:  claims feeling slightly better with breathing, on RA and ambulating in room and doing ok with that. Has been getting vest therapy here and feels helping with expectoration, still with fair amount of sputum production but feels color is lighter.     OBJECTIVE    Medications    Continuous Infusions:   sodium chloride         Scheduled Meds:   predniSONE  30 mg Oral Daily    guaiFENesin  400 mg Oral TID    ciprofloxacin  500 mg Oral 2 times per day    sodium chloride flush  5-40 mL IntraVENous 2 times per day    enoxaparin  40 mg SubCUTAneous Daily    ipratropium-albuterol  1 ampule Inhalation Q4H WA    pantoprazole  40 mg Oral QAM AC    lactobacillus  1 capsule Oral Daily    amLODIPine  5 mg Oral Daily    aspirin  81 mg Oral QAM    atorvastatin  20 mg Oral Nightly    calcium carbonate  1 tablet Oral QAM    metoprolol succinate  12.5 mg Oral Daily    sertraline  100 mg Oral Nightly    losartan  100 mg Oral QAM    And    hydroCHLOROthiazide  12.5 mg Oral QAM       PRN Meds:prochlorperazine, zolpidem, sodium chloride flush, sodium chloride, polyethylene glycol, acetaminophen **OR** acetaminophen, melatonin, diazePAM    Physical    VITALS:  BP (!) 119/58   Pulse 73   Temp 97.6 °F (36.4 °C) (Temporal)   Resp 18   Ht 5' 5\" (1.651 m)   Wt 190 lb (86.2 kg)   SpO2 92%   BMI 31.62 kg/m²     24HR INTAKE/OUTPUT:      Intake/Output Summary (Last 24 hours) at 4/15/2023 1357  Last data filed at 4/15/2023 0948  Gross per 24 hour   Intake 180 ml   Output --   Net 180 ml         24HR PULSE OXIMETRY RANGE:    SpO2  Av.4 %  Min: 91 %  Max: 96 %    General appearance: alert, appears stated age, and cooperative  Lungs: rhonchi bilaterally worse with cough  Heart: regular rate and rhythm, S1, S2 normal, no murmur, click, rub or gallop  Abdomen: soft,
CLINICAL PHARMACY NOTE: MEDS TO BEDS    Total # of Prescriptions Filled: 1   The following medications were delivered to the patient:  Prednisone 10 mg    Additional Documentation:
Database initiated pharmacy and medications verified with the patient. She is A&O independent from home with  states she uses no assistive devices and wears 4 liters oxygen through [employer]. She has had diarrhea for 5 days and has fallen recently.
ID consult placed.   Abrahan Nguyen RN
Occupational Therapy  OCCUPATIONAL THERAPY INITIAL EVALUATION  [facility] [address]    Date: 2023     Patient Name: Paty Yoo  MRN: [id number]  : 1945  Room: [address] [place], [place] [address]    Evaluating OT: ALBERT Helton/EM - [id number]    Referring Provider: Haseeb De Paz MD  Specific Provider Orders/Date: \"OT eval and treat\" - 2023    Diagnosis: COPD exacerbation ([address] [J44.1]      Pertinent Medical History: COPD     Precautions: fall risk    Assessment of Current Deficits:    [x] Functional mobility   [x]ADLs  [x] Strength               []Cognition   [x] Functional transfers   [x] IADLs         [x] Safety Awareness   [x]Endurance   [] Fine Coordination              [x] Balance      [] Vision/perception   []Sensation    []Gross Motor Coordination  [] ROM  [] Delirium                   [] Motor Control     OT PLAN OF CARE   OT POC is based on physician orders, patient diagnosis, and results of clinical assessment.   Frequency/Duration 2-5 days/week for 2 weeks PRN   Specific OT Treatment Interventions to Include:   * Instruction/training on adapted ADL techniques and AE recommendations to increase functional independence within precautions       * Training on energy conservation strategies, correct breathing pattern and techniques to improve independence/tolerance for self-care routine  * Functional transfer/mobility training/DME recommendations for increased independence, safety, and fall prevention  * Patient/Family education to increase follow through with safety techniques and functional independence  * Recommendation of environmental modifications for increased safety with functional transfers/mobility and ADLs  * Therapeutic exercise to improve motor endurance, ROM, and functional strength for ADLs/functional transfers  * Therapeutic activities to facilitate/challenge dynamic balance, stand tolerance for increased safety
[facility] Progress Note    Admitting Date and Time: 4/11/2023 11:05 AM  Admit Dx: COPD exacerbation ([facility] [J44.1]    Subjective:  Patient is being followed for COPD exacerbation ([facility] [J44.1]     Seen sitting up in bed. She is alert in no apparent distress. Respirations are unlabored on 4 L nasal cannula. Patient still reports moist productive cough but improved. No acute issues overnight. ROS: denies fever, chills, cp, sob, n/v, HA unless stated above.      guaiFENesin  400 mg Oral TID    ciprofloxacin  500 mg Oral 2 times per day    sodium chloride flush  5-40 mL IntraVENous 2 times per day    enoxaparin  40 mg SubCUTAneous Daily    ipratropium-albuterol  1 ampule Inhalation Q4H WA    pantoprazole  40 mg Oral QAM AC    lactobacillus  1 capsule Oral Daily    predniSONE  40 mg Oral Daily    amLODIPine  5 mg Oral Daily    aspirin  81 mg Oral QAM    atorvastatin  20 mg Oral Nightly    calcium carbonate  1 tablet Oral QAM    metoprolol succinate  12.5 mg Oral Daily    sertraline  100 mg Oral Nightly    losartan  100 mg Oral QAM    And    hydroCHLOROthiazide  12.5 mg Oral QAM     prochlorperazine, 10 mg, Q6H PRN  zolpidem, 5 mg, Nightly PRN  sodium chloride flush, 5-40 mL, PRN  sodium chloride, , PRN  polyethylene glycol, 17 g, Daily PRN  acetaminophen, 650 mg, Q6H PRN   Or  acetaminophen, 650 mg, Q6H PRN  melatonin, 3 mg, Nightly PRN  diazePAM, 10 mg, Nightly PRN         Objective:    BP (!) 143/76   Pulse 76   Temp 97.5 °F (36.4 °C) (Temporal)   Resp 20   Ht 5' 5\" (1.651 m)   Wt 191 lb 4.8 oz (86.8 kg)   SpO2 92%   BMI 31.83 kg/m²     General Appearance: alert and oriented to person, place and time and in no acute distress  Skin: warm and dry  Head: normocephalic and atraumatic  Eyes: pupils equal, round, and reactive to light, extraocular eye movements intact, conjunctivae normal  Neck: neck supple and non tender without mass   Pulmonary/Chest: Respirations unlabored.   Bilateral
Pharmacy Note    This patient was ordered Fosamax. Per the [address] [place] [address] Dr, this medication is non-formulary and not stocked by pharmacy for the reason indicated below. The medication can be reordered at discharge.      Medications in which risks outweigh benefits during hospitalization:           -  oral bisphosphonates         -  raloxifene ([facility])        -  SGLT2 inhibitors (ordered in the hospital for an indication other than heart failure or chronic kidney disease)    Medications that lack necessity during an acute hospital stay:        -  nasal antihistamines        -  nasal ipratropium 0.03% and 0.06%        -  nasal miacalcin        -  acyclovir topical cream/ointment orders for herpes labialis (cold sores)  f
Physical Therapy  Facility/Department: [address] INTERNAL MEDICINE 2  Physical Therapy Initial Assessment    Name: Gianna Segovia  : [birth year]  MRN: [id number]  Date of Service: 2023    Attending Provider:  Erlin Lilly MD    Evaluating PT:  Anthony Sanches. [employer]., P.T. Room #:  5552/1377-Z  Diagnosis:  COPD exacerbation ([facility] [J44.1]  Precautions:  O2 sat drops with activity, falls    SUBJECTIVE:    Pt lives with her  in a 1 story home with 3 stairs and 1 rail to enter. Pt ambulated with no AD and uses 4L home O2. OBJECTIVE:   Initial Evaluation  Date: 23 Treatment Short Term/ Long Term   Goals   Was pt agreeable to Eval/treatment? yes     Does pt have pain? No c/o pain     Bed Mobility  Rolling: Independent  Supine to sit: Independent  Sit to supine: Independent  Scooting: Independent  Independent   Transfers Sit to stand: supervision  Stand to sit: supervision  Stand pivot: supervision  Independent    Ambulation   100 feet with no AD supervision  150 feet with no AD Independent    Stair negotiation: ascended and descended NA  3 steps with 1 rail supervision   AM-PAC 6 Clicks 89/95       BLE ROM is WFL. BLE strength is grossly 4+/5. Sensation:  Pt denies numbness and tingling to extremities  Balance: sitting is Independent and standing with no AD is supervision  Endurance: fair    Vitals:  Pt was found in bed on 4L O2 and at rest O2 sat was 87-89%, after amb she had increased SOB and O2 sat dropped to 69%, while sitting on EOB her O2 sat slowly returned to 87-89%.       Patient education  Pt educated on deep breathing with NC    Patient response to education:   Pt verbalized understanding Pt demonstrated skill Pt requires further education in this area   yes yes yes     ASSESSMENT:    Conditions Requiring Skilled Therapeutic Intervention:    []Decreased strength     []Decreased ROM  [x]Decreased functional mobility  []Decreased balance   [x]Decreased endurance   []Decreased
Pulmonary notified of new consult via the office.
mass   Pulmonary/Chest: Respirations unlabored. Bilateral wheezes heard throughout coarse crackles in right base. 4 L nasal cannula. Cardiovascular: normal rate, normal S1 and S2 and no carotid bruits  Abdomen: soft, non-tender, non-distended, normal bowel sounds, no masses or organomegaly  Extremities: no cyanosis, no clubbing and no edema  Neurologic: no cranial nerve deficit and speech normal        Recent Labs     04/12/23 0240 04/13/23 0453 04/14/23  0300    140 142   K 4.2 3.7 3.6    101 98   CO2 30* 31* 36*   BUN 21 17 15   CREATININE 1.1* 0.7 0.7   GLUCOSE 127* 107* 103*   CALCIUM 8.3* 8.3* 8.7         Recent Labs     04/12/23 0240 04/13/23 0453 04/14/23  0603   WBC 7.5 6.6 6.7   RBC 3.84 3.78 4.05   HGB 9.0* 8.9* 9.4*   HCT 30.8* 30.3* 32.5*   MCV 80.2 80.2 80.2   MCH 23.4* 23.5* 23.2*   MCHC 29.2* 29.4* 28.9*   RDW 16.2* 16.4* 16.1*    192 214   MPV 10.0 9.6 9.8             Assessment:    Principal Problem:    COPD exacerbation (HCC)  Active Problems:    Bronchiectasis with acute exacerbation (HCC)    Prolonged Q-T interval on ECG    Essential hypertension  Resolved Problems:    * No resolved hospital problems. *      Plan:  Acute on chronic hypoxic respiratory failure: CTA shows no evidence of PE, mediastinal hilar adenopathy, emphysematous changes, stable right lower lobe opacity that is unchanged from 9/2022. CXR shows obstructive airway disease and likely interstitial fibrosis. ABGs in the ED pH 7.41/PCO2 47.9/PO2 48.8/HCO3 29.7 on 4 L nasal cannula. Received 60 mg Solumedrol and Duoneb treatments in the ED. Back on baseline oxygen of 4 L. Continue prednisone 30 mg daily taper per pulmonology-pulmonology input appreciated  COPD exacerbation with a Hx bronchiectasis: On home O2 with home breathing treatments. 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 Continue Mucinex twice daily.   Sputum culture growing Pseudomonas- await final

## 2023-04-15 NOTE — DISCHARGE SUMMARY
[facility] Physician Discharge Summary       Roseanne Archer DME  oxygen    [phone]  Follow up        Activity level: As tolerated     Dispo:Home      Condition on discharge: Stable     Patient ID:  Shelley Nicole  [id number]  65 y.o.  1945    Admit date: 4/11/2023    Discharge date and time:  4/15/2023  11:31 AM    Admission Diagnoses: Principal Problem:    COPD exacerbation ([address]  Active Problems:    Bronchiectasis with acute exacerbation ([address]    Prolonged Q-T interval on ECG    Essential hypertension    Heart failure with preserved ejection fraction ([address]    Diarrhea  Resolved Problems:    * No resolved hospital problems. *      Discharge Diagnoses: Principal Problem:    COPD exacerbation ([address]  Active Problems:    Bronchiectasis with acute exacerbation (HCC)    Prolonged Q-T interval on ECG    Essential hypertension    Heart failure with preserved ejection fraction (HCC)    Diarrhea  Resolved Problems:    * No resolved hospital problems. *      Consults:  IP CONSULT TO PULMONOLOGY  IP CONSULT TO INFECTIOUS DISEASES    Procedures: none    Hospital Course: Patient presented to the ED with c/o shortness of breath and cough. Patient wears 4 L nasal cannula chronically at home. Patient was hypoxic on arrival to the ED with saturations in the 60s as well as some mild hypotension. Patient was placed on CPAP by EMS but now is currently on 8 L nasal cannula saturating 96%. ED course is as follows CO2 32 creatinine 1.1, procalcitonin 0.13, proBNP 3212, troponin 36> 52. Hemoglobin 9.7. Flu A/B and COVID-negative. Received 60 mg IV Solu-Medrol x1, DuoNeb treatment, 20 mg famotidine IV, 25 mg IV Benadryl, 1 L normal saline bolus. Acute on chronic hypoxic respiratory failure: CTA shows no evidence of PE, mediastinal hilar adenopathy, emphysematous changes, stable right lower lobe opacity that is unchanged from 9/2022. CXR shows obstructive airway disease and likely interstitial fibrosis.   ABGs in

## 2023-04-18 LAB
EKG ATRIAL RATE: 78 BPM
EKG P AXIS: 63 DEGREES
EKG P-R INTERVAL: 136 MS
EKG Q-T INTERVAL: 394 MS
EKG QRS DURATION: 78 MS
EKG QTC CALCULATION (BAZETT): 449 MS
EKG R AXIS: -52 DEGREES
EKG T AXIS: 14 DEGREES
EKG VENTRICULAR RATE: 78 BPM

## 2023-09-15 RX ORDER — ATORVASTATIN CALCIUM 20 MG/1
TABLET, FILM COATED ORAL
Qty: 90 TABLET | Refills: 0 | OUTPATIENT
Start: 2023-09-15

## 2023-09-18 ENCOUNTER — HOSPITAL ENCOUNTER (INPATIENT)
Age: 78
LOS: 3 days | Discharge: HOME | End: 2023-09-21
Attending: EMERGENCY MEDICINE | Admitting: INTERNAL MEDICINE
Payer: MEDICARE

## 2023-09-18 ENCOUNTER — APPOINTMENT (OUTPATIENT)
Dept: CT IMAGING | Age: 78
End: 2023-09-18
Payer: MEDICARE

## 2023-09-18 ENCOUNTER — APPOINTMENT (OUTPATIENT)
Dept: GENERAL RADIOLOGY | Age: 78
End: 2023-09-18
Payer: MEDICARE

## 2023-09-18 DIAGNOSIS — J96.01 ACUTE RESPIRATORY FAILURE WITH HYPOXIA (HCC): Primary | ICD-10-CM

## 2023-09-18 DIAGNOSIS — J47.1 BRONCHIECTASIS WITH ACUTE EXACERBATION (HCC): ICD-10-CM

## 2023-09-18 LAB
ALBUMIN SERPL-MCNC: 3.7 G/DL (ref 3.5–5.2)
ALP SERPL-CCNC: 62 U/L (ref 35–104)
ALT SERPL-CCNC: <5 U/L (ref 0–32)
ANION GAP SERPL CALCULATED.3IONS-SCNC: 15 MMOL/L (ref 7–16)
AST SERPL-CCNC: 6 U/L (ref 0–31)
B PARAP IS1001 DNA NPH QL NAA+NON-PROBE: NOT DETECTED
B PERT DNA SPEC QL NAA+PROBE: NOT DETECTED
B.E.: 4.8 MMOL/L (ref -3–3)
BASOPHILS # BLD: 0.05 K/UL (ref 0–0.2)
BASOPHILS NFR BLD: 1 % (ref 0–2)
BILIRUB DIRECT SERPL-MCNC: 0.3 MG/DL (ref 0–0.3)
BILIRUB INDIRECT SERPL-MCNC: 0.2 MG/DL (ref 0–1)
BILIRUB SERPL-MCNC: 0.5 MG/DL (ref 0–1.2)
BNP SERPL-MCNC: 1621 PG/ML (ref 0–450)
BUN SERPL-MCNC: 15 MG/DL (ref 6–23)
C PNEUM DNA NPH QL NAA+NON-PROBE: NOT DETECTED
CALCIUM SERPL-MCNC: 8.5 MG/DL (ref 8.6–10.2)
CHLORIDE SERPL-SCNC: 98 MMOL/L (ref 98–107)
CO2 SERPL-SCNC: 28 MMOL/L (ref 22–29)
COHB: 1.6 % (ref 0–1.5)
CREAT SERPL-MCNC: 0.9 MG/DL (ref 0.5–1)
CRITICAL: ABNORMAL
CRP SERPL HS-MCNC: 71 MG/L (ref 0–5)
DATE ANALYZED: ABNORMAL
DATE OF COLLECTION: ABNORMAL
EKG ATRIAL RATE: 103 BPM
EKG P-R INTERVAL: 114 MS
EKG Q-T INTERVAL: 348 MS
EKG QRS DURATION: 80 MS
EKG QTC CALCULATION (BAZETT): 455 MS
EKG R AXIS: -11 DEGREES
EKG T AXIS: 132 DEGREES
EKG VENTRICULAR RATE: 103 BPM
EOSINOPHIL # BLD: 0.02 K/UL (ref 0.05–0.5)
EOSINOPHILS RELATIVE PERCENT: 0 % (ref 0–6)
ERYTHROCYTE [DISTWIDTH] IN BLOOD BY AUTOMATED COUNT: 14.7 % (ref 11.5–15)
ERYTHROCYTE [SEDIMENTATION RATE] IN BLOOD BY WESTERGREN METHOD: 69 MM/HR (ref 0–20)
FLUAV RNA NPH QL NAA+NON-PROBE: NOT DETECTED
FLUBV RNA NPH QL NAA+NON-PROBE: NOT DETECTED
GFR SERPL CREATININE-BSD FRML MDRD: >60 ML/MIN/1.73M2
GLUCOSE SERPL-MCNC: 144 MG/DL (ref 74–99)
HADV DNA NPH QL NAA+NON-PROBE: NOT DETECTED
HCO3: 29.7 MMOL/L (ref 22–26)
HCOV 229E RNA NPH QL NAA+NON-PROBE: NOT DETECTED
HCOV HKU1 RNA NPH QL NAA+NON-PROBE: NOT DETECTED
HCOV NL63 RNA NPH QL NAA+NON-PROBE: NOT DETECTED
HCOV OC43 RNA NPH QL NAA+NON-PROBE: NOT DETECTED
HCT VFR BLD AUTO: 36.6 % (ref 34–48)
HGB BLD-MCNC: 11.8 G/DL (ref 11.5–15.5)
HHB: 0.8 % (ref 0–5)
HMPV RNA NPH QL NAA+NON-PROBE: NOT DETECTED
HPIV1 RNA NPH QL NAA+NON-PROBE: NOT DETECTED
HPIV2 RNA NPH QL NAA+NON-PROBE: NOT DETECTED
HPIV3 RNA NPH QL NAA+NON-PROBE: NOT DETECTED
HPIV4 RNA NPH QL NAA+NON-PROBE: NOT DETECTED
IMM GRANULOCYTES # BLD AUTO: 0.03 K/UL (ref 0–0.58)
IMM GRANULOCYTES NFR BLD: 0 % (ref 0–5)
INR PPP: 1.3
LAB: ABNORMAL
LACTATE BLDV-SCNC: 1.7 MMOL/L (ref 0.5–2.2)
LIPASE SERPL-CCNC: 11 U/L (ref 13–60)
LYMPHOCYTES NFR BLD: 1.65 K/UL (ref 1.5–4)
LYMPHOCYTES RELATIVE PERCENT: 18 % (ref 20–42)
Lab: ABNORMAL
M PNEUMO DNA NPH QL NAA+NON-PROBE: NOT DETECTED
MAGNESIUM SERPL-MCNC: 1.9 MG/DL (ref 1.6–2.6)
MCH RBC QN AUTO: 26.8 PG (ref 26–35)
MCHC RBC AUTO-ENTMCNC: 32.2 G/DL (ref 32–34.5)
MCV RBC AUTO: 83.2 FL (ref 80–99.9)
METHB: 0.3 % (ref 0–1.5)
MODE: ABNORMAL
MONOCYTES NFR BLD: 0.37 K/UL (ref 0.1–0.95)
MONOCYTES NFR BLD: 4 % (ref 2–12)
NEUTROPHILS NFR BLD: 77 % (ref 43–80)
NEUTS SEG NFR BLD: 7.2 K/UL (ref 1.8–7.3)
O2 CONTENT: 18 ML/DL
O2 SATURATION: 99.2 % (ref 92–98.5)
O2HB: 97.3 % (ref 94–97)
OPERATOR ID: 7874
PATIENT TEMP: 37 C
PCO2: 44.8 MMHG (ref 35–45)
PH BLOOD GAS: 7.44 (ref 7.35–7.45)
PLATELET # BLD AUTO: 273 K/UL (ref 130–450)
PMV BLD AUTO: 9.1 FL (ref 7–12)
PO2: 162.2 MMHG (ref 75–100)
POTASSIUM SERPL-SCNC: 3 MMOL/L (ref 3.5–5)
PROCALCITONIN SERPL-MCNC: 0.14 NG/ML (ref 0–0.08)
PROT SERPL-MCNC: 7.3 G/DL (ref 6.4–8.3)
PROTHROMBIN TIME: 14.5 SEC (ref 9.3–12.4)
RBC # BLD AUTO: 4.4 M/UL (ref 3.5–5.5)
RSV RNA NPH QL NAA+NON-PROBE: NOT DETECTED
RV+EV RNA NPH QL NAA+NON-PROBE: NOT DETECTED
SARS-COV-2 RDRP RESP QL NAA+PROBE: NOT DETECTED
SARS-COV-2 RNA NPH QL NAA+NON-PROBE: NOT DETECTED
SODIUM SERPL-SCNC: 141 MMOL/L (ref 132–146)
SOURCE, BLOOD GAS: ABNORMAL
SPECIMEN DESCRIPTION: NORMAL
SPECIMEN DESCRIPTION: NORMAL
THB: 12.9 G/DL (ref 11.5–16.5)
TIME ANALYZED: 936
TROPONIN I SERPL HS-MCNC: 23 NG/L (ref 0–9)
TROPONIN I SERPL HS-MCNC: 24 NG/L (ref 0–9)
TSH SERPL DL<=0.05 MIU/L-ACNC: 1.74 UIU/ML (ref 0.27–4.2)
WBC OTHER # BLD: 9.3 K/UL (ref 4.5–11.5)

## 2023-09-18 PROCEDURE — 2060000000 HC ICU INTERMEDIATE R&B

## 2023-09-18 PROCEDURE — 6370000000 HC RX 637 (ALT 250 FOR IP): Performed by: INTERNAL MEDICINE

## 2023-09-18 PROCEDURE — 82248 BILIRUBIN DIRECT: CPT

## 2023-09-18 PROCEDURE — 6360000002 HC RX W HCPCS

## 2023-09-18 PROCEDURE — 87154 CUL TYP ID BLD PTHGN 6+ TRGT: CPT

## 2023-09-18 PROCEDURE — 96365 THER/PROPH/DIAG IV INF INIT: CPT

## 2023-09-18 PROCEDURE — 6370000000 HC RX 637 (ALT 250 FOR IP): Performed by: EMERGENCY MEDICINE

## 2023-09-18 PROCEDURE — 80053 COMPREHEN METABOLIC PANEL: CPT

## 2023-09-18 PROCEDURE — 87635 SARS-COV-2 COVID-19 AMP PRB: CPT

## 2023-09-18 PROCEDURE — 2580000003 HC RX 258: Performed by: INTERNAL MEDICINE

## 2023-09-18 PROCEDURE — 83880 ASSAY OF NATRIURETIC PEPTIDE: CPT

## 2023-09-18 PROCEDURE — 6360000004 HC RX CONTRAST MEDICATION: Performed by: RADIOLOGY

## 2023-09-18 PROCEDURE — 99223 1ST HOSP IP/OBS HIGH 75: CPT | Performed by: INTERNAL MEDICINE

## 2023-09-18 PROCEDURE — 6360000002 HC RX W HCPCS: Performed by: EMERGENCY MEDICINE

## 2023-09-18 PROCEDURE — 85025 COMPLETE CBC W/AUTO DIFF WBC: CPT

## 2023-09-18 PROCEDURE — 71045 X-RAY EXAM CHEST 1 VIEW: CPT

## 2023-09-18 PROCEDURE — 6360000002 HC RX W HCPCS: Performed by: INTERNAL MEDICINE

## 2023-09-18 PROCEDURE — 93010 ELECTROCARDIOGRAM REPORT: CPT | Performed by: INTERNAL MEDICINE

## 2023-09-18 PROCEDURE — 94640 AIRWAY INHALATION TREATMENT: CPT

## 2023-09-18 PROCEDURE — 83690 ASSAY OF LIPASE: CPT

## 2023-09-18 PROCEDURE — 86140 C-REACTIVE PROTEIN: CPT

## 2023-09-18 PROCEDURE — 83605 ASSAY OF LACTIC ACID: CPT

## 2023-09-18 PROCEDURE — 85652 RBC SED RATE AUTOMATED: CPT

## 2023-09-18 PROCEDURE — 0202U NFCT DS 22 TRGT SARS-COV-2: CPT

## 2023-09-18 PROCEDURE — 96375 TX/PRO/DX INJ NEW DRUG ADDON: CPT

## 2023-09-18 PROCEDURE — 99285 EMERGENCY DEPT VISIT HI MDM: CPT

## 2023-09-18 PROCEDURE — 2700000000 HC OXYGEN THERAPY PER DAY

## 2023-09-18 PROCEDURE — 82805 BLOOD GASES W/O2 SATURATION: CPT

## 2023-09-18 PROCEDURE — 85610 PROTHROMBIN TIME: CPT

## 2023-09-18 PROCEDURE — 83735 ASSAY OF MAGNESIUM: CPT

## 2023-09-18 PROCEDURE — 93005 ELECTROCARDIOGRAM TRACING: CPT | Performed by: EMERGENCY MEDICINE

## 2023-09-18 PROCEDURE — 71275 CT ANGIOGRAPHY CHEST: CPT

## 2023-09-18 PROCEDURE — 84484 ASSAY OF TROPONIN QUANT: CPT

## 2023-09-18 PROCEDURE — 84145 PROCALCITONIN (PCT): CPT

## 2023-09-18 PROCEDURE — 84443 ASSAY THYROID STIM HORMONE: CPT

## 2023-09-18 PROCEDURE — 96376 TX/PRO/DX INJ SAME DRUG ADON: CPT

## 2023-09-18 PROCEDURE — 87040 BLOOD CULTURE FOR BACTERIA: CPT

## 2023-09-18 PROCEDURE — 6370000000 HC RX 637 (ALT 250 FOR IP)

## 2023-09-18 RX ORDER — ASPIRIN 81 MG/1
81 TABLET ORAL NIGHTLY
Status: DISCONTINUED | OUTPATIENT
Start: 2023-09-18 | End: 2023-09-21 | Stop reason: HOSPADM

## 2023-09-18 RX ORDER — FUROSEMIDE 10 MG/ML
40 INJECTION INTRAMUSCULAR; INTRAVENOUS ONCE
Status: COMPLETED | OUTPATIENT
Start: 2023-09-18 | End: 2023-09-18

## 2023-09-18 RX ORDER — DULOXETIN HYDROCHLORIDE 30 MG/1
30 CAPSULE, DELAYED RELEASE ORAL NIGHTLY
COMMUNITY

## 2023-09-18 RX ORDER — POTASSIUM CHLORIDE 7.45 MG/ML
10 INJECTION INTRAVENOUS ONCE
Status: COMPLETED | OUTPATIENT
Start: 2023-09-18 | End: 2023-09-18

## 2023-09-18 RX ORDER — ARFORMOTEROL TARTRATE 15 UG/2ML
15 SOLUTION RESPIRATORY (INHALATION)
Status: DISCONTINUED | OUTPATIENT
Start: 2023-09-18 | End: 2023-09-21 | Stop reason: HOSPADM

## 2023-09-18 RX ORDER — SODIUM CHLORIDE 0.9 % (FLUSH) 0.9 %
5-40 SYRINGE (ML) INJECTION PRN
Status: DISCONTINUED | OUTPATIENT
Start: 2023-09-18 | End: 2023-09-21 | Stop reason: HOSPADM

## 2023-09-18 RX ORDER — METHYLPREDNISOLONE SODIUM SUCCINATE 40 MG/ML
40 INJECTION, POWDER, LYOPHILIZED, FOR SOLUTION INTRAMUSCULAR; INTRAVENOUS EVERY 12 HOURS
Status: DISCONTINUED | OUTPATIENT
Start: 2023-09-18 | End: 2023-09-18

## 2023-09-18 RX ORDER — ALBUTEROL SULFATE 2.5 MG/3ML
2.5 SOLUTION RESPIRATORY (INHALATION) EVERY 6 HOURS PRN
Status: DISCONTINUED | OUTPATIENT
Start: 2023-09-18 | End: 2023-09-21 | Stop reason: HOSPADM

## 2023-09-18 RX ORDER — DEXAMETHASONE SODIUM PHOSPHATE 10 MG/ML
10 INJECTION INTRAMUSCULAR; INTRAVENOUS ONCE
Status: DISCONTINUED | OUTPATIENT
Start: 2023-09-18 | End: 2023-09-18

## 2023-09-18 RX ORDER — ATORVASTATIN CALCIUM 20 MG/1
20 TABLET, FILM COATED ORAL NIGHTLY
Status: DISCONTINUED | OUTPATIENT
Start: 2023-09-18 | End: 2023-09-21 | Stop reason: HOSPADM

## 2023-09-18 RX ORDER — POLYETHYLENE GLYCOL 3350 17 G/17G
17 POWDER, FOR SOLUTION ORAL DAILY PRN
Status: DISCONTINUED | OUTPATIENT
Start: 2023-09-18 | End: 2023-09-21 | Stop reason: HOSPADM

## 2023-09-18 RX ORDER — BUDESONIDE 0.25 MG/2ML
1 INHALANT ORAL
COMMUNITY

## 2023-09-18 RX ORDER — ONDANSETRON 4 MG/1
4 TABLET, ORALLY DISINTEGRATING ORAL EVERY 8 HOURS PRN
Status: DISCONTINUED | OUTPATIENT
Start: 2023-09-18 | End: 2023-09-21 | Stop reason: HOSPADM

## 2023-09-18 RX ORDER — LOSARTAN POTASSIUM AND HYDROCHLOROTHIAZIDE 12.5; 1 MG/1; MG/1
1 TABLET ORAL NIGHTLY
Status: DISCONTINUED | OUTPATIENT
Start: 2023-09-18 | End: 2023-09-18 | Stop reason: CLARIF

## 2023-09-18 RX ORDER — DIAZEPAM 5 MG/1
10 TABLET ORAL NIGHTLY PRN
Status: DISCONTINUED | OUTPATIENT
Start: 2023-09-18 | End: 2023-09-21 | Stop reason: HOSPADM

## 2023-09-18 RX ORDER — BUDESONIDE 0.25 MG/2ML
250 INHALANT ORAL
Status: DISCONTINUED | OUTPATIENT
Start: 2023-09-18 | End: 2023-09-19

## 2023-09-18 RX ORDER — HYDROCHLOROTHIAZIDE 12.5 MG/1
12.5 TABLET ORAL DAILY
Status: DISCONTINUED | OUTPATIENT
Start: 2023-09-18 | End: 2023-09-21 | Stop reason: HOSPADM

## 2023-09-18 RX ORDER — LEVOFLOXACIN 5 MG/ML
750 INJECTION, SOLUTION INTRAVENOUS EVERY 24 HOURS
Status: DISCONTINUED | OUTPATIENT
Start: 2023-09-18 | End: 2023-09-18

## 2023-09-18 RX ORDER — SODIUM CHLORIDE 9 MG/ML
INJECTION, SOLUTION INTRAVENOUS PRN
Status: DISCONTINUED | OUTPATIENT
Start: 2023-09-18 | End: 2023-09-21 | Stop reason: HOSPADM

## 2023-09-18 RX ORDER — DIPHENHYDRAMINE HCL 25 MG
50 TABLET ORAL ONCE
Status: COMPLETED | OUTPATIENT
Start: 2023-09-18 | End: 2023-09-18

## 2023-09-18 RX ORDER — PANTOPRAZOLE SODIUM 40 MG/1
40 TABLET, DELAYED RELEASE ORAL
Status: DISCONTINUED | OUTPATIENT
Start: 2023-09-19 | End: 2023-09-21 | Stop reason: HOSPADM

## 2023-09-18 RX ORDER — ATORVASTATIN CALCIUM 20 MG/1
20 TABLET, FILM COATED ORAL NIGHTLY
COMMUNITY

## 2023-09-18 RX ORDER — DULOXETIN HYDROCHLORIDE 30 MG/1
30 CAPSULE, DELAYED RELEASE ORAL NIGHTLY
Status: DISCONTINUED | OUTPATIENT
Start: 2023-09-18 | End: 2023-09-21 | Stop reason: HOSPADM

## 2023-09-18 RX ORDER — LOSARTAN POTASSIUM 50 MG/1
100 TABLET ORAL DAILY
Status: DISCONTINUED | OUTPATIENT
Start: 2023-09-18 | End: 2023-09-21 | Stop reason: HOSPADM

## 2023-09-18 RX ORDER — IPRATROPIUM BROMIDE AND ALBUTEROL SULFATE 2.5; .5 MG/3ML; MG/3ML
1 SOLUTION RESPIRATORY (INHALATION)
Status: DISCONTINUED | OUTPATIENT
Start: 2023-09-18 | End: 2023-09-21 | Stop reason: HOSPADM

## 2023-09-18 RX ORDER — ACETAMINOPHEN 325 MG/1
650 TABLET ORAL EVERY 6 HOURS PRN
Status: DISCONTINUED | OUTPATIENT
Start: 2023-09-18 | End: 2023-09-21 | Stop reason: HOSPADM

## 2023-09-18 RX ORDER — MV-MIN/FA/VIT K/LUTEIN/ZEAXANT 200MCG-5MG
1 CAPSULE ORAL 2 TIMES DAILY
COMMUNITY

## 2023-09-18 RX ORDER — ACETAMINOPHEN 650 MG/1
650 SUPPOSITORY RECTAL EVERY 6 HOURS PRN
Status: DISCONTINUED | OUTPATIENT
Start: 2023-09-18 | End: 2023-09-21 | Stop reason: HOSPADM

## 2023-09-18 RX ORDER — DIPHENHYDRAMINE HCL 25 MG
50 TABLET ORAL ONCE
Status: DISCONTINUED | OUTPATIENT
Start: 2023-09-18 | End: 2023-09-18

## 2023-09-18 RX ORDER — MAGNESIUM SULFATE IN WATER 40 MG/ML
2000 INJECTION, SOLUTION INTRAVENOUS ONCE
Status: COMPLETED | OUTPATIENT
Start: 2023-09-18 | End: 2023-09-18

## 2023-09-18 RX ORDER — LEVOFLOXACIN 5 MG/ML
750 INJECTION, SOLUTION INTRAVENOUS EVERY 24 HOURS
Status: DISCONTINUED | OUTPATIENT
Start: 2023-09-18 | End: 2023-09-20 | Stop reason: SDUPTHER

## 2023-09-18 RX ORDER — ENOXAPARIN SODIUM 100 MG/ML
40 INJECTION SUBCUTANEOUS DAILY
Status: DISCONTINUED | OUTPATIENT
Start: 2023-09-18 | End: 2023-09-21 | Stop reason: HOSPADM

## 2023-09-18 RX ORDER — METOPROLOL SUCCINATE 25 MG/1
12.5 TABLET, EXTENDED RELEASE ORAL NIGHTLY
Status: DISCONTINUED | OUTPATIENT
Start: 2023-09-18 | End: 2023-09-21 | Stop reason: HOSPADM

## 2023-09-18 RX ORDER — POTASSIUM CHLORIDE 1.5 G/1.58G
40 POWDER, FOR SOLUTION ORAL ONCE
Status: DISCONTINUED | OUTPATIENT
Start: 2023-09-18 | End: 2023-09-18 | Stop reason: CLARIF

## 2023-09-18 RX ORDER — ONDANSETRON 2 MG/ML
4 INJECTION INTRAMUSCULAR; INTRAVENOUS EVERY 6 HOURS PRN
Status: DISCONTINUED | OUTPATIENT
Start: 2023-09-18 | End: 2023-09-21 | Stop reason: HOSPADM

## 2023-09-18 RX ORDER — IPRATROPIUM BROMIDE AND ALBUTEROL SULFATE 2.5; .5 MG/3ML; MG/3ML
1 SOLUTION RESPIRATORY (INHALATION)
Status: COMPLETED | OUTPATIENT
Start: 2023-09-18 | End: 2023-09-18

## 2023-09-18 RX ORDER — METHYLPREDNISOLONE SODIUM SUCCINATE 40 MG/ML
40 INJECTION, POWDER, LYOPHILIZED, FOR SOLUTION INTRAMUSCULAR; INTRAVENOUS EVERY 12 HOURS
Status: DISCONTINUED | OUTPATIENT
Start: 2023-09-18 | End: 2023-09-19 | Stop reason: SDUPTHER

## 2023-09-18 RX ORDER — ALENDRONATE SODIUM 70 MG/1
70 TABLET ORAL WEEKLY
Status: DISCONTINUED | OUTPATIENT
Start: 2023-09-18 | End: 2023-09-18 | Stop reason: CLARIF

## 2023-09-18 RX ORDER — FERROUS SULFATE 325(65) MG
325 TABLET ORAL NIGHTLY
Status: DISCONTINUED | OUTPATIENT
Start: 2023-09-18 | End: 2023-09-21 | Stop reason: HOSPADM

## 2023-09-18 RX ORDER — AMLODIPINE BESYLATE 5 MG/1
5 TABLET ORAL NIGHTLY
Status: DISCONTINUED | OUTPATIENT
Start: 2023-09-18 | End: 2023-09-21 | Stop reason: HOSPADM

## 2023-09-18 RX ORDER — FERROUS SULFATE 325(65) MG
325 TABLET ORAL NIGHTLY
COMMUNITY

## 2023-09-18 RX ORDER — SODIUM CHLORIDE 0.9 % (FLUSH) 0.9 %
5-40 SYRINGE (ML) INJECTION EVERY 12 HOURS SCHEDULED
Status: DISCONTINUED | OUTPATIENT
Start: 2023-09-18 | End: 2023-09-21 | Stop reason: HOSPADM

## 2023-09-18 RX ORDER — REVEFENACIN 175 UG/3ML
175 SOLUTION RESPIRATORY (INHALATION) DAILY
COMMUNITY

## 2023-09-18 RX ORDER — METOPROLOL SUCCINATE 25 MG/1
12.5 TABLET, EXTENDED RELEASE ORAL NIGHTLY
COMMUNITY

## 2023-09-18 RX ADMIN — SODIUM CHLORIDE, PRESERVATIVE FREE 10 ML: 5 INJECTION INTRAVENOUS at 20:26

## 2023-09-18 RX ADMIN — IOPAMIDOL 75 ML: 755 INJECTION, SOLUTION INTRAVENOUS at 14:12

## 2023-09-18 RX ADMIN — LOSARTAN POTASSIUM 100 MG: 50 TABLET, FILM COATED ORAL at 22:26

## 2023-09-18 RX ADMIN — FERROUS SULFATE TAB 325 MG (65 MG ELEMENTAL FE) 325 MG: 325 (65 FE) TAB at 20:24

## 2023-09-18 RX ADMIN — MAGNESIUM SULFATE HEPTAHYDRATE 2000 MG: 40 INJECTION, SOLUTION INTRAVENOUS at 10:37

## 2023-09-18 RX ADMIN — ARFORMOTEROL TARTRATE 15 MCG: 15 SOLUTION RESPIRATORY (INHALATION) at 19:29

## 2023-09-18 RX ADMIN — IPRATROPIUM BROMIDE AND ALBUTEROL SULFATE 1 DOSE: 2.5; .5 SOLUTION RESPIRATORY (INHALATION) at 19:29

## 2023-09-18 RX ADMIN — BUDESONIDE 250 MCG: 0.25 SUSPENSION RESPIRATORY (INHALATION) at 19:29

## 2023-09-18 RX ADMIN — DULOXETINE HYDROCHLORIDE 30 MG: 30 CAPSULE, DELAYED RELEASE ORAL at 20:24

## 2023-09-18 RX ADMIN — ATORVASTATIN CALCIUM 20 MG: 20 TABLET, FILM COATED ORAL at 20:24

## 2023-09-18 RX ADMIN — DIPHENHYDRAMINE HCL 50 MG: 25 TABLET ORAL at 11:42

## 2023-09-18 RX ADMIN — POTASSIUM BICARBONATE 40 MEQ: 782 TABLET, EFFERVESCENT ORAL at 12:58

## 2023-09-18 RX ADMIN — FUROSEMIDE 40 MG: 10 INJECTION, SOLUTION INTRAMUSCULAR; INTRAVENOUS at 13:00

## 2023-09-18 RX ADMIN — IPRATROPIUM BROMIDE AND ALBUTEROL SULFATE 1 DOSE: 2.5; .5 SOLUTION RESPIRATORY (INHALATION) at 15:56

## 2023-09-18 RX ADMIN — METHYLPREDNISOLONE SODIUM SUCCINATE 40 MG: 40 INJECTION INTRAMUSCULAR; INTRAVENOUS at 20:24

## 2023-09-18 RX ADMIN — AMLODIPINE BESYLATE 5 MG: 5 TABLET ORAL at 20:24

## 2023-09-18 RX ADMIN — IPRATROPIUM BROMIDE AND ALBUTEROL SULFATE 1 DOSE: 2.5; .5 SOLUTION RESPIRATORY (INHALATION) at 09:46

## 2023-09-18 RX ADMIN — POTASSIUM CHLORIDE 10 MEQ: 7.46 INJECTION, SOLUTION INTRAVENOUS at 13:13

## 2023-09-18 RX ADMIN — HYDROCORTISONE SODIUM SUCCINATE 200 MG: 100 INJECTION, POWDER, FOR SOLUTION INTRAMUSCULAR; INTRAVENOUS at 10:28

## 2023-09-18 RX ADMIN — ENOXAPARIN SODIUM 40 MG: 100 INJECTION SUBCUTANEOUS at 22:43

## 2023-09-18 RX ADMIN — IPRATROPIUM BROMIDE AND ALBUTEROL SULFATE 1 DOSE: 2.5; .5 SOLUTION RESPIRATORY (INHALATION) at 09:48

## 2023-09-18 RX ADMIN — IPRATROPIUM BROMIDE AND ALBUTEROL SULFATE 1 DOSE: 2.5; .5 SOLUTION RESPIRATORY (INHALATION) at 09:47

## 2023-09-18 RX ADMIN — LEVOFLOXACIN 750 MG: 5 INJECTION, SOLUTION INTRAVENOUS at 20:33

## 2023-09-18 RX ADMIN — METOPROLOL SUCCINATE 12.5 MG: 25 TABLET, EXTENDED RELEASE ORAL at 20:24

## 2023-09-18 RX ADMIN — HYDROCORTISONE SODIUM SUCCINATE 200 MG: 100 INJECTION, POWDER, FOR SOLUTION INTRAMUSCULAR; INTRAVENOUS at 11:42

## 2023-09-18 RX ADMIN — ASPIRIN 81 MG: 81 TABLET, COATED ORAL at 20:24

## 2023-09-18 ASSESSMENT — PAIN - FUNCTIONAL ASSESSMENT: PAIN_FUNCTIONAL_ASSESSMENT: NONE - DENIES PAIN

## 2023-09-18 NOTE — ED PROVIDER NOTES
[facility]        Pt Name: Dick Acosta  MRN: [id number]  [facility] 1945  Date of evaluation: 9/18/2023  Provider: Ciera Ortega DO  PCP: MISHA Kulkarni CNP  Note Started: 9:30 AM EDT 9/18/23    CHIEF COMPLAINT       Chief Complaint   Patient presents with    Shortness of Breath    Cough       HISTORY OF PRESENT ILLNESS: 1 or more Elements            Dick Acosta is a 77 y.o. female with history of COPD, known bronchiectasis in the right lung, presents to the ER for complaint of shortness of breath started today. Patient daughter states that he found her hypoxic in the 46s at home even on her normal 5 L nasal cannula. Patient was bumped up to 10 L by EMS, given 2 and DuoNeb treatments in route. Patient states she still feels short of breath with increased work of breathing. Patient denies any chest pain, headache, dizziness, fevers, chills, recent cough or congestion. No abdominal pain, nausea, vomiting. Patient denies any recent travels or sick contacts. No leg swelling, no recent medication changes. Nursing Notes were all reviewed and agreed with or any disagreements were addressed in the HPI. REVIEW OF EXTERNAL NOTE :       Records reviewed for medical history, surgical history, medications. Chart Review/External Note Review    Last Echo reviewed by Me:  Lab Results   Component Value Date    LVEF 60 09/11/2018    LVEFMODE Echo 07/05/2018             Controlled Substance Monitoring:    Acute and Chronic Pain Monitoring:        No data to display                    REVIEW OF SYSTEMS :      Positives and Pertinent negatives as per HPI.      SURGICAL HISTORY     Past Surgical History:   Procedure Laterality Date    BREAST LUMPECTOMY Right     PICC LINE INSERTION NURSE  8/31/2021         [facility] [place] DX LUNGS/PERICAR/MED/PLEURAL SPACE W/O BX Left 9/12/2018    LEFT VIDEO ASSISTED THORACOSCOPY with patient, consults, disposition:          ED Course as of 23 1642   Mon Sep 18, 2023   1037 EC  Ecg read by me  CALLIE hr 80  Matthew lateral old infarct  No acute ischemia stable axis and qtc [KEM]      ED Course User Index  [KEM] Artis Medina DO       Medical Decision Making  Amount and/or Complexity of Data Reviewed  Labs: ordered. Radiology: ordered. ECG/medicine tests: ordered. Risk  OTC drugs. Prescription drug management. Decision regarding hospitalization. 79-year-old female with history of COPD, remote history of PE, hypertension, cardiomyopathy, hyperlipidemia, known history of bronchiectasis, presents to the ER for complaint of shortness of breath that started this morning. Patient was found to be hypoxic in the 50%. Patient normally wears 5 L of oxygen nasal cannula at home. Patient was increased to 10 L by EMS in route given 2 DuoNeb treatments. Patient denies any fevers, chills, chest pain, abdominal pain, vomiting. Patient does endorse some nausea, as well as a chronic cough. No sick contacts, no recent travel. On exam patient is afebrile, hypoxic at 54% on initial presentation. Patient with increased work of breathing. Her lung exam with no evidence of wheezes, rhonchi, stridor. Differential diagnosis includes not limited to COPD exacerbation, pneumonia, pleural effusions, heart failure, pneumothorax. BiPAP considered for patient's shortness of breath. Given initial ABG showing no evidence of hypercapnia, PO2 of 162,, patient felt to be suitable for high flow nasal cannula. Will order patient additional DuoNeb treatments as well as magnesium. Will order CBC to assess for evidence of leukocytosis or anemia. Will order CMP to assess for evidence of electrolyte abnormality, lipase to rule out pancreatitis, will order patient a septic work-up due to her known bronchiectasis primarily in the left lung. Will order procalcitonin, blood cultures, lactic acid.

## 2023-09-18 NOTE — PROGRESS NOTES
Pharmacy Note    This patient was ordered alendronate (Fosamax). [facility], this medication is non-formulary and not stocked by pharmacy for the reason indicated below. The medication can be reordered at discharge.      Medications in which risks outweigh benefits during hospitalization:           -  oral bisphosphonates         -  raloxifene (Evista)        -  SGLT2 inhibitors (ordered in the hospital for an indication other than heart failure or chronic kidney disease)    Medications that lack necessity during an acute hospital stay:        -  nasal antihistamines        -  nasal ipratropium 0.03% and 0.06%        -  nasal miacalcin        -  acyclovir topical cream/ointment orders for herpes labialis (cold sores)     Stanley Vergara, PharmD  09/18/23 3:39 PM

## 2023-09-18 NOTE — H&P
[address] [place], [place] [address]ist Group   History and Physical      CHIEF COMPLAINT:  SOB    History of Present Illness:  77 y.o. female with a history of COPD, HLD, HTN presents with increased SOB. Chronically wears 5L NC at home. Increased to 10 L NC per EMS. Received DuoNeb's x2 on route. Notes nausea with chronic cough. Denies any ill contacts or recent travel. Upon presentation to ED hypoxia noted at 54%. Patient states increased shortness of breath beginning approximately Thursday evening. She denies any fevers, chills, N/V/D, CP. Patient received Solu-Cortef 200 mg IV x2, Benadryl 50 mg IV, furosemide 40 mg IV, DuoNeb x3, magnesium sulfate 2000 mg IV, Effer-K 40 mEq x 1 and potassium chloride 10 mEq IV in ED course. Decision to admit patient for further evaluation and treatment. Informant(s) for H&P: Patient and EMR    REVIEW OF SYSTEMS:  Admits to Increased SOB. Denies fevers, chills, cp,  n/v, ha, vision/hearing changes, wt changes, hot/cold flashes, other open skin lesions, diarrhea, constipation, dysuria/hematuria unless noted in HPI. Complete ROS performed with the patient and is otherwise negative. PMH:  Past Medical History:   Diagnosis Date    COPD (chronic obstructive pulmonary disease) ([address])     Hyperlipidemia 9/14/2022    Hypertension     Migraines     MRSA (methicillin resistant staph aureus) culture positive     Pneumonia     Ulcer        Surgical History:  Past Surgical History:   Procedure Laterality Date    BREAST LUMPECTOMY Right     PICC LINE INSERTION NURSE  8/31/2021         [facility] DX LUNGS/PERICAR/MED/PLEURAL SPACE W/O BX Left 9/12/2018    LEFT VIDEO ASSISTED THORACOSCOPY STAPLING OF BLEBS TALC PLEURODESIS WITH PLEURAL STRIPPING POSSIBLE THORACOTOMY performed by Ana Lawrence MD at [address] [place] [address]         Medications Prior to Admission:    Prior to Admission medications    Medication Sig Start Date End Date Taking?  Authorizing Provider crackles and diffuse wheezing  Heart: RRR, no murmur   Abdomen: soft nontender nondistended positive bowel sounds. Extremities: full range of motion no peripheral edema. Impression:  Principal Problem:    COPD exacerbation ([address])  Active Problems:    Acute on chronic respiratory failure with hypoxia (HCC)  Resolved Problems:    * No resolved hospital problems. *        My findings/plan include:     Acute on chronic hypoxic respiratory failure - Patient is normally on 5 L NC, currently on 7 L NC. ProBNP is not significantly elevated. Viral respiratory panel is negative. Procalcitinon is unremarkable  COPD exacerbation - Continue solumedrol, breathing treatments, and levaquin. Hypokalemia - Normal magnesium levels. Will treat with Kdur 40 mEq po BID times 2. Check levels in the AM     NOTE: This report was transcribed using voice recognition software. Every effort was made to ensure accuracy; however, inadvertent computerized transcription errors may be present.      Electronically signed by Ángela Jackson DO on 9/18/2023 at 4:08 PM

## 2023-09-18 NOTE — ED NOTES
Spoke to lab, initial coag tube rejected due to insufficient quantity in tube.  Reordered     Channing Rios RN  09/18/23 [id number]

## 2023-09-19 PROBLEM — J96.01 ACUTE RESPIRATORY FAILURE WITH HYPOXIA (HCC): Status: ACTIVE | Noted: 2022-09-14

## 2023-09-19 LAB
ANION GAP SERPL CALCULATED.3IONS-SCNC: 14 MMOL/L (ref 7–16)
BASOPHILS # BLD: 0.01 K/UL (ref 0–0.2)
BASOPHILS NFR BLD: 0 % (ref 0–2)
BUN SERPL-MCNC: 17 MG/DL (ref 6–23)
CALCIUM SERPL-MCNC: 8.4 MG/DL (ref 8.6–10.2)
CHLORIDE SERPL-SCNC: 95 MMOL/L (ref 98–107)
CO2 SERPL-SCNC: 32 MMOL/L (ref 22–29)
CREAT SERPL-MCNC: 0.8 MG/DL (ref 0.5–1)
EOSINOPHIL # BLD: 0 K/UL (ref 0.05–0.5)
EOSINOPHILS RELATIVE PERCENT: 0 % (ref 0–6)
ERYTHROCYTE [DISTWIDTH] IN BLOOD BY AUTOMATED COUNT: 14.8 % (ref 11.5–15)
GFR SERPL CREATININE-BSD FRML MDRD: >60 ML/MIN/1.73M2
GLUCOSE SERPL-MCNC: 131 MG/DL (ref 74–99)
HCT VFR BLD AUTO: 32.5 % (ref 34–48)
HGB BLD-MCNC: 10.5 G/DL (ref 11.5–15.5)
IMM GRANULOCYTES # BLD AUTO: 0.03 K/UL (ref 0–0.58)
IMM GRANULOCYTES NFR BLD: 1 % (ref 0–5)
LYMPHOCYTES NFR BLD: 0.67 K/UL (ref 1.5–4)
LYMPHOCYTES RELATIVE PERCENT: 13 % (ref 20–42)
MCH RBC QN AUTO: 26.9 PG (ref 26–35)
MCHC RBC AUTO-ENTMCNC: 32.3 G/DL (ref 32–34.5)
MCV RBC AUTO: 83.1 FL (ref 80–99.9)
MONOCYTES NFR BLD: 0.2 K/UL (ref 0.1–0.95)
MONOCYTES NFR BLD: 4 % (ref 2–12)
NEUTROPHILS NFR BLD: 83 % (ref 43–80)
NEUTS SEG NFR BLD: 4.39 K/UL (ref 1.8–7.3)
PLATELET # BLD AUTO: 245 K/UL (ref 130–450)
PMV BLD AUTO: 9.5 FL (ref 7–12)
POTASSIUM SERPL-SCNC: 3.6 MMOL/L (ref 3.5–5)
RBC # BLD AUTO: 3.91 M/UL (ref 3.5–5.5)
SODIUM SERPL-SCNC: 141 MMOL/L (ref 132–146)
WBC OTHER # BLD: 5.3 K/UL (ref 4.5–11.5)

## 2023-09-19 PROCEDURE — 94640 AIRWAY INHALATION TREATMENT: CPT

## 2023-09-19 PROCEDURE — 6370000000 HC RX 637 (ALT 250 FOR IP): Performed by: INTERNAL MEDICINE

## 2023-09-19 PROCEDURE — 6360000002 HC RX W HCPCS: Performed by: INTERNAL MEDICINE

## 2023-09-19 PROCEDURE — 2580000003 HC RX 258

## 2023-09-19 PROCEDURE — 94667 MNPJ CHEST WALL 1ST: CPT

## 2023-09-19 PROCEDURE — 2580000003 HC RX 258: Performed by: INTERNAL MEDICINE

## 2023-09-19 PROCEDURE — 85025 COMPLETE CBC W/AUTO DIFF WBC: CPT

## 2023-09-19 PROCEDURE — 36415 COLL VENOUS BLD VENIPUNCTURE: CPT

## 2023-09-19 PROCEDURE — 1200000000 HC SEMI PRIVATE

## 2023-09-19 PROCEDURE — 87205 SMEAR GRAM STAIN: CPT

## 2023-09-19 PROCEDURE — 6360000002 HC RX W HCPCS

## 2023-09-19 PROCEDURE — 2060000000 HC ICU INTERMEDIATE R&B

## 2023-09-19 PROCEDURE — 87070 CULTURE OTHR SPECIMN AEROBIC: CPT

## 2023-09-19 PROCEDURE — 80048 BASIC METABOLIC PNL TOTAL CA: CPT

## 2023-09-19 PROCEDURE — 99233 SBSQ HOSP IP/OBS HIGH 50: CPT | Performed by: INTERNAL MEDICINE

## 2023-09-19 RX ORDER — WATER 1000 ML/1000ML
1 INJECTION, SOLUTION INTRAVENOUS 2 TIMES DAILY
Status: DISCONTINUED | OUTPATIENT
Start: 2023-09-19 | End: 2023-09-21 | Stop reason: HOSPADM

## 2023-09-19 RX ORDER — BUDESONIDE 0.25 MG/2ML
500 INHALANT ORAL
Status: DISCONTINUED | OUTPATIENT
Start: 2023-09-19 | End: 2023-09-21 | Stop reason: HOSPADM

## 2023-09-19 RX ORDER — METHYLPREDNISOLONE SODIUM SUCCINATE 40 MG/ML
40 INJECTION, POWDER, LYOPHILIZED, FOR SOLUTION INTRAMUSCULAR; INTRAVENOUS 2 TIMES DAILY
Status: DISCONTINUED | OUTPATIENT
Start: 2023-09-19 | End: 2023-09-21 | Stop reason: HOSPADM

## 2023-09-19 RX ORDER — WATER 1000 ML/1000ML
INJECTION, SOLUTION INTRAVENOUS
Status: COMPLETED
Start: 2023-09-19 | End: 2023-09-19

## 2023-09-19 RX ORDER — WATER 1000 ML/1000ML
1 INJECTION, SOLUTION INTRAVENOUS 2 TIMES DAILY
Status: DISCONTINUED | OUTPATIENT
Start: 2023-09-19 | End: 2023-09-19 | Stop reason: SDUPTHER

## 2023-09-19 RX ORDER — BENZONATATE 100 MG/1
100 CAPSULE ORAL 3 TIMES DAILY PRN
Status: DISCONTINUED | OUTPATIENT
Start: 2023-09-19 | End: 2023-09-21 | Stop reason: HOSPADM

## 2023-09-19 RX ADMIN — LEVOFLOXACIN 750 MG: 5 INJECTION, SOLUTION INTRAVENOUS at 20:06

## 2023-09-19 RX ADMIN — METHYLPREDNISOLONE SODIUM SUCCINATE 40 MG: 40 INJECTION INTRAMUSCULAR; INTRAVENOUS at 19:57

## 2023-09-19 RX ADMIN — SODIUM CHLORIDE, PRESERVATIVE FREE 10 ML: 5 INJECTION INTRAVENOUS at 19:59

## 2023-09-19 RX ADMIN — BUDESONIDE 250 MCG: 0.25 SUSPENSION RESPIRATORY (INHALATION) at 07:55

## 2023-09-19 RX ADMIN — ARFORMOTEROL TARTRATE 15 MCG: 15 SOLUTION RESPIRATORY (INHALATION) at 07:55

## 2023-09-19 RX ADMIN — ASPIRIN 81 MG: 81 TABLET, COATED ORAL at 19:59

## 2023-09-19 RX ADMIN — DULOXETINE HYDROCHLORIDE 30 MG: 30 CAPSULE, DELAYED RELEASE ORAL at 19:58

## 2023-09-19 RX ADMIN — ATORVASTATIN CALCIUM 20 MG: 20 TABLET, FILM COATED ORAL at 19:58

## 2023-09-19 RX ADMIN — FERROUS SULFATE TAB 325 MG (65 MG ELEMENTAL FE) 325 MG: 325 (65 FE) TAB at 19:58

## 2023-09-19 RX ADMIN — BUDESONIDE 500 MCG: 0.25 SUSPENSION RESPIRATORY (INHALATION) at 19:15

## 2023-09-19 RX ADMIN — IPRATROPIUM BROMIDE AND ALBUTEROL SULFATE 1 DOSE: 2.5; .5 SOLUTION RESPIRATORY (INHALATION) at 11:33

## 2023-09-19 RX ADMIN — LOSARTAN POTASSIUM 100 MG: 50 TABLET, FILM COATED ORAL at 08:31

## 2023-09-19 RX ADMIN — IPRATROPIUM BROMIDE AND ALBUTEROL SULFATE 1 DOSE: 2.5; .5 SOLUTION RESPIRATORY (INHALATION) at 07:55

## 2023-09-19 RX ADMIN — AMLODIPINE BESYLATE 5 MG: 5 TABLET ORAL at 19:58

## 2023-09-19 RX ADMIN — IPRATROPIUM BROMIDE AND ALBUTEROL SULFATE 1 DOSE: 2.5; .5 SOLUTION RESPIRATORY (INHALATION) at 15:48

## 2023-09-19 RX ADMIN — PANTOPRAZOLE SODIUM 40 MG: 40 TABLET, DELAYED RELEASE ORAL at 05:59

## 2023-09-19 RX ADMIN — WATER 1 ML: 1 INJECTION INTRAMUSCULAR; INTRAVENOUS; SUBCUTANEOUS at 19:59

## 2023-09-19 RX ADMIN — METHYLPREDNISOLONE SODIUM SUCCINATE 40 MG: 40 INJECTION INTRAMUSCULAR; INTRAVENOUS at 08:31

## 2023-09-19 RX ADMIN — ARFORMOTEROL TARTRATE 15 MCG: 15 SOLUTION RESPIRATORY (INHALATION) at 19:14

## 2023-09-19 RX ADMIN — METOPROLOL SUCCINATE 12.5 MG: 25 TABLET, EXTENDED RELEASE ORAL at 19:59

## 2023-09-19 RX ADMIN — HYDROCHLOROTHIAZIDE 12.5 MG: 12.5 TABLET ORAL at 08:31

## 2023-09-19 RX ADMIN — SODIUM CHLORIDE, PRESERVATIVE FREE 10 ML: 5 INJECTION INTRAVENOUS at 08:32

## 2023-09-19 RX ADMIN — IPRATROPIUM BROMIDE AND ALBUTEROL SULFATE 1 DOSE: 2.5; .5 SOLUTION RESPIRATORY (INHALATION) at 19:15

## 2023-09-19 RX ADMIN — WATER 10 ML: 1 INJECTION INTRAMUSCULAR; INTRAVENOUS; SUBCUTANEOUS at 08:32

## 2023-09-19 RX ADMIN — ENOXAPARIN SODIUM 40 MG: 100 INJECTION SUBCUTANEOUS at 08:32

## 2023-09-19 NOTE — PROGRESS NOTES
4 Eyes Skin Assessment     NAME:  London Sidhu  YOB: 1945  MEDICAL RECORD NUMBER:  [id number]    The patient is being assessed for  Admission    I agree that at least one RN has performed a thorough Head to Toe Skin Assessment on the patient. ALL assessment sites listed below have been assessed. Areas assessed by both nurses:    Head, Face, Ears, Shoulders, Back, Chest, Arms, Elbows, Hands, Sacrum. Buttock, Coccyx, Ischium, Legs. Feet and Heels, and Under Medical Devices         Does the Patient have a Wound?  No noted wound(s)       Mio Prevention initiated by RN: No  Wound Care Orders initiated by RN: No    Pressure Injury (Stage 3,4, Unstageable, DTI, NWPT, and Complex wounds) if present, place Wound referral order by RN under : No    New Ostomies, if present place, Ostomy referral order under : No     Nurse 1 eSignature: Electronically signed by Didier Ambrose RN on 9/18/23 at 9:45 PM EDT    **SHARE this note so that the co-signing nurse can place an eSignature**    Nurse 2 eSignature: Electronically signed by Geri Obando RN on 9/18/23 at 9:47 PM EDT

## 2023-09-19 NOTE — CONSULTS
Pulmonary Consultation    Admit Date: 9/18/2023  Requesting Physician: MISHA Ferguson CNP      Reason for consultation:  Bronchiectasis, acute respiratory failure        HPI:  Patient is a 77year old female who presents to emergency room after three days of increasing shortness of breath. She states when her daughter was visiting her that her oxygen saturations went as low as 52%, prompting her to call EMS. Patient does admit to a fever of 99.9 one time and some chills at home. She does have a daily productive cough of yellow secretions. Respiratory panel was negative and no leukocytosis, however inflammatory markers are elevated. CT imaging shows chronic changes. She was placed on increased supplemental oxygen, aerosols, intravenous antibiotics, diuretics, and steroids. Patient has known COPD and bronchiectasis. She has nebulizer, supplemental oxygen, and chest vest at home. Patient admits that she was not using her chest vest the last three days. Patient does have a cough that is productive of yellow sputum. She denies any current fevers or chills. PMH:    Past Medical History:   Diagnosis Date    COPD (chronic obstructive pulmonary disease) ([address])     Hyperlipidemia 9/14/2022    Hypertension     Migraines     MRSA (methicillin resistant staph aureus) culture positive     Pneumonia     Ulcer        PSH:   Past Surgical History:   Procedure Laterality Date    BREAST LUMPECTOMY Right     PICC LINE INSERTION NURSE  8/31/2021         [place] DX LUNGS/PERICAR/MED/PLEURAL SPACE W/O BX Left 9/12/2018    LEFT VIDEO ASSISTED THORACOSCOPY STAPLING OF BLEBS TALC PLEURODESIS WITH PLEURAL STRIPPING POSSIBLE THORACOTOMY performed by Jace Farrell MD at [facility] [address] [place] [address]         Review of Systems:    Constitutional: As noted in the HPI. Eyes: No visual changes or diplopia. No scleral icterus. ENT: No headaches, hearing loss or vertigo.  (+) nasal congestion, no sore Result   1. No acute pulmonary emboli. 2.  No aneurysm formation or dissection of thoracic aorta. 3.  Chronic mediastinal adenopathy more prominent the recent examination   particular when comparison with study of June 2021. They can be reactive   lymph nodes from the chronic changes throughout both lung parenchyma. 4.  Longstanding chronic findings in the lung parenchyma as above commented   with cylindrical bronchiectasis towards the lower lobes more on the   right-side. 5.  There is a chronic area of fibrosis and retraction and traction   bronchiectasis in the right upper lobe causing compensatory hyperexpansion   particular in the right middle lobe but also in the right lower lobe. 6.  Development of an area of a consolidation with loss of volume/atelectasis   and traction bronchiectasis in the superior segment of the right lower lobe   that was not present on the study April 2023. 7.  More accentuated reticular interstitial changes in the left lower lobe   since the study of April 2023.      8.  Can consider consultation with pulmonary medicine for management and   follow-up study. XR CHEST PORTABLE   Final Result   Pulmonary parenchymal scarring and emphysema as before. No clear interval   change. Assessment:  COPD with exacerbation. Bronchiectasis. Recurrent infections with pseudomonas. Acute on chronic respiratory failure with hypoxia. Resolving. Plan:  Add chest vest  Increase budesonide dose  Continue LABA and ipratropium-albuterol  Wean supplemental oxygen at home. Her baseline is 5L. SpO2 goal of 88%-92%. Incentive spirometer      Thanks for letting us see this patient in consultation. Total time in reviewing the previous admissions and records, reviewing the current x-rays, labs, and discussing with clinical staff including nursing and physicians, exceeded 50 minutes. Please contact us with any questions.  Office [phone] or

## 2023-09-19 NOTE — PLAN OF CARE
Problem: Discharge Planning  Goal: Discharge to home or other facility with appropriate resources  Outcome: Progressing     Problem: Respiratory - Adult  Goal: Achieves optimal ventilation and oxygenation  Outcome: Progressing     Problem: Skin/Tissue Integrity - Adult  Goal: Skin integrity remains intact  Outcome: Progressing

## 2023-09-20 LAB
ANION GAP SERPL CALCULATED.3IONS-SCNC: 10 MMOL/L (ref 7–16)
BASOPHILS # BLD: 0 K/UL (ref 0–0.2)
BASOPHILS NFR BLD: 0 % (ref 0–2)
BUN SERPL-MCNC: 22 MG/DL (ref 6–23)
CALCIUM SERPL-MCNC: 8.4 MG/DL (ref 8.6–10.2)
CHLORIDE SERPL-SCNC: 94 MMOL/L (ref 98–107)
CO2 SERPL-SCNC: 32 MMOL/L (ref 22–29)
CREAT SERPL-MCNC: 0.9 MG/DL (ref 0.5–1)
EOSINOPHIL # BLD: 0 K/UL (ref 0.05–0.5)
EOSINOPHILS RELATIVE PERCENT: 0 % (ref 0–6)
ERYTHROCYTE [DISTWIDTH] IN BLOOD BY AUTOMATED COUNT: 15 % (ref 11.5–15)
GFR SERPL CREATININE-BSD FRML MDRD: >60 ML/MIN/1.73M2
GLUCOSE SERPL-MCNC: 163 MG/DL (ref 74–99)
HCT VFR BLD AUTO: 31.1 % (ref 34–48)
HGB BLD-MCNC: 9.9 G/DL (ref 11.5–15.5)
LYMPHOCYTES NFR BLD: 0.53 K/UL (ref 1.5–4)
LYMPHOCYTES RELATIVE PERCENT: 10 % (ref 20–42)
MAGNESIUM SERPL-MCNC: 2.3 MG/DL (ref 1.6–2.6)
MCH RBC QN AUTO: 26.6 PG (ref 26–35)
MCHC RBC AUTO-ENTMCNC: 31.8 G/DL (ref 32–34.5)
MCV RBC AUTO: 83.6 FL (ref 80–99.9)
MONOCYTES NFR BLD: 0 % (ref 2–12)
MONOCYTES NFR BLD: 0 K/UL (ref 0.1–0.95)
NEUTROPHILS NFR BLD: 90 % (ref 43–80)
NEUTS SEG NFR BLD: 4.97 K/UL (ref 1.8–7.3)
NUCLEATED RED BLOOD CELLS: 1 PER 100 WBC
PLATELET # BLD AUTO: 249 K/UL (ref 130–450)
PMV BLD AUTO: 9.7 FL (ref 7–12)
POTASSIUM SERPL-SCNC: 3.2 MMOL/L (ref 3.5–5)
RBC # BLD AUTO: 3.72 M/UL (ref 3.5–5.5)
RBC # BLD: ABNORMAL 10*6/UL
SODIUM SERPL-SCNC: 136 MMOL/L (ref 132–146)
WBC OTHER # BLD: 5.5 K/UL (ref 4.5–11.5)

## 2023-09-20 PROCEDURE — 1200000000 HC SEMI PRIVATE

## 2023-09-20 PROCEDURE — 2580000003 HC RX 258

## 2023-09-20 PROCEDURE — 6370000000 HC RX 637 (ALT 250 FOR IP): Performed by: INTERNAL MEDICINE

## 2023-09-20 PROCEDURE — 2700000000 HC OXYGEN THERAPY PER DAY

## 2023-09-20 PROCEDURE — 83735 ASSAY OF MAGNESIUM: CPT

## 2023-09-20 PROCEDURE — 6360000002 HC RX W HCPCS: Performed by: INTERNAL MEDICINE

## 2023-09-20 PROCEDURE — 36415 COLL VENOUS BLD VENIPUNCTURE: CPT

## 2023-09-20 PROCEDURE — 94640 AIRWAY INHALATION TREATMENT: CPT

## 2023-09-20 PROCEDURE — 87040 BLOOD CULTURE FOR BACTERIA: CPT

## 2023-09-20 PROCEDURE — 94668 MNPJ CHEST WALL SBSQ: CPT

## 2023-09-20 PROCEDURE — 80048 BASIC METABOLIC PNL TOTAL CA: CPT

## 2023-09-20 PROCEDURE — 2060000000 HC ICU INTERMEDIATE R&B

## 2023-09-20 PROCEDURE — 6370000000 HC RX 637 (ALT 250 FOR IP): Performed by: NURSE PRACTITIONER

## 2023-09-20 PROCEDURE — 6360000002 HC RX W HCPCS

## 2023-09-20 PROCEDURE — 85025 COMPLETE CBC W/AUTO DIFF WBC: CPT

## 2023-09-20 PROCEDURE — 99232 SBSQ HOSP IP/OBS MODERATE 35: CPT | Performed by: INTERNAL MEDICINE

## 2023-09-20 PROCEDURE — 2580000003 HC RX 258: Performed by: INTERNAL MEDICINE

## 2023-09-20 RX ORDER — POTASSIUM CHLORIDE 20 MEQ/1
40 TABLET, EXTENDED RELEASE ORAL ONCE
Status: COMPLETED | OUTPATIENT
Start: 2023-09-20 | End: 2023-09-20

## 2023-09-20 RX ADMIN — ARFORMOTEROL TARTRATE 15 MCG: 15 SOLUTION RESPIRATORY (INHALATION) at 22:14

## 2023-09-20 RX ADMIN — BUDESONIDE 500 MCG: 0.25 SUSPENSION RESPIRATORY (INHALATION) at 22:14

## 2023-09-20 RX ADMIN — METHYLPREDNISOLONE SODIUM SUCCINATE 40 MG: 40 INJECTION INTRAMUSCULAR; INTRAVENOUS at 07:50

## 2023-09-20 RX ADMIN — SODIUM CHLORIDE, PRESERVATIVE FREE 10 ML: 5 INJECTION INTRAVENOUS at 07:50

## 2023-09-20 RX ADMIN — ENOXAPARIN SODIUM 40 MG: 100 INJECTION SUBCUTANEOUS at 07:49

## 2023-09-20 RX ADMIN — ASPIRIN 81 MG: 81 TABLET, COATED ORAL at 20:33

## 2023-09-20 RX ADMIN — DIAZEPAM 10 MG: 5 TABLET ORAL at 20:34

## 2023-09-20 RX ADMIN — FERROUS SULFATE TAB 325 MG (65 MG ELEMENTAL FE) 325 MG: 325 (65 FE) TAB at 20:34

## 2023-09-20 RX ADMIN — BUDESONIDE 500 MCG: 0.25 SUSPENSION RESPIRATORY (INHALATION) at 08:46

## 2023-09-20 RX ADMIN — LEVOFLOXACIN 750 MG: 500 TABLET, FILM COATED ORAL at 09:04

## 2023-09-20 RX ADMIN — PANTOPRAZOLE SODIUM 40 MG: 40 TABLET, DELAYED RELEASE ORAL at 05:28

## 2023-09-20 RX ADMIN — AMLODIPINE BESYLATE 5 MG: 5 TABLET ORAL at 20:34

## 2023-09-20 RX ADMIN — WATER 1 ML: 1 INJECTION INTRAMUSCULAR; INTRAVENOUS; SUBCUTANEOUS at 20:34

## 2023-09-20 RX ADMIN — METOPROLOL SUCCINATE 12.5 MG: 25 TABLET, EXTENDED RELEASE ORAL at 20:34

## 2023-09-20 RX ADMIN — METHYLPREDNISOLONE SODIUM SUCCINATE 40 MG: 40 INJECTION INTRAMUSCULAR; INTRAVENOUS at 20:34

## 2023-09-20 RX ADMIN — HYDROCHLOROTHIAZIDE 12.5 MG: 12.5 TABLET ORAL at 07:50

## 2023-09-20 RX ADMIN — ARFORMOTEROL TARTRATE 15 MCG: 15 SOLUTION RESPIRATORY (INHALATION) at 08:46

## 2023-09-20 RX ADMIN — IPRATROPIUM BROMIDE AND ALBUTEROL SULFATE 1 DOSE: 2.5; .5 SOLUTION RESPIRATORY (INHALATION) at 12:32

## 2023-09-20 RX ADMIN — IPRATROPIUM BROMIDE AND ALBUTEROL SULFATE 1 DOSE: 2.5; .5 SOLUTION RESPIRATORY (INHALATION) at 22:14

## 2023-09-20 RX ADMIN — ATORVASTATIN CALCIUM 20 MG: 20 TABLET, FILM COATED ORAL at 20:34

## 2023-09-20 RX ADMIN — IPRATROPIUM BROMIDE AND ALBUTEROL SULFATE 1 DOSE: 2.5; .5 SOLUTION RESPIRATORY (INHALATION) at 08:46

## 2023-09-20 RX ADMIN — LOSARTAN POTASSIUM 100 MG: 50 TABLET, FILM COATED ORAL at 07:50

## 2023-09-20 RX ADMIN — DULOXETINE HYDROCHLORIDE 30 MG: 30 CAPSULE, DELAYED RELEASE ORAL at 20:33

## 2023-09-20 RX ADMIN — SODIUM CHLORIDE, PRESERVATIVE FREE 10 ML: 5 INJECTION INTRAVENOUS at 20:40

## 2023-09-20 RX ADMIN — POTASSIUM CHLORIDE 40 MEQ: 1500 TABLET, EXTENDED RELEASE ORAL at 09:05

## 2023-09-20 RX ADMIN — WATER 1 ML: 1 INJECTION INTRAMUSCULAR; INTRAVENOUS; SUBCUTANEOUS at 07:50

## 2023-09-20 ASSESSMENT — PAIN SCALES - GENERAL
PAINLEVEL_OUTOF10: 0
PAINLEVEL_OUTOF10: 8
PAINLEVEL_OUTOF10: 0

## 2023-09-20 NOTE — ACP (ADVANCE CARE PLANNING)
Advance Care Planning   Healthcare Decision Maker:    Primary Decision Maker: Nicholas Perez Child - [phone]    Secondary Decision Maker: Ellison Bones \"Edgard\" - Spouse - [phone]    Click here to complete Healthcare Decision Makers including selection of the Healthcare Decision Maker Relationship (ie \"Primary\").

## 2023-09-20 NOTE — PROGRESS NOTES
Notification of IV to PO conversion: This patient's order for levofloxacin IV has been changed to PO as approved by the Pharmacy & Therapeutics and Medical Executive Committees. If the patient should become strict NPO while on this therapy, contact the prescriber for further orders.

## 2023-09-20 NOTE — CARE COORDINATION
Social Work:    Mrs. Last was admitted due to shortness of breath. She has a history of COPD & bronchiectasis/right lung. Social service met with Mrs. Berhane Park), advised her about social service &  roles, as well as discussed discharge planning. Felipa Park is a patient of Elaina Pereira NP and she uses [address] in [place]. She resides independently with her  in a ranch home (3-entry steps HR on Right) tub/shower & walk-in shower set up and has home 02 (5 liters) & nebulizer from [address] [place] [address], as well as a chest vest.  Felipa Park has used [facility]. She presently does not feel she will need home care and advises that her daughter Sage Montenegro, who resides next door to her, is a flight RN for [employer]. Social service to continue to follow should needs arise.     Electronically signed by DAKOTA Avery on 9/20/2023 at 3:56 PM

## 2023-09-20 NOTE — PROGRESS NOTES
[facility]  [facility], [address] [place] [address]  [phone]      Medical Progress Note      NAME:         Kb Stuart   :        1936  MRM:        [id number]    Date:          2017      Subjective: Patient has been seen and examined as a follow up for GI bleeding. Chart, labs, diagnostics reviewed. He says he has not had a recurrence. No abdominal pain, nausea or vomiting. Feels weak though. Objective:    Vital Signs:    Visit Vitals    /67 (BP 1 Location: Right arm, BP Patient Position: At rest)    Pulse 77    Temp 98.3 °F (36.8 °C)    Resp 16    Ht 6' (1.829 m)    Wt 82.2 kg (181 lb 3.5 oz)    SpO2 98%    BMI 24.58 kg/m2        Intake/Output Summary (Last 24 hours) at 17 0176  Last data filed at 17 0257   Gross per 24 hour   Intake                0 ml   Output              300 ml   Net             -300 ml        Physical Examination:    General:   Weak looking elderly male, not in much distress   Eyes:   pink conjunctivae, PERRLA with no discharge. ENT:   no ottorrhea or rhinorrhea with moist mucous membranes  Neck: no masses, thyroid non-tender and trachea central.  Pulm:  no accessory muscle use, clear breath sounds without crackles or wheezes  Card:  no JVD or murmurs, has regular and normal S1, S2 without thrills, bruits or peripheral edema  Abd:  Soft, non-tender, non-distended, normoactive bowel sounds with no palpable organomegaly  Musc:  No cyanosis, clubbing, atrophy or deformities. Skin:  No rashes, bruising or ulcers. Neuro: Awake and alert.  Generally a non focal exam. Follows commands appropriately  Psych:  Has a good insight and is oriented x 3    Current Facility-Administered Medications   Medication Dose Route Frequency    sodium chloride (NS) flush 5-10 mL  5-10 mL IntraVENous Q8H    sodium chloride (NS) flush 5-10 mL  5-10 mL IntraVENous PRN [facility] Progress Note    Admitting Date and Time: 9/18/2023  9:08 AM  Admit Dx:  Bronchiectasis with acute exacerbation (HCC) [J47.1]  COPD exacerbation (HCC) [J44.1]  Acute respiratory failure with hypoxia (HCC) [J96.01]  Acute on chronic respiratory failure with hypoxia (HCC) [J96.21]    Subjective:  Patient is being followed for Bronchiectasis with acute exacerbation ([address]) [J47.1]  COPD exacerbation ([address]) [J44.1]  Acute respiratory failure with hypoxia ([address]) [J96.01]  Acute on chronic respiratory failure with hypoxia (HCC) [J96.21]       Patient awake and alert- seen sitting up in bed in no acute distress  Reporting she is feeling better- feels close to baseline  Up most of the night- she drank coffee late  On 5L NC  Denies sob while walking to the bathroom         ROS: denies fever, chills, cp, sob, n/v, HA unless stated above.      levoFLOXacin  750 mg Oral Daily    methylPREDNISolone  40 mg IntraVENous BID    And    sterile water  1 mL Injection BID    budesonide  500 mcg Nebulization BID RT    amLODIPine  5 mg Oral Nightly    aspirin  81 mg Oral Nightly    atorvastatin  20 mg Oral Nightly    DULoxetine  30 mg Oral Nightly    ferrous sulfate  325 mg Oral Nightly    arformoterol tartrate  15 mcg Nebulization BID RT    metoprolol succinate  12.5 mg Oral Nightly    pantoprazole  40 mg Oral QAM AC    sodium chloride flush  5-40 mL IntraVENous 2 times per day    enoxaparin  40 mg SubCUTAneous Daily    ipratropium 0.5 mg-albuterol 2.5 mg  1 Dose Inhalation Q4H WA RT    losartan  100 mg Oral Daily    And    hydroCHLOROthiazide  12.5 mg Oral Daily     benzonatate, 100 mg, TID PRN  diazePAM, 10 mg, Nightly PRN  albuterol, 2.5 mg, Q6H PRN  sodium chloride flush, 5-40 mL, PRN  sodium chloride, , PRN  ondansetron, 4 mg, Q8H PRN   Or  ondansetron, 4 mg, Q6H PRN  polyethylene glycol, 17 g, Daily PRN  acetaminophen, 650 mg, Q6H PRN   Or  acetaminophen, 650 mg, Q6H PRN         Objective:    BP (!)  ondansetron (ZOFRAN) injection 4 mg  4 mg IntraVENous Q4H PRN    carvedilol (COREG) tablet 6.25 mg  6.25 mg Oral BID WITH MEALS    digoxin (LANOXIN) tablet 0.125 mg  0.125 mg Oral DAILY    mexiletine (MEXITIL) capsule 150 mg  150 mg Oral TID WITH MEALS    amiodarone (CORDARONE) tablet 200 mg  200 mg Oral DAILY    simvastatin (ZOCOR) tablet 20 mg  20 mg Oral QHS        Laboratory data and review:    Recent Labs      05/16/17   0326  05/15/17   1937   WBC  3.2*  3.7*   HGB  10.8*  12.1   HCT  33.9*  39.0   PLT  143*  151     Recent Labs      05/16/17   0326  05/15/17   1937   NA  144  143   K  3.6  3.6   CL  105  102   CO2  34*  35*   GLU  78  114*   BUN  17  20   CREA  1.25  1.46*   CA  8.3*  8.6   ALB  3.0*  3.4*   SGOT  27  35   ALT  27  30   INR  1.5*  1.4*     No components found for: [place]    Other Diagnostics:    Telemetry reviewed: paced rhythm     Assessment and Plan:    Lower GI bleed POA: Hgb dropped slightly. No active bleeding. Has been on Eliquis. Continue to monitor. Awaiting a GI evaluation     Non-ischemic cardiomyopathy / Chronic systolic congestive heart failure, NYHA class 3. Currently asymptomatic. Continue coreg, digoxin, amiodarone and zocor.     Persistent atrial fibrillation: V-paced and stable. Continue Coreg. Hold eliquis pending GI evaluation     HTN (hypertension): BP low normal. Monitor on Coreg      Hyperlipidemia: Continue zocor     CKD (chronic kidney disease) stage 3, GFR 30-59 ml/min: avoiding IVFs in light of low EF.  Monitor      Total time spent for the patient's care: [facility] discussed with: Patient, Family and Nursing Staff    Discussed:  Care Plan    Prophylaxis:  SCD's    Anticipated Disposition:  Home w/Family           ___________________________________________________    Attending Physician:   Héctor Matos MD statin      6. Hypokalemia: suplement       Dispo: DC when ok with pulmonology       Time spent reviewing chart, clinical exam, discussing case and answering questions with staff/consultants/patient/family = 20 minutes             NOTE: This report was transcribed using voice recognition software. Every effort was made to ensure accuracy; however, inadvertent computerized transcription errors may be present.   Electronically signed by KASIE Crump on 9/20/2023 at 8:54 AM

## 2023-09-21 VITALS
WEIGHT: 170.2 LBS | HEART RATE: 78 BPM | BODY MASS INDEX: 27.35 KG/M2 | HEIGHT: 66 IN | SYSTOLIC BLOOD PRESSURE: 122 MMHG | TEMPERATURE: 98.1 F | OXYGEN SATURATION: 93 % | RESPIRATION RATE: 18 BRPM | DIASTOLIC BLOOD PRESSURE: 58 MMHG

## 2023-09-21 LAB
ACB COMPLEX DNA BLD POS QL NAA+NON-PROBE: NOT DETECTED
ANION GAP SERPL CALCULATED.3IONS-SCNC: 10 MMOL/L (ref 7–16)
B FRAGILIS DNA BLD POS QL NAA+NON-PROBE: NOT DETECTED
BASOPHILS # BLD: 0.01 K/UL (ref 0–0.2)
BASOPHILS NFR BLD: 0 % (ref 0–2)
BIOFIRE TEST COMMENT: ABNORMAL
BUN SERPL-MCNC: 24 MG/DL (ref 6–23)
C ALBICANS DNA BLD POS QL NAA+NON-PROBE: NOT DETECTED
C AURIS DNA BLD POS QL NAA+NON-PROBE: NOT DETECTED
C GATTII+NEOFOR DNA BLD POS QL NAA+N-PRB: NOT DETECTED
C GLABRATA DNA BLD POS QL NAA+NON-PROBE: NOT DETECTED
C KRUSEI DNA BLD POS QL NAA+NON-PROBE: NOT DETECTED
C PARAP DNA BLD POS QL NAA+NON-PROBE: NOT DETECTED
C TROPICLS DNA BLD POS QL NAA+NON-PROBE: NOT DETECTED
CALCIUM SERPL-MCNC: 8.7 MG/DL (ref 8.6–10.2)
CHLORIDE SERPL-SCNC: 98 MMOL/L (ref 98–107)
CO2 SERPL-SCNC: 32 MMOL/L (ref 22–29)
CREAT SERPL-MCNC: 0.9 MG/DL (ref 0.5–1)
E CLOAC COMP DNA BLD POS NAA+NON-PROBE: NOT DETECTED
E COLI DNA BLD POS QL NAA+NON-PROBE: NOT DETECTED
E FAECALIS DNA BLD POS QL NAA+NON-PROBE: NOT DETECTED
E FAECIUM DNA BLD POS QL NAA+NON-PROBE: NOT DETECTED
ENTEROBACTERALES DNA BLD POS NAA+N-PRB: NOT DETECTED
EOSINOPHIL # BLD: 0 K/UL (ref 0.05–0.5)
EOSINOPHILS RELATIVE PERCENT: 0 % (ref 0–6)
ERYTHROCYTE [DISTWIDTH] IN BLOOD BY AUTOMATED COUNT: 15.2 % (ref 11.5–15)
GFR SERPL CREATININE-BSD FRML MDRD: >60 ML/MIN/1.73M2
GLUCOSE SERPL-MCNC: 163 MG/DL (ref 74–99)
GP B STREP DNA BLD POS QL NAA+NON-PROBE: NOT DETECTED
HAEM INFLU DNA BLD POS QL NAA+NON-PROBE: NOT DETECTED
HCT VFR BLD AUTO: 31.4 % (ref 34–48)
HGB BLD-MCNC: 9.9 G/DL (ref 11.5–15.5)
IMM GRANULOCYTES # BLD AUTO: 0.11 K/UL (ref 0–0.58)
IMM GRANULOCYTES NFR BLD: 2 % (ref 0–5)
K OXYTOCA DNA BLD POS QL NAA+NON-PROBE: NOT DETECTED
KLEBSIELLA SP DNA BLD POS QL NAA+NON-PRB: NOT DETECTED
KLEBSIELLA SP DNA BLD POS QL NAA+NON-PRB: NOT DETECTED
L MONOCYTOG DNA BLD POS QL NAA+NON-PROBE: NOT DETECTED
LYMPHOCYTES NFR BLD: 0.71 K/UL (ref 1.5–4)
LYMPHOCYTES RELATIVE PERCENT: 15 % (ref 20–42)
MCH RBC QN AUTO: 26.4 PG (ref 26–35)
MCHC RBC AUTO-ENTMCNC: 31.5 G/DL (ref 32–34.5)
MCV RBC AUTO: 83.7 FL (ref 80–99.9)
MECA+MECC ISLT/SPM QL: DETECTED
MICROORGANISM SPEC CULT: ABNORMAL
MICROORGANISM SPEC CULT: ABNORMAL
MICROORGANISM/AGENT SPEC: ABNORMAL
MONOCYTES NFR BLD: 0.18 K/UL (ref 0.1–0.95)
MONOCYTES NFR BLD: 4 % (ref 2–12)
N MEN DNA BLD POS QL NAA+NON-PROBE: NOT DETECTED
NEUTROPHILS NFR BLD: 79 % (ref 43–80)
NEUTS SEG NFR BLD: 3.76 K/UL (ref 1.8–7.3)
P AERUGINOSA DNA BLD POS NAA+NON-PROBE: NOT DETECTED
PLATELET # BLD AUTO: 249 K/UL (ref 130–450)
PMV BLD AUTO: 9.4 FL (ref 7–12)
POTASSIUM SERPL-SCNC: 4.1 MMOL/L (ref 3.5–5)
PROTEUS SP DNA BLD POS QL NAA+NON-PROBE: NOT DETECTED
RBC # BLD AUTO: 3.75 M/UL (ref 3.5–5.5)
S AUREUS DNA BLD POS QL NAA+NON-PROBE: NOT DETECTED
S AUREUS+CONS DNA BLD POS NAA+NON-PROBE: DETECTED
S EPIDERMIDIS DNA BLD POS QL NAA+NON-PRB: DETECTED
S LUGDUNENSIS DNA BLD POS QL NAA+NON-PRB: NOT DETECTED
S MALTOPHILIA DNA BLD POS QL NAA+NON-PRB: NOT DETECTED
S MARCESCENS DNA BLD POS NAA+NON-PROBE: NOT DETECTED
S PNEUM DNA BLD POS QL NAA+NON-PROBE: NOT DETECTED
S PYO DNA BLD POS QL NAA+NON-PROBE: NOT DETECTED
SALMONELLA DNA BLD POS QL NAA+NON-PROBE: NOT DETECTED
SERVICE CMNT-IMP: ABNORMAL
SODIUM SERPL-SCNC: 140 MMOL/L (ref 132–146)
SPECIMEN DESCRIPTION: ABNORMAL
SPECIMEN DESCRIPTION: ABNORMAL
STREPTOCOCCUS DNA BLD POS NAA+NON-PROBE: NOT DETECTED
WBC OTHER # BLD: 4.8 K/UL (ref 4.5–11.5)

## 2023-09-21 PROCEDURE — 99239 HOSP IP/OBS DSCHRG MGMT >30: CPT | Performed by: INTERNAL MEDICINE

## 2023-09-21 PROCEDURE — 6370000000 HC RX 637 (ALT 250 FOR IP): Performed by: INTERNAL MEDICINE

## 2023-09-21 PROCEDURE — 94640 AIRWAY INHALATION TREATMENT: CPT

## 2023-09-21 PROCEDURE — 6360000002 HC RX W HCPCS

## 2023-09-21 PROCEDURE — 6360000002 HC RX W HCPCS: Performed by: INTERNAL MEDICINE

## 2023-09-21 PROCEDURE — 2700000000 HC OXYGEN THERAPY PER DAY

## 2023-09-21 PROCEDURE — 2580000003 HC RX 258: Performed by: INTERNAL MEDICINE

## 2023-09-21 PROCEDURE — 80048 BASIC METABOLIC PNL TOTAL CA: CPT

## 2023-09-21 PROCEDURE — 2580000003 HC RX 258

## 2023-09-21 PROCEDURE — 94669 MECHANICAL CHEST WALL OSCILL: CPT

## 2023-09-21 PROCEDURE — 36415 COLL VENOUS BLD VENIPUNCTURE: CPT

## 2023-09-21 PROCEDURE — 85025 COMPLETE CBC W/AUTO DIFF WBC: CPT

## 2023-09-21 RX ORDER — LEVOFLOXACIN 750 MG/1
750 TABLET ORAL DAILY
Qty: 3 TABLET | Refills: 0 | Status: SHIPPED | OUTPATIENT
Start: 2023-09-21 | End: 2023-09-24

## 2023-09-21 RX ORDER — PREDNISONE 20 MG/1
20 TABLET ORAL DAILY
Qty: 10 TABLET | Refills: 0 | Status: SHIPPED | OUTPATIENT
Start: 2023-09-21 | End: 2023-10-01

## 2023-09-21 RX ADMIN — IPRATROPIUM BROMIDE AND ALBUTEROL SULFATE 1 DOSE: 2.5; .5 SOLUTION RESPIRATORY (INHALATION) at 08:36

## 2023-09-21 RX ADMIN — LEVOFLOXACIN 750 MG: 500 TABLET, FILM COATED ORAL at 10:26

## 2023-09-21 RX ADMIN — METHYLPREDNISOLONE SODIUM SUCCINATE 40 MG: 40 INJECTION INTRAMUSCULAR; INTRAVENOUS at 10:25

## 2023-09-21 RX ADMIN — LOSARTAN POTASSIUM 100 MG: 50 TABLET, FILM COATED ORAL at 10:25

## 2023-09-21 RX ADMIN — ENOXAPARIN SODIUM 40 MG: 100 INJECTION SUBCUTANEOUS at 10:25

## 2023-09-21 RX ADMIN — BUDESONIDE 500 MCG: 0.25 SUSPENSION RESPIRATORY (INHALATION) at 08:50

## 2023-09-21 RX ADMIN — WATER 1 ML: 1 INJECTION INTRAMUSCULAR; INTRAVENOUS; SUBCUTANEOUS at 10:24

## 2023-09-21 RX ADMIN — ARFORMOTEROL TARTRATE 15 MCG: 15 SOLUTION RESPIRATORY (INHALATION) at 08:50

## 2023-09-21 RX ADMIN — HYDROCHLOROTHIAZIDE 12.5 MG: 12.5 TABLET ORAL at 10:26

## 2023-09-21 RX ADMIN — IPRATROPIUM BROMIDE AND ALBUTEROL SULFATE 1 DOSE: 2.5; .5 SOLUTION RESPIRATORY (INHALATION) at 12:20

## 2023-09-21 RX ADMIN — SODIUM CHLORIDE, PRESERVATIVE FREE 10 ML: 5 INJECTION INTRAVENOUS at 10:33

## 2023-09-21 RX ADMIN — PANTOPRAZOLE SODIUM 40 MG: 40 TABLET, DELAYED RELEASE ORAL at 06:07

## 2023-09-21 NOTE — DISCHARGE SUMMARY
[facility] Physician Discharge Summary       Gregg Mena, APRN - CNP  [address] [facility] [address] [phone]    Call in 1 week(s)      Victor Manuel Hardy MD  [address] [place] [address]  [phone]    Call in 1 week(s)      Activity level: As tolerated   Dispo: Home    Condition on discharge: Stable     Patient ID:  Evelyn Maldonado  [id number]  77 y.o.  1945    Admit date: 9/18/2023    Discharge date and time:  9/21/2023  2:20 PM    Admission Diagnoses: Principal Problem:    COPD exacerbation ([address])  Active Problems:    Acute respiratory failure with hypoxia ([address])  Resolved Problems:    * No resolved hospital problems. *      Discharge Diagnoses: Principal Problem:    COPD exacerbation ([address])  Active Problems:    Acute respiratory failure with hypoxia (HCC)  Resolved Problems:    * No resolved hospital problems. *      Consults:  IP CONSULT TO PULMONOLOGY  IP CONSULT TO IV TEAM    Hospital Course:   Patient Evelyn Maldonado is a 77 y.o. presented with Bronchiectasis with acute exacerbation ([address]) [J47.1]  COPD exacerbation ([address]) [J44.1]  Acute respiratory failure with hypoxia ([address]) [J96.01]  Acute on chronic respiratory failure with hypoxia ([address]) [J96.21]    Per H&P, this is a 77 y.o. female with a history of COPD, HLD, HTN presents with increased SOB. Chronically wears 5L NC at home. Increased to 10 L NC per EMS. Received DuoNeb's x2 on route. Notes nausea with chronic cough. Denies any ill contacts or recent travel. Upon presentation to ED hypoxia noted at 54%. Patient states increased shortness of breath beginning approximately Thursday evening. She denies any fevers, chills, N/V/D, CP. Patient received Solu-Cortef 200 mg IV x2, Benadryl 50 mg IV, furosemide 40 mg IV, DuoNeb x3, magnesium sulfate 2000 mg IV, Effer-K 40 mEq x 1 and potassium chloride 10 mEq IV in ED course. Decision to admit patient for further evaluation and treatment.     The

## 2023-09-21 NOTE — PROGRESS NOTES
Patient discharged. Discharge paperwork discussed with patient. Patients daughter transporting home.

## 2023-09-23 LAB
MICROORGANISM SPEC CULT: NORMAL
SERVICE CMNT-IMP: NORMAL
SPECIMEN DESCRIPTION: NORMAL

## 2023-09-24 LAB
MICROORGANISM SPEC CULT: NORMAL
MICROORGANISM SPEC CULT: NORMAL
SERVICE CMNT-IMP: NORMAL
SERVICE CMNT-IMP: NORMAL
SPECIMEN DESCRIPTION: NORMAL
SPECIMEN DESCRIPTION: NORMAL

## 2023-10-05 PROBLEM — J47.9 BRONCHIECTASIS WITHOUT COMPLICATION (HCC): Status: ACTIVE | Noted: 2023-04-12

## 2023-10-07 ENCOUNTER — APPOINTMENT (OUTPATIENT)
Dept: GENERAL RADIOLOGY | Age: 78
DRG: 871 | End: 2023-10-07
Payer: MEDICARE

## 2023-10-07 ENCOUNTER — HOSPITAL ENCOUNTER (INPATIENT)
Age: 78
LOS: 10 days | Discharge: NURSING, REHAB OR LONG-TERM CARE | DRG: 871 | End: 2023-10-17
Attending: EMERGENCY MEDICINE | Admitting: INTERNAL MEDICINE
Payer: MEDICARE

## 2023-10-07 ENCOUNTER — APPOINTMENT (OUTPATIENT)
Dept: CT IMAGING | Age: 78
DRG: 871 | End: 2023-10-07
Payer: MEDICARE

## 2023-10-07 DIAGNOSIS — J96.01 ACUTE RESPIRATORY FAILURE WITH HYPOXIA (HCC): Primary | ICD-10-CM

## 2023-10-07 DIAGNOSIS — J18.9 PNEUMONIA DUE TO INFECTIOUS ORGANISM, UNSPECIFIED LATERALITY, UNSPECIFIED PART OF LUNG: ICD-10-CM

## 2023-10-07 DIAGNOSIS — R79.89 ELEVATED TROPONIN: ICD-10-CM

## 2023-10-07 DIAGNOSIS — Z71.89 CARE PLAN DISCUSSED WITH PATIENT: ICD-10-CM

## 2023-10-07 DIAGNOSIS — A41.9 SEPSIS, DUE TO UNSPECIFIED ORGANISM, UNSPECIFIED WHETHER ACUTE ORGAN DYSFUNCTION PRESENT (HCC): ICD-10-CM

## 2023-10-07 PROBLEM — Z51.5 PALLIATIVE CARE BY SPECIALIST: Status: ACTIVE | Noted: 2023-10-07

## 2023-10-07 PROBLEM — J96.21 ACUTE ON CHRONIC HYPOXIC RESPIRATORY FAILURE (HCC): Status: ACTIVE | Noted: 2023-10-07

## 2023-10-07 LAB
ALBUMIN SERPL-MCNC: 2.7 G/DL (ref 3.5–5.2)
ALP SERPL-CCNC: 89 U/L (ref 35–104)
ALT SERPL-CCNC: 31 U/L (ref 0–32)
ANION GAP SERPL CALCULATED.3IONS-SCNC: 12 MMOL/L (ref 7–16)
AST SERPL-CCNC: 39 U/L (ref 0–31)
B PARAP IS1001 DNA NPH QL NAA+NON-PROBE: NOT DETECTED
B PERT DNA SPEC QL NAA+PROBE: NOT DETECTED
B.E.: 2.9 MMOL/L (ref -3–3)
B.E.: 3.8 MMOL/L (ref -3–3)
BACTERIA URNS QL MICRO: ABNORMAL
BASOPHILS # BLD: 0.03 K/UL (ref 0–0.2)
BASOPHILS NFR BLD: 1 % (ref 0–2)
BILIRUB SERPL-MCNC: 1.9 MG/DL (ref 0–1.2)
BILIRUB UR QL STRIP: ABNORMAL
BNP SERPL-MCNC: 1000 PG/ML (ref 0–450)
BUN SERPL-MCNC: 27 MG/DL (ref 6–23)
C PNEUM DNA NPH QL NAA+NON-PROBE: NOT DETECTED
CALCIUM SERPL-MCNC: 8.6 MG/DL (ref 8.6–10.2)
CHLORIDE SERPL-SCNC: 96 MMOL/L (ref 98–107)
CLARITY UR: CLEAR
CO2 SERPL-SCNC: 32 MMOL/L (ref 22–29)
COHB: 1.1 % (ref 0–1.5)
COHB: 1.5 % (ref 0–1.5)
COLOR UR: YELLOW
CREAT SERPL-MCNC: 0.8 MG/DL (ref 0.5–1)
CRITICAL: ABNORMAL
CRITICAL: ABNORMAL
D DIMER: 877 NG/ML DDU (ref 0–232)
DATE ANALYZED: ABNORMAL
DATE ANALYZED: ABNORMAL
DATE OF COLLECTION: ABNORMAL
DATE OF COLLECTION: ABNORMAL
EKG ATRIAL RATE: 117 BPM
EKG P AXIS: 51 DEGREES
EKG P-R INTERVAL: 126 MS
EKG Q-T INTERVAL: 320 MS
EKG QRS DURATION: 78 MS
EKG QTC CALCULATION (BAZETT): 446 MS
EKG R AXIS: -68 DEGREES
EKG T AXIS: 61 DEGREES
EKG VENTRICULAR RATE: 117 BPM
EOSINOPHIL # BLD: 0 K/UL (ref 0.05–0.5)
EOSINOPHILS RELATIVE PERCENT: 0 % (ref 0–6)
ERYTHROCYTE [DISTWIDTH] IN BLOOD BY AUTOMATED COUNT: 15.3 % (ref 11.5–15)
FIO2: 100 %
FIO2: 80 %
FLUAV RNA NPH QL NAA+NON-PROBE: NOT DETECTED
FLUBV RNA NPH QL NAA+NON-PROBE: NOT DETECTED
GFR SERPL CREATININE-BSD FRML MDRD: >60 ML/MIN/1.73M2
GLUCOSE SERPL-MCNC: 138 MG/DL (ref 74–99)
GLUCOSE UR STRIP-MCNC: NEGATIVE MG/DL
HADV DNA NPH QL NAA+NON-PROBE: NOT DETECTED
HCO3: 27.6 MMOL/L (ref 22–26)
HCO3: 28.9 MMOL/L (ref 22–26)
HCOV 229E RNA NPH QL NAA+NON-PROBE: NOT DETECTED
HCOV HKU1 RNA NPH QL NAA+NON-PROBE: NOT DETECTED
HCOV NL63 RNA NPH QL NAA+NON-PROBE: NOT DETECTED
HCOV OC43 RNA NPH QL NAA+NON-PROBE: NOT DETECTED
HCT VFR BLD AUTO: 37.9 % (ref 34–48)
HGB BLD-MCNC: 11.8 G/DL (ref 11.5–15.5)
HGB UR QL STRIP.AUTO: NEGATIVE
HHB: 3.3 % (ref 0–5)
HHB: 5.8 % (ref 0–5)
HMPV RNA NPH QL NAA+NON-PROBE: NOT DETECTED
HPIV1 RNA NPH QL NAA+NON-PROBE: NOT DETECTED
HPIV2 RNA NPH QL NAA+NON-PROBE: NOT DETECTED
HPIV3 RNA NPH QL NAA+NON-PROBE: NOT DETECTED
HPIV4 RNA NPH QL NAA+NON-PROBE: NOT DETECTED
IMM GRANULOCYTES # BLD AUTO: 0.03 K/UL (ref 0–0.58)
IMM GRANULOCYTES NFR BLD: 1 % (ref 0–5)
KETONES UR STRIP-MCNC: NEGATIVE MG/DL
LAB: ABNORMAL
LAB: ABNORMAL
LEUKOCYTE ESTERASE UR QL STRIP: NEGATIVE
LYMPHOCYTES NFR BLD: 0.98 K/UL (ref 1.5–4)
LYMPHOCYTES RELATIVE PERCENT: 16 % (ref 20–42)
Lab: 1210
Lab: 937
M PNEUMO DNA NPH QL NAA+NON-PROBE: NOT DETECTED
MCH RBC QN AUTO: 26.5 PG (ref 26–35)
MCHC RBC AUTO-ENTMCNC: 31.1 G/DL (ref 32–34.5)
MCV RBC AUTO: 85 FL (ref 80–99.9)
METHB: 0.3 % (ref 0–1.5)
METHB: 0.3 % (ref 0–1.5)
MODE: ABNORMAL
MODE: ABNORMAL
MONOCYTES NFR BLD: 0.58 K/UL (ref 0.1–0.95)
MONOCYTES NFR BLD: 9 % (ref 2–12)
NEUTROPHILS NFR BLD: 74 % (ref 43–80)
NEUTS SEG NFR BLD: 4.55 K/UL (ref 1.8–7.3)
NITRITE UR QL STRIP: NEGATIVE
O2 CONTENT: 16.2 ML/DL
O2 CONTENT: 16.5 ML/DL
O2 SATURATION: 94.1 % (ref 92–98.5)
O2 SATURATION: 96.7 % (ref 92–98.5)
O2HB: 92.4 % (ref 94–97)
O2HB: 95.3 % (ref 94–97)
OPERATOR ID: 166
OPERATOR ID: 166
PATIENT TEMP: 37 C
PATIENT TEMP: 37 C
PCO2: 43.2 MMHG (ref 35–45)
PCO2: 45.2 MMHG (ref 35–45)
PEEP/CPAP: 6 CMH2O
PEEP/CPAP: 6 CMH2O
PFO2: 0.71 MMHG/%
PFO2: 1.13 MMHG/%
PH BLOOD GAS: 7.42 (ref 7.35–7.45)
PH BLOOD GAS: 7.42 (ref 7.35–7.45)
PH UR STRIP: 5.5 [PH] (ref 5–9)
PIP: 12 CMH2O
PIP: 12 CMH2O
PLATELET # BLD AUTO: 202 K/UL (ref 130–450)
PMV BLD AUTO: 9.9 FL (ref 7–12)
PO2: 71.4 MMHG (ref 75–100)
PO2: 90.3 MMHG (ref 75–100)
POTASSIUM SERPL-SCNC: 3.7 MMOL/L (ref 3.5–5)
PROT SERPL-MCNC: 6.9 G/DL (ref 6.4–8.3)
PROT UR STRIP-MCNC: 30 MG/DL
RBC # BLD AUTO: 4.46 M/UL (ref 3.5–5.5)
RBC #/AREA URNS HPF: ABNORMAL /HPF
RI(T): 4.81
RI(T): 8.35
RR MECHANICAL: 12 B/MIN
RSV RNA NPH QL NAA+NON-PROBE: NOT DETECTED
RV+EV RNA NPH QL NAA+NON-PROBE: NOT DETECTED
SARS-COV-2 RNA NPH QL NAA+NON-PROBE: NOT DETECTED
SODIUM SERPL-SCNC: 140 MMOL/L (ref 132–146)
SOURCE, BLOOD GAS: ABNORMAL
SOURCE, BLOOD GAS: ABNORMAL
SP GR UR STRIP: 1.02 (ref 1–1.03)
SPECIMEN DESCRIPTION: NORMAL
THB: 12 G/DL (ref 11.5–16.5)
THB: 12.7 G/DL (ref 11.5–16.5)
TIME ANALYZED: 1215
TIME ANALYZED: 943
TROPONIN I SERPL HS-MCNC: 27 NG/L (ref 0–9)
TROPONIN I SERPL HS-MCNC: 29 NG/L (ref 0–9)
UROBILINOGEN UR STRIP-ACNC: 2 EU/DL (ref 0–1)
WBC #/AREA URNS HPF: ABNORMAL /HPF
WBC OTHER # BLD: 6.2 K/UL (ref 4.5–11.5)

## 2023-10-07 PROCEDURE — 2580000003 HC RX 258: Performed by: INTERNAL MEDICINE

## 2023-10-07 PROCEDURE — 5A09457 ASSISTANCE WITH RESPIRATORY VENTILATION, 24-96 CONSECUTIVE HOURS, CONTINUOUS POSITIVE AIRWAY PRESSURE: ICD-10-PCS | Performed by: INTERNAL MEDICINE

## 2023-10-07 PROCEDURE — 87181 SC STD AGAR DILUTION PER AGT: CPT

## 2023-10-07 PROCEDURE — 96361 HYDRATE IV INFUSION ADD-ON: CPT

## 2023-10-07 PROCEDURE — 93010 ELECTROCARDIOGRAM REPORT: CPT | Performed by: INTERNAL MEDICINE

## 2023-10-07 PROCEDURE — 87081 CULTURE SCREEN ONLY: CPT

## 2023-10-07 PROCEDURE — 6360000004 HC RX CONTRAST MEDICATION: Performed by: RADIOLOGY

## 2023-10-07 PROCEDURE — 87077 CULTURE AEROBIC IDENTIFY: CPT

## 2023-10-07 PROCEDURE — 2580000003 HC RX 258: Performed by: STUDENT IN AN ORGANIZED HEALTH CARE EDUCATION/TRAINING PROGRAM

## 2023-10-07 PROCEDURE — 84484 ASSAY OF TROPONIN QUANT: CPT

## 2023-10-07 PROCEDURE — 0202U NFCT DS 22 TRGT SARS-COV-2: CPT

## 2023-10-07 PROCEDURE — 99223 1ST HOSP IP/OBS HIGH 75: CPT | Performed by: INTERNAL MEDICINE

## 2023-10-07 PROCEDURE — 93005 ELECTROCARDIOGRAM TRACING: CPT | Performed by: STUDENT IN AN ORGANIZED HEALTH CARE EDUCATION/TRAINING PROGRAM

## 2023-10-07 PROCEDURE — 81001 URINALYSIS AUTO W/SCOPE: CPT

## 2023-10-07 PROCEDURE — 96375 TX/PRO/DX INJ NEW DRUG ADDON: CPT

## 2023-10-07 PROCEDURE — 6360000002 HC RX W HCPCS: Performed by: INTERNAL MEDICINE

## 2023-10-07 PROCEDURE — 96365 THER/PROPH/DIAG IV INF INIT: CPT

## 2023-10-07 PROCEDURE — 83880 ASSAY OF NATRIURETIC PEPTIDE: CPT

## 2023-10-07 PROCEDURE — 6360000002 HC RX W HCPCS: Performed by: STUDENT IN AN ORGANIZED HEALTH CARE EDUCATION/TRAINING PROGRAM

## 2023-10-07 PROCEDURE — 99222 1ST HOSP IP/OBS MODERATE 55: CPT | Performed by: NURSE PRACTITIONER

## 2023-10-07 PROCEDURE — 87070 CULTURE OTHR SPECIMN AEROBIC: CPT

## 2023-10-07 PROCEDURE — 85379 FIBRIN DEGRADATION QUANT: CPT

## 2023-10-07 PROCEDURE — 71275 CT ANGIOGRAPHY CHEST: CPT

## 2023-10-07 PROCEDURE — 94660 CPAP INITIATION&MGMT: CPT

## 2023-10-07 PROCEDURE — 87205 SMEAR GRAM STAIN: CPT

## 2023-10-07 PROCEDURE — 2060000000 HC ICU INTERMEDIATE R&B

## 2023-10-07 PROCEDURE — 71045 X-RAY EXAM CHEST 1 VIEW: CPT

## 2023-10-07 PROCEDURE — 85025 COMPLETE CBC W/AUTO DIFF WBC: CPT

## 2023-10-07 PROCEDURE — 6370000000 HC RX 637 (ALT 250 FOR IP): Performed by: INTERNAL MEDICINE

## 2023-10-07 PROCEDURE — 6370000000 HC RX 637 (ALT 250 FOR IP): Performed by: STUDENT IN AN ORGANIZED HEALTH CARE EDUCATION/TRAINING PROGRAM

## 2023-10-07 PROCEDURE — 99285 EMERGENCY DEPT VISIT HI MDM: CPT

## 2023-10-07 PROCEDURE — 80053 COMPREHEN METABOLIC PANEL: CPT

## 2023-10-07 PROCEDURE — 87899 AGENT NOS ASSAY W/OPTIC: CPT

## 2023-10-07 PROCEDURE — 87040 BLOOD CULTURE FOR BACTERIA: CPT

## 2023-10-07 PROCEDURE — 82805 BLOOD GASES W/O2 SATURATION: CPT

## 2023-10-07 PROCEDURE — 94640 AIRWAY INHALATION TREATMENT: CPT

## 2023-10-07 PROCEDURE — 74177 CT ABD & PELVIS W/CONTRAST: CPT

## 2023-10-07 PROCEDURE — 87449 NOS EACH ORGANISM AG IA: CPT

## 2023-10-07 RX ORDER — ATORVASTATIN CALCIUM 20 MG/1
20 TABLET, FILM COATED ORAL NIGHTLY
Status: DISCONTINUED | OUTPATIENT
Start: 2023-10-07 | End: 2023-10-17 | Stop reason: HOSPADM

## 2023-10-07 RX ORDER — ARFORMOTEROL TARTRATE 15 UG/2ML
15 SOLUTION RESPIRATORY (INHALATION)
Status: DISCONTINUED | OUTPATIENT
Start: 2023-10-07 | End: 2023-10-17 | Stop reason: HOSPADM

## 2023-10-07 RX ORDER — ACETAMINOPHEN 650 MG/1
650 SUPPOSITORY RECTAL EVERY 6 HOURS PRN
Status: DISCONTINUED | OUTPATIENT
Start: 2023-10-07 | End: 2023-10-17 | Stop reason: HOSPADM

## 2023-10-07 RX ORDER — AMLODIPINE BESYLATE 5 MG/1
5 TABLET ORAL NIGHTLY
Status: DISCONTINUED | OUTPATIENT
Start: 2023-10-07 | End: 2023-10-17 | Stop reason: HOSPADM

## 2023-10-07 RX ORDER — 0.9 % SODIUM CHLORIDE 0.9 %
1000 INTRAVENOUS SOLUTION INTRAVENOUS ONCE
Status: COMPLETED | OUTPATIENT
Start: 2023-10-07 | End: 2023-10-07

## 2023-10-07 RX ORDER — METOPROLOL SUCCINATE 25 MG/1
12.5 TABLET, EXTENDED RELEASE ORAL NIGHTLY
Status: DISCONTINUED | OUTPATIENT
Start: 2023-10-07 | End: 2023-10-17 | Stop reason: HOSPADM

## 2023-10-07 RX ORDER — ENOXAPARIN SODIUM 100 MG/ML
40 INJECTION SUBCUTANEOUS DAILY
Status: DISCONTINUED | OUTPATIENT
Start: 2023-10-07 | End: 2023-10-17 | Stop reason: HOSPADM

## 2023-10-07 RX ORDER — SODIUM CHLORIDE 9 MG/ML
INJECTION, SOLUTION INTRAVENOUS PRN
Status: DISCONTINUED | OUTPATIENT
Start: 2023-10-07 | End: 2023-10-17 | Stop reason: HOSPADM

## 2023-10-07 RX ORDER — SODIUM CHLORIDE 0.9 % (FLUSH) 0.9 %
5-40 SYRINGE (ML) INJECTION EVERY 12 HOURS SCHEDULED
Status: DISCONTINUED | OUTPATIENT
Start: 2023-10-07 | End: 2023-10-17 | Stop reason: HOSPADM

## 2023-10-07 RX ORDER — METHYLPREDNISOLONE SODIUM SUCCINATE 40 MG/ML
40 INJECTION, POWDER, LYOPHILIZED, FOR SOLUTION INTRAMUSCULAR; INTRAVENOUS EVERY 12 HOURS
Status: DISCONTINUED | OUTPATIENT
Start: 2023-10-07 | End: 2023-10-08

## 2023-10-07 RX ORDER — POLYETHYLENE GLYCOL 3350 17 G/17G
17 POWDER, FOR SOLUTION ORAL DAILY PRN
Status: DISCONTINUED | OUTPATIENT
Start: 2023-10-07 | End: 2023-10-17 | Stop reason: HOSPADM

## 2023-10-07 RX ORDER — SODIUM CHLORIDE 9 MG/ML
INJECTION, SOLUTION INTRAVENOUS CONTINUOUS
Status: DISCONTINUED | OUTPATIENT
Start: 2023-10-07 | End: 2023-10-10

## 2023-10-07 RX ORDER — DULOXETIN HYDROCHLORIDE 30 MG/1
30 CAPSULE, DELAYED RELEASE ORAL NIGHTLY
Status: DISCONTINUED | OUTPATIENT
Start: 2023-10-07 | End: 2023-10-17 | Stop reason: HOSPADM

## 2023-10-07 RX ORDER — ONDANSETRON 2 MG/ML
4 INJECTION INTRAMUSCULAR; INTRAVENOUS EVERY 6 HOURS PRN
Status: DISCONTINUED | OUTPATIENT
Start: 2023-10-07 | End: 2023-10-17 | Stop reason: HOSPADM

## 2023-10-07 RX ORDER — ALBUTEROL SULFATE 2.5 MG/3ML
2.5 SOLUTION RESPIRATORY (INHALATION)
Status: DISCONTINUED | OUTPATIENT
Start: 2023-10-07 | End: 2023-10-08

## 2023-10-07 RX ORDER — LORAZEPAM 1 MG/1
1 TABLET ORAL
Status: DISCONTINUED | OUTPATIENT
Start: 2023-10-07 | End: 2023-10-17 | Stop reason: HOSPADM

## 2023-10-07 RX ORDER — LANSOPRAZOLE 30 MG/1
30 TABLET, ORALLY DISINTEGRATING, DELAYED RELEASE ORAL
Status: DISCONTINUED | OUTPATIENT
Start: 2023-10-08 | End: 2023-10-17 | Stop reason: HOSPADM

## 2023-10-07 RX ORDER — LOSARTAN POTASSIUM 50 MG/1
100 TABLET ORAL NIGHTLY
Status: DISCONTINUED | OUTPATIENT
Start: 2023-10-07 | End: 2023-10-17 | Stop reason: HOSPADM

## 2023-10-07 RX ORDER — DIPHENHYDRAMINE HYDROCHLORIDE 50 MG/ML
25 INJECTION INTRAMUSCULAR; INTRAVENOUS ONCE
Status: COMPLETED | OUTPATIENT
Start: 2023-10-07 | End: 2023-10-07

## 2023-10-07 RX ORDER — HYDROCHLOROTHIAZIDE 12.5 MG/1
12.5 TABLET ORAL NIGHTLY
Status: DISCONTINUED | OUTPATIENT
Start: 2023-10-07 | End: 2023-10-10

## 2023-10-07 RX ORDER — SCOLOPAMINE TRANSDERMAL SYSTEM 1 MG/1
1 PATCH, EXTENDED RELEASE TRANSDERMAL
Status: DISCONTINUED | OUTPATIENT
Start: 2023-10-07 | End: 2023-10-17 | Stop reason: HOSPADM

## 2023-10-07 RX ORDER — ACETAMINOPHEN 325 MG/1
650 TABLET ORAL EVERY 6 HOURS PRN
Status: DISCONTINUED | OUTPATIENT
Start: 2023-10-07 | End: 2023-10-17 | Stop reason: HOSPADM

## 2023-10-07 RX ORDER — FERROUS SULFATE 325(65) MG
325 TABLET ORAL NIGHTLY
Status: DISCONTINUED | OUTPATIENT
Start: 2023-10-07 | End: 2023-10-17 | Stop reason: HOSPADM

## 2023-10-07 RX ORDER — LOSARTAN POTASSIUM AND HYDROCHLOROTHIAZIDE 12.5; 1 MG/1; MG/1
1 TABLET ORAL NIGHTLY
Status: DISCONTINUED | OUTPATIENT
Start: 2023-10-07 | End: 2023-10-07 | Stop reason: CLARIF

## 2023-10-07 RX ORDER — IPRATROPIUM BROMIDE AND ALBUTEROL SULFATE 2.5; .5 MG/3ML; MG/3ML
3 SOLUTION RESPIRATORY (INHALATION) ONCE
Status: COMPLETED | OUTPATIENT
Start: 2023-10-07 | End: 2023-10-07

## 2023-10-07 RX ORDER — MORPHINE SULFATE 10 MG/5ML
5 SOLUTION ORAL
Status: DISCONTINUED | OUTPATIENT
Start: 2023-10-07 | End: 2023-10-17 | Stop reason: HOSPADM

## 2023-10-07 RX ORDER — ALENDRONATE SODIUM 70 MG/1
70 TABLET ORAL WEEKLY
Status: DISCONTINUED | OUTPATIENT
Start: 2023-10-07 | End: 2023-10-07 | Stop reason: DRUGHIGH

## 2023-10-07 RX ORDER — 0.9 % SODIUM CHLORIDE 0.9 %
1500 INTRAVENOUS SOLUTION INTRAVENOUS ONCE
Status: DISCONTINUED | OUTPATIENT
Start: 2023-10-07 | End: 2023-10-17 | Stop reason: HOSPADM

## 2023-10-07 RX ORDER — SODIUM CHLORIDE 0.9 % (FLUSH) 0.9 %
5-40 SYRINGE (ML) INJECTION PRN
Status: DISCONTINUED | OUTPATIENT
Start: 2023-10-07 | End: 2023-10-17 | Stop reason: HOSPADM

## 2023-10-07 RX ORDER — BUDESONIDE 0.5 MG/2ML
500 INHALANT ORAL 2 TIMES DAILY
Status: DISCONTINUED | OUTPATIENT
Start: 2023-10-07 | End: 2023-10-08

## 2023-10-07 RX ORDER — ASPIRIN 81 MG/1
81 TABLET ORAL NIGHTLY
Status: DISCONTINUED | OUTPATIENT
Start: 2023-10-07 | End: 2023-10-17 | Stop reason: HOSPADM

## 2023-10-07 RX ORDER — ONDANSETRON 4 MG/1
4 TABLET, ORALLY DISINTEGRATING ORAL EVERY 8 HOURS PRN
Status: DISCONTINUED | OUTPATIENT
Start: 2023-10-07 | End: 2023-10-17 | Stop reason: HOSPADM

## 2023-10-07 RX ADMIN — SODIUM CHLORIDE 1000 ML: 9 INJECTION, SOLUTION INTRAVENOUS at 12:43

## 2023-10-07 RX ADMIN — DIPHENHYDRAMINE HYDROCHLORIDE 25 MG: 50 INJECTION INTRAMUSCULAR; INTRAVENOUS at 10:52

## 2023-10-07 RX ADMIN — SODIUM CHLORIDE, PRESERVATIVE FREE 10 ML: 5 INJECTION INTRAVENOUS at 21:09

## 2023-10-07 RX ADMIN — ASPIRIN 81 MG: 81 TABLET, COATED ORAL at 21:07

## 2023-10-07 RX ADMIN — FERROUS SULFATE TAB 325 MG (65 MG ELEMENTAL FE) 325 MG: 325 (65 FE) TAB at 21:07

## 2023-10-07 RX ADMIN — SODIUM CHLORIDE: 9 INJECTION, SOLUTION INTRAVENOUS at 17:02

## 2023-10-07 RX ADMIN — ALBUTEROL SULFATE 2.5 MG: 2.5 SOLUTION RESPIRATORY (INHALATION) at 20:35

## 2023-10-07 RX ADMIN — AMLODIPINE BESYLATE 5 MG: 5 TABLET ORAL at 21:07

## 2023-10-07 RX ADMIN — METOPROLOL SUCCINATE 12.5 MG: 25 TABLET, EXTENDED RELEASE ORAL at 21:08

## 2023-10-07 RX ADMIN — ALBUTEROL SULFATE 2.5 MG: 2.5 SOLUTION RESPIRATORY (INHALATION) at 16:05

## 2023-10-07 RX ADMIN — DULOXETINE HYDROCHLORIDE 30 MG: 30 CAPSULE, DELAYED RELEASE ORAL at 21:07

## 2023-10-07 RX ADMIN — SODIUM CHLORIDE 1000 ML: 9 INJECTION, SOLUTION INTRAVENOUS at 13:20

## 2023-10-07 RX ADMIN — METHYLPREDNISOLONE SODIUM SUCCINATE 40 MG: 40 INJECTION INTRAMUSCULAR; INTRAVENOUS at 21:08

## 2023-10-07 RX ADMIN — METHYLPREDNISOLONE SODIUM SUCCINATE 125 MG: 125 INJECTION, POWDER, FOR SOLUTION INTRAMUSCULAR; INTRAVENOUS at 10:30

## 2023-10-07 RX ADMIN — IOPAMIDOL 75 ML: 755 INJECTION, SOLUTION INTRAVENOUS at 11:14

## 2023-10-07 RX ADMIN — HYDROCHLOROTHIAZIDE 12.5 MG: 12.5 TABLET ORAL at 21:07

## 2023-10-07 RX ADMIN — VANCOMYCIN HYDROCHLORIDE 2000 MG: 10 INJECTION, POWDER, LYOPHILIZED, FOR SOLUTION INTRAVENOUS at 14:13

## 2023-10-07 RX ADMIN — ATORVASTATIN CALCIUM 20 MG: 20 TABLET, FILM COATED ORAL at 21:08

## 2023-10-07 RX ADMIN — LOSARTAN POTASSIUM 100 MG: 50 TABLET, FILM COATED ORAL at 21:07

## 2023-10-07 RX ADMIN — IPRATROPIUM BROMIDE AND ALBUTEROL SULFATE 3 DOSE: 2.5; .5 SOLUTION RESPIRATORY (INHALATION) at 11:20

## 2023-10-07 RX ADMIN — CEFEPIME 2000 MG: 2 INJECTION, POWDER, FOR SOLUTION INTRAVENOUS at 21:10

## 2023-10-07 RX ADMIN — LORAZEPAM 1 MG: 1 TABLET ORAL at 17:02

## 2023-10-07 RX ADMIN — CEFEPIME 2000 MG: 2 INJECTION, POWDER, FOR SOLUTION INTRAVENOUS at 12:44

## 2023-10-07 RX ADMIN — ENOXAPARIN SODIUM 40 MG: 100 INJECTION SUBCUTANEOUS at 17:02

## 2023-10-07 RX ADMIN — ARFORMOTEROL TARTRATE 15 MCG: 15 SOLUTION RESPIRATORY (INHALATION) at 20:35

## 2023-10-07 RX ADMIN — BUDESONIDE 500 MCG: 0.5 SUSPENSION RESPIRATORY (INHALATION) at 20:35

## 2023-10-07 ASSESSMENT — PAIN - FUNCTIONAL ASSESSMENT
PAIN_FUNCTIONAL_ASSESSMENT: NONE - DENIES PAIN
PAIN_FUNCTIONAL_ASSESSMENT: NONE - DENIES PAIN

## 2023-10-07 NOTE — H&P
[facility] History and Physical      CHIEF COMPLAINT:  shortness of breath, weakness, no urine output    History of Present Illness: This is a 77year old female recently discharged from the hospital. As per the daughter, she has not been feeling well. She has had decreased intake, decreased urine output, weakness, and consistent congestion. She denies any fevers or chills however has cough with no sputum production. Patient normally wears 5 L NC at home. Currently she has been placed on bipap. Informant(s) for H&P: Patient, daughter, EMR    REVIEW OF SYSTEMS:  A comprehensive review of systems was negative except for: what is in the HPI      PMH:  Past Medical History:   Diagnosis Date    COPD (chronic obstructive pulmonary disease) ([address])     Hyperlipidemia 9/14/2022    Hypertension     Migraines     MRSA (methicillin resistant staph aureus) culture positive     Pneumonia     Ulcer        Surgical History:  Past Surgical History:   Procedure Laterality Date    BREAST LUMPECTOMY Right     PICC LINE INSERTION NURSE  8/31/2021         FL THORSC DX LUNGS/PERICAR/MED/PLEURAL SPACE W/O BX Left 9/12/2018    LEFT VIDEO ASSISTED THORACOSCOPY STAPLING OF BLEBS TALC PLEURODESIS WITH PLEURAL STRIPPING POSSIBLE THORACOTOMY performed by Malorie Meier MD at [address] [place] [address]         Medications Prior to Admission:    Prior to Admission medications    Medication Sig Start Date End Date Taking?  Authorizing Provider   budesonide (PULMICORT) 0.5 MG/2ML nebulizer suspension Take 2 mLs by nebulization 2 times daily 10/5/23   Caryn Griffith MD   atorvastatin (LIPITOR) 20 MG tablet Take 1 tablet by mouth nightly    Elizabeth Leonard MD   metoprolol succinate (TOPROL XL) 25 MG extended release tablet Take 0.5 tablets by mouth nightly    Elizabeth Leonard MD   Multiple Vitamins-Minerals (PRESERVISION AREDS 2+MULTI VIT) CAPS Take 1 capsule by mouth 2 times daily    Elizabeth Leonard

## 2023-10-07 NOTE — CONSULTS
Palliative Care Department  [phone]  Palliative Care Initial Consult  Provider MISHA Norman CNP      PATIENT: Kimberlyn Cr  : 1945  MRN: [id number]  ADMISSION DATE: 10/7/2023  9:32 AM  Referring Provider: Whitney Lino DO    Palliative Medicine was consulted on hospital day 0 for assistance with Goals of care  HPI:     Clinical Summary:Pretty Last is a 77 y.o. y/o female with a history of COPD, HLD, HTN  who presented to [facility] on 10/7/2023 with shortness of breath. CT of the chest showed a consolidation of the right lung consistent with multifocal pneumonia. She was started on antibiotics and admitted. ASSESSMENT/PLAN:     Pertinent Hospital Diagnoses     Acute on chronic hypoxic respiratory failure  COPD exacerbation  Multifocal pneumonia    Palliative Care Encounter / Counseling Regarding Goals of Care  Please see detailed goals of care discussion as below  At this time, Kimberlyn Cr, Does have capacity for medical decision-making. Capacity is time limited and situation/question specific  During encounter Kleber Dubose was surrogate medical decision-maker  Outcome of goals of care meeting:  Continue DNR CC  Supportive medical management including BiPAP and antibiotics  Code status DNR-CC  Advanced Directives: no POA or living will in epic  Surrogate/Legal NOK:  Venita Mariano () [phone]  Ronnie Camilo (daughter) [phone]    Spiritual assessment: no spiritual distress identified  Bereavement and grief: to be determined  Referrals to: none today    Thank you for the opportunity to participate in the care of Kimberlyn Cr. MISHA Norman CNP   Palliative Medicine     SUBJECTIVE:     Details of Conversation: Chart reviewed and met with the patient and her daughter Kleber Dubose at the bedside. The patient is awake and alert and oriented. I introduced palliative medicine.   Her daughter explains that the patient did not want to come into the hospital and wanted to have hospice at

## 2023-10-07 NOTE — ED PROVIDER NOTES
[facility] INTERMEDIATE  EMERGENCY DEPARTMENT ENCOUNTER        Pt Name: Adriano Dale  MRN: [id number]  [address] [facility] 1945  Date of evaluation: 10/7/2023  Provider: Cesilia Velasco DO  PCP: MISHA Cardenas CNP  Note Started: 9:50 AM EDT 10/7/23    CHIEF COMPLAINT       Chief Complaint   Patient presents with    Respiratory Distress       HISTORY OF PRESENT ILLNESS: 1 or more Elements   History From: Patient     Mentation to history: Respiratory distress    Adriano Dale is a 77 y.o. female with past medical history of COPD chronic home oxygen at 5 L ATC, bronchiectasis, PE not on anticoagulation, hypertension, hyperlipidemia, pneumonia and migraines who presents to the emergency department due to respiratory distress and generalized weakness. Patient was brought in by home by EMS for further evaluation. EMS had noted that patient was hypoxic at 76% despite CPAP. Patient was also soft at 98 systolic. Remaining vitals unremarkable. On arrival to the emergency department, patient was on CPAP and was not exhibiting significant respiratory distress other than increased work of breathing. Patient was stating that she has been feeling weak lately. Today she had worsening shortness of breath but she denies any inciting factors for this. Patient does see pulmonology Castillo Lay. Review of systems and history slightly limited due to respiratory distress. Patient was vaguely denying any chest pain, abdominal pain, nausea or vomiting. Noted patient had any recent sick contacts. EMS did note that patient's  is in the hospital currently but we do not know for what.     Nursing Notes were all reviewed and agreed with or any disagreements were addressed in the HPI.    ROS:   Pertinent positives and negatives are stated within HPI, all other systems reviewed and are negative.    --------------------------------------------- PAST HISTORY ---------------------------------------------  Past Medical History:  has a

## 2023-10-08 PROBLEM — E87.20 ACIDOSIS: Status: ACTIVE | Noted: 2023-10-08

## 2023-10-08 PROBLEM — J15.9 BACTERIAL PNEUMONIA: Status: ACTIVE | Noted: 2023-10-08

## 2023-10-08 LAB
L PNEUMO1 AG UR QL IA.RAPID: NEGATIVE
MAGNESIUM SERPL-MCNC: 2.1 MG/DL (ref 1.6–2.6)
PROCALCITONIN SERPL-MCNC: 0.1 NG/ML (ref 0–0.08)
S PNEUM AG SPEC QL: NEGATIVE

## 2023-10-08 PROCEDURE — 94660 CPAP INITIATION&MGMT: CPT

## 2023-10-08 PROCEDURE — 6360000002 HC RX W HCPCS

## 2023-10-08 PROCEDURE — 2580000003 HC RX 258: Performed by: INTERNAL MEDICINE

## 2023-10-08 PROCEDURE — 94640 AIRWAY INHALATION TREATMENT: CPT

## 2023-10-08 PROCEDURE — 99232 SBSQ HOSP IP/OBS MODERATE 35: CPT | Performed by: INTERNAL MEDICINE

## 2023-10-08 PROCEDURE — 2700000000 HC OXYGEN THERAPY PER DAY

## 2023-10-08 PROCEDURE — 83735 ASSAY OF MAGNESIUM: CPT

## 2023-10-08 PROCEDURE — 84145 PROCALCITONIN (PCT): CPT

## 2023-10-08 PROCEDURE — 6370000000 HC RX 637 (ALT 250 FOR IP): Performed by: INTERNAL MEDICINE

## 2023-10-08 PROCEDURE — 6360000002 HC RX W HCPCS: Performed by: INTERNAL MEDICINE

## 2023-10-08 PROCEDURE — 99231 SBSQ HOSP IP/OBS SF/LOW 25: CPT | Performed by: NURSE PRACTITIONER

## 2023-10-08 PROCEDURE — 6370000000 HC RX 637 (ALT 250 FOR IP)

## 2023-10-08 PROCEDURE — 6370000000 HC RX 637 (ALT 250 FOR IP): Performed by: NURSE PRACTITIONER

## 2023-10-08 PROCEDURE — 2060000000 HC ICU INTERMEDIATE R&B

## 2023-10-08 PROCEDURE — 94669 MECHANICAL CHEST WALL OSCILL: CPT

## 2023-10-08 RX ORDER — GUAIFENESIN 400 MG/1
400 TABLET ORAL 3 TIMES DAILY
Status: DISCONTINUED | OUTPATIENT
Start: 2023-10-08 | End: 2023-10-17 | Stop reason: HOSPADM

## 2023-10-08 RX ORDER — IPRATROPIUM BROMIDE AND ALBUTEROL SULFATE 2.5; .5 MG/3ML; MG/3ML
1 SOLUTION RESPIRATORY (INHALATION)
Status: DISCONTINUED | OUTPATIENT
Start: 2023-10-08 | End: 2023-10-17 | Stop reason: HOSPADM

## 2023-10-08 RX ORDER — BUDESONIDE 0.5 MG/2ML
1 INHALANT ORAL 2 TIMES DAILY
Status: DISCONTINUED | OUTPATIENT
Start: 2023-10-08 | End: 2023-10-17 | Stop reason: HOSPADM

## 2023-10-08 RX ORDER — METHYLPREDNISOLONE SODIUM SUCCINATE 125 MG/2ML
60 INJECTION, POWDER, LYOPHILIZED, FOR SOLUTION INTRAMUSCULAR; INTRAVENOUS EVERY 8 HOURS
Status: DISCONTINUED | OUTPATIENT
Start: 2023-10-08 | End: 2023-10-12

## 2023-10-08 RX ADMIN — GUAIFENESIN 400 MG: 400 TABLET ORAL at 20:55

## 2023-10-08 RX ADMIN — ENOXAPARIN SODIUM 40 MG: 100 INJECTION SUBCUTANEOUS at 14:00

## 2023-10-08 RX ADMIN — CEFEPIME 2000 MG: 2 INJECTION, POWDER, FOR SOLUTION INTRAVENOUS at 09:46

## 2023-10-08 RX ADMIN — AMLODIPINE BESYLATE 5 MG: 5 TABLET ORAL at 20:55

## 2023-10-08 RX ADMIN — HYDROCHLOROTHIAZIDE 12.5 MG: 12.5 TABLET ORAL at 20:55

## 2023-10-08 RX ADMIN — SODIUM CHLORIDE, PRESERVATIVE FREE 10 ML: 5 INJECTION INTRAVENOUS at 20:55

## 2023-10-08 RX ADMIN — METHYLPREDNISOLONE SODIUM SUCCINATE 40 MG: 40 INJECTION INTRAMUSCULAR; INTRAVENOUS at 09:42

## 2023-10-08 RX ADMIN — IPRATROPIUM BROMIDE AND ALBUTEROL SULFATE 1 DOSE: .5; 2.5 SOLUTION RESPIRATORY (INHALATION) at 20:05

## 2023-10-08 RX ADMIN — VANCOMYCIN HYDROCHLORIDE 750 MG: 1 INJECTION, POWDER, LYOPHILIZED, FOR SOLUTION INTRAVENOUS at 01:15

## 2023-10-08 RX ADMIN — BUDESONIDE INHALATION 1000 MCG: 0.5 SUSPENSION RESPIRATORY (INHALATION) at 20:06

## 2023-10-08 RX ADMIN — DULOXETINE HYDROCHLORIDE 30 MG: 30 CAPSULE, DELAYED RELEASE ORAL at 20:55

## 2023-10-08 RX ADMIN — METHYLPREDNISOLONE SODIUM SUCCINATE 60 MG: 125 INJECTION, POWDER, LYOPHILIZED, FOR SOLUTION INTRAMUSCULAR; INTRAVENOUS at 16:01

## 2023-10-08 RX ADMIN — GUAIFENESIN 400 MG: 400 TABLET ORAL at 11:28

## 2023-10-08 RX ADMIN — ATORVASTATIN CALCIUM 20 MG: 20 TABLET, FILM COATED ORAL at 20:55

## 2023-10-08 RX ADMIN — GUAIFENESIN 400 MG: 400 TABLET ORAL at 16:01

## 2023-10-08 RX ADMIN — VANCOMYCIN HYDROCHLORIDE 1000 MG: 1 INJECTION, POWDER, LYOPHILIZED, FOR SOLUTION INTRAVENOUS at 13:46

## 2023-10-08 RX ADMIN — METOPROLOL SUCCINATE 12.5 MG: 25 TABLET, EXTENDED RELEASE ORAL at 20:53

## 2023-10-08 RX ADMIN — ALBUTEROL SULFATE 2.5 MG: 2.5 SOLUTION RESPIRATORY (INHALATION) at 09:38

## 2023-10-08 RX ADMIN — BUDESONIDE 500 MCG: 0.5 SUSPENSION RESPIRATORY (INHALATION) at 09:37

## 2023-10-08 RX ADMIN — IPRATROPIUM BROMIDE AND ALBUTEROL SULFATE 1 DOSE: .5; 2.5 SOLUTION RESPIRATORY (INHALATION) at 12:09

## 2023-10-08 RX ADMIN — ARFORMOTEROL TARTRATE 15 MCG: 15 SOLUTION RESPIRATORY (INHALATION) at 09:38

## 2023-10-08 RX ADMIN — SODIUM CHLORIDE: 9 INJECTION, SOLUTION INTRAVENOUS at 13:45

## 2023-10-08 RX ADMIN — ARFORMOTEROL TARTRATE 15 MCG: 15 SOLUTION RESPIRATORY (INHALATION) at 20:05

## 2023-10-08 RX ADMIN — LOSARTAN POTASSIUM 100 MG: 50 TABLET, FILM COATED ORAL at 20:55

## 2023-10-08 RX ADMIN — FERROUS SULFATE TAB 325 MG (65 MG ELEMENTAL FE) 325 MG: 325 (65 FE) TAB at 20:55

## 2023-10-08 RX ADMIN — CEFEPIME 2000 MG: 2 INJECTION, POWDER, FOR SOLUTION INTRAVENOUS at 20:56

## 2023-10-08 RX ADMIN — LANSOPRAZOLE 30 MG: 30 TABLET, ORALLY DISINTEGRATING, DELAYED RELEASE ORAL at 05:09

## 2023-10-08 RX ADMIN — ASPIRIN 81 MG: 81 TABLET, COATED ORAL at 20:55

## 2023-10-08 RX ADMIN — IPRATROPIUM BROMIDE AND ALBUTEROL SULFATE 1 DOSE: .5; 2.5 SOLUTION RESPIRATORY (INHALATION) at 16:15

## 2023-10-08 NOTE — CONSULTS
Pulmonary Consultation    Admit Date: 10/7/2023  Requesting Physician: MISHA Cardenas CNP        Reason for consultation:  Respiratory distress        HPI:  Patient is a 77year old female who presents to emergency room with complaints of increased shortness of breath and hypoxia for one week. She was immediately placed on NIV upon admission to ER. Patient had associated symptoms of weakness, congestion, poor appetite, and urinary retention. Patient respiratory panel was negative with no leukocytosis. CTA of the chest did show new consolidations. Patient admits that she has not used her chest vest in a week. She was started on intravenous antibiotics, NIV, intravenous steroids, and aerosols. Patient was recently admitted three-four weeks ago with similar symptoms. Daughter at bedside does state that she felt good one week after being discharged and but over the last few weeks she has had difficulty with oxygen saturations at home. Patient has known COPD and bronchiectasis. She has had frequent hospitalizations over the last year for respiratory distress. One issue is her compliance with her chest vest which  is set up near her at home but due to weakness she just does not use consistently. Patient is seen at bedside with coarse rales without auscultation. She is breathless with conversation. Her secretions are minimally productive. There was discussion regarding code status and whether she would be easily extubated if intubation was needed. I did recommend that it be avoided at all costs due to her already underlying lung diseases, her increased risk for infection, and her recent failure to thrive. Daughter and patient agree [facility] is warranted currently. Palliative care note was reviewed.          PMH:    Past Medical History:   Diagnosis Date    COPD (chronic obstructive pulmonary disease) ([address])     Hyperlipidemia 9/14/2022    Hypertension     Migraines     MRSA (methicillin resistant

## 2023-10-09 LAB
ANION GAP SERPL CALCULATED.3IONS-SCNC: 8 MMOL/L (ref 7–16)
BUN SERPL-MCNC: 27 MG/DL (ref 6–23)
CALCIUM SERPL-MCNC: 7.6 MG/DL (ref 8.6–10.2)
CHLORIDE SERPL-SCNC: 112 MMOL/L (ref 98–107)
CO2 SERPL-SCNC: 24 MMOL/L (ref 22–29)
CREAT SERPL-MCNC: 0.6 MG/DL (ref 0.5–1)
ERYTHROCYTE [DISTWIDTH] IN BLOOD BY AUTOMATED COUNT: 15.8 % (ref 11.5–15)
GFR SERPL CREATININE-BSD FRML MDRD: >60 ML/MIN/1.73M2
GLUCOSE SERPL-MCNC: 220 MG/DL (ref 74–99)
HCT VFR BLD AUTO: 27.8 % (ref 34–48)
HGB BLD-MCNC: 8.4 G/DL (ref 11.5–15.5)
MCH RBC QN AUTO: 26.8 PG (ref 26–35)
MCHC RBC AUTO-ENTMCNC: 30.2 G/DL (ref 32–34.5)
MCV RBC AUTO: 88.8 FL (ref 80–99.9)
MICROORGANISM SPEC CULT: NORMAL
PLATELET # BLD AUTO: 149 K/UL (ref 130–450)
PMV BLD AUTO: 10.8 FL (ref 7–12)
POTASSIUM SERPL-SCNC: 3.7 MMOL/L (ref 3.5–5)
RBC # BLD AUTO: 3.13 M/UL (ref 3.5–5.5)
SODIUM SERPL-SCNC: 144 MMOL/L (ref 132–146)
SPECIMEN DESCRIPTION: NORMAL
WBC OTHER # BLD: 2.8 K/UL (ref 4.5–11.5)

## 2023-10-09 PROCEDURE — 80048 BASIC METABOLIC PNL TOTAL CA: CPT

## 2023-10-09 PROCEDURE — 6360000002 HC RX W HCPCS

## 2023-10-09 PROCEDURE — 2580000003 HC RX 258: Performed by: INTERNAL MEDICINE

## 2023-10-09 PROCEDURE — 94669 MECHANICAL CHEST WALL OSCILL: CPT

## 2023-10-09 PROCEDURE — 2060000000 HC ICU INTERMEDIATE R&B

## 2023-10-09 PROCEDURE — 6370000000 HC RX 637 (ALT 250 FOR IP): Performed by: INTERNAL MEDICINE

## 2023-10-09 PROCEDURE — 6370000000 HC RX 637 (ALT 250 FOR IP)

## 2023-10-09 PROCEDURE — 85027 COMPLETE CBC AUTOMATED: CPT

## 2023-10-09 PROCEDURE — 6360000002 HC RX W HCPCS: Performed by: INTERNAL MEDICINE

## 2023-10-09 PROCEDURE — 99231 SBSQ HOSP IP/OBS SF/LOW 25: CPT | Performed by: NURSE PRACTITIONER

## 2023-10-09 PROCEDURE — 92526 ORAL FUNCTION THERAPY: CPT

## 2023-10-09 PROCEDURE — 6370000000 HC RX 637 (ALT 250 FOR IP): Performed by: NURSE PRACTITIONER

## 2023-10-09 PROCEDURE — 94640 AIRWAY INHALATION TREATMENT: CPT

## 2023-10-09 PROCEDURE — 2700000000 HC OXYGEN THERAPY PER DAY

## 2023-10-09 PROCEDURE — 92610 EVALUATE SWALLOWING FUNCTION: CPT

## 2023-10-09 PROCEDURE — 94660 CPAP INITIATION&MGMT: CPT

## 2023-10-09 PROCEDURE — 99232 SBSQ HOSP IP/OBS MODERATE 35: CPT | Performed by: INTERNAL MEDICINE

## 2023-10-09 RX ADMIN — FERROUS SULFATE TAB 325 MG (65 MG ELEMENTAL FE) 325 MG: 325 (65 FE) TAB at 22:08

## 2023-10-09 RX ADMIN — ASPIRIN 81 MG: 81 TABLET, COATED ORAL at 22:08

## 2023-10-09 RX ADMIN — SODIUM CHLORIDE: 9 INJECTION, SOLUTION INTRAVENOUS at 11:21

## 2023-10-09 RX ADMIN — SODIUM CHLORIDE, PRESERVATIVE FREE 10 ML: 5 INJECTION INTRAVENOUS at 08:14

## 2023-10-09 RX ADMIN — ARFORMOTEROL TARTRATE 15 MCG: 15 SOLUTION RESPIRATORY (INHALATION) at 08:59

## 2023-10-09 RX ADMIN — HYDROCHLOROTHIAZIDE 12.5 MG: 12.5 TABLET ORAL at 22:08

## 2023-10-09 RX ADMIN — GUAIFENESIN 400 MG: 400 TABLET ORAL at 22:08

## 2023-10-09 RX ADMIN — LOSARTAN POTASSIUM 100 MG: 50 TABLET, FILM COATED ORAL at 22:08

## 2023-10-09 RX ADMIN — ENOXAPARIN SODIUM 40 MG: 100 INJECTION SUBCUTANEOUS at 12:34

## 2023-10-09 RX ADMIN — METHYLPREDNISOLONE SODIUM SUCCINATE 60 MG: 125 INJECTION, POWDER, LYOPHILIZED, FOR SOLUTION INTRAMUSCULAR; INTRAVENOUS at 16:47

## 2023-10-09 RX ADMIN — IPRATROPIUM BROMIDE AND ALBUTEROL SULFATE 1 DOSE: .5; 2.5 SOLUTION RESPIRATORY (INHALATION) at 15:54

## 2023-10-09 RX ADMIN — IPRATROPIUM BROMIDE AND ALBUTEROL SULFATE 1 DOSE: .5; 2.5 SOLUTION RESPIRATORY (INHALATION) at 12:23

## 2023-10-09 RX ADMIN — BUDESONIDE INHALATION 1000 MCG: 0.5 SUSPENSION RESPIRATORY (INHALATION) at 20:40

## 2023-10-09 RX ADMIN — ATORVASTATIN CALCIUM 20 MG: 20 TABLET, FILM COATED ORAL at 22:09

## 2023-10-09 RX ADMIN — METHYLPREDNISOLONE SODIUM SUCCINATE 60 MG: 125 INJECTION, POWDER, LYOPHILIZED, FOR SOLUTION INTRAMUSCULAR; INTRAVENOUS at 08:14

## 2023-10-09 RX ADMIN — IPRATROPIUM BROMIDE AND ALBUTEROL SULFATE 1 DOSE: .5; 2.5 SOLUTION RESPIRATORY (INHALATION) at 20:40

## 2023-10-09 RX ADMIN — GUAIFENESIN 400 MG: 400 TABLET ORAL at 08:14

## 2023-10-09 RX ADMIN — GUAIFENESIN 400 MG: 400 TABLET ORAL at 14:54

## 2023-10-09 RX ADMIN — CEFEPIME 2000 MG: 2 INJECTION, POWDER, FOR SOLUTION INTRAVENOUS at 08:24

## 2023-10-09 RX ADMIN — LANSOPRAZOLE 30 MG: 30 TABLET, ORALLY DISINTEGRATING, DELAYED RELEASE ORAL at 06:06

## 2023-10-09 RX ADMIN — METOPROLOL SUCCINATE 12.5 MG: 25 TABLET, EXTENDED RELEASE ORAL at 22:08

## 2023-10-09 RX ADMIN — BUDESONIDE INHALATION 1000 MCG: 0.5 SUSPENSION RESPIRATORY (INHALATION) at 08:59

## 2023-10-09 RX ADMIN — SODIUM CHLORIDE, PRESERVATIVE FREE 10 ML: 5 INJECTION INTRAVENOUS at 21:32

## 2023-10-09 RX ADMIN — IPRATROPIUM BROMIDE AND ALBUTEROL SULFATE 1 DOSE: .5; 2.5 SOLUTION RESPIRATORY (INHALATION) at 08:59

## 2023-10-09 RX ADMIN — ARFORMOTEROL TARTRATE 15 MCG: 15 SOLUTION RESPIRATORY (INHALATION) at 20:40

## 2023-10-09 RX ADMIN — LORAZEPAM 1 MG: 1 TABLET ORAL at 14:57

## 2023-10-09 RX ADMIN — CEFEPIME 2000 MG: 2 INJECTION, POWDER, FOR SOLUTION INTRAVENOUS at 22:07

## 2023-10-09 RX ADMIN — DULOXETINE HYDROCHLORIDE 30 MG: 30 CAPSULE, DELAYED RELEASE ORAL at 22:08

## 2023-10-09 RX ADMIN — VANCOMYCIN HYDROCHLORIDE 1000 MG: 1 INJECTION, POWDER, LYOPHILIZED, FOR SOLUTION INTRAVENOUS at 13:11

## 2023-10-09 RX ADMIN — AMLODIPINE BESYLATE 5 MG: 5 TABLET ORAL at 22:08

## 2023-10-09 RX ADMIN — METHYLPREDNISOLONE SODIUM SUCCINATE 60 MG: 125 INJECTION, POWDER, LYOPHILIZED, FOR SOLUTION INTRAMUSCULAR; INTRAVENOUS at 01:00

## 2023-10-09 ASSESSMENT — PAIN SCALES - GENERAL: PAINLEVEL_OUTOF10: 0

## 2023-10-09 NOTE — ACP (ADVANCE CARE PLANNING)
Advance Care Planning   Healthcare Decision Maker:    Primary Decision Maker: Kai Bhatt Child - [phone]    Secondary Decision Maker: Steven Guzman \"Edgard\" - Spouse - [phone]      Today we documented desired Decision Maker(s), who is (are) NOT Legal Next of [address] [place] [address]. ACP documents are required for decision maker authority.

## 2023-10-09 NOTE — CARE COORDINATION
Readmission noted. Pt admitted w/respiratory failure/pneumonia. Pt is now on 7l, IV cefepime and solumedrol. She is established w/Klarissa Burleson NP and she uses [address] in [place]. She resides with her  in a ranch home (3-entry steps HR on Right) tub/shower & walk-in shower set up and has home 02 (5 liters) & nebulizer from [address] [place] [address], as well as a chest vest.  Greg Leyden has used [facility] in the past. She is unsure if she would like [facility] at this time, she declines the need for a SNF. CM will continue to follow.   Edmund Pretty, BSN, RN  [facility] Case Management  [phone]

## 2023-10-10 ENCOUNTER — ANESTHESIA (OUTPATIENT)
Dept: ENDOSCOPY | Age: 78
End: 2023-10-10
Payer: MEDICARE

## 2023-10-10 ENCOUNTER — ANESTHESIA EVENT (OUTPATIENT)
Dept: ENDOSCOPY | Age: 78
End: 2023-10-10
Payer: MEDICARE

## 2023-10-10 LAB
ANION GAP SERPL CALCULATED.3IONS-SCNC: 6 MMOL/L (ref 7–16)
B.E.: 2.4 MMOL/L (ref -3–3)
BUN SERPL-MCNC: 28 MG/DL (ref 6–23)
CALCIUM SERPL-MCNC: 8.2 MG/DL (ref 8.6–10.2)
CHLORIDE SERPL-SCNC: 112 MMOL/L (ref 98–107)
CO2 SERPL-SCNC: 27 MMOL/L (ref 22–29)
COHB: 1 % (ref 0–1.5)
CREAT SERPL-MCNC: 0.7 MG/DL (ref 0.5–1)
CRITICAL: ABNORMAL
DATE ANALYZED: ABNORMAL
DATE OF COLLECTION: ABNORMAL
ERYTHROCYTE [DISTWIDTH] IN BLOOD BY AUTOMATED COUNT: 15.7 % (ref 11.5–15)
GFR SERPL CREATININE-BSD FRML MDRD: >60 ML/MIN/1.73M2
GLUCOSE SERPL-MCNC: 168 MG/DL (ref 74–99)
HCO3: 28.2 MMOL/L (ref 22–26)
HCT VFR BLD AUTO: 27 % (ref 34–48)
HGB BLD-MCNC: 8.2 G/DL (ref 11.5–15.5)
HHB: 5.5 % (ref 0–5)
LAB: ABNORMAL
Lab: 915
MCH RBC QN AUTO: 26.6 PG (ref 26–35)
MCHC RBC AUTO-ENTMCNC: 30.4 G/DL (ref 32–34.5)
MCV RBC AUTO: 87.7 FL (ref 80–99.9)
METHB: 0.3 % (ref 0–1.5)
MICROORGANISM SPEC CULT: ABNORMAL
MICROORGANISM SPEC CULT: ABNORMAL
MICROORGANISM/AGENT SPEC: ABNORMAL
MODE: ABNORMAL
O2 CONTENT: 11.6 ML/DL
O2 SATURATION: 94.4 % (ref 92–98.5)
O2HB: 93.2 % (ref 94–97)
OPERATOR ID: ABNORMAL
PATIENT TEMP: 37 C
PCO2: 50 MMHG (ref 35–45)
PH BLOOD GAS: 7.37 (ref 7.35–7.45)
PLATELET # BLD AUTO: 188 K/UL (ref 130–450)
PMV BLD AUTO: 10.3 FL (ref 7–12)
PO2: 74.8 MMHG (ref 75–100)
POTASSIUM SERPL-SCNC: 3.8 MMOL/L (ref 3.5–5)
RBC # BLD AUTO: 3.08 M/UL (ref 3.5–5.5)
SODIUM SERPL-SCNC: 145 MMOL/L (ref 132–146)
SOURCE, BLOOD GAS: ABNORMAL
SPECIMEN DESCRIPTION: ABNORMAL
THB: 8.8 G/DL (ref 11.5–16.5)
TIME ANALYZED: 930
VANCOMYCIN SERPL-MCNC: 13.5 UG/ML (ref 5–40)
WBC OTHER # BLD: 3.9 K/UL (ref 4.5–11.5)

## 2023-10-10 PROCEDURE — 6370000000 HC RX 637 (ALT 250 FOR IP)

## 2023-10-10 PROCEDURE — 87181 SC STD AGAR DILUTION PER AGT: CPT

## 2023-10-10 PROCEDURE — 6370000000 HC RX 637 (ALT 250 FOR IP): Performed by: INTERNAL MEDICINE

## 2023-10-10 PROCEDURE — 2580000003 HC RX 258: Performed by: INTERNAL MEDICINE

## 2023-10-10 PROCEDURE — 2700000000 HC OXYGEN THERAPY PER DAY

## 2023-10-10 PROCEDURE — APPSS30 APP SPLIT SHARED TIME 16-30 MINUTES: Performed by: NURSE PRACTITIONER

## 2023-10-10 PROCEDURE — 6360000002 HC RX W HCPCS: Performed by: NURSE ANESTHETIST, CERTIFIED REGISTERED

## 2023-10-10 PROCEDURE — 2060000000 HC ICU INTERMEDIATE R&B

## 2023-10-10 PROCEDURE — 6360000002 HC RX W HCPCS: Performed by: INTERNAL MEDICINE

## 2023-10-10 PROCEDURE — 2580000003 HC RX 258: Performed by: NURSE PRACTITIONER

## 2023-10-10 PROCEDURE — 92526 ORAL FUNCTION THERAPY: CPT

## 2023-10-10 PROCEDURE — 2709999900 HC NON-CHARGEABLE SUPPLY: Performed by: INTERNAL MEDICINE

## 2023-10-10 PROCEDURE — 94640 AIRWAY INHALATION TREATMENT: CPT

## 2023-10-10 PROCEDURE — 6360000002 HC RX W HCPCS

## 2023-10-10 PROCEDURE — 3700000000 HC ANESTHESIA ATTENDED CARE: Performed by: INTERNAL MEDICINE

## 2023-10-10 PROCEDURE — 2580000003 HC RX 258: Performed by: NURSE ANESTHETIST, CERTIFIED REGISTERED

## 2023-10-10 PROCEDURE — 94669 MECHANICAL CHEST WALL OSCILL: CPT

## 2023-10-10 PROCEDURE — 36415 COLL VENOUS BLD VENIPUNCTURE: CPT

## 2023-10-10 PROCEDURE — 94660 CPAP INITIATION&MGMT: CPT

## 2023-10-10 PROCEDURE — 99231 SBSQ HOSP IP/OBS SF/LOW 25: CPT | Performed by: NURSE PRACTITIONER

## 2023-10-10 PROCEDURE — 7100000011 HC PHASE II RECOVERY - ADDTL 15 MIN: Performed by: INTERNAL MEDICINE

## 2023-10-10 PROCEDURE — 99232 SBSQ HOSP IP/OBS MODERATE 35: CPT | Performed by: INTERNAL MEDICINE

## 2023-10-10 PROCEDURE — 3609027000 HC BRONCHOSCOPY: Performed by: INTERNAL MEDICINE

## 2023-10-10 PROCEDURE — 80048 BASIC METABOLIC PNL TOTAL CA: CPT

## 2023-10-10 PROCEDURE — 87077 CULTURE AEROBIC IDENTIFY: CPT

## 2023-10-10 PROCEDURE — 3700000001 HC ADD 15 MINUTES (ANESTHESIA): Performed by: INTERNAL MEDICINE

## 2023-10-10 PROCEDURE — 80202 ASSAY OF VANCOMYCIN: CPT

## 2023-10-10 PROCEDURE — 85027 COMPLETE CBC AUTOMATED: CPT

## 2023-10-10 PROCEDURE — 6360000002 HC RX W HCPCS: Performed by: NURSE PRACTITIONER

## 2023-10-10 PROCEDURE — 7100000010 HC PHASE II RECOVERY - FIRST 15 MIN: Performed by: INTERNAL MEDICINE

## 2023-10-10 PROCEDURE — 82805 BLOOD GASES W/O2 SATURATION: CPT

## 2023-10-10 PROCEDURE — 0B9F8ZZ DRAINAGE OF RIGHT LOWER LUNG LOBE, VIA NATURAL OR ARTIFICIAL OPENING ENDOSCOPIC: ICD-10-PCS | Performed by: INTERNAL MEDICINE

## 2023-10-10 PROCEDURE — 87070 CULTURE OTHR SPECIMN AEROBIC: CPT

## 2023-10-10 PROCEDURE — 87205 SMEAR GRAM STAIN: CPT

## 2023-10-10 RX ORDER — HYDRALAZINE HYDROCHLORIDE 20 MG/ML
10 INJECTION INTRAMUSCULAR; INTRAVENOUS
Status: DISCONTINUED | OUTPATIENT
Start: 2023-10-10 | End: 2023-10-10 | Stop reason: HOSPADM

## 2023-10-10 RX ORDER — ONDANSETRON 2 MG/ML
4 INJECTION INTRAMUSCULAR; INTRAVENOUS
Status: DISCONTINUED | OUTPATIENT
Start: 2023-10-10 | End: 2023-10-10 | Stop reason: HOSPADM

## 2023-10-10 RX ORDER — SODIUM CHLORIDE 0.9 % (FLUSH) 0.9 %
5-40 SYRINGE (ML) INJECTION PRN
Status: DISCONTINUED | OUTPATIENT
Start: 2023-10-10 | End: 2023-10-10 | Stop reason: HOSPADM

## 2023-10-10 RX ORDER — IPRATROPIUM BROMIDE AND ALBUTEROL SULFATE 2.5; .5 MG/3ML; MG/3ML
1 SOLUTION RESPIRATORY (INHALATION)
Status: DISCONTINUED | OUTPATIENT
Start: 2023-10-10 | End: 2023-10-10 | Stop reason: HOSPADM

## 2023-10-10 RX ORDER — SODIUM CHLORIDE 9 MG/ML
INJECTION, SOLUTION INTRAVENOUS PRN
Status: DISCONTINUED | OUTPATIENT
Start: 2023-10-10 | End: 2023-10-10 | Stop reason: HOSPADM

## 2023-10-10 RX ORDER — LABETALOL HYDROCHLORIDE 5 MG/ML
10 INJECTION, SOLUTION INTRAVENOUS
Status: DISCONTINUED | OUTPATIENT
Start: 2023-10-10 | End: 2023-10-10 | Stop reason: HOSPADM

## 2023-10-10 RX ORDER — MEPERIDINE HYDROCHLORIDE 25 MG/ML
12.5 INJECTION INTRAMUSCULAR; INTRAVENOUS; SUBCUTANEOUS EVERY 5 MIN PRN
Status: DISCONTINUED | OUTPATIENT
Start: 2023-10-10 | End: 2023-10-10 | Stop reason: HOSPADM

## 2023-10-10 RX ORDER — SODIUM CHLORIDE 0.9 % (FLUSH) 0.9 %
5-40 SYRINGE (ML) INJECTION EVERY 12 HOURS SCHEDULED
Status: DISCONTINUED | OUTPATIENT
Start: 2023-10-10 | End: 2023-10-10 | Stop reason: HOSPADM

## 2023-10-10 RX ORDER — PROPOFOL 10 MG/ML
INJECTION, EMULSION INTRAVENOUS PRN
Status: DISCONTINUED | OUTPATIENT
Start: 2023-10-10 | End: 2023-10-10 | Stop reason: SDUPTHER

## 2023-10-10 RX ORDER — MIDAZOLAM HYDROCHLORIDE 2 MG/2ML
2 INJECTION, SOLUTION INTRAMUSCULAR; INTRAVENOUS
Status: DISCONTINUED | OUTPATIENT
Start: 2023-10-10 | End: 2023-10-10 | Stop reason: HOSPADM

## 2023-10-10 RX ORDER — DEXTROSE AND SODIUM CHLORIDE 5; .45 G/100ML; G/100ML
INJECTION, SOLUTION INTRAVENOUS CONTINUOUS
Status: DISCONTINUED | OUTPATIENT
Start: 2023-10-10 | End: 2023-10-17 | Stop reason: HOSPADM

## 2023-10-10 RX ORDER — SODIUM CHLORIDE 9 MG/ML
INJECTION, SOLUTION INTRAVENOUS CONTINUOUS PRN
Status: DISCONTINUED | OUTPATIENT
Start: 2023-10-10 | End: 2023-10-10 | Stop reason: SDUPTHER

## 2023-10-10 RX ADMIN — BUDESONIDE INHALATION 1000 MCG: 0.5 SUSPENSION RESPIRATORY (INHALATION) at 08:21

## 2023-10-10 RX ADMIN — ENOXAPARIN SODIUM 40 MG: 100 INJECTION SUBCUTANEOUS at 18:34

## 2023-10-10 RX ADMIN — BUDESONIDE INHALATION 1000 MCG: 0.5 SUSPENSION RESPIRATORY (INHALATION) at 20:17

## 2023-10-10 RX ADMIN — SODIUM CHLORIDE, PRESERVATIVE FREE 10 ML: 5 INJECTION INTRAVENOUS at 10:58

## 2023-10-10 RX ADMIN — SODIUM CHLORIDE: 9 INJECTION, SOLUTION INTRAVENOUS at 12:00

## 2023-10-10 RX ADMIN — IPRATROPIUM BROMIDE AND ALBUTEROL SULFATE 1 DOSE: .5; 2.5 SOLUTION RESPIRATORY (INHALATION) at 16:14

## 2023-10-10 RX ADMIN — DEXTROSE AND SODIUM CHLORIDE: 5; 450 INJECTION, SOLUTION INTRAVENOUS at 21:29

## 2023-10-10 RX ADMIN — ARFORMOTEROL TARTRATE 15 MCG: 15 SOLUTION RESPIRATORY (INHALATION) at 20:16

## 2023-10-10 RX ADMIN — PIPERACILLIN AND TAZOBACTAM 4500 MG: 4; .5 INJECTION, POWDER, LYOPHILIZED, FOR SOLUTION INTRAVENOUS at 18:43

## 2023-10-10 RX ADMIN — PIPERACILLIN AND TAZOBACTAM 4500 MG: 4; .5 INJECTION, POWDER, LYOPHILIZED, FOR SOLUTION INTRAVENOUS at 11:07

## 2023-10-10 RX ADMIN — PROPOFOL 80 MG: 10 INJECTION, EMULSION INTRAVENOUS at 12:05

## 2023-10-10 RX ADMIN — SODIUM CHLORIDE, PRESERVATIVE FREE 10 ML: 5 INJECTION INTRAVENOUS at 20:24

## 2023-10-10 RX ADMIN — IPRATROPIUM BROMIDE AND ALBUTEROL SULFATE 1 DOSE: .5; 2.5 SOLUTION RESPIRATORY (INHALATION) at 13:35

## 2023-10-10 RX ADMIN — SODIUM CHLORIDE: 9 INJECTION, SOLUTION INTRAVENOUS at 16:24

## 2023-10-10 RX ADMIN — IPRATROPIUM BROMIDE AND ALBUTEROL SULFATE 1 DOSE: .5; 2.5 SOLUTION RESPIRATORY (INHALATION) at 08:21

## 2023-10-10 RX ADMIN — METHYLPREDNISOLONE SODIUM SUCCINATE 60 MG: 125 INJECTION, POWDER, LYOPHILIZED, FOR SOLUTION INTRAMUSCULAR; INTRAVENOUS at 10:58

## 2023-10-10 RX ADMIN — SODIUM CHLORIDE, PRESERVATIVE FREE 10 ML: 5 INJECTION INTRAVENOUS at 18:34

## 2023-10-10 RX ADMIN — IPRATROPIUM BROMIDE AND ALBUTEROL SULFATE 1 DOSE: .5; 2.5 SOLUTION RESPIRATORY (INHALATION) at 20:16

## 2023-10-10 RX ADMIN — METHYLPREDNISOLONE SODIUM SUCCINATE 60 MG: 125 INJECTION, POWDER, LYOPHILIZED, FOR SOLUTION INTRAMUSCULAR; INTRAVENOUS at 02:05

## 2023-10-10 RX ADMIN — METHYLPREDNISOLONE SODIUM SUCCINATE 60 MG: 125 INJECTION, POWDER, LYOPHILIZED, FOR SOLUTION INTRAMUSCULAR; INTRAVENOUS at 18:34

## 2023-10-10 RX ADMIN — ARFORMOTEROL TARTRATE 15 MCG: 15 SOLUTION RESPIRATORY (INHALATION) at 08:21

## 2023-10-10 ASSESSMENT — PAIN SCALES - GENERAL
PAINLEVEL_OUTOF10: 0

## 2023-10-10 ASSESSMENT — LIFESTYLE VARIABLES: SMOKING_STATUS: 0

## 2023-10-10 NOTE — ANESTHESIA PRE PROCEDURE
electrolyte abnormalities, . Abdominal:             Vascular:   + PE. Other Findings:           Anesthesia Plan      MAC     ASA 3       Induction: intravenous. Anesthetic plan and risks discussed with patient. Plan discussed with CRNA. Flakita Blancas MD   10/10/2023      DOS STAFF ADDENDUM:    Patient seen and chart reviewed. No interval change in history or exam.   Anesthesia plan discussed, risk/benefits addressed. Patient's concerns and questions answered.      MISHA Andres - CRNA  October 10, 2023  11:47 AM

## 2023-10-10 NOTE — OP NOTE
Operative Note      Patient: Jewel Zaman  YOB: 1945  MRN: [id number]    Date of Procedure: 10/10/2023    Pre-Op Diagnosis Codes:     * Pneumonia due to infectious organism, unspecified laterality, unspecified part of lung [J18.9]    Post-Op Diagnosis: Same       Procedure(s):  BRONCHOSCOPY DIAGNOSTIC OR CELL [address] ONLY    Surgeon(s):  Augusta Cortez MD    Assistant:   * No surgical staff found *    Anesthesia: Monitor Anesthesia Care    Estimated Blood Loss (mL): Minimal    Complications: None    Specimens:   ID Type Source Tests Collected by Time Destination   1 : RLL micro wash  Respiratory Bronchial Washing GRAM STAIN, CULTURE, RESPIRATORY Augusta Cortez MD 10/10/2023 1209        Implants:  * No implants in log *      Drains:   Urinary Catheter 10/08/23 (Active)   Catheter Indications Urinary retention (acute or chronic), continuous bladder irrigation or bladder outlet obstruction 10/10/23 1144   Site Assessment [place] 10/10/23 1144   Urine Color Yellow 10/10/23 1221   Urine Appearance Mucous 10/10/23 1144   Urine Odor Malodorous 10/10/23 1144   Collection Container Standard 10/10/23 1221   Securement Method Leg strap 10/10/23 1144   Catheter Care  Soap and water 10/09/23 2000   Catheter Best Practices  Drainage tube clipped to bed;Catheter secured to thigh; Bag below bladder;Bag not on floor;Drainage bag less than half full 10/10/23 1144   Status Patent;Draining 10/10/23 0805   Output (mL) 325 mL 10/10/23 1107       [REMOVED] External Urinary Catheter (Removed)   Site Assessment Clean,dry & intact 09/19/23 1028   Placement Replaced 09/19/23 1028   Catheter Care Catheter/Wick replaced;Suction Canister/Tubing changed 09/19/23 1028   Perineal Care Yes 09/19/23 1028   Suction 40 mmgHg continuous 09/19/23 1028       Findings: Mucous plugging throughout the tracheobronchial tree with purulent secretions        Detailed Description of Procedure:    The patient was informed of the procedure, consent was

## 2023-10-10 NOTE — ANESTHESIA POSTPROCEDURE EVALUATION
Department of Anesthesiology  Postprocedure Note    Patient: Dick Acosta  MRN: [id number]  YOB: 1945  Date of evaluation: 10/10/2023      Procedure Summary     Date: 10/10/23 Room / Location: [facility] / [facility]    Anesthesia Start: 1200 Anesthesia Stop: 1211    Procedure: BRONCHOSCOPY DIAGNOSTIC OR CELL [address] Diagnosis:       Pneumonia due to infectious organism, unspecified laterality, unspecified part of lung      (Pneumonia due to infectious organism, unspecified laterality, unspecified part of lung [J18.9])    Surgeons: Kiran Lunsford MD Responsible Provider: Geraldine Tobin MD    Anesthesia Type: MAC ASA Status: 3          Anesthesia Type: No value filed.     Gian Phase I: Gian Score: 10    Gian Phase II:        Anesthesia Post Evaluation    Patient location during evaluation: bedside  Patient participation: complete - patient participated  Level of consciousness: awake and alert  Pain score: 0  Airway patency: patent  Nausea & Vomiting: no nausea and no vomiting  Complications: no  Cardiovascular status: blood pressure returned to baseline  Respiratory status: acceptable  Hydration status: euvolemic  Pain management: adequate

## 2023-10-10 NOTE — PLAN OF CARE
Problem: Skin/Tissue Integrity  Goal: Absence of new skin breakdown  Description: 1. Monitor for areas of redness and/or skin breakdown  2. Assess vascular access sites hourly  3. Every 4-6 hours minimum:  Change oxygen saturation probe site  4. Every 4-6 hours:  If on nasal continuous positive airway pressure, respiratory therapy assess nares and determine need for appliance change or resting period.   10/9/2023 2312 by Shena Smalls RN  Outcome: Progressing  10/9/2023 1256 by Jose Angel Risk, RN  Outcome: Progressing     Problem: Safety - Adult  Goal: Free from fall injury  10/9/2023 2312 by Shena Smalls RN  Outcome: Progressing  10/9/2023 1256 by Jose Angel , RN  Outcome: Progressing

## 2023-10-11 ENCOUNTER — APPOINTMENT (OUTPATIENT)
Dept: GENERAL RADIOLOGY | Age: 78
DRG: 871 | End: 2023-10-11
Payer: MEDICARE

## 2023-10-11 LAB
ANION GAP SERPL CALCULATED.3IONS-SCNC: 11 MMOL/L (ref 7–16)
ANION GAP SERPL CALCULATED.3IONS-SCNC: 9 MMOL/L (ref 7–16)
B.E.: 7.9 MMOL/L (ref -3–3)
BUN SERPL-MCNC: 20 MG/DL (ref 6–23)
BUN SERPL-MCNC: 21 MG/DL (ref 6–23)
CALCIUM SERPL-MCNC: 7.8 MG/DL (ref 8.6–10.2)
CALCIUM SERPL-MCNC: 8.1 MG/DL (ref 8.6–10.2)
CHLORIDE SERPL-SCNC: 103 MMOL/L (ref 98–107)
CHLORIDE SERPL-SCNC: 99 MMOL/L (ref 98–107)
CO2 SERPL-SCNC: 30 MMOL/L (ref 22–29)
CO2 SERPL-SCNC: 30 MMOL/L (ref 22–29)
COHB: 0.9 % (ref 0–1.5)
CREAT SERPL-MCNC: 0.6 MG/DL (ref 0.5–1)
CREAT SERPL-MCNC: 0.7 MG/DL (ref 0.5–1)
CRITICAL: ABNORMAL
DATE ANALYZED: ABNORMAL
DATE OF COLLECTION: ABNORMAL
ERYTHROCYTE [DISTWIDTH] IN BLOOD BY AUTOMATED COUNT: 15.6 % (ref 11.5–15)
GFR SERPL CREATININE-BSD FRML MDRD: >60 ML/MIN/1.73M2
GFR SERPL CREATININE-BSD FRML MDRD: >60 ML/MIN/1.73M2
GLUCOSE BLD-MCNC: 163 MG/DL (ref 74–99)
GLUCOSE BLD-MCNC: 170 MG/DL (ref 74–99)
GLUCOSE BLD-MCNC: 179 MG/DL (ref 74–99)
GLUCOSE SERPL-MCNC: 181 MG/DL (ref 74–99)
GLUCOSE SERPL-MCNC: 368 MG/DL (ref 74–99)
HCO3: 33.3 MMOL/L (ref 22–26)
HCT VFR BLD AUTO: 27.9 % (ref 34–48)
HGB BLD-MCNC: 8.3 G/DL (ref 11.5–15.5)
HHB: 7.9 % (ref 0–5)
LAB: ABNORMAL
Lab: 1402
MAGNESIUM SERPL-MCNC: 1.8 MG/DL (ref 1.6–2.6)
MAGNESIUM SERPL-MCNC: 1.9 MG/DL (ref 1.6–2.6)
MCH RBC QN AUTO: 26.4 PG (ref 26–35)
MCHC RBC AUTO-ENTMCNC: 29.7 G/DL (ref 32–34.5)
MCV RBC AUTO: 88.9 FL (ref 80–99.9)
METHB: 0.3 % (ref 0–1.5)
MODE: ABNORMAL
O2 CONTENT: 12.5 ML/DL
O2 SATURATION: 92 % (ref 92–98.5)
O2HB: 90.9 % (ref 94–97)
OPERATOR ID: 3114
PATIENT TEMP: 37 C
PCO2: 50.9 MMHG (ref 35–45)
PH BLOOD GAS: 7.43 (ref 7.35–7.45)
PHOSPHATE SERPL-MCNC: 2.6 MG/DL (ref 2.5–4.5)
PLATELET # BLD AUTO: 202 K/UL (ref 130–450)
PMV BLD AUTO: 10.4 FL (ref 7–12)
PO2: 63.4 MMHG (ref 75–100)
POTASSIUM SERPL-SCNC: 2.7 MMOL/L (ref 3.5–5)
POTASSIUM SERPL-SCNC: 3.4 MMOL/L (ref 3.5–5)
POTASSIUM SERPL-SCNC: 3.4 MMOL/L (ref 3.5–5)
RBC # BLD AUTO: 3.14 M/UL (ref 3.5–5.5)
SODIUM SERPL-SCNC: 138 MMOL/L (ref 132–146)
SODIUM SERPL-SCNC: 144 MMOL/L (ref 132–146)
SOURCE, BLOOD GAS: ABNORMAL
THB: 9.7 G/DL (ref 11.5–16.5)
TIME ANALYZED: 1407
WBC OTHER # BLD: 4.2 K/UL (ref 4.5–11.5)

## 2023-10-11 PROCEDURE — 94669 MECHANICAL CHEST WALL OSCILL: CPT

## 2023-10-11 PROCEDURE — 36415 COLL VENOUS BLD VENIPUNCTURE: CPT

## 2023-10-11 PROCEDURE — 2580000003 HC RX 258: Performed by: INTERNAL MEDICINE

## 2023-10-11 PROCEDURE — 82962 GLUCOSE BLOOD TEST: CPT

## 2023-10-11 PROCEDURE — 2500000003 HC RX 250 WO HCPCS: Performed by: RADIOLOGY

## 2023-10-11 PROCEDURE — 84100 ASSAY OF PHOSPHORUS: CPT

## 2023-10-11 PROCEDURE — 6370000000 HC RX 637 (ALT 250 FOR IP)

## 2023-10-11 PROCEDURE — 84132 ASSAY OF SERUM POTASSIUM: CPT

## 2023-10-11 PROCEDURE — 36600 WITHDRAWAL OF ARTERIAL BLOOD: CPT

## 2023-10-11 PROCEDURE — 94660 CPAP INITIATION&MGMT: CPT

## 2023-10-11 PROCEDURE — 2580000003 HC RX 258: Performed by: NURSE PRACTITIONER

## 2023-10-11 PROCEDURE — 74230 X-RAY XM SWLNG FUNCJ C+: CPT

## 2023-10-11 PROCEDURE — 2700000000 HC OXYGEN THERAPY PER DAY

## 2023-10-11 PROCEDURE — 83735 ASSAY OF MAGNESIUM: CPT

## 2023-10-11 PROCEDURE — 80048 BASIC METABOLIC PNL TOTAL CA: CPT

## 2023-10-11 PROCEDURE — 85027 COMPLETE CBC AUTOMATED: CPT

## 2023-10-11 PROCEDURE — 6360000002 HC RX W HCPCS

## 2023-10-11 PROCEDURE — 6360000002 HC RX W HCPCS: Performed by: INTERNAL MEDICINE

## 2023-10-11 PROCEDURE — 82805 BLOOD GASES W/O2 SATURATION: CPT

## 2023-10-11 PROCEDURE — 92610 EVALUATE SWALLOWING FUNCTION: CPT

## 2023-10-11 PROCEDURE — 92526 ORAL FUNCTION THERAPY: CPT

## 2023-10-11 PROCEDURE — 94640 AIRWAY INHALATION TREATMENT: CPT

## 2023-10-11 PROCEDURE — 2060000000 HC ICU INTERMEDIATE R&B

## 2023-10-11 PROCEDURE — 6360000002 HC RX W HCPCS: Performed by: NURSE PRACTITIONER

## 2023-10-11 PROCEDURE — 99232 SBSQ HOSP IP/OBS MODERATE 35: CPT | Performed by: INTERNAL MEDICINE

## 2023-10-11 PROCEDURE — 99231 SBSQ HOSP IP/OBS SF/LOW 25: CPT | Performed by: NURSE PRACTITIONER

## 2023-10-11 RX ORDER — INSULIN LISPRO 100 [IU]/ML
0-4 INJECTION, SOLUTION INTRAVENOUS; SUBCUTANEOUS NIGHTLY
Status: DISCONTINUED | OUTPATIENT
Start: 2023-10-11 | End: 2023-10-11

## 2023-10-11 RX ORDER — POTASSIUM CHLORIDE 20 MEQ/1
40 TABLET, EXTENDED RELEASE ORAL ONCE
Status: CANCELLED | OUTPATIENT
Start: 2023-10-11 | End: 2023-10-11

## 2023-10-11 RX ORDER — DEXTROSE MONOHYDRATE 100 MG/ML
INJECTION, SOLUTION INTRAVENOUS CONTINUOUS PRN
Status: DISCONTINUED | OUTPATIENT
Start: 2023-10-11 | End: 2023-10-17 | Stop reason: HOSPADM

## 2023-10-11 RX ORDER — INSULIN LISPRO 100 [IU]/ML
0-4 INJECTION, SOLUTION INTRAVENOUS; SUBCUTANEOUS EVERY 4 HOURS
Status: DISCONTINUED | OUTPATIENT
Start: 2023-10-11 | End: 2023-10-12

## 2023-10-11 RX ORDER — INSULIN LISPRO 100 [IU]/ML
0-4 INJECTION, SOLUTION INTRAVENOUS; SUBCUTANEOUS
Status: DISCONTINUED | OUTPATIENT
Start: 2023-10-11 | End: 2023-10-11

## 2023-10-11 RX ADMIN — PIPERACILLIN AND TAZOBACTAM 4500 MG: 4; .5 INJECTION, POWDER, LYOPHILIZED, FOR SOLUTION INTRAVENOUS at 01:03

## 2023-10-11 RX ADMIN — BUDESONIDE INHALATION 1000 MCG: 0.5 SUSPENSION RESPIRATORY (INHALATION) at 09:08

## 2023-10-11 RX ADMIN — ARFORMOTEROL TARTRATE 15 MCG: 15 SOLUTION RESPIRATORY (INHALATION) at 09:08

## 2023-10-11 RX ADMIN — SODIUM CHLORIDE, PRESERVATIVE FREE 10 ML: 5 INJECTION INTRAVENOUS at 10:27

## 2023-10-11 RX ADMIN — DEXTROSE AND SODIUM CHLORIDE: 5; 450 INJECTION, SOLUTION INTRAVENOUS at 18:15

## 2023-10-11 RX ADMIN — BARIUM SULFATE 70 G: 0.81 POWDER, FOR SUSPENSION ORAL at 12:52

## 2023-10-11 RX ADMIN — METHYLPREDNISOLONE SODIUM SUCCINATE 60 MG: 125 INJECTION, POWDER, LYOPHILIZED, FOR SOLUTION INTRAMUSCULAR; INTRAVENOUS at 18:17

## 2023-10-11 RX ADMIN — METHYLPREDNISOLONE SODIUM SUCCINATE 60 MG: 125 INJECTION, POWDER, LYOPHILIZED, FOR SOLUTION INTRAMUSCULAR; INTRAVENOUS at 08:36

## 2023-10-11 RX ADMIN — IPRATROPIUM BROMIDE AND ALBUTEROL SULFATE 1 DOSE: .5; 2.5 SOLUTION RESPIRATORY (INHALATION) at 16:21

## 2023-10-11 RX ADMIN — ENOXAPARIN SODIUM 40 MG: 100 INJECTION SUBCUTANEOUS at 13:17

## 2023-10-11 RX ADMIN — METHYLPREDNISOLONE SODIUM SUCCINATE 60 MG: 125 INJECTION, POWDER, LYOPHILIZED, FOR SOLUTION INTRAMUSCULAR; INTRAVENOUS at 01:02

## 2023-10-11 RX ADMIN — BUDESONIDE INHALATION 1000 MCG: 0.5 SUSPENSION RESPIRATORY (INHALATION) at 20:23

## 2023-10-11 RX ADMIN — BARIUM SULFATE 10 ML: 400 PASTE ORAL at 12:52

## 2023-10-11 RX ADMIN — PIPERACILLIN AND TAZOBACTAM 4500 MG: 4; .5 INJECTION, POWDER, LYOPHILIZED, FOR SOLUTION INTRAVENOUS at 18:24

## 2023-10-11 RX ADMIN — BARIUM SULFATE 120 ML: 400 SUSPENSION ORAL at 12:53

## 2023-10-11 RX ADMIN — PIPERACILLIN AND TAZOBACTAM 4500 MG: 4; .5 INJECTION, POWDER, LYOPHILIZED, FOR SOLUTION INTRAVENOUS at 08:41

## 2023-10-11 RX ADMIN — IPRATROPIUM BROMIDE AND ALBUTEROL SULFATE 1 DOSE: .5; 2.5 SOLUTION RESPIRATORY (INHALATION) at 09:09

## 2023-10-11 RX ADMIN — SODIUM CHLORIDE, PRESERVATIVE FREE 10 ML: 5 INJECTION INTRAVENOUS at 08:36

## 2023-10-11 RX ADMIN — IPRATROPIUM BROMIDE AND ALBUTEROL SULFATE 1 DOSE: .5; 2.5 SOLUTION RESPIRATORY (INHALATION) at 20:23

## 2023-10-11 RX ADMIN — ARFORMOTEROL TARTRATE 15 MCG: 15 SOLUTION RESPIRATORY (INHALATION) at 20:23

## 2023-10-12 LAB
ANION GAP SERPL CALCULATED.3IONS-SCNC: 7 MMOL/L (ref 7–16)
BUN SERPL-MCNC: 18 MG/DL (ref 6–23)
CALCIUM SERPL-MCNC: 7.8 MG/DL (ref 8.6–10.2)
CHLORIDE SERPL-SCNC: 104 MMOL/L (ref 98–107)
CO2 SERPL-SCNC: 33 MMOL/L (ref 22–29)
CREAT SERPL-MCNC: 0.6 MG/DL (ref 0.5–1)
ERYTHROCYTE [DISTWIDTH] IN BLOOD BY AUTOMATED COUNT: 15.2 % (ref 11.5–15)
GFR SERPL CREATININE-BSD FRML MDRD: >60 ML/MIN/1.73M2
GLUCOSE BLD-MCNC: 165 MG/DL (ref 74–99)
GLUCOSE BLD-MCNC: 194 MG/DL (ref 74–99)
GLUCOSE BLD-MCNC: 197 MG/DL (ref 74–99)
GLUCOSE BLD-MCNC: 204 MG/DL (ref 74–99)
GLUCOSE BLD-MCNC: 207 MG/DL (ref 74–99)
GLUCOSE BLD-MCNC: 305 MG/DL (ref 74–99)
GLUCOSE SERPL-MCNC: 203 MG/DL (ref 74–99)
HCT VFR BLD AUTO: 27.1 % (ref 34–48)
HGB BLD-MCNC: 8.3 G/DL (ref 11.5–15.5)
MCH RBC QN AUTO: 26 PG (ref 26–35)
MCHC RBC AUTO-ENTMCNC: 30.6 G/DL (ref 32–34.5)
MCV RBC AUTO: 85 FL (ref 80–99.9)
MICROORGANISM SPEC CULT: NORMAL
MICROORGANISM SPEC CULT: NORMAL
PLATELET # BLD AUTO: 208 K/UL (ref 130–450)
PMV BLD AUTO: 10.2 FL (ref 7–12)
POTASSIUM SERPL-SCNC: 3.2 MMOL/L (ref 3.5–5)
RBC # BLD AUTO: 3.19 M/UL (ref 3.5–5.5)
SERVICE CMNT-IMP: NORMAL
SERVICE CMNT-IMP: NORMAL
SODIUM SERPL-SCNC: 144 MMOL/L (ref 132–146)
SPECIMEN DESCRIPTION: NORMAL
SPECIMEN DESCRIPTION: NORMAL
WBC OTHER # BLD: 5 K/UL (ref 4.5–11.5)

## 2023-10-12 PROCEDURE — 6370000000 HC RX 637 (ALT 250 FOR IP): Performed by: NURSE PRACTITIONER

## 2023-10-12 PROCEDURE — 97161 PT EVAL LOW COMPLEX 20 MIN: CPT

## 2023-10-12 PROCEDURE — 2060000000 HC ICU INTERMEDIATE R&B

## 2023-10-12 PROCEDURE — 6370000000 HC RX 637 (ALT 250 FOR IP): Performed by: STUDENT IN AN ORGANIZED HEALTH CARE EDUCATION/TRAINING PROGRAM

## 2023-10-12 PROCEDURE — 6360000002 HC RX W HCPCS

## 2023-10-12 PROCEDURE — 2580000003 HC RX 258: Performed by: NURSE PRACTITIONER

## 2023-10-12 PROCEDURE — 80048 BASIC METABOLIC PNL TOTAL CA: CPT

## 2023-10-12 PROCEDURE — 6370000000 HC RX 637 (ALT 250 FOR IP)

## 2023-10-12 PROCEDURE — 85027 COMPLETE CBC AUTOMATED: CPT

## 2023-10-12 PROCEDURE — 94669 MECHANICAL CHEST WALL OSCILL: CPT

## 2023-10-12 PROCEDURE — 6360000002 HC RX W HCPCS: Performed by: NURSE PRACTITIONER

## 2023-10-12 PROCEDURE — 2700000000 HC OXYGEN THERAPY PER DAY

## 2023-10-12 PROCEDURE — 99232 SBSQ HOSP IP/OBS MODERATE 35: CPT | Performed by: NURSE PRACTITIONER

## 2023-10-12 PROCEDURE — 99232 SBSQ HOSP IP/OBS MODERATE 35: CPT | Performed by: STUDENT IN AN ORGANIZED HEALTH CARE EDUCATION/TRAINING PROGRAM

## 2023-10-12 PROCEDURE — 36415 COLL VENOUS BLD VENIPUNCTURE: CPT

## 2023-10-12 PROCEDURE — 97165 OT EVAL LOW COMPLEX 30 MIN: CPT

## 2023-10-12 PROCEDURE — 6360000002 HC RX W HCPCS: Performed by: INTERNAL MEDICINE

## 2023-10-12 PROCEDURE — 94660 CPAP INITIATION&MGMT: CPT

## 2023-10-12 PROCEDURE — 2580000003 HC RX 258: Performed by: INTERNAL MEDICINE

## 2023-10-12 PROCEDURE — 82962 GLUCOSE BLOOD TEST: CPT

## 2023-10-12 PROCEDURE — 94640 AIRWAY INHALATION TREATMENT: CPT

## 2023-10-12 PROCEDURE — 6370000000 HC RX 637 (ALT 250 FOR IP): Performed by: INTERNAL MEDICINE

## 2023-10-12 RX ORDER — POTASSIUM CHLORIDE 20 MEQ/1
40 TABLET, EXTENDED RELEASE ORAL ONCE
Status: COMPLETED | OUTPATIENT
Start: 2023-10-12 | End: 2023-10-12

## 2023-10-12 RX ORDER — INSULIN LISPRO 100 [IU]/ML
0-4 INJECTION, SOLUTION INTRAVENOUS; SUBCUTANEOUS
Status: DISCONTINUED | OUTPATIENT
Start: 2023-10-12 | End: 2023-10-13

## 2023-10-12 RX ORDER — METHYLPREDNISOLONE SODIUM SUCCINATE 125 MG/2ML
60 INJECTION, POWDER, LYOPHILIZED, FOR SOLUTION INTRAMUSCULAR; INTRAVENOUS EVERY 12 HOURS
Status: DISCONTINUED | OUTPATIENT
Start: 2023-10-12 | End: 2023-10-14

## 2023-10-12 RX ADMIN — ENOXAPARIN SODIUM 40 MG: 100 INJECTION SUBCUTANEOUS at 14:07

## 2023-10-12 RX ADMIN — GUAIFENESIN 400 MG: 400 TABLET ORAL at 20:28

## 2023-10-12 RX ADMIN — METHYLPREDNISOLONE SODIUM SUCCINATE 60 MG: 125 INJECTION, POWDER, LYOPHILIZED, FOR SOLUTION INTRAMUSCULAR; INTRAVENOUS at 08:14

## 2023-10-12 RX ADMIN — GUAIFENESIN 400 MG: 400 TABLET ORAL at 08:14

## 2023-10-12 RX ADMIN — DULOXETINE HYDROCHLORIDE 30 MG: 30 CAPSULE, DELAYED RELEASE ORAL at 20:28

## 2023-10-12 RX ADMIN — METHYLPREDNISOLONE SODIUM SUCCINATE 60 MG: 125 INJECTION, POWDER, LYOPHILIZED, FOR SOLUTION INTRAMUSCULAR; INTRAVENOUS at 01:15

## 2023-10-12 RX ADMIN — METOPROLOL SUCCINATE 12.5 MG: 25 TABLET, EXTENDED RELEASE ORAL at 20:28

## 2023-10-12 RX ADMIN — BUDESONIDE INHALATION 1000 MCG: 0.5 SUSPENSION RESPIRATORY (INHALATION) at 12:46

## 2023-10-12 RX ADMIN — POTASSIUM CHLORIDE 40 MEQ: 1500 TABLET, EXTENDED RELEASE ORAL at 10:59

## 2023-10-12 RX ADMIN — PIPERACILLIN AND TAZOBACTAM 4500 MG: 4; .5 INJECTION, POWDER, LYOPHILIZED, FOR SOLUTION INTRAVENOUS at 08:19

## 2023-10-12 RX ADMIN — INSULIN LISPRO 1 UNITS: 100 INJECTION, SOLUTION INTRAVENOUS; SUBCUTANEOUS at 01:15

## 2023-10-12 RX ADMIN — ARFORMOTEROL TARTRATE 15 MCG: 15 SOLUTION RESPIRATORY (INHALATION) at 19:15

## 2023-10-12 RX ADMIN — IPRATROPIUM BROMIDE AND ALBUTEROL SULFATE 1 DOSE: .5; 2.5 SOLUTION RESPIRATORY (INHALATION) at 19:15

## 2023-10-12 RX ADMIN — SODIUM CHLORIDE, PRESERVATIVE FREE 10 ML: 5 INJECTION INTRAVENOUS at 08:15

## 2023-10-12 RX ADMIN — METHYLPREDNISOLONE SODIUM SUCCINATE 60 MG: 125 INJECTION INTRAMUSCULAR; INTRAVENOUS at 20:27

## 2023-10-12 RX ADMIN — PIPERACILLIN AND TAZOBACTAM 4500 MG: 4; .5 INJECTION, POWDER, LYOPHILIZED, FOR SOLUTION INTRAVENOUS at 16:15

## 2023-10-12 RX ADMIN — BUDESONIDE INHALATION 1000 MCG: 0.5 SUSPENSION RESPIRATORY (INHALATION) at 19:15

## 2023-10-12 RX ADMIN — PIPERACILLIN AND TAZOBACTAM 4500 MG: 4; .5 INJECTION, POWDER, LYOPHILIZED, FOR SOLUTION INTRAVENOUS at 01:19

## 2023-10-12 RX ADMIN — LOSARTAN POTASSIUM 100 MG: 50 TABLET, FILM COATED ORAL at 20:28

## 2023-10-12 RX ADMIN — GUAIFENESIN 400 MG: 400 TABLET ORAL at 14:07

## 2023-10-12 RX ADMIN — ATORVASTATIN CALCIUM 20 MG: 20 TABLET, FILM COATED ORAL at 20:28

## 2023-10-12 RX ADMIN — FERROUS SULFATE TAB 325 MG (65 MG ELEMENTAL FE) 325 MG: 325 (65 FE) TAB at 20:27

## 2023-10-12 RX ADMIN — IPRATROPIUM BROMIDE AND ALBUTEROL SULFATE 1 DOSE: .5; 2.5 SOLUTION RESPIRATORY (INHALATION) at 15:58

## 2023-10-12 RX ADMIN — INSULIN LISPRO 1 UNITS: 100 INJECTION, SOLUTION INTRAVENOUS; SUBCUTANEOUS at 20:38

## 2023-10-12 RX ADMIN — IPRATROPIUM BROMIDE AND ALBUTEROL SULFATE 1 DOSE: .5; 2.5 SOLUTION RESPIRATORY (INHALATION) at 12:46

## 2023-10-12 RX ADMIN — ARFORMOTEROL TARTRATE 15 MCG: 15 SOLUTION RESPIRATORY (INHALATION) at 12:46

## 2023-10-12 RX ADMIN — ASPIRIN 81 MG: 81 TABLET, COATED ORAL at 20:28

## 2023-10-12 RX ADMIN — AMLODIPINE BESYLATE 5 MG: 5 TABLET ORAL at 20:28

## 2023-10-12 ASSESSMENT — PAIN SCALES - GENERAL
PAINLEVEL_OUTOF10: 0
PAINLEVEL_OUTOF10: 0

## 2023-10-13 ENCOUNTER — APPOINTMENT (OUTPATIENT)
Dept: GENERAL RADIOLOGY | Age: 78
DRG: 871 | End: 2023-10-13
Payer: MEDICARE

## 2023-10-13 LAB
ANION GAP SERPL CALCULATED.3IONS-SCNC: 9 MMOL/L (ref 7–16)
BUN SERPL-MCNC: 16 MG/DL (ref 6–23)
CALCIUM SERPL-MCNC: 7.6 MG/DL (ref 8.6–10.2)
CHLORIDE SERPL-SCNC: 101 MMOL/L (ref 98–107)
CO2 SERPL-SCNC: 34 MMOL/L (ref 22–29)
CREAT SERPL-MCNC: 0.6 MG/DL (ref 0.5–1)
EKG ATRIAL RATE: 88 BPM
EKG P AXIS: 42 DEGREES
EKG P-R INTERVAL: 114 MS
EKG Q-T INTERVAL: 432 MS
EKG QRS DURATION: 80 MS
EKG QTC CALCULATION (BAZETT): 522 MS
EKG R AXIS: -32 DEGREES
EKG T AXIS: 1 DEGREES
EKG VENTRICULAR RATE: 88 BPM
ERYTHROCYTE [DISTWIDTH] IN BLOOD BY AUTOMATED COUNT: 15 % (ref 11.5–15)
GFR SERPL CREATININE-BSD FRML MDRD: >60 ML/MIN/1.73M2
GLUCOSE BLD-MCNC: 135 MG/DL (ref 74–99)
GLUCOSE BLD-MCNC: 142 MG/DL (ref 74–99)
GLUCOSE BLD-MCNC: 161 MG/DL (ref 74–99)
GLUCOSE BLD-MCNC: 197 MG/DL (ref 74–99)
GLUCOSE SERPL-MCNC: 157 MG/DL (ref 74–99)
HCT VFR BLD AUTO: 30.5 % (ref 34–48)
HGB BLD-MCNC: 9.4 G/DL (ref 11.5–15.5)
MCH RBC QN AUTO: 26.4 PG (ref 26–35)
MCHC RBC AUTO-ENTMCNC: 30.8 G/DL (ref 32–34.5)
MCV RBC AUTO: 85.7 FL (ref 80–99.9)
MICROORGANISM SPEC CULT: ABNORMAL
MICROORGANISM SPEC CULT: ABNORMAL
MICROORGANISM/AGENT SPEC: ABNORMAL
PLATELET # BLD AUTO: 234 K/UL (ref 130–450)
PMV BLD AUTO: 10 FL (ref 7–12)
POTASSIUM SERPL-SCNC: 3.6 MMOL/L (ref 3.5–5)
RBC # BLD AUTO: 3.56 M/UL (ref 3.5–5.5)
SODIUM SERPL-SCNC: 144 MMOL/L (ref 132–146)
SPECIMEN DESCRIPTION: ABNORMAL
WBC OTHER # BLD: 8.4 K/UL (ref 4.5–11.5)

## 2023-10-13 PROCEDURE — 6370000000 HC RX 637 (ALT 250 FOR IP): Performed by: REGISTERED NURSE

## 2023-10-13 PROCEDURE — 93010 ELECTROCARDIOGRAM REPORT: CPT | Performed by: INTERNAL MEDICINE

## 2023-10-13 PROCEDURE — 6360000002 HC RX W HCPCS

## 2023-10-13 PROCEDURE — 6360000002 HC RX W HCPCS: Performed by: INTERNAL MEDICINE

## 2023-10-13 PROCEDURE — 2580000003 HC RX 258: Performed by: INTERNAL MEDICINE

## 2023-10-13 PROCEDURE — 94640 AIRWAY INHALATION TREATMENT: CPT

## 2023-10-13 PROCEDURE — 94660 CPAP INITIATION&MGMT: CPT

## 2023-10-13 PROCEDURE — 6370000000 HC RX 637 (ALT 250 FOR IP): Performed by: NURSE PRACTITIONER

## 2023-10-13 PROCEDURE — 2060000000 HC ICU INTERMEDIATE R&B

## 2023-10-13 PROCEDURE — 82962 GLUCOSE BLOOD TEST: CPT

## 2023-10-13 PROCEDURE — 6370000000 HC RX 637 (ALT 250 FOR IP): Performed by: INTERNAL MEDICINE

## 2023-10-13 PROCEDURE — 85027 COMPLETE CBC AUTOMATED: CPT

## 2023-10-13 PROCEDURE — 2700000000 HC OXYGEN THERAPY PER DAY

## 2023-10-13 PROCEDURE — 94669 MECHANICAL CHEST WALL OSCILL: CPT

## 2023-10-13 PROCEDURE — 6360000002 HC RX W HCPCS: Performed by: NURSE PRACTITIONER

## 2023-10-13 PROCEDURE — 2580000003 HC RX 258: Performed by: NURSE PRACTITIONER

## 2023-10-13 PROCEDURE — 80048 BASIC METABOLIC PNL TOTAL CA: CPT

## 2023-10-13 PROCEDURE — 6370000000 HC RX 637 (ALT 250 FOR IP)

## 2023-10-13 PROCEDURE — 71045 X-RAY EXAM CHEST 1 VIEW: CPT

## 2023-10-13 PROCEDURE — 93005 ELECTROCARDIOGRAM TRACING: CPT | Performed by: STUDENT IN AN ORGANIZED HEALTH CARE EDUCATION/TRAINING PROGRAM

## 2023-10-13 RX ORDER — INSULIN LISPRO 100 [IU]/ML
0-4 INJECTION, SOLUTION INTRAVENOUS; SUBCUTANEOUS
Status: DISCONTINUED | OUTPATIENT
Start: 2023-10-13 | End: 2023-10-17 | Stop reason: HOSPADM

## 2023-10-13 RX ORDER — LEVOFLOXACIN 5 MG/ML
750 INJECTION, SOLUTION INTRAVENOUS EVERY 24 HOURS
Status: DISCONTINUED | OUTPATIENT
Start: 2023-10-13 | End: 2023-10-13

## 2023-10-13 RX ORDER — CIPROFLOXACIN 500 MG/1
500 TABLET, FILM COATED ORAL EVERY 12 HOURS SCHEDULED
Status: DISCONTINUED | OUTPATIENT
Start: 2023-10-13 | End: 2023-10-17 | Stop reason: HOSPADM

## 2023-10-13 RX ADMIN — IPRATROPIUM BROMIDE AND ALBUTEROL SULFATE 1 DOSE: .5; 2.5 SOLUTION RESPIRATORY (INHALATION) at 20:01

## 2023-10-13 RX ADMIN — GUAIFENESIN 400 MG: 400 TABLET ORAL at 15:16

## 2023-10-13 RX ADMIN — LANSOPRAZOLE 30 MG: 30 TABLET, ORALLY DISINTEGRATING, DELAYED RELEASE ORAL at 05:47

## 2023-10-13 RX ADMIN — METOPROLOL SUCCINATE 12.5 MG: 25 TABLET, EXTENDED RELEASE ORAL at 20:18

## 2023-10-13 RX ADMIN — LOSARTAN POTASSIUM 100 MG: 50 TABLET, FILM COATED ORAL at 20:17

## 2023-10-13 RX ADMIN — CIPROFLOXACIN 500 MG: 500 TABLET, FILM COATED ORAL at 20:17

## 2023-10-13 RX ADMIN — SODIUM CHLORIDE, PRESERVATIVE FREE 10 ML: 5 INJECTION INTRAVENOUS at 09:55

## 2023-10-13 RX ADMIN — ATORVASTATIN CALCIUM 20 MG: 20 TABLET, FILM COATED ORAL at 20:18

## 2023-10-13 RX ADMIN — ENOXAPARIN SODIUM 40 MG: 100 INJECTION SUBCUTANEOUS at 15:11

## 2023-10-13 RX ADMIN — GUAIFENESIN 400 MG: 400 TABLET ORAL at 20:18

## 2023-10-13 RX ADMIN — METHYLPREDNISOLONE SODIUM SUCCINATE 60 MG: 125 INJECTION INTRAMUSCULAR; INTRAVENOUS at 09:56

## 2023-10-13 RX ADMIN — BUDESONIDE INHALATION 1000 MCG: 0.5 SUSPENSION RESPIRATORY (INHALATION) at 20:01

## 2023-10-13 RX ADMIN — METHYLPREDNISOLONE SODIUM SUCCINATE 60 MG: 125 INJECTION INTRAMUSCULAR; INTRAVENOUS at 20:18

## 2023-10-13 RX ADMIN — PIPERACILLIN AND TAZOBACTAM 4500 MG: 4; .5 INJECTION, POWDER, LYOPHILIZED, FOR SOLUTION INTRAVENOUS at 10:01

## 2023-10-13 RX ADMIN — ARFORMOTEROL TARTRATE 15 MCG: 15 SOLUTION RESPIRATORY (INHALATION) at 20:01

## 2023-10-13 RX ADMIN — DULOXETINE HYDROCHLORIDE 30 MG: 30 CAPSULE, DELAYED RELEASE ORAL at 20:18

## 2023-10-13 RX ADMIN — IPRATROPIUM BROMIDE AND ALBUTEROL SULFATE 1 DOSE: .5; 2.5 SOLUTION RESPIRATORY (INHALATION) at 15:59

## 2023-10-13 RX ADMIN — IPRATROPIUM BROMIDE AND ALBUTEROL SULFATE 1 DOSE: .5; 2.5 SOLUTION RESPIRATORY (INHALATION) at 12:59

## 2023-10-13 RX ADMIN — SODIUM CHLORIDE, PRESERVATIVE FREE 10 ML: 5 INJECTION INTRAVENOUS at 20:18

## 2023-10-13 RX ADMIN — PIPERACILLIN AND TAZOBACTAM 4500 MG: 4; .5 INJECTION, POWDER, LYOPHILIZED, FOR SOLUTION INTRAVENOUS at 00:09

## 2023-10-13 RX ADMIN — ASPIRIN 81 MG: 81 TABLET, COATED ORAL at 20:18

## 2023-10-13 RX ADMIN — ARFORMOTEROL TARTRATE 15 MCG: 15 SOLUTION RESPIRATORY (INHALATION) at 08:26

## 2023-10-13 RX ADMIN — CIPROFLOXACIN 500 MG: 500 TABLET, FILM COATED ORAL at 12:30

## 2023-10-13 RX ADMIN — AMLODIPINE BESYLATE 5 MG: 5 TABLET ORAL at 20:18

## 2023-10-13 RX ADMIN — DEXTROSE AND SODIUM CHLORIDE: 5; 450 INJECTION, SOLUTION INTRAVENOUS at 12:35

## 2023-10-13 RX ADMIN — IPRATROPIUM BROMIDE AND ALBUTEROL SULFATE 1 DOSE: .5; 2.5 SOLUTION RESPIRATORY (INHALATION) at 08:26

## 2023-10-13 RX ADMIN — BUDESONIDE INHALATION 1000 MCG: 0.5 SUSPENSION RESPIRATORY (INHALATION) at 08:26

## 2023-10-13 RX ADMIN — GUAIFENESIN 400 MG: 400 TABLET ORAL at 09:56

## 2023-10-13 ASSESSMENT — PAIN SCALES - GENERAL
PAINLEVEL_OUTOF10: 0

## 2023-10-13 ASSESSMENT — PAIN SCALES - WONG BAKER
WONGBAKER_NUMERICALRESPONSE: 0

## 2023-10-13 NOTE — CONSULTS
[facility]  Consultation Note     Admit Date: 10/7/2023  9:32 AM    Reason for Consult:   resistant pseudomonas pna    Attending Physician:  Simon Levi MD    HISTORY OF PRESENT ILLNESS:             The history is obtained from extensive review of available past medical records. The patient is a 77 y.o. female who is previously known to the ID service. She has a history of COPD and was recently discharged from the hospital just last month. She presented to the ED at [facility] on 10/7 with respiratory distress, generalized weakness, decreased intake for a few days. She was satting 76% at home on her bipap. She endorsed a nonproductive cough. She was started on Zosyn due to her history of pseudmonas. She was also started on Solumedrol. She has remained afebrile throughout her hospitalization. CXR showed right lower lobe pneumonia and chronic changes. CT of the chest showed dense consolidation in the right upper and lower lobes. She is on [facility]. She has been on Zosyn. 2 respiratory cultures were done 3 days apart? Both grew Pseudomonas, the second one was resistant to Zosyn. ID was asked to see today due to a resistant organism. The family is in talks with hospice. Past Medical History:    April 2023: Admitted to [facility] with shortness of breath and COPD exacerbation. CXR showed likely interstitial fibrosis. Respiratory culture grew Pseudomonas. She was treated with and discharged on Ciprofloxacin. September 2022. Admitted to [facility] with worsening dyspnea and a productive brownish/reddish sputum. CTA which showed partially occlusive pulmonary embolism left lower lobe. It also showed a right lower lobe infiltrate. Seen by ID and treated with oral Ciprofloxacin for Pseudomonas pneumonia versus infected bronchiectasis and exacerbation COPD. QT intervals were checked because she was on metoprolol.   She was discharged on 3 weeks of

## 2023-10-13 NOTE — CARE COORDINATION
Social Work / Discharge PLanning : Hospice is following patient. Patient and family undecided. SW visited with patient . Tires easily but did participate in basic conversation. SW spoke to daughter Ian Church and explaiend role as discharge planner/ transition of care. Therapy worked with patient 09/24. Goals at discharge and discussed and additional options if not going with Hospice such as SNF and HHC. SNF list for [facility] per daughter  request left in room and she will discuss goals at discharge with patient and family and update SW. Support provided. Await plan. ID consulted and weaning steroids. If SNF is decision, no pre-cert needed. SW to follow.  Electronically signed by DAKOTA Doan on 10/13/23 at 11:22 AM EDT

## 2023-10-13 NOTE — PLAN OF CARE
Problem: Skin/Tissue Integrity  Goal: Absence of new skin breakdown  Description: 1. Monitor for areas of redness and/or skin breakdown  2. Assess vascular access sites hourly  3. Every 4-6 hours minimum:  Change oxygen saturation probe site  4. Every 4-6 hours:  If on nasal continuous positive airway pressure, respiratory therapy assess nares and determine need for appliance change or resting period.   10/12/2023 2140 by Juan Diego Lopez RN  Outcome: Progressing  10/12/2023 1035 by Karina Montiel RN  Outcome: Progressing     Problem: Safety - Adult  Goal: Free from fall injury  10/12/2023 2140 by Juan Diego Lopez RN  Outcome: Progressing  10/12/2023 1035 by Karina Montiel RN  Outcome: Progressing     Problem: Pain  Goal: Verbalizes/displays adequate comfort level or baseline comfort level  10/12/2023 2140 by Juan Diego Lopez RN  Outcome: Progressing  10/12/2023 1035 by Karina Montiel RN  Outcome: Progressing

## 2023-10-13 NOTE — PLAN OF CARE
Problem: Skin/Tissue Integrity  Goal: Absence of new skin breakdown  Description: 1. Monitor for areas of redness and/or skin breakdown  2. Assess vascular access sites hourly  3. Every 4-6 hours minimum:  Change oxygen saturation probe site  4. Every 4-6 hours:  If on nasal continuous positive airway pressure, respiratory therapy assess nares and determine need for appliance change or resting period.   10/13/2023 0032 by Quita Roth RN  Outcome: Progressing     Problem: Safety - Adult  Goal: Free from fall injury  10/13/2023 0032 by Quita Roth RN  Outcome: Progressing     Problem: Pain  Goal: Verbalizes/displays adequate comfort level or baseline comfort level  10/13/2023 0032 by Quita Roth RN  Outcome: Progressing

## 2023-10-14 LAB
ANION GAP SERPL CALCULATED.3IONS-SCNC: 7 MMOL/L (ref 7–16)
BUN SERPL-MCNC: 12 MG/DL (ref 6–23)
CALCIUM SERPL-MCNC: 7.5 MG/DL (ref 8.6–10.2)
CHLORIDE SERPL-SCNC: 96 MMOL/L (ref 98–107)
CO2 SERPL-SCNC: 40 MMOL/L (ref 22–29)
CREAT SERPL-MCNC: 0.5 MG/DL (ref 0.5–1)
ERYTHROCYTE [DISTWIDTH] IN BLOOD BY AUTOMATED COUNT: 15.5 % (ref 11.5–15)
GFR SERPL CREATININE-BSD FRML MDRD: >60 ML/MIN/1.73M2
GLUCOSE BLD-MCNC: 139 MG/DL (ref 74–99)
GLUCOSE BLD-MCNC: 150 MG/DL (ref 74–99)
GLUCOSE BLD-MCNC: 171 MG/DL (ref 74–99)
GLUCOSE BLD-MCNC: 189 MG/DL (ref 74–99)
GLUCOSE SERPL-MCNC: 166 MG/DL (ref 74–99)
HCT VFR BLD AUTO: 31.2 % (ref 34–48)
HGB BLD-MCNC: 9.8 G/DL (ref 11.5–15.5)
MCH RBC QN AUTO: 26.6 PG (ref 26–35)
MCHC RBC AUTO-ENTMCNC: 31.4 G/DL (ref 32–34.5)
MCV RBC AUTO: 84.8 FL (ref 80–99.9)
PLATELET # BLD AUTO: 220 K/UL (ref 130–450)
PMV BLD AUTO: 10.1 FL (ref 7–12)
POTASSIUM SERPL-SCNC: 3.3 MMOL/L (ref 3.5–5)
RBC # BLD AUTO: 3.68 M/UL (ref 3.5–5.5)
SODIUM SERPL-SCNC: 143 MMOL/L (ref 132–146)
WBC OTHER # BLD: 8.5 K/UL (ref 4.5–11.5)

## 2023-10-14 PROCEDURE — 6370000000 HC RX 637 (ALT 250 FOR IP): Performed by: INTERNAL MEDICINE

## 2023-10-14 PROCEDURE — 2700000000 HC OXYGEN THERAPY PER DAY

## 2023-10-14 PROCEDURE — 6370000000 HC RX 637 (ALT 250 FOR IP): Performed by: NURSE PRACTITIONER

## 2023-10-14 PROCEDURE — 6360000002 HC RX W HCPCS: Performed by: INTERNAL MEDICINE

## 2023-10-14 PROCEDURE — 82962 GLUCOSE BLOOD TEST: CPT

## 2023-10-14 PROCEDURE — 6370000000 HC RX 637 (ALT 250 FOR IP)

## 2023-10-14 PROCEDURE — 6370000000 HC RX 637 (ALT 250 FOR IP): Performed by: REGISTERED NURSE

## 2023-10-14 PROCEDURE — 2580000003 HC RX 258: Performed by: INTERNAL MEDICINE

## 2023-10-14 PROCEDURE — 94660 CPAP INITIATION&MGMT: CPT

## 2023-10-14 PROCEDURE — 94669 MECHANICAL CHEST WALL OSCILL: CPT

## 2023-10-14 PROCEDURE — 94640 AIRWAY INHALATION TREATMENT: CPT

## 2023-10-14 PROCEDURE — 6370000000 HC RX 637 (ALT 250 FOR IP): Performed by: STUDENT IN AN ORGANIZED HEALTH CARE EDUCATION/TRAINING PROGRAM

## 2023-10-14 PROCEDURE — 2060000000 HC ICU INTERMEDIATE R&B

## 2023-10-14 PROCEDURE — 6360000002 HC RX W HCPCS

## 2023-10-14 PROCEDURE — 85027 COMPLETE CBC AUTOMATED: CPT

## 2023-10-14 PROCEDURE — 80048 BASIC METABOLIC PNL TOTAL CA: CPT

## 2023-10-14 RX ORDER — METHYLPREDNISOLONE SODIUM SUCCINATE 40 MG/ML
40 INJECTION, POWDER, LYOPHILIZED, FOR SOLUTION INTRAMUSCULAR; INTRAVENOUS EVERY 12 HOURS
Status: COMPLETED | OUTPATIENT
Start: 2023-10-14 | End: 2023-10-15

## 2023-10-14 RX ORDER — POTASSIUM CHLORIDE 20 MEQ/1
40 TABLET, EXTENDED RELEASE ORAL ONCE
Status: COMPLETED | OUTPATIENT
Start: 2023-10-14 | End: 2023-10-14

## 2023-10-14 RX ADMIN — ASPIRIN 81 MG: 81 TABLET, COATED ORAL at 20:53

## 2023-10-14 RX ADMIN — IPRATROPIUM BROMIDE AND ALBUTEROL SULFATE 1 DOSE: .5; 2.5 SOLUTION RESPIRATORY (INHALATION) at 08:38

## 2023-10-14 RX ADMIN — DEXTROSE AND SODIUM CHLORIDE: 5; 450 INJECTION, SOLUTION INTRAVENOUS at 08:19

## 2023-10-14 RX ADMIN — IPRATROPIUM BROMIDE AND ALBUTEROL SULFATE 1 DOSE: .5; 2.5 SOLUTION RESPIRATORY (INHALATION) at 16:13

## 2023-10-14 RX ADMIN — IPRATROPIUM BROMIDE AND ALBUTEROL SULFATE 1 DOSE: .5; 2.5 SOLUTION RESPIRATORY (INHALATION) at 12:06

## 2023-10-14 RX ADMIN — ATORVASTATIN CALCIUM 20 MG: 20 TABLET, FILM COATED ORAL at 20:53

## 2023-10-14 RX ADMIN — SODIUM CHLORIDE, PRESERVATIVE FREE 10 ML: 5 INJECTION INTRAVENOUS at 08:20

## 2023-10-14 RX ADMIN — IPRATROPIUM BROMIDE AND ALBUTEROL SULFATE 1 DOSE: .5; 2.5 SOLUTION RESPIRATORY (INHALATION) at 20:14

## 2023-10-14 RX ADMIN — METHYLPREDNISOLONE SODIUM SUCCINATE 40 MG: 40 INJECTION, POWDER, LYOPHILIZED, FOR SOLUTION INTRAMUSCULAR; INTRAVENOUS at 20:53

## 2023-10-14 RX ADMIN — LANSOPRAZOLE 30 MG: 30 TABLET, ORALLY DISINTEGRATING, DELAYED RELEASE ORAL at 06:12

## 2023-10-14 RX ADMIN — BUDESONIDE INHALATION 1000 MCG: 0.5 SUSPENSION RESPIRATORY (INHALATION) at 08:38

## 2023-10-14 RX ADMIN — LOSARTAN POTASSIUM 100 MG: 50 TABLET, FILM COATED ORAL at 20:53

## 2023-10-14 RX ADMIN — CIPROFLOXACIN 500 MG: 500 TABLET, FILM COATED ORAL at 21:00

## 2023-10-14 RX ADMIN — ARFORMOTEROL TARTRATE 15 MCG: 15 SOLUTION RESPIRATORY (INHALATION) at 20:14

## 2023-10-14 RX ADMIN — BUDESONIDE INHALATION 1000 MCG: 0.5 SUSPENSION RESPIRATORY (INHALATION) at 20:14

## 2023-10-14 RX ADMIN — ENOXAPARIN SODIUM 40 MG: 100 INJECTION SUBCUTANEOUS at 14:50

## 2023-10-14 RX ADMIN — SODIUM CHLORIDE, PRESERVATIVE FREE 10 ML: 5 INJECTION INTRAVENOUS at 20:54

## 2023-10-14 RX ADMIN — GUAIFENESIN 400 MG: 400 TABLET ORAL at 20:53

## 2023-10-14 RX ADMIN — DULOXETINE HYDROCHLORIDE 30 MG: 30 CAPSULE, DELAYED RELEASE ORAL at 20:53

## 2023-10-14 RX ADMIN — CIPROFLOXACIN 500 MG: 500 TABLET, FILM COATED ORAL at 08:20

## 2023-10-14 RX ADMIN — GUAIFENESIN 400 MG: 400 TABLET ORAL at 14:50

## 2023-10-14 RX ADMIN — METOPROLOL SUCCINATE 12.5 MG: 25 TABLET, EXTENDED RELEASE ORAL at 20:53

## 2023-10-14 RX ADMIN — AMLODIPINE BESYLATE 5 MG: 5 TABLET ORAL at 20:54

## 2023-10-14 RX ADMIN — FERROUS SULFATE TAB 325 MG (65 MG ELEMENTAL FE) 325 MG: 325 (65 FE) TAB at 16:46

## 2023-10-14 RX ADMIN — POTASSIUM CHLORIDE 40 MEQ: 1500 TABLET, EXTENDED RELEASE ORAL at 12:22

## 2023-10-14 RX ADMIN — METHYLPREDNISOLONE SODIUM SUCCINATE 60 MG: 125 INJECTION INTRAMUSCULAR; INTRAVENOUS at 08:19

## 2023-10-14 RX ADMIN — ARFORMOTEROL TARTRATE 15 MCG: 15 SOLUTION RESPIRATORY (INHALATION) at 08:38

## 2023-10-14 RX ADMIN — GUAIFENESIN 400 MG: 400 TABLET ORAL at 08:20

## 2023-10-14 ASSESSMENT — PAIN SCALES - GENERAL: PAINLEVEL_OUTOF10: 0

## 2023-10-14 NOTE — PLAN OF CARE
Problem: Skin/Tissue Integrity  Goal: Absence of new skin breakdown  Description: 1. Monitor for areas of redness and/or skin breakdown  2. Assess vascular access sites hourly  3. Every 4-6 hours minimum:  Change oxygen saturation probe site  4. Every 4-6 hours:  If on nasal continuous positive airway pressure, respiratory therapy assess nares and determine need for appliance change or resting period.   10/14/2023 1018 by Deena Walden RN  Outcome: Progressing  10/14/2023 0036 by Alicia Barnes RN  Outcome: Progressing  10/13/2023 2210 by Jenise Habermann, RN  Outcome: Progressing     Problem: Safety - Adult  Goal: Free from fall injury  10/14/2023 1018 by Deena Walden RN  Outcome: Progressing  10/14/2023 0036 by Alicia Barnes RN  Outcome: Progressing  10/13/2023 2210 by Jenise Habermann, RN  Outcome: Progressing     Problem: Pain  Goal: Verbalizes/displays adequate comfort level or baseline comfort level  10/14/2023 1018 by Deena Walden RN  Outcome: Progressing  10/14/2023 0036 by Alicia Barnes RN  Outcome: Progressing  10/13/2023 2210 by Jenise Habermann, RN  Outcome: Progressing

## 2023-10-14 NOTE — PLAN OF CARE
Problem: Skin/Tissue Integrity  Goal: Absence of new skin breakdown  Description: 1. Monitor for areas of redness and/or skin breakdown  2. Assess vascular access sites hourly  3. Every 4-6 hours minimum:  Change oxygen saturation probe site  4. Every 4-6 hours:  If on nasal continuous positive airway pressure, respiratory therapy assess nares and determine need for appliance change or resting period.   10/14/2023 0036 by Pennie David RN  Outcome: Progressing  10/13/2023 2210 by Shabana Shipman RN  Outcome: Progressing     Problem: Safety - Adult  Goal: Free from fall injury  10/14/2023 0036 by Pennie David RN  Outcome: Progressing  10/13/2023 2210 by Shabana Shipman, RN  Outcome: Progressing     Problem: Pain  Goal: Verbalizes/displays adequate comfort level or baseline comfort level  10/14/2023 0036 by Pennie David RN  Outcome: Progressing  10/13/2023 2210 by Shabana Shipman, RN  Outcome: Progressing

## 2023-10-15 LAB
ANION GAP SERPL CALCULATED.3IONS-SCNC: 8 MMOL/L (ref 7–16)
BUN SERPL-MCNC: 16 MG/DL (ref 6–23)
CALCIUM SERPL-MCNC: 7.7 MG/DL (ref 8.6–10.2)
CHLORIDE SERPL-SCNC: 98 MMOL/L (ref 98–107)
CO2 SERPL-SCNC: 36 MMOL/L (ref 22–29)
CREAT SERPL-MCNC: 0.5 MG/DL (ref 0.5–1)
ERYTHROCYTE [DISTWIDTH] IN BLOOD BY AUTOMATED COUNT: 15.6 % (ref 11.5–15)
GFR SERPL CREATININE-BSD FRML MDRD: >60 ML/MIN/1.73M2
GLUCOSE BLD-MCNC: 172 MG/DL (ref 74–99)
GLUCOSE BLD-MCNC: 180 MG/DL (ref 74–99)
GLUCOSE BLD-MCNC: 184 MG/DL (ref 74–99)
GLUCOSE BLD-MCNC: 319 MG/DL (ref 74–99)
GLUCOSE SERPL-MCNC: 183 MG/DL (ref 74–99)
HCT VFR BLD AUTO: 32.4 % (ref 34–48)
HGB BLD-MCNC: 9.9 G/DL (ref 11.5–15.5)
MCH RBC QN AUTO: 26.7 PG (ref 26–35)
MCHC RBC AUTO-ENTMCNC: 30.6 G/DL (ref 32–34.5)
MCV RBC AUTO: 87.3 FL (ref 80–99.9)
PLATELET # BLD AUTO: 179 K/UL (ref 130–450)
PMV BLD AUTO: 10.8 FL (ref 7–12)
POTASSIUM SERPL-SCNC: 3.9 MMOL/L (ref 3.5–5)
RBC # BLD AUTO: 3.71 M/UL (ref 3.5–5.5)
SODIUM SERPL-SCNC: 142 MMOL/L (ref 132–146)
WBC OTHER # BLD: 5.9 K/UL (ref 4.5–11.5)

## 2023-10-15 PROCEDURE — 6370000000 HC RX 637 (ALT 250 FOR IP): Performed by: STUDENT IN AN ORGANIZED HEALTH CARE EDUCATION/TRAINING PROGRAM

## 2023-10-15 PROCEDURE — 94660 CPAP INITIATION&MGMT: CPT

## 2023-10-15 PROCEDURE — 6370000000 HC RX 637 (ALT 250 FOR IP): Performed by: REGISTERED NURSE

## 2023-10-15 PROCEDURE — 6360000002 HC RX W HCPCS: Performed by: INTERNAL MEDICINE

## 2023-10-15 PROCEDURE — 85027 COMPLETE CBC AUTOMATED: CPT

## 2023-10-15 PROCEDURE — 2580000003 HC RX 258: Performed by: INTERNAL MEDICINE

## 2023-10-15 PROCEDURE — 82962 GLUCOSE BLOOD TEST: CPT

## 2023-10-15 PROCEDURE — 94669 MECHANICAL CHEST WALL OSCILL: CPT

## 2023-10-15 PROCEDURE — 6370000000 HC RX 637 (ALT 250 FOR IP): Performed by: INTERNAL MEDICINE

## 2023-10-15 PROCEDURE — 2060000000 HC ICU INTERMEDIATE R&B

## 2023-10-15 PROCEDURE — 94640 AIRWAY INHALATION TREATMENT: CPT

## 2023-10-15 PROCEDURE — 80048 BASIC METABOLIC PNL TOTAL CA: CPT

## 2023-10-15 PROCEDURE — 6370000000 HC RX 637 (ALT 250 FOR IP): Performed by: NURSE PRACTITIONER

## 2023-10-15 PROCEDURE — 6370000000 HC RX 637 (ALT 250 FOR IP)

## 2023-10-15 PROCEDURE — 36415 COLL VENOUS BLD VENIPUNCTURE: CPT

## 2023-10-15 PROCEDURE — 2700000000 HC OXYGEN THERAPY PER DAY

## 2023-10-15 PROCEDURE — 6360000002 HC RX W HCPCS

## 2023-10-15 RX ADMIN — IPRATROPIUM BROMIDE AND ALBUTEROL SULFATE 1 DOSE: .5; 2.5 SOLUTION RESPIRATORY (INHALATION) at 15:28

## 2023-10-15 RX ADMIN — ARFORMOTEROL TARTRATE 15 MCG: 15 SOLUTION RESPIRATORY (INHALATION) at 19:25

## 2023-10-15 RX ADMIN — IPRATROPIUM BROMIDE AND ALBUTEROL SULFATE 1 DOSE: .5; 2.5 SOLUTION RESPIRATORY (INHALATION) at 19:25

## 2023-10-15 RX ADMIN — DULOXETINE HYDROCHLORIDE 30 MG: 30 CAPSULE, DELAYED RELEASE ORAL at 19:57

## 2023-10-15 RX ADMIN — BUDESONIDE INHALATION 1000 MCG: 0.5 SUSPENSION RESPIRATORY (INHALATION) at 19:25

## 2023-10-15 RX ADMIN — CIPROFLOXACIN 500 MG: 500 TABLET, FILM COATED ORAL at 08:23

## 2023-10-15 RX ADMIN — ENOXAPARIN SODIUM 40 MG: 100 INJECTION SUBCUTANEOUS at 14:17

## 2023-10-15 RX ADMIN — IPRATROPIUM BROMIDE AND ALBUTEROL SULFATE 1 DOSE: .5; 2.5 SOLUTION RESPIRATORY (INHALATION) at 12:49

## 2023-10-15 RX ADMIN — GUAIFENESIN 400 MG: 400 TABLET ORAL at 14:17

## 2023-10-15 RX ADMIN — METHYLPREDNISOLONE SODIUM SUCCINATE 40 MG: 40 INJECTION, POWDER, LYOPHILIZED, FOR SOLUTION INTRAMUSCULAR; INTRAVENOUS at 19:58

## 2023-10-15 RX ADMIN — GUAIFENESIN 400 MG: 400 TABLET ORAL at 08:23

## 2023-10-15 RX ADMIN — BUDESONIDE INHALATION 1000 MCG: 0.5 SUSPENSION RESPIRATORY (INHALATION) at 08:52

## 2023-10-15 RX ADMIN — IPRATROPIUM BROMIDE AND ALBUTEROL SULFATE 1 DOSE: .5; 2.5 SOLUTION RESPIRATORY (INHALATION) at 08:52

## 2023-10-15 RX ADMIN — CIPROFLOXACIN 500 MG: 500 TABLET, FILM COATED ORAL at 19:58

## 2023-10-15 RX ADMIN — ARFORMOTEROL TARTRATE 15 MCG: 15 SOLUTION RESPIRATORY (INHALATION) at 08:52

## 2023-10-15 RX ADMIN — METHYLPREDNISOLONE SODIUM SUCCINATE 40 MG: 40 INJECTION, POWDER, LYOPHILIZED, FOR SOLUTION INTRAMUSCULAR; INTRAVENOUS at 08:23

## 2023-10-15 RX ADMIN — LOSARTAN POTASSIUM 100 MG: 50 TABLET, FILM COATED ORAL at 19:57

## 2023-10-15 RX ADMIN — GUAIFENESIN 400 MG: 400 TABLET ORAL at 19:58

## 2023-10-15 RX ADMIN — INSULIN LISPRO 3 UNITS: 100 INJECTION, SOLUTION INTRAVENOUS; SUBCUTANEOUS at 19:59

## 2023-10-15 RX ADMIN — AMLODIPINE BESYLATE 5 MG: 5 TABLET ORAL at 19:58

## 2023-10-15 RX ADMIN — ATORVASTATIN CALCIUM 20 MG: 20 TABLET, FILM COATED ORAL at 19:58

## 2023-10-15 RX ADMIN — SODIUM CHLORIDE, PRESERVATIVE FREE 10 ML: 5 INJECTION INTRAVENOUS at 08:24

## 2023-10-15 RX ADMIN — FERROUS SULFATE TAB 325 MG (65 MG ELEMENTAL FE) 325 MG: 325 (65 FE) TAB at 16:43

## 2023-10-15 RX ADMIN — ASPIRIN 81 MG: 81 TABLET, COATED ORAL at 19:57

## 2023-10-15 RX ADMIN — METOPROLOL SUCCINATE 12.5 MG: 25 TABLET, EXTENDED RELEASE ORAL at 19:58

## 2023-10-15 RX ADMIN — LANSOPRAZOLE 30 MG: 30 TABLET, ORALLY DISINTEGRATING, DELAYED RELEASE ORAL at 08:23

## 2023-10-15 RX ADMIN — SODIUM CHLORIDE, PRESERVATIVE FREE 10 ML: 5 INJECTION INTRAVENOUS at 19:58

## 2023-10-15 ASSESSMENT — PAIN SCALES - GENERAL
PAINLEVEL_OUTOF10: 0

## 2023-10-16 LAB
ANION GAP SERPL CALCULATED.3IONS-SCNC: 8 MMOL/L (ref 7–16)
BUN SERPL-MCNC: 16 MG/DL (ref 6–23)
CALCIUM SERPL-MCNC: 7.9 MG/DL (ref 8.6–10.2)
CHLORIDE SERPL-SCNC: 94 MMOL/L (ref 98–107)
CO2 SERPL-SCNC: 36 MMOL/L (ref 22–29)
CREAT SERPL-MCNC: 0.5 MG/DL (ref 0.5–1)
ERYTHROCYTE [DISTWIDTH] IN BLOOD BY AUTOMATED COUNT: 15.6 % (ref 11.5–15)
GFR SERPL CREATININE-BSD FRML MDRD: >60 ML/MIN/1.73M2
GLUCOSE BLD-MCNC: 118 MG/DL (ref 74–99)
GLUCOSE BLD-MCNC: 148 MG/DL (ref 74–99)
GLUCOSE BLD-MCNC: 174 MG/DL (ref 74–99)
GLUCOSE BLD-MCNC: 190 MG/DL (ref 74–99)
GLUCOSE SERPL-MCNC: 175 MG/DL (ref 74–99)
HCT VFR BLD AUTO: 31.9 % (ref 34–48)
HGB BLD-MCNC: 9.9 G/DL (ref 11.5–15.5)
MCH RBC QN AUTO: 26.8 PG (ref 26–35)
MCHC RBC AUTO-ENTMCNC: 31 G/DL (ref 32–34.5)
MCV RBC AUTO: 86.2 FL (ref 80–99.9)
PLATELET # BLD AUTO: 178 K/UL (ref 130–450)
PMV BLD AUTO: 10.2 FL (ref 7–12)
POTASSIUM SERPL-SCNC: 3.9 MMOL/L (ref 3.5–5)
RBC # BLD AUTO: 3.7 M/UL (ref 3.5–5.5)
SODIUM SERPL-SCNC: 138 MMOL/L (ref 132–146)
WBC OTHER # BLD: 8.7 K/UL (ref 4.5–11.5)

## 2023-10-16 PROCEDURE — 6370000000 HC RX 637 (ALT 250 FOR IP): Performed by: INTERNAL MEDICINE

## 2023-10-16 PROCEDURE — 94669 MECHANICAL CHEST WALL OSCILL: CPT

## 2023-10-16 PROCEDURE — 6370000000 HC RX 637 (ALT 250 FOR IP): Performed by: STUDENT IN AN ORGANIZED HEALTH CARE EDUCATION/TRAINING PROGRAM

## 2023-10-16 PROCEDURE — 6370000000 HC RX 637 (ALT 250 FOR IP): Performed by: NURSE PRACTITIONER

## 2023-10-16 PROCEDURE — 97530 THERAPEUTIC ACTIVITIES: CPT

## 2023-10-16 PROCEDURE — 85027 COMPLETE CBC AUTOMATED: CPT

## 2023-10-16 PROCEDURE — 6360000002 HC RX W HCPCS: Performed by: INTERNAL MEDICINE

## 2023-10-16 PROCEDURE — 6370000000 HC RX 637 (ALT 250 FOR IP): Performed by: REGISTERED NURSE

## 2023-10-16 PROCEDURE — 2700000000 HC OXYGEN THERAPY PER DAY

## 2023-10-16 PROCEDURE — 94640 AIRWAY INHALATION TREATMENT: CPT

## 2023-10-16 PROCEDURE — 2060000000 HC ICU INTERMEDIATE R&B

## 2023-10-16 PROCEDURE — 97535 SELF CARE MNGMENT TRAINING: CPT

## 2023-10-16 PROCEDURE — 82962 GLUCOSE BLOOD TEST: CPT

## 2023-10-16 PROCEDURE — 99232 SBSQ HOSP IP/OBS MODERATE 35: CPT | Performed by: NURSE PRACTITIONER

## 2023-10-16 PROCEDURE — 6360000002 HC RX W HCPCS

## 2023-10-16 PROCEDURE — 80048 BASIC METABOLIC PNL TOTAL CA: CPT

## 2023-10-16 PROCEDURE — 6370000000 HC RX 637 (ALT 250 FOR IP)

## 2023-10-16 PROCEDURE — 36415 COLL VENOUS BLD VENIPUNCTURE: CPT

## 2023-10-16 PROCEDURE — 94660 CPAP INITIATION&MGMT: CPT

## 2023-10-16 RX ADMIN — LANSOPRAZOLE 30 MG: 30 TABLET, ORALLY DISINTEGRATING, DELAYED RELEASE ORAL at 09:17

## 2023-10-16 RX ADMIN — PETROLATUM: 420 OINTMENT TOPICAL at 21:44

## 2023-10-16 RX ADMIN — GUAIFENESIN 400 MG: 400 TABLET ORAL at 21:45

## 2023-10-16 RX ADMIN — ENOXAPARIN SODIUM 40 MG: 100 INJECTION SUBCUTANEOUS at 14:18

## 2023-10-16 RX ADMIN — FERROUS SULFATE TAB 325 MG (65 MG ELEMENTAL FE) 325 MG: 325 (65 FE) TAB at 16:40

## 2023-10-16 RX ADMIN — CIPROFLOXACIN 500 MG: 500 TABLET, FILM COATED ORAL at 21:47

## 2023-10-16 RX ADMIN — AMLODIPINE BESYLATE 5 MG: 5 TABLET ORAL at 21:45

## 2023-10-16 RX ADMIN — PREDNISONE 30 MG: 20 TABLET ORAL at 09:17

## 2023-10-16 RX ADMIN — BUDESONIDE INHALATION 1000 MCG: 0.5 SUSPENSION RESPIRATORY (INHALATION) at 08:28

## 2023-10-16 RX ADMIN — ASPIRIN 81 MG: 81 TABLET, COATED ORAL at 21:45

## 2023-10-16 RX ADMIN — IPRATROPIUM BROMIDE AND ALBUTEROL SULFATE 1 DOSE: .5; 2.5 SOLUTION RESPIRATORY (INHALATION) at 20:46

## 2023-10-16 RX ADMIN — BUDESONIDE INHALATION 1000 MCG: 0.5 SUSPENSION RESPIRATORY (INHALATION) at 20:46

## 2023-10-16 RX ADMIN — ARFORMOTEROL TARTRATE 15 MCG: 15 SOLUTION RESPIRATORY (INHALATION) at 20:46

## 2023-10-16 RX ADMIN — ATORVASTATIN CALCIUM 20 MG: 20 TABLET, FILM COATED ORAL at 21:45

## 2023-10-16 RX ADMIN — GUAIFENESIN 400 MG: 400 TABLET ORAL at 09:17

## 2023-10-16 RX ADMIN — LOSARTAN POTASSIUM 100 MG: 50 TABLET, FILM COATED ORAL at 21:45

## 2023-10-16 RX ADMIN — GUAIFENESIN 400 MG: 400 TABLET ORAL at 14:17

## 2023-10-16 RX ADMIN — DULOXETINE HYDROCHLORIDE 30 MG: 30 CAPSULE, DELAYED RELEASE ORAL at 21:45

## 2023-10-16 RX ADMIN — IPRATROPIUM BROMIDE AND ALBUTEROL SULFATE 1 DOSE: .5; 2.5 SOLUTION RESPIRATORY (INHALATION) at 11:55

## 2023-10-16 RX ADMIN — IPRATROPIUM BROMIDE AND ALBUTEROL SULFATE 1 DOSE: .5; 2.5 SOLUTION RESPIRATORY (INHALATION) at 08:28

## 2023-10-16 RX ADMIN — CIPROFLOXACIN 500 MG: 500 TABLET, FILM COATED ORAL at 09:17

## 2023-10-16 RX ADMIN — METOPROLOL SUCCINATE 12.5 MG: 25 TABLET, EXTENDED RELEASE ORAL at 21:45

## 2023-10-16 RX ADMIN — ARFORMOTEROL TARTRATE 15 MCG: 15 SOLUTION RESPIRATORY (INHALATION) at 08:28

## 2023-10-16 ASSESSMENT — PAIN SCALES - WONG BAKER: WONGBAKER_NUMERICALRESPONSE: 0

## 2023-10-16 ASSESSMENT — PAIN SCALES - GENERAL
PAINLEVEL_OUTOF10: 0
PAINLEVEL_OUTOF10: 0

## 2023-10-16 ASSESSMENT — PAIN DESCRIPTION - DESCRIPTORS: DESCRIPTORS: OTHER (COMMENT)

## 2023-10-16 NOTE — CARE COORDINATION
Social Work / Discharge PLanning : SW followed up with daughter [facility] in regards to SNF choices. Daughter stated she had a lengthy discussion with provider and recommended inpatient rehab. SW explained that this hospital does NOT have inpatient rehab. Discussed SNF and daughter stated that is NOT what I want for my mother . Acute rehab explained as well as criteria/ Dx. Daughter did want SW to reach out to [facility]. REferral made to [facility] from [facility] . AWait acceptance/ denial. Meanwhile, daughter is going to discuss with family alternative plans if needed. SW to follow. Electronically signed by DAKOTA Batista on 10/16/23 at 10:30 AM EDT     Addendum: [facility] can accept and admit tomorrow. Daughter [facility] updated. SW to follow.  Electronically signed by DAKOTA Batista on 10/16/23 at 3:14 PM EDT

## 2023-10-16 NOTE — DISCHARGE INSTR - COC
oxygen at 5 L/min per nasal cannula. Ventilator:    - No ventilator support    Rehab Therapies: Physical Therapy, Occupational Therapy, and Speech/Language Therapy  Weight Bearing Status/Restrictions: No weight bearing restrictions  Other Medical Equipment (for information only, NOT a DME order):  {EQUIPMENT:[id number]}  Other Treatments: ***    Patient's personal belongings (please select all that are sent with patient):  {[facility] DME Belongings:[id number]}    RN SIGNATURE:  Electronically signed by Zhane Chou RN on 10/17/23 at 1:16 PM EDT    CASE MANAGEMENT/SOCIAL WORK SECTION    Inpatient Status Date: ***    Readmission Risk Assessment Score:  Readmission Risk              Risk of Unplanned Readmission:  24           Discharging to Facility/ Agency   Name: [place], [place]  Phone:  Fax:    Dialysis Facility (if applicable)   Name:  Address:  Dialysis Schedule:  Phone:  Fax:    / signature: {Esignature:[id number]}Electronically signed by Martinez Stafford, [place] on 10/16/23 at 3:17 PM EDT     PHYSICIAN SECTION    Prognosis: Fair    Condition at Discharge: Stable    Rehab Potential (if transferring to Rehab): Fair    Recommended Labs or Other Treatments After Discharge: continue chest vest treatments. Wean prednisone 30mg x1 day, 20mg x3 days, 10mg x3 days    Physician Certification: I certify the above information and transfer of Kaylee Schultz  is necessary for the continuing treatment of the diagnosis listed and that she requires Acute Rehab for less 30 days.      Update Admission H&P: No change in H&P    PHYSICIAN SIGNATURE:  Electronically signed by Herbert Mayo MD on 10/17/23 at 11:25 AM EDT

## 2023-10-17 ENCOUNTER — APPOINTMENT (OUTPATIENT)
Dept: GENERAL RADIOLOGY | Age: 78
DRG: 871 | End: 2023-10-17
Payer: MEDICARE

## 2023-10-17 VITALS
RESPIRATION RATE: 20 BRPM | HEART RATE: 78 BPM | DIASTOLIC BLOOD PRESSURE: 64 MMHG | OXYGEN SATURATION: 90 % | SYSTOLIC BLOOD PRESSURE: 138 MMHG | HEIGHT: 66 IN | BODY MASS INDEX: 28.52 KG/M2 | WEIGHT: 177.44 LBS | TEMPERATURE: 97.9 F

## 2023-10-17 LAB
ANION GAP SERPL CALCULATED.3IONS-SCNC: 12 MMOL/L (ref 7–16)
BUN SERPL-MCNC: 14 MG/DL (ref 6–23)
CALCIUM SERPL-MCNC: 8.4 MG/DL (ref 8.6–10.2)
CHLORIDE SERPL-SCNC: 96 MMOL/L (ref 98–107)
CO2 SERPL-SCNC: 33 MMOL/L (ref 22–29)
CREAT SERPL-MCNC: 0.6 MG/DL (ref 0.5–1)
ERYTHROCYTE [DISTWIDTH] IN BLOOD BY AUTOMATED COUNT: 16 % (ref 11.5–15)
GFR SERPL CREATININE-BSD FRML MDRD: >60 ML/MIN/1.73M2
GLUCOSE BLD-MCNC: 111 MG/DL (ref 74–99)
GLUCOSE BLD-MCNC: 93 MG/DL (ref 74–99)
GLUCOSE SERPL-MCNC: 109 MG/DL (ref 74–99)
HCT VFR BLD AUTO: 36.4 % (ref 34–48)
HGB BLD-MCNC: 10.9 G/DL (ref 11.5–15.5)
MCH RBC QN AUTO: 26.5 PG (ref 26–35)
MCHC RBC AUTO-ENTMCNC: 29.9 G/DL (ref 32–34.5)
MCV RBC AUTO: 88.3 FL (ref 80–99.9)
PLATELET # BLD AUTO: 172 K/UL (ref 130–450)
PMV BLD AUTO: 11 FL (ref 7–12)
POTASSIUM SERPL-SCNC: 3.7 MMOL/L (ref 3.5–5)
RBC # BLD AUTO: 4.12 M/UL (ref 3.5–5.5)
SODIUM SERPL-SCNC: 141 MMOL/L (ref 132–146)
WBC OTHER # BLD: 9.1 K/UL (ref 4.5–11.5)

## 2023-10-17 PROCEDURE — 2580000003 HC RX 258: Performed by: INTERNAL MEDICINE

## 2023-10-17 PROCEDURE — 6370000000 HC RX 637 (ALT 250 FOR IP)

## 2023-10-17 PROCEDURE — 94669 MECHANICAL CHEST WALL OSCILL: CPT

## 2023-10-17 PROCEDURE — 6360000002 HC RX W HCPCS: Performed by: INTERNAL MEDICINE

## 2023-10-17 PROCEDURE — 6370000000 HC RX 637 (ALT 250 FOR IP): Performed by: INTERNAL MEDICINE

## 2023-10-17 PROCEDURE — 85027 COMPLETE CBC AUTOMATED: CPT

## 2023-10-17 PROCEDURE — 94660 CPAP INITIATION&MGMT: CPT

## 2023-10-17 PROCEDURE — 80048 BASIC METABOLIC PNL TOTAL CA: CPT

## 2023-10-17 PROCEDURE — 94640 AIRWAY INHALATION TREATMENT: CPT

## 2023-10-17 PROCEDURE — 6370000000 HC RX 637 (ALT 250 FOR IP): Performed by: REGISTERED NURSE

## 2023-10-17 PROCEDURE — 2700000000 HC OXYGEN THERAPY PER DAY

## 2023-10-17 PROCEDURE — 71046 X-RAY EXAM CHEST 2 VIEWS: CPT

## 2023-10-17 PROCEDURE — 82962 GLUCOSE BLOOD TEST: CPT

## 2023-10-17 PROCEDURE — 6370000000 HC RX 637 (ALT 250 FOR IP): Performed by: NURSE PRACTITIONER

## 2023-10-17 PROCEDURE — 36415 COLL VENOUS BLD VENIPUNCTURE: CPT

## 2023-10-17 PROCEDURE — 6360000002 HC RX W HCPCS

## 2023-10-17 RX ORDER — CIPROFLOXACIN 500 MG/1
500 TABLET, FILM COATED ORAL EVERY 12 HOURS SCHEDULED
Qty: 10 TABLET | Refills: 0 | DISCHARGE
Start: 2023-10-17 | End: 2023-10-22

## 2023-10-17 RX ORDER — CHLORHEXIDINE GLUCONATE ORAL RINSE 1.2 MG/ML
15 SOLUTION DENTAL 3 TIMES DAILY
Qty: 1350 ML | Refills: 0 | Status: SHIPPED | OUTPATIENT
Start: 2023-10-17 | End: 2023-11-16

## 2023-10-17 RX ORDER — CHLORHEXIDINE GLUCONATE ORAL RINSE 1.2 MG/ML
15 SOLUTION DENTAL 3 TIMES DAILY
Status: DISCONTINUED | OUTPATIENT
Start: 2023-10-17 | End: 2023-10-17 | Stop reason: HOSPADM

## 2023-10-17 RX ORDER — PREDNISONE 10 MG/1
TABLET ORAL
Qty: 12 TABLET | Refills: 0 | Status: SHIPPED | OUTPATIENT
Start: 2023-10-17 | End: 2023-10-24

## 2023-10-17 RX ADMIN — IPRATROPIUM BROMIDE AND ALBUTEROL SULFATE 1 DOSE: .5; 2.5 SOLUTION RESPIRATORY (INHALATION) at 12:19

## 2023-10-17 RX ADMIN — PREDNISONE 30 MG: 20 TABLET ORAL at 09:58

## 2023-10-17 RX ADMIN — IPRATROPIUM BROMIDE AND ALBUTEROL SULFATE 1 DOSE: .5; 2.5 SOLUTION RESPIRATORY (INHALATION) at 09:33

## 2023-10-17 RX ADMIN — SODIUM CHLORIDE, PRESERVATIVE FREE 10 ML: 5 INJECTION INTRAVENOUS at 09:59

## 2023-10-17 RX ADMIN — PETROLATUM: 420 OINTMENT TOPICAL at 10:05

## 2023-10-17 RX ADMIN — GUAIFENESIN 400 MG: 400 TABLET ORAL at 09:59

## 2023-10-17 RX ADMIN — ARFORMOTEROL TARTRATE 15 MCG: 15 SOLUTION RESPIRATORY (INHALATION) at 09:32

## 2023-10-17 RX ADMIN — LANSOPRAZOLE 30 MG: 30 TABLET, ORALLY DISINTEGRATING, DELAYED RELEASE ORAL at 09:58

## 2023-10-17 RX ADMIN — BUDESONIDE INHALATION 1000 MCG: 0.5 SUSPENSION RESPIRATORY (INHALATION) at 09:32

## 2023-10-17 RX ADMIN — CIPROFLOXACIN 500 MG: 500 TABLET, FILM COATED ORAL at 09:58

## 2023-10-17 NOTE — CARE COORDINATION
Social Work / Discharge PLanning :Patient will be discharged to [facility] today at 2:00 via [facility]. Patient daughter Sherley Odom updated as well as Armando Ramirez from [facility] and charge RN and RN. SW to follow.  Electronically signed by DAKOTA Lowe on 10/17/23 at 10:13 AM EDT

## 2023-10-17 NOTE — PLAN OF CARE
Problem: Safety - Adult  Goal: Free from fall injury  Outcome: Progressing     Problem: Pain  Goal: Verbalizes/displays adequate comfort level or baseline comfort level  Outcome: Progressing  Flowsheets (Taken 10/16/2023 1019 by Verito Voss)  Verbalizes/displays adequate comfort level or baseline comfort level: (clary any pain) --

## 2023-10-17 NOTE — DISCHARGE SUMMARY
3. Patchy airspace disease within the right middle lobe 4. Patchy airspace disease within the left lower lobe posteriorly 5. Right paratracheal, right hilar and subcarinal lymphadenopathy. 6. Advanced emphysematous changes 7. There is no pulmonary embolus 8. The above findings can represent multifocal pneumonia however given the dense consolidation of the medial aspect the right upper lobe and right lower lobe underlying malignancy cannot be excluded. Dedicated bronchoscopy is recommended for further evaluation. XR CHEST PORTABLE    Result Date: 10/7/2023  EXAMINATION: ONE XRAY VIEW OF THE CHEST 10/7/2023 9:45 am COMPARISON: None. HISTORY: ORDERING SYSTEM PROVIDED HISTORY: SOB TECHNOLOGIST PROVIDED HISTORY: Reason for exam:->SOB FINDINGS: The cardiac silhouette is within normal limits. There is a right lower lobe and perihilar infiltrate. There is chronic pleuroparenchymal scarring seen within the right lung apex. There are emphysematous changes. 1. Right perihilar and right lower lobe pneumonia 2. Chronic pleuroparenchymal scarring seen within the right lung apex. 3. Emphysematous changes       Patient Instructions:      Medication List        START taking these medications      ciprofloxacin 500 MG tablet  Commonly known as: CIPRO  Take 1 tablet by mouth every 12 hours for 5 days     predniSONE 10 MG tablet  Commonly known as: DELTASONE  Take 3 tablets by mouth daily for 1 day, THEN 2 tablets daily for 3 days, THEN 1 tablet daily for 3 days.   Start taking on: October 17, 2023            Sharmaine Razo taking these medications      alendronate 70 MG tablet  Commonly known as: FOSAMAX     amLODIPine 5 MG tablet  Commonly known as: NORVASC     aspirin 81 MG tablet     atorvastatin 20 MG tablet  Commonly known as: LIPITOR     budesonide 0.5 MG/2ML nebulizer suspension  Commonly known as: Pulmicort  Take 2 mLs by nebulization 2 times daily     diazePAM 10 MG tablet  Commonly known as: VALIUM     DULoxetine 30 MG

## 2023-10-25 ENCOUNTER — HOSPITAL ENCOUNTER (EMERGENCY)
Age: 78
Discharge: ANOTHER HOSPITAL | End: 2023-10-26
Attending: EMERGENCY MEDICINE
Payer: MEDICARE

## 2023-10-25 ENCOUNTER — APPOINTMENT (OUTPATIENT)
Dept: CT IMAGING | Age: 78
End: 2023-10-25
Payer: MEDICARE

## 2023-10-25 ENCOUNTER — APPOINTMENT (OUTPATIENT)
Dept: GENERAL RADIOLOGY | Age: 78
End: 2023-10-25
Payer: MEDICARE

## 2023-10-25 DIAGNOSIS — J85.1 ABSCESS OF RIGHT LUNG WITH PNEUMONIA, UNSPECIFIED PART OF LUNG (HCC): ICD-10-CM

## 2023-10-25 DIAGNOSIS — J96.21 ACUTE ON CHRONIC RESPIRATORY FAILURE WITH HYPOXIA (HCC): Primary | ICD-10-CM

## 2023-10-25 DIAGNOSIS — J44.1 COPD EXACERBATION (HCC): ICD-10-CM

## 2023-10-25 LAB
ALBUMIN SERPL-MCNC: 3 G/DL (ref 3.5–5.2)
ALLEN TEST: POSITIVE
ALP SERPL-CCNC: 60 U/L (ref 35–104)
ALT SERPL-CCNC: 24 U/L (ref 0–32)
ANION GAP SERPL CALCULATED.3IONS-SCNC: 7 MMOL/L (ref 7–16)
AST SERPL-CCNC: 14 U/L (ref 0–31)
BASOPHILS # BLD: 0 K/UL (ref 0–0.2)
BASOPHILS NFR BLD: 0 % (ref 0–2)
BILIRUB SERPL-MCNC: 0.5 MG/DL (ref 0–1.2)
BNP SERPL-MCNC: 644 PG/ML (ref 0–450)
BUN SERPL-MCNC: 20 MG/DL (ref 6–23)
CALCIUM SERPL-MCNC: 8.9 MG/DL (ref 8.6–10.2)
CHLORIDE SERPL-SCNC: 96 MMOL/L (ref 98–107)
CO2 SERPL-SCNC: 36 MMOL/L (ref 22–29)
CREAT SERPL-MCNC: 0.7 MG/DL (ref 0.5–1)
EKG ATRIAL RATE: 132 BPM
EKG P AXIS: 88 DEGREES
EKG P-R INTERVAL: 128 MS
EKG Q-T INTERVAL: 288 MS
EKG QRS DURATION: 66 MS
EKG QTC CALCULATION (BAZETT): 426 MS
EKG R AXIS: -67 DEGREES
EKG T AXIS: 46 DEGREES
EKG VENTRICULAR RATE: 132 BPM
EOSINOPHIL # BLD: 0 K/UL (ref 0.05–0.5)
EOSINOPHILS RELATIVE PERCENT: 0 % (ref 0–6)
ERYTHROCYTE [DISTWIDTH] IN BLOOD BY AUTOMATED COUNT: 15.7 % (ref 11.5–15)
FLOW RATE: 10
GFR SERPL CREATININE-BSD FRML MDRD: >60 ML/MIN/1.73M2
GLUCOSE SERPL-MCNC: 108 MG/DL (ref 74–99)
HCT VFR BLD AUTO: 34.3 % (ref 34–48)
HGB BLD-MCNC: 10.7 G/DL (ref 11.5–15.5)
LACTATE BLDV-SCNC: 1.9 MMOL/L (ref 0.5–2.2)
LYMPHOCYTES NFR BLD: 0.41 K/UL (ref 1.5–4)
LYMPHOCYTES RELATIVE PERCENT: 9 % (ref 20–42)
MCH RBC QN AUTO: 26.6 PG (ref 26–35)
MCHC RBC AUTO-ENTMCNC: 31.2 G/DL (ref 32–34.5)
MCV RBC AUTO: 85.1 FL (ref 80–99.9)
MONOCYTES NFR BLD: 0.05 K/UL (ref 0.1–0.95)
MONOCYTES NFR BLD: 1 % (ref 2–12)
NEUTROPHILS NFR BLD: 90 % (ref 43–80)
NEUTS SEG NFR BLD: 4.05 K/UL (ref 1.8–7.3)
O2 DELIVERY DEVICE: ABNORMAL
PATIENT TEMP: 37
PLATELET CONFIRMATION: NORMAL
PLATELET, FLUORESCENCE: 151 K/UL (ref 130–450)
PMV BLD AUTO: 10.7 FL (ref 7–12)
POC HCO3: 32.1 MMOL/L (ref 22–26)
POC O2 SATURATION: 96.4 % (ref 92–98.5)
POC PCO2 TEMP: 40.4 MM HG
POC PCO2: 40.4 MM HG (ref 35–45)
POC PH TEMP: 7.51
POC PH: 7.51 (ref 7.35–7.45)
POC PO2 TEMP: 76.7 MM HG
POC PO2: 76.7 MM HG (ref 60–80)
POSITIVE BASE EXCESS, ART: 8.3 MMOL/L (ref 0–3)
POTASSIUM SERPL-SCNC: 4.6 MMOL/L (ref 3.5–5)
PROCALCITONIN SERPL-MCNC: 0.15 NG/ML (ref 0–0.08)
PROT SERPL-MCNC: 6 G/DL (ref 6.4–8.3)
RBC # BLD AUTO: 4.03 M/UL (ref 3.5–5.5)
RBC # BLD: ABNORMAL 10*6/UL
SARS-COV-2 RDRP RESP QL NAA+PROBE: NOT DETECTED
SODIUM SERPL-SCNC: 139 MMOL/L (ref 132–146)
SPECIMEN DESCRIPTION: NORMAL
TROPONIN I SERPL HS-MCNC: 36 NG/L (ref 0–9)
TROPONIN I SERPL HS-MCNC: 40 NG/L (ref 0–9)
WBC OTHER # BLD: 4.5 K/UL (ref 4.5–11.5)

## 2023-10-25 PROCEDURE — 6360000002 HC RX W HCPCS: Performed by: EMERGENCY MEDICINE

## 2023-10-25 PROCEDURE — 84484 ASSAY OF TROPONIN QUANT: CPT

## 2023-10-25 PROCEDURE — 83605 ASSAY OF LACTIC ACID: CPT

## 2023-10-25 PROCEDURE — 96367 TX/PROPH/DG ADDL SEQ IV INF: CPT

## 2023-10-25 PROCEDURE — 93005 ELECTROCARDIOGRAM TRACING: CPT | Performed by: EMERGENCY MEDICINE

## 2023-10-25 PROCEDURE — 99285 EMERGENCY DEPT VISIT HI MDM: CPT

## 2023-10-25 PROCEDURE — 6370000000 HC RX 637 (ALT 250 FOR IP): Performed by: EMERGENCY MEDICINE

## 2023-10-25 PROCEDURE — 87635 SARS-COV-2 COVID-19 AMP PRB: CPT

## 2023-10-25 PROCEDURE — 83880 ASSAY OF NATRIURETIC PEPTIDE: CPT

## 2023-10-25 PROCEDURE — 87040 BLOOD CULTURE FOR BACTERIA: CPT

## 2023-10-25 PROCEDURE — 96375 TX/PRO/DX INJ NEW DRUG ADDON: CPT

## 2023-10-25 PROCEDURE — 96366 THER/PROPH/DIAG IV INF ADDON: CPT

## 2023-10-25 PROCEDURE — 6360000004 HC RX CONTRAST MEDICATION: Performed by: RADIOLOGY

## 2023-10-25 PROCEDURE — 93010 ELECTROCARDIOGRAM REPORT: CPT | Performed by: INTERNAL MEDICINE

## 2023-10-25 PROCEDURE — 80053 COMPREHEN METABOLIC PANEL: CPT

## 2023-10-25 PROCEDURE — 2580000003 HC RX 258: Performed by: EMERGENCY MEDICINE

## 2023-10-25 PROCEDURE — 96365 THER/PROPH/DIAG IV INF INIT: CPT

## 2023-10-25 PROCEDURE — 94664 DEMO&/EVAL PT USE INHALER: CPT

## 2023-10-25 PROCEDURE — 82803 BLOOD GASES ANY COMBINATION: CPT

## 2023-10-25 PROCEDURE — 71045 X-RAY EXAM CHEST 1 VIEW: CPT

## 2023-10-25 PROCEDURE — 84145 PROCALCITONIN (PCT): CPT

## 2023-10-25 PROCEDURE — 71275 CT ANGIOGRAPHY CHEST: CPT

## 2023-10-25 PROCEDURE — 94640 AIRWAY INHALATION TREATMENT: CPT

## 2023-10-25 PROCEDURE — 85025 COMPLETE CBC W/AUTO DIFF WBC: CPT

## 2023-10-25 RX ORDER — DIPHENHYDRAMINE HYDROCHLORIDE 50 MG/ML
25 INJECTION INTRAMUSCULAR; INTRAVENOUS ONCE
Status: COMPLETED | OUTPATIENT
Start: 2023-10-25 | End: 2023-10-25

## 2023-10-25 RX ORDER — IPRATROPIUM BROMIDE AND ALBUTEROL SULFATE 2.5; .5 MG/3ML; MG/3ML
3 SOLUTION RESPIRATORY (INHALATION) ONCE
Status: COMPLETED | OUTPATIENT
Start: 2023-10-25 | End: 2023-10-25

## 2023-10-25 RX ORDER — 0.9 % SODIUM CHLORIDE 0.9 %
1000 INTRAVENOUS SOLUTION INTRAVENOUS ONCE
Status: COMPLETED | OUTPATIENT
Start: 2023-10-25 | End: 2023-10-25

## 2023-10-25 RX ORDER — LEVOFLOXACIN 5 MG/ML
750 INJECTION, SOLUTION INTRAVENOUS ONCE
Status: COMPLETED | OUTPATIENT
Start: 2023-10-25 | End: 2023-10-25

## 2023-10-25 RX ADMIN — METHYLPREDNISOLONE SODIUM SUCCINATE 60 MG: 125 INJECTION, POWDER, FOR SOLUTION INTRAMUSCULAR; INTRAVENOUS at 13:57

## 2023-10-25 RX ADMIN — DIPHENHYDRAMINE HYDROCHLORIDE 25 MG: 50 INJECTION INTRAMUSCULAR; INTRAVENOUS at 15:43

## 2023-10-25 RX ADMIN — VANCOMYCIN HYDROCHLORIDE 1750 MG: 10 INJECTION, POWDER, LYOPHILIZED, FOR SOLUTION INTRAVENOUS at 19:10

## 2023-10-25 RX ADMIN — IPRATROPIUM BROMIDE AND ALBUTEROL SULFATE 3 DOSE: .5; 3 SOLUTION RESPIRATORY (INHALATION) at 14:08

## 2023-10-25 RX ADMIN — LEVOFLOXACIN 750 MG: 5 INJECTION, SOLUTION INTRAVENOUS at 20:57

## 2023-10-25 RX ADMIN — SODIUM CHLORIDE 1000 ML: 9 INJECTION, SOLUTION INTRAVENOUS at 13:57

## 2023-10-25 RX ADMIN — IOPAMIDOL 75 ML: 755 INJECTION, SOLUTION INTRAVENOUS at 17:04

## 2023-10-25 ASSESSMENT — PAIN - FUNCTIONAL ASSESSMENT: PAIN_FUNCTIONAL_ASSESSMENT: NONE - DENIES PAIN

## 2023-10-25 ASSESSMENT — LIFESTYLE VARIABLES
HOW OFTEN DO YOU HAVE A DRINK CONTAINING ALCOHOL: NEVER
HOW MANY STANDARD DRINKS CONTAINING ALCOHOL DO YOU HAVE ON A TYPICAL DAY: PATIENT DOES NOT DRINK

## 2023-10-25 NOTE — CARE COORDINATION
SS Note: No Covid test. Pt was @ [facility] 10/7 - 10/17 for Acute on chronic hypoxic respiratory failure. She was transferred to [facility] & her 02 needs increased and sent here to ED. Pt's spo2 86% on 10L high flow nasal cannula. Pt increased to 15L, spo2 improved to 98%. Pt's Dtr, Valentina Dong, is bedside and requests pt be transferred to [facility] as all pt's doctors- pulmonologist & ID who know her are there. Pt lives in [place] & normally goes there. Only reason she is here is because she was @ [facility]. ELDON notes if pt requires readmission & services can be provided here transfer is not warranted. Nancy Backus still wants to review this with ED physician. Pt is , PCP is Richar Garcia NP and she uses [facility] [address] [place] [address] [address] in [place]. She resides with her  in a ranch home (3-entry steps HR on Right) tub/shower & walk-in shower set up and has home 02 (5 liters) & nebulizer from [address] [place] [address], as well as a chest vest. Pt has used [facility] in the past. Nancy Bhat lives next door and helps pt w/care. ELDON did complete POA w/pt and she chose Nancy Bhat as her agent. ELDON updated Tiffany Crow DO resident of Sherita's request for transfer.    Electronically signed by DAKOTA Quarles on 10/25/2023 at 5:34 PM

## 2023-10-25 NOTE — ACP (ADVANCE CARE PLANNING)
/ Decision Maker regarding differences between Advance Directives and portable DNR orders.     Length of ACP Conversation in minutes:  25    Conversation Outcomes:  ACP discussion completed    Follow-up plan:    [] Schedule follow-up conversation to continue planning  [] Referred individual to Provider for additional questions/concerns   [] Advised patient/agent/surrogate to review completed ACP document and update if needed with changes in condition, patient preferences or care setting    [x] This note routed to one or more involved healthcare providers    Electronically signed by DAKOTA Fairbanks on 10/25/2023 at 5:26 PM

## 2023-10-25 NOTE — ED PROVIDER NOTES
1:40 PM EDT       [MS]   9348 On chart review she was last admitted to the hospital on 10/7/2023 for acute on chronic hypoxic respiratory failure. Her last echo was completed 1/2020 that demonstrated ejection fraction 52%. [MM]   0267 Consulted the radiologist.  Discussed with him that the initial radiologist read this as a smaller abscess than prior study. I did not see any reports of abscess in any of the CT reports or pulmonology consults. The radiologist says that the initial film did not show an abscess it was more of a cavitary lesion versus abscess which is new on today's study. Patient was started on appropriate antibiotics. [MS]   1845 Patient resting comfortably on 10 L by high flow cannula. We will attempt to titrate this down. Discussed with her that she does have a pulmonary abscess. She is agreeable to transfer to [facility]. [MS]   2003 Dr. Lacy Peabody for medicine at [facility] has excepted the patient as a transfer. Currently awaiting bed placement. [MS]   Thu Oct 26, 2023   2037 EKG: This EKG is signed and interpreted by me. Rate: 132  Rhythm: Sinus with PACs  Interpretation: no acute changes, no acute ST elevations, left axis deviation, QTc 426, KS interval 128  Comparison: stable as compared to patient's most recent EKG on 10/13/2023   [MM]      ED Course User Index  [MM] Talon Archer DO  [MS] Kristin Garcia DO     CONSULTS: (Who and What was discussed)  None    FINAL IMPRESSION      1. Acute on chronic respiratory failure with hypoxia (HCC)    2. Abscess of right lung with pneumonia, unspecified part of lung ([address])    3. COPD exacerbation [facility])          DISPOSITION/PLAN     DISPOSITION Decision To Transfer 10/25/2023 08:33:31 PM    (Please note that portions of this note were completed with a voice recognition program.  Efforts were made to edit the dictations but occasionally words are mis-transcribed. )    Talon Archer DO (electronically

## 2023-10-25 NOTE — ED NOTES
Patient spo2 86% on 10L high flow nasal cannula. Patient increased to 15L, spo2 improved to 98%.  Dr. Chio Ross notified     Patricia Milan, RN  10/25/23 4737

## 2023-10-26 ENCOUNTER — HOSPITAL ENCOUNTER (INPATIENT)
Age: 78
LOS: 3 days | Discharge: HOSPICE | DRG: 177 | End: 2023-10-29
Attending: FAMILY MEDICINE | Admitting: FAMILY MEDICINE
Payer: MEDICARE

## 2023-10-26 VITALS
SYSTOLIC BLOOD PRESSURE: 113 MMHG | RESPIRATION RATE: 16 BRPM | DIASTOLIC BLOOD PRESSURE: 58 MMHG | WEIGHT: 177 LBS | BODY MASS INDEX: 28.45 KG/M2 | OXYGEN SATURATION: 99 % | HEIGHT: 66 IN | HEART RATE: 73 BPM | TEMPERATURE: 98.9 F

## 2023-10-26 DIAGNOSIS — Z51.5 HOSPICE CARE PATIENT: ICD-10-CM

## 2023-10-26 DIAGNOSIS — J44.9 END STAGE COPD (HCC): Primary | ICD-10-CM

## 2023-10-26 PROBLEM — J85.1 ABSCESS OF LOWER LOBE OF RIGHT LUNG WITH PNEUMONIA (HCC): Status: ACTIVE | Noted: 2023-10-26

## 2023-10-26 PROBLEM — J85.2 LUNG ABSCESS WITHOUT PNEUMONIA (HCC): Status: ACTIVE | Noted: 2023-10-26

## 2023-10-26 PROCEDURE — 2060000000 HC ICU INTERMEDIATE R&B

## 2023-10-26 PROCEDURE — 6370000000 HC RX 637 (ALT 250 FOR IP): Performed by: INTERNAL MEDICINE

## 2023-10-26 PROCEDURE — 2580000003 HC RX 258: Performed by: INTERNAL MEDICINE

## 2023-10-26 PROCEDURE — XW03396 INTRODUCTION OF CEFTOLOZANE/TAZOBACTAM ANTI-INFECTIVE INTO PERIPHERAL VEIN, PERCUTANEOUS APPROACH, NEW TECHNOLOGY GROUP 6: ICD-10-PCS | Performed by: INTERNAL MEDICINE

## 2023-10-26 PROCEDURE — 6360000002 HC RX W HCPCS: Performed by: INTERNAL MEDICINE

## 2023-10-26 PROCEDURE — 94640 AIRWAY INHALATION TREATMENT: CPT

## 2023-10-26 RX ORDER — ACETAMINOPHEN 650 MG/1
650 SUPPOSITORY RECTAL EVERY 6 HOURS PRN
Status: DISCONTINUED | OUTPATIENT
Start: 2023-10-26 | End: 2023-10-29 | Stop reason: HOSPADM

## 2023-10-26 RX ORDER — CHLORHEXIDINE GLUCONATE ORAL RINSE 1.2 MG/ML
15 SOLUTION DENTAL 3 TIMES DAILY
Status: DISCONTINUED | OUTPATIENT
Start: 2023-10-26 | End: 2023-10-29 | Stop reason: HOSPADM

## 2023-10-26 RX ORDER — DULOXETIN HYDROCHLORIDE 30 MG/1
30 CAPSULE, DELAYED RELEASE ORAL NIGHTLY
Status: DISCONTINUED | OUTPATIENT
Start: 2023-10-26 | End: 2023-10-29 | Stop reason: HOSPADM

## 2023-10-26 RX ORDER — ONDANSETRON 4 MG/1
4 TABLET, ORALLY DISINTEGRATING ORAL EVERY 8 HOURS PRN
Status: DISCONTINUED | OUTPATIENT
Start: 2023-10-26 | End: 2023-10-29 | Stop reason: HOSPADM

## 2023-10-26 RX ORDER — CIPROFLOXACIN 2 MG/ML
400 INJECTION, SOLUTION INTRAVENOUS EVERY 12 HOURS
Status: DISCONTINUED | OUTPATIENT
Start: 2023-10-26 | End: 2023-10-26

## 2023-10-26 RX ORDER — HYDROCHLOROTHIAZIDE 25 MG/1
12.5 TABLET ORAL NIGHTLY
Status: DISCONTINUED | OUTPATIENT
Start: 2023-10-26 | End: 2023-10-29 | Stop reason: HOSPADM

## 2023-10-26 RX ORDER — PANTOPRAZOLE SODIUM 40 MG/1
40 TABLET, DELAYED RELEASE ORAL
Status: DISCONTINUED | OUTPATIENT
Start: 2023-10-27 | End: 2023-10-29 | Stop reason: HOSPADM

## 2023-10-26 RX ORDER — GUAIFENESIN/DEXTROMETHORPHAN 100-10MG/5
5 SYRUP ORAL EVERY 4 HOURS PRN
Status: DISCONTINUED | OUTPATIENT
Start: 2023-10-26 | End: 2023-10-29 | Stop reason: HOSPADM

## 2023-10-26 RX ORDER — VIT C/E/ZN/COPPR/LUTEIN/ZEAXAN 60 MG-6 MG
1 CAPSULE ORAL 2 TIMES DAILY
Status: DISCONTINUED | OUTPATIENT
Start: 2023-10-26 | End: 2023-10-29 | Stop reason: HOSPADM

## 2023-10-26 RX ORDER — BUDESONIDE 0.5 MG/2ML
500 INHALANT ORAL 2 TIMES DAILY
Status: DISCONTINUED | OUTPATIENT
Start: 2023-10-26 | End: 2023-10-29 | Stop reason: HOSPADM

## 2023-10-26 RX ORDER — ONDANSETRON 2 MG/ML
4 INJECTION INTRAMUSCULAR; INTRAVENOUS EVERY 6 HOURS PRN
Status: DISCONTINUED | OUTPATIENT
Start: 2023-10-26 | End: 2023-10-29 | Stop reason: HOSPADM

## 2023-10-26 RX ORDER — CIPROFLOXACIN 500 MG/1
500 TABLET, FILM COATED ORAL EVERY 8 HOURS SCHEDULED
Status: DISCONTINUED | OUTPATIENT
Start: 2023-10-26 | End: 2023-10-26

## 2023-10-26 RX ORDER — MV-MIN/FA/VIT K/LUTEIN/ZEAXANT 200MCG-5MG
1 CAPSULE ORAL 2 TIMES DAILY
Status: DISCONTINUED | OUTPATIENT
Start: 2023-10-26 | End: 2023-10-26 | Stop reason: CLARIF

## 2023-10-26 RX ORDER — ACETAMINOPHEN 325 MG/1
650 TABLET ORAL EVERY 6 HOURS PRN
Status: DISCONTINUED | OUTPATIENT
Start: 2023-10-26 | End: 2023-10-29 | Stop reason: HOSPADM

## 2023-10-26 RX ORDER — FERROUS SULFATE 325(65) MG
325 TABLET ORAL NIGHTLY
Status: DISCONTINUED | OUTPATIENT
Start: 2023-10-26 | End: 2023-10-29 | Stop reason: HOSPADM

## 2023-10-26 RX ORDER — ENOXAPARIN SODIUM 100 MG/ML
40 INJECTION SUBCUTANEOUS DAILY
Status: DISCONTINUED | OUTPATIENT
Start: 2023-10-26 | End: 2023-10-29 | Stop reason: HOSPADM

## 2023-10-26 RX ORDER — ARFORMOTEROL TARTRATE 15 UG/2ML
15 SOLUTION RESPIRATORY (INHALATION)
Status: DISCONTINUED | OUTPATIENT
Start: 2023-10-26 | End: 2023-10-29 | Stop reason: HOSPADM

## 2023-10-26 RX ORDER — IPRATROPIUM BROMIDE AND ALBUTEROL SULFATE 2.5; .5 MG/3ML; MG/3ML
1 SOLUTION RESPIRATORY (INHALATION)
Status: DISCONTINUED | OUTPATIENT
Start: 2023-10-26 | End: 2023-10-29 | Stop reason: HOSPADM

## 2023-10-26 RX ORDER — SODIUM CHLORIDE 0.9 % (FLUSH) 0.9 %
5-40 SYRINGE (ML) INJECTION PRN
Status: DISCONTINUED | OUTPATIENT
Start: 2023-10-26 | End: 2023-10-29 | Stop reason: HOSPADM

## 2023-10-26 RX ORDER — LOSARTAN POTASSIUM 50 MG/1
100 TABLET ORAL NIGHTLY
Status: DISCONTINUED | OUTPATIENT
Start: 2023-10-26 | End: 2023-10-29 | Stop reason: HOSPADM

## 2023-10-26 RX ORDER — SODIUM CHLORIDE 9 MG/ML
INJECTION, SOLUTION INTRAVENOUS PRN
Status: DISCONTINUED | OUTPATIENT
Start: 2023-10-26 | End: 2023-10-29 | Stop reason: HOSPADM

## 2023-10-26 RX ORDER — AMLODIPINE BESYLATE 5 MG/1
5 TABLET ORAL NIGHTLY
Status: DISCONTINUED | OUTPATIENT
Start: 2023-10-26 | End: 2023-10-29 | Stop reason: HOSPADM

## 2023-10-26 RX ORDER — METOPROLOL SUCCINATE 25 MG/1
12.5 TABLET, EXTENDED RELEASE ORAL NIGHTLY
Status: DISCONTINUED | OUTPATIENT
Start: 2023-10-26 | End: 2023-10-29 | Stop reason: HOSPADM

## 2023-10-26 RX ORDER — POLYETHYLENE GLYCOL 3350 17 G/17G
17 POWDER, FOR SOLUTION ORAL DAILY PRN
Status: DISCONTINUED | OUTPATIENT
Start: 2023-10-26 | End: 2023-10-29 | Stop reason: HOSPADM

## 2023-10-26 RX ORDER — ATORVASTATIN CALCIUM 20 MG/1
20 TABLET, FILM COATED ORAL NIGHTLY
Status: DISCONTINUED | OUTPATIENT
Start: 2023-10-26 | End: 2023-10-29 | Stop reason: HOSPADM

## 2023-10-26 RX ORDER — LOSARTAN POTASSIUM AND HYDROCHLOROTHIAZIDE 12.5; 1 MG/1; MG/1
1 TABLET ORAL NIGHTLY
Status: DISCONTINUED | OUTPATIENT
Start: 2023-10-26 | End: 2023-10-26 | Stop reason: CLARIF

## 2023-10-26 RX ORDER — SODIUM CHLORIDE 0.9 % (FLUSH) 0.9 %
5-40 SYRINGE (ML) INJECTION EVERY 12 HOURS SCHEDULED
Status: DISCONTINUED | OUTPATIENT
Start: 2023-10-26 | End: 2023-10-29 | Stop reason: HOSPADM

## 2023-10-26 RX ADMIN — IPRATROPIUM BROMIDE AND ALBUTEROL SULFATE 1 DOSE: .5; 2.5 SOLUTION RESPIRATORY (INHALATION) at 20:39

## 2023-10-26 RX ADMIN — CIPROFLOXACIN HYDROCHLORIDE 750 MG: 500 TABLET, FILM COATED ORAL at 21:27

## 2023-10-26 RX ADMIN — Medication 1 CAPSULE: at 12:49

## 2023-10-26 RX ADMIN — FERROUS SULFATE TAB 325 MG (65 MG ELEMENTAL FE) 325 MG: 325 (65 FE) TAB at 21:25

## 2023-10-26 RX ADMIN — IPRATROPIUM BROMIDE AND ALBUTEROL SULFATE 1 DOSE: .5; 2.5 SOLUTION RESPIRATORY (INHALATION) at 15:46

## 2023-10-26 RX ADMIN — CIPROFLOXACIN 400 MG: 400 INJECTION, SOLUTION INTRAVENOUS at 12:49

## 2023-10-26 RX ADMIN — CEFTOLOZANE AND TAZOBACTAM 3000 MG: 1; .5 INJECTION, POWDER, LYOPHILIZED, FOR SOLUTION INTRAVENOUS at 17:11

## 2023-10-26 RX ADMIN — BUDESONIDE INHALATION 500 MCG: 0.5 SUSPENSION RESPIRATORY (INHALATION) at 20:39

## 2023-10-26 RX ADMIN — AMLODIPINE BESYLATE 5 MG: 5 TABLET ORAL at 21:25

## 2023-10-26 RX ADMIN — BUDESONIDE INHALATION 500 MCG: 0.5 SUSPENSION RESPIRATORY (INHALATION) at 11:51

## 2023-10-26 RX ADMIN — 0.12% CHLORHEXIDINE GLUCONATE 15 ML: 1.2 RINSE ORAL at 21:37

## 2023-10-26 RX ADMIN — ARFORMOTEROL TARTRATE 15 MCG: 15 SOLUTION RESPIRATORY (INHALATION) at 20:39

## 2023-10-26 RX ADMIN — LOSARTAN POTASSIUM 100 MG: 50 TABLET, FILM COATED ORAL at 21:25

## 2023-10-26 RX ADMIN — HYDROCHLOROTHIAZIDE 12.5 MG: 25 TABLET ORAL at 21:25

## 2023-10-26 RX ADMIN — ATORVASTATIN CALCIUM 20 MG: 20 TABLET, FILM COATED ORAL at 21:25

## 2023-10-26 RX ADMIN — ARFORMOTEROL TARTRATE 15 MCG: 15 SOLUTION RESPIRATORY (INHALATION) at 11:50

## 2023-10-26 RX ADMIN — DULOXETINE HYDROCHLORIDE 30 MG: 30 CAPSULE, DELAYED RELEASE ORAL at 21:26

## 2023-10-26 RX ADMIN — 0.12% CHLORHEXIDINE GLUCONATE 15 ML: 1.2 RINSE ORAL at 17:08

## 2023-10-26 RX ADMIN — METOPROLOL SUCCINATE 12.5 MG: 25 TABLET, EXTENDED RELEASE ORAL at 21:26

## 2023-10-26 RX ADMIN — SODIUM CHLORIDE, PRESERVATIVE FREE 10 ML: 5 INJECTION INTRAVENOUS at 11:30

## 2023-10-26 RX ADMIN — Medication 1 CAPSULE: at 21:27

## 2023-10-26 RX ADMIN — ENOXAPARIN SODIUM 40 MG: 100 INJECTION SUBCUTANEOUS at 12:31

## 2023-10-26 RX ADMIN — IPRATROPIUM BROMIDE AND ALBUTEROL SULFATE 1 DOSE: .5; 2.5 SOLUTION RESPIRATORY (INHALATION) at 11:49

## 2023-10-26 RX ADMIN — SODIUM CHLORIDE, PRESERVATIVE FREE 10 ML: 5 INJECTION INTRAVENOUS at 21:28

## 2023-10-26 ASSESSMENT — PAIN SCALES - GENERAL
PAINLEVEL_OUTOF10: 0
PAINLEVEL_OUTOF10: 0

## 2023-10-26 ASSESSMENT — PAIN - FUNCTIONAL ASSESSMENT: PAIN_FUNCTIONAL_ASSESSMENT: NONE - DENIES PAIN

## 2023-10-26 NOTE — H&P
History and Physical      CHIEF COMPLAINT:  cough shortness of breath      HISTORY OF PRESENT ILLNESS:      The patient is a 77 y.o. female patient of Sasha Stokes, APRN hx chr resp failure 5L O2 NC sepsis just discharged after admission for pneumonia who presents with cough. Presented to [facility] show snoring with worsening cough trouble breathing. Patient was just discharged from [facility] acute rehab after admission for pneumonia. Fevers chills no vomiting diarrhea + cough nonproductive. Laboratory evaluation reveals mildly elevated troponin at 36 elevated bicarb at 36 hemoglobin 10.7 white count normal at 4.5. CT scan shows pulmonary abscess 3.7 x 3.4 x 2.1 with bronchiectasis patchy pneumonia in the right lower and upper lobe advanced emphysematous changes no PE. Pulmonary abscess is reported to be improved compared to previous evaluation. Patient is transferred to [facility] for further evaluation and treatment. Discussed with daughter who notes that hospice has been a consideration but patient and family been hopeful for improvement. CODE STATUS is limited code with no intubation okay with meds. Patient states her primary goal is to go home and not be in the hospital or nursing home. Patient and daughter are agreeable to hospice consult.     Past Medical History:    Past Medical History:   Diagnosis Date    COPD (chronic obstructive pulmonary disease) ([address])     Hyperlipidemia 9/14/2022    Hypertension     Migraines     MRSA (methicillin resistant staph aureus) culture positive     Pneumonia     Ulcer        Past Surgical History:    Past Surgical History:   Procedure Laterality Date    BREAST LUMPECTOMY Right     BRONCHOSCOPY N/A 10/10/2023    BRONCHOSCOPY DIAGNOSTIC OR CELL [address] ONLY performed by Virginia Mclain MD at [facility] LINE INSERTION NURSE  8/31/2021         [place] DX LUNGS/PERICAR/MED/PLEURAL SPACE W/O BX Left 9/12/2018    LEFT VIDEO ASSISTED No

## 2023-10-26 NOTE — CONSULTS
opacity/consolidation upper part of right lower lobe with more noticeable cavitary lesion superior right lower lobe segment compared to previous    IMPRESSION/RECOMMENDATIONS:      Pneumonia with cavitation  COPD  hypoxia    Cont with antibiotics as per ID  Cont with brovana/pulmicort bid and duonebs q4WA, observe cough/sputum production  Chest vest tid, observe cough/sputum production  Cont with oxygen, taper as tolerated  OOB to chair, PT/OT      Time at the bedside, reviewing labs and radiographs, reviewing notes and consultations, discussing with staff and family was more than 55 minutes. Thanks for letting us see this patient in consultation. Please contact us with any questions. Office [phone] or after hours through [employer], x [phone].
FEW GRAM POSITIVE COCCI IN CHAINS Abnormal     Culture PSEUDOMONAS AERUGINOSA HEAVY GROWTH Abnormal      NORMAL RESPIRATORY KATIE REDUCED    Resulting Agency  - [facility]        Susceptibility    Pseudomonas aeruginosa (1)    Antibiotic Interpretation Microscan Method Status    cefepime Resistant 48 BACTERIAL SUSCEPTIBILITY PANEL BY E-TEST  Final    Ceftolozane/Tazobactam (Zerbaxa) Sensitive 4 BACTERIAL SUSCEPTIBILITY PANEL PEREZ Final    gentamicin Sensitive 2 BACTERIAL SUSCEPTIBILITY PANEL PEREZ Final    levofloxacin Sensitive 2 BACTERIAL SUSCEPTIBILITY PANEL PEREZ Final    meropenem Intermediate 4 BACTERIAL SUSCEPTIBILITY PANEL PEREZ Final    piperacillin-tazobactam Resistant >=128 BACTERIAL SUSCEPTIBILITY PANEL PEREZ Final    tobramycin Sensitive <=1 BACTERIAL SUSCEPTIBILITY PANEL PEREZ Final                 Radiology :    CT chest -    IMPRESSION:  1. Slight improved aeration of the right lower lobe when compared with the  patient's prior study of 10/07/2023 consistent with resolving dense pneumonia  2. Abscess cavity within the right lower lobe measuring approximate 3.7 x 3.4  x 2.1 cm. Overall the abscess cavity is slightly smaller when compared with  the prior study  3. Patchy pneumonia seen throughout the right lower lobe and right upper lobe. 4. Patchy pneumonia within the left lower lobe  5. Bronchiectasis  6. Advanced emphysematous changes  7. There is no pulmonary embolus. 8. Reactive lymphadenopathy within the right paratracheal region.       IMPRESSION:     Right lung abscess , pneumonia, COPD, Respirator failure     RECOMMENDATIONS:       Zerbaxa 3 grams IV q 8 hrs, Ciprofloxacin 500 mg po q 8 hrs for now   Hospice consult     Thank you Dr Leyda Ghosh for the consult

## 2023-10-27 LAB
ALBUMIN SERPL-MCNC: 2.7 G/DL (ref 3.5–5.2)
ALP SERPL-CCNC: 50 U/L (ref 35–104)
ALT SERPL-CCNC: 30 U/L (ref 0–32)
ANION GAP SERPL CALCULATED.3IONS-SCNC: 12 MMOL/L (ref 7–16)
AST SERPL-CCNC: 17 U/L (ref 0–31)
BASOPHILS # BLD: 0.02 K/UL (ref 0–0.2)
BASOPHILS NFR BLD: 1 % (ref 0–2)
BILIRUB SERPL-MCNC: 0.3 MG/DL (ref 0–1.2)
BUN SERPL-MCNC: 15 MG/DL (ref 6–23)
CALCIUM SERPL-MCNC: 8.1 MG/DL (ref 8.6–10.2)
CHLORIDE SERPL-SCNC: 101 MMOL/L (ref 98–107)
CO2 SERPL-SCNC: 30 MMOL/L (ref 22–29)
CREAT SERPL-MCNC: 0.7 MG/DL (ref 0.5–1)
EOSINOPHIL # BLD: 0.03 K/UL (ref 0.05–0.5)
EOSINOPHILS RELATIVE PERCENT: 1 % (ref 0–6)
ERYTHROCYTE [DISTWIDTH] IN BLOOD BY AUTOMATED COUNT: 15.7 % (ref 11.5–15)
GFR SERPL CREATININE-BSD FRML MDRD: >60 ML/MIN/1.73M2
GLUCOSE SERPL-MCNC: 113 MG/DL (ref 74–99)
HCT VFR BLD AUTO: 29.5 % (ref 34–48)
HGB BLD-MCNC: 8.9 G/DL (ref 11.5–15.5)
IMM GRANULOCYTES # BLD AUTO: <0.03 K/UL (ref 0–0.58)
IMM GRANULOCYTES NFR BLD: 1 % (ref 0–5)
LYMPHOCYTES NFR BLD: 0.75 K/UL (ref 1.5–4)
LYMPHOCYTES RELATIVE PERCENT: 23 % (ref 20–42)
MAGNESIUM SERPL-MCNC: 1.6 MG/DL (ref 1.6–2.6)
MCH RBC QN AUTO: 26.2 PG (ref 26–35)
MCHC RBC AUTO-ENTMCNC: 30.2 G/DL (ref 32–34.5)
MCV RBC AUTO: 86.8 FL (ref 80–99.9)
MONOCYTES NFR BLD: 0.21 K/UL (ref 0.1–0.95)
MONOCYTES NFR BLD: 7 % (ref 2–12)
NEUTROPHILS NFR BLD: 68 % (ref 43–80)
NEUTS SEG NFR BLD: 2.17 K/UL (ref 1.8–7.3)
PLATELET # BLD AUTO: 148 K/UL (ref 130–450)
PMV BLD AUTO: 10.3 FL (ref 7–12)
POTASSIUM SERPL-SCNC: 3.4 MMOL/L (ref 3.5–5)
PROCALCITONIN SERPL-MCNC: 0.1 NG/ML (ref 0–0.08)
PROT SERPL-MCNC: 5.5 G/DL (ref 6.4–8.3)
RBC # BLD AUTO: 3.4 M/UL (ref 3.5–5.5)
RBC # BLD: ABNORMAL 10*6/UL
SODIUM SERPL-SCNC: 143 MMOL/L (ref 132–146)
WBC OTHER # BLD: 3.2 K/UL (ref 4.5–11.5)

## 2023-10-27 PROCEDURE — 85025 COMPLETE CBC W/AUTO DIFF WBC: CPT

## 2023-10-27 PROCEDURE — 83735 ASSAY OF MAGNESIUM: CPT

## 2023-10-27 PROCEDURE — 6370000000 HC RX 637 (ALT 250 FOR IP): Performed by: INTERNAL MEDICINE

## 2023-10-27 PROCEDURE — 94640 AIRWAY INHALATION TREATMENT: CPT

## 2023-10-27 PROCEDURE — 6360000002 HC RX W HCPCS: Performed by: INTERNAL MEDICINE

## 2023-10-27 PROCEDURE — 6370000000 HC RX 637 (ALT 250 FOR IP): Performed by: STUDENT IN AN ORGANIZED HEALTH CARE EDUCATION/TRAINING PROGRAM

## 2023-10-27 PROCEDURE — 2580000003 HC RX 258: Performed by: INTERNAL MEDICINE

## 2023-10-27 PROCEDURE — 80053 COMPREHEN METABOLIC PANEL: CPT

## 2023-10-27 PROCEDURE — 2060000000 HC ICU INTERMEDIATE R&B

## 2023-10-27 PROCEDURE — 36415 COLL VENOUS BLD VENIPUNCTURE: CPT

## 2023-10-27 PROCEDURE — 84145 PROCALCITONIN (PCT): CPT

## 2023-10-27 RX ORDER — POTASSIUM CHLORIDE 20 MEQ/1
40 TABLET, EXTENDED RELEASE ORAL ONCE
Status: COMPLETED | OUTPATIENT
Start: 2023-10-27 | End: 2023-10-27

## 2023-10-27 RX ADMIN — CEFTOLOZANE AND TAZOBACTAM 3000 MG: 1; .5 INJECTION, POWDER, LYOPHILIZED, FOR SOLUTION INTRAVENOUS at 11:02

## 2023-10-27 RX ADMIN — IPRATROPIUM BROMIDE AND ALBUTEROL SULFATE 1 DOSE: .5; 2.5 SOLUTION RESPIRATORY (INHALATION) at 06:35

## 2023-10-27 RX ADMIN — POTASSIUM CHLORIDE 40 MEQ: 1500 TABLET, EXTENDED RELEASE ORAL at 14:28

## 2023-10-27 RX ADMIN — Medication 1 CAPSULE: at 21:01

## 2023-10-27 RX ADMIN — IPRATROPIUM BROMIDE AND ALBUTEROL SULFATE 1 DOSE: .5; 2.5 SOLUTION RESPIRATORY (INHALATION) at 16:02

## 2023-10-27 RX ADMIN — ARFORMOTEROL TARTRATE 15 MCG: 15 SOLUTION RESPIRATORY (INHALATION) at 19:44

## 2023-10-27 RX ADMIN — CEFTOLOZANE AND TAZOBACTAM 3000 MG: 1; .5 INJECTION, POWDER, LYOPHILIZED, FOR SOLUTION INTRAVENOUS at 00:44

## 2023-10-27 RX ADMIN — ARFORMOTEROL TARTRATE 15 MCG: 15 SOLUTION RESPIRATORY (INHALATION) at 06:35

## 2023-10-27 RX ADMIN — DULOXETINE HYDROCHLORIDE 30 MG: 30 CAPSULE, DELAYED RELEASE ORAL at 21:02

## 2023-10-27 RX ADMIN — IPRATROPIUM BROMIDE AND ALBUTEROL SULFATE 1 DOSE: .5; 2.5 SOLUTION RESPIRATORY (INHALATION) at 19:44

## 2023-10-27 RX ADMIN — ATORVASTATIN CALCIUM 20 MG: 20 TABLET, FILM COATED ORAL at 21:01

## 2023-10-27 RX ADMIN — BUDESONIDE INHALATION 500 MCG: 0.5 SUSPENSION RESPIRATORY (INHALATION) at 06:35

## 2023-10-27 RX ADMIN — SODIUM CHLORIDE, PRESERVATIVE FREE 10 ML: 5 INJECTION INTRAVENOUS at 09:10

## 2023-10-27 RX ADMIN — SODIUM CHLORIDE, PRESERVATIVE FREE 10 ML: 5 INJECTION INTRAVENOUS at 21:01

## 2023-10-27 RX ADMIN — ENOXAPARIN SODIUM 40 MG: 100 INJECTION SUBCUTANEOUS at 09:08

## 2023-10-27 RX ADMIN — Medication 1 CAPSULE: at 09:10

## 2023-10-27 RX ADMIN — AMLODIPINE BESYLATE 5 MG: 5 TABLET ORAL at 21:01

## 2023-10-27 RX ADMIN — 0.12% CHLORHEXIDINE GLUCONATE 15 ML: 1.2 RINSE ORAL at 21:01

## 2023-10-27 RX ADMIN — CIPROFLOXACIN HYDROCHLORIDE 750 MG: 500 TABLET, FILM COATED ORAL at 09:09

## 2023-10-27 RX ADMIN — 0.12% CHLORHEXIDINE GLUCONATE 15 ML: 1.2 RINSE ORAL at 09:09

## 2023-10-27 RX ADMIN — CIPROFLOXACIN HYDROCHLORIDE 750 MG: 500 TABLET, FILM COATED ORAL at 21:01

## 2023-10-27 RX ADMIN — FERROUS SULFATE TAB 325 MG (65 MG ELEMENTAL FE) 325 MG: 325 (65 FE) TAB at 21:01

## 2023-10-27 RX ADMIN — METOPROLOL SUCCINATE 12.5 MG: 25 TABLET, EXTENDED RELEASE ORAL at 21:02

## 2023-10-27 RX ADMIN — BUDESONIDE INHALATION 500 MCG: 0.5 SUSPENSION RESPIRATORY (INHALATION) at 19:44

## 2023-10-27 RX ADMIN — IPRATROPIUM BROMIDE AND ALBUTEROL SULFATE 1 DOSE: .5; 2.5 SOLUTION RESPIRATORY (INHALATION) at 12:42

## 2023-10-27 RX ADMIN — HYDROCHLOROTHIAZIDE 12.5 MG: 25 TABLET ORAL at 21:02

## 2023-10-27 RX ADMIN — 0.12% CHLORHEXIDINE GLUCONATE 15 ML: 1.2 RINSE ORAL at 14:29

## 2023-10-27 RX ADMIN — PANTOPRAZOLE SODIUM 40 MG: 40 TABLET, DELAYED RELEASE ORAL at 05:43

## 2023-10-27 ASSESSMENT — PAIN SCALES - GENERAL
PAINLEVEL_OUTOF10: 0
PAINLEVEL_OUTOF10: 0

## 2023-10-27 NOTE — CARE COORDINATION
Hospice order noted, met with pt to discuss discharge planning/transition of care. Pt from  [facility], would like to go home with hospice but wants daughter at bedside to discuss. Called Ian Church daughter, she will be in later today and would like HOV. Daughter explained that pt was just at [id number] [employer] and  hospice was brought up and pt wasn't ready. Pt went to [facility], and was readmitted for pneumonia. Pt now ready to discuss hospice. Referral to Michelle Kohli to arrange meeting with family and pt. Per pt, she has O2 throuogh [employer] and nebulizer. Roger Burger, MSW, LSW

## 2023-10-28 LAB
ALBUMIN SERPL-MCNC: 2.7 G/DL (ref 3.5–5.2)
ALP SERPL-CCNC: 55 U/L (ref 35–104)
ALT SERPL-CCNC: 26 U/L (ref 0–32)
ANION GAP SERPL CALCULATED.3IONS-SCNC: 7 MMOL/L (ref 7–16)
AST SERPL-CCNC: 10 U/L (ref 0–31)
BASOPHILS # BLD: 0.01 K/UL (ref 0–0.2)
BASOPHILS NFR BLD: 0 % (ref 0–2)
BILIRUB SERPL-MCNC: 0.3 MG/DL (ref 0–1.2)
BUN SERPL-MCNC: 12 MG/DL (ref 6–23)
CALCIUM SERPL-MCNC: 8.1 MG/DL (ref 8.6–10.2)
CHLORIDE SERPL-SCNC: 99 MMOL/L (ref 98–107)
CO2 SERPL-SCNC: 33 MMOL/L (ref 22–29)
CREAT SERPL-MCNC: 0.6 MG/DL (ref 0.5–1)
EOSINOPHIL # BLD: 0.04 K/UL (ref 0.05–0.5)
EOSINOPHILS RELATIVE PERCENT: 1 % (ref 0–6)
ERYTHROCYTE [DISTWIDTH] IN BLOOD BY AUTOMATED COUNT: 15.8 % (ref 11.5–15)
GFR SERPL CREATININE-BSD FRML MDRD: >60 ML/MIN/1.73M2
GLUCOSE SERPL-MCNC: 118 MG/DL (ref 74–99)
HCT VFR BLD AUTO: 29.1 % (ref 34–48)
HGB BLD-MCNC: 9.1 G/DL (ref 11.5–15.5)
IMM GRANULOCYTES # BLD AUTO: 0.03 K/UL (ref 0–0.58)
IMM GRANULOCYTES NFR BLD: 1 % (ref 0–5)
LYMPHOCYTES NFR BLD: 1.11 K/UL (ref 1.5–4)
LYMPHOCYTES RELATIVE PERCENT: 29 % (ref 20–42)
MCH RBC QN AUTO: 26.6 PG (ref 26–35)
MCHC RBC AUTO-ENTMCNC: 31.3 G/DL (ref 32–34.5)
MCV RBC AUTO: 85.1 FL (ref 80–99.9)
MONOCYTES NFR BLD: 0.22 K/UL (ref 0.1–0.95)
MONOCYTES NFR BLD: 6 % (ref 2–12)
NEUTROPHILS NFR BLD: 63 % (ref 43–80)
NEUTS SEG NFR BLD: 2.41 K/UL (ref 1.8–7.3)
PLATELET # BLD AUTO: 155 K/UL (ref 130–450)
PMV BLD AUTO: 9.7 FL (ref 7–12)
POTASSIUM SERPL-SCNC: 3.8 MMOL/L (ref 3.5–5)
PROCALCITONIN SERPL-MCNC: 0.13 NG/ML (ref 0–0.08)
PROT SERPL-MCNC: 5.5 G/DL (ref 6.4–8.3)
RBC # BLD AUTO: 3.42 M/UL (ref 3.5–5.5)
RBC # BLD: ABNORMAL 10*6/UL
SODIUM SERPL-SCNC: 139 MMOL/L (ref 132–146)
WBC OTHER # BLD: 3.8 K/UL (ref 4.5–11.5)

## 2023-10-28 PROCEDURE — 6370000000 HC RX 637 (ALT 250 FOR IP): Performed by: INTERNAL MEDICINE

## 2023-10-28 PROCEDURE — 2580000003 HC RX 258: Performed by: INTERNAL MEDICINE

## 2023-10-28 PROCEDURE — 6370000000 HC RX 637 (ALT 250 FOR IP): Performed by: STUDENT IN AN ORGANIZED HEALTH CARE EDUCATION/TRAINING PROGRAM

## 2023-10-28 PROCEDURE — 6360000002 HC RX W HCPCS: Performed by: INTERNAL MEDICINE

## 2023-10-28 PROCEDURE — 36415 COLL VENOUS BLD VENIPUNCTURE: CPT

## 2023-10-28 PROCEDURE — 80053 COMPREHEN METABOLIC PANEL: CPT

## 2023-10-28 PROCEDURE — 85025 COMPLETE CBC W/AUTO DIFF WBC: CPT

## 2023-10-28 PROCEDURE — 2060000000 HC ICU INTERMEDIATE R&B

## 2023-10-28 PROCEDURE — 84145 PROCALCITONIN (PCT): CPT

## 2023-10-28 PROCEDURE — 94640 AIRWAY INHALATION TREATMENT: CPT

## 2023-10-28 PROCEDURE — 2700000000 HC OXYGEN THERAPY PER DAY

## 2023-10-28 RX ORDER — GLYCOPYRROLATE 1 MG/1
2 TABLET ORAL EVERY 8 HOURS PRN
Qty: 18 TABLET | Refills: 0 | Status: SHIPPED | OUTPATIENT
Start: 2023-10-28 | End: 2023-10-31

## 2023-10-28 RX ORDER — LORAZEPAM 1 MG/1
1 TABLET ORAL EVERY 6 HOURS PRN
Qty: 12 TABLET | Refills: 0 | Status: SHIPPED | OUTPATIENT
Start: 2023-10-28 | End: 2023-10-31

## 2023-10-28 RX ORDER — TRAMADOL HYDROCHLORIDE 50 MG/1
50 TABLET ORAL EVERY 6 HOURS PRN
Status: DISCONTINUED | OUTPATIENT
Start: 2023-10-28 | End: 2023-10-29 | Stop reason: HOSPADM

## 2023-10-28 RX ORDER — PHENAZOPYRIDINE HYDROCHLORIDE 100 MG/1
200 TABLET, FILM COATED ORAL
Status: DISCONTINUED | OUTPATIENT
Start: 2023-10-28 | End: 2023-10-29 | Stop reason: HOSPADM

## 2023-10-28 RX ORDER — MORPHINE SULFATE 100 MG/5ML
5 SOLUTION ORAL EVERY 4 HOURS PRN
Qty: 15 ML | Refills: 0 | Status: SHIPPED | OUTPATIENT
Start: 2023-10-28 | End: 2023-11-07

## 2023-10-28 RX ADMIN — IPRATROPIUM BROMIDE AND ALBUTEROL SULFATE 1 DOSE: .5; 2.5 SOLUTION RESPIRATORY (INHALATION) at 06:16

## 2023-10-28 RX ADMIN — SODIUM CHLORIDE, PRESERVATIVE FREE 10 ML: 5 INJECTION INTRAVENOUS at 11:53

## 2023-10-28 RX ADMIN — ARFORMOTEROL TARTRATE 15 MCG: 15 SOLUTION RESPIRATORY (INHALATION) at 21:04

## 2023-10-28 RX ADMIN — BUDESONIDE INHALATION 500 MCG: 0.5 SUSPENSION RESPIRATORY (INHALATION) at 21:04

## 2023-10-28 RX ADMIN — DULOXETINE HYDROCHLORIDE 30 MG: 30 CAPSULE, DELAYED RELEASE ORAL at 20:26

## 2023-10-28 RX ADMIN — BUDESONIDE INHALATION 500 MCG: 0.5 SUSPENSION RESPIRATORY (INHALATION) at 06:16

## 2023-10-28 RX ADMIN — 0.12% CHLORHEXIDINE GLUCONATE 15 ML: 1.2 RINSE ORAL at 20:25

## 2023-10-28 RX ADMIN — CIPROFLOXACIN HYDROCHLORIDE 750 MG: 500 TABLET, FILM COATED ORAL at 20:30

## 2023-10-28 RX ADMIN — IPRATROPIUM BROMIDE AND ALBUTEROL SULFATE 1 DOSE: .5; 2.5 SOLUTION RESPIRATORY (INHALATION) at 21:04

## 2023-10-28 RX ADMIN — SODIUM CHLORIDE, PRESERVATIVE FREE 10 ML: 5 INJECTION INTRAVENOUS at 20:29

## 2023-10-28 RX ADMIN — AMLODIPINE BESYLATE 5 MG: 5 TABLET ORAL at 20:26

## 2023-10-28 RX ADMIN — METOPROLOL SUCCINATE 12.5 MG: 25 TABLET, EXTENDED RELEASE ORAL at 20:26

## 2023-10-28 RX ADMIN — CIPROFLOXACIN HYDROCHLORIDE 750 MG: 500 TABLET, FILM COATED ORAL at 08:58

## 2023-10-28 RX ADMIN — ARFORMOTEROL TARTRATE 15 MCG: 15 SOLUTION RESPIRATORY (INHALATION) at 06:16

## 2023-10-28 RX ADMIN — HYDROCHLOROTHIAZIDE 12.5 MG: 25 TABLET ORAL at 20:26

## 2023-10-28 RX ADMIN — CEFTOLOZANE AND TAZOBACTAM 3000 MG: 1; .5 INJECTION, POWDER, LYOPHILIZED, FOR SOLUTION INTRAVENOUS at 11:52

## 2023-10-28 RX ADMIN — TRAMADOL HYDROCHLORIDE 50 MG: 50 TABLET ORAL at 13:24

## 2023-10-28 RX ADMIN — Medication 1 CAPSULE: at 20:30

## 2023-10-28 RX ADMIN — 0.12% CHLORHEXIDINE GLUCONATE 15 ML: 1.2 RINSE ORAL at 13:26

## 2023-10-28 RX ADMIN — FERROUS SULFATE TAB 325 MG (65 MG ELEMENTAL FE) 325 MG: 325 (65 FE) TAB at 20:26

## 2023-10-28 RX ADMIN — PHENAZOPYRIDINE 200 MG: 100 TABLET ORAL at 13:24

## 2023-10-28 RX ADMIN — Medication 1 CAPSULE: at 08:58

## 2023-10-28 RX ADMIN — IPRATROPIUM BROMIDE AND ALBUTEROL SULFATE 1 DOSE: .5; 2.5 SOLUTION RESPIRATORY (INHALATION) at 11:30

## 2023-10-28 RX ADMIN — IPRATROPIUM BROMIDE AND ALBUTEROL SULFATE 1 DOSE: .5; 2.5 SOLUTION RESPIRATORY (INHALATION) at 16:56

## 2023-10-28 RX ADMIN — ATORVASTATIN CALCIUM 20 MG: 20 TABLET, FILM COATED ORAL at 20:26

## 2023-10-28 RX ADMIN — ENOXAPARIN SODIUM 40 MG: 100 INJECTION SUBCUTANEOUS at 08:57

## 2023-10-28 RX ADMIN — PHENAZOPYRIDINE 200 MG: 100 TABLET ORAL at 17:17

## 2023-10-28 RX ADMIN — PANTOPRAZOLE SODIUM 40 MG: 40 TABLET, DELAYED RELEASE ORAL at 08:57

## 2023-10-28 RX ADMIN — 0.12% CHLORHEXIDINE GLUCONATE 15 ML: 1.2 RINSE ORAL at 08:58

## 2023-10-28 ASSESSMENT — PAIN SCALES - GENERAL: PAINLEVEL_OUTOF10: 10

## 2023-10-28 ASSESSMENT — PAIN DESCRIPTION - LOCATION: LOCATION: OTHER (COMMENT)

## 2023-10-29 VITALS
TEMPERATURE: 98.6 F | BODY MASS INDEX: 28.45 KG/M2 | SYSTOLIC BLOOD PRESSURE: 123 MMHG | OXYGEN SATURATION: 92 % | HEIGHT: 66 IN | WEIGHT: 177 LBS | RESPIRATION RATE: 16 BRPM | DIASTOLIC BLOOD PRESSURE: 60 MMHG | HEART RATE: 97 BPM

## 2023-10-29 LAB
ALBUMIN SERPL-MCNC: 2.8 G/DL (ref 3.5–5.2)
ALP SERPL-CCNC: 67 U/L (ref 35–104)
ALT SERPL-CCNC: 23 U/L (ref 0–32)
ANION GAP SERPL CALCULATED.3IONS-SCNC: 9 MMOL/L (ref 7–16)
AST SERPL-CCNC: 11 U/L (ref 0–31)
BASOPHILS # BLD: 0.03 K/UL (ref 0–0.2)
BASOPHILS NFR BLD: 1 % (ref 0–2)
BILIRUB SERPL-MCNC: 0.4 MG/DL (ref 0–1.2)
BUN SERPL-MCNC: 11 MG/DL (ref 6–23)
CALCIUM SERPL-MCNC: 8.3 MG/DL (ref 8.6–10.2)
CHLORIDE SERPL-SCNC: 100 MMOL/L (ref 98–107)
CO2 SERPL-SCNC: 32 MMOL/L (ref 22–29)
CREAT SERPL-MCNC: 0.7 MG/DL (ref 0.5–1)
EOSINOPHIL # BLD: 0.09 K/UL (ref 0.05–0.5)
EOSINOPHILS RELATIVE PERCENT: 2 % (ref 0–6)
ERYTHROCYTE [DISTWIDTH] IN BLOOD BY AUTOMATED COUNT: 15.5 % (ref 11.5–15)
GFR SERPL CREATININE-BSD FRML MDRD: >60 ML/MIN/1.73M2
GLUCOSE SERPL-MCNC: 133 MG/DL (ref 74–99)
HCT VFR BLD AUTO: 32.4 % (ref 34–48)
HGB BLD-MCNC: 9.9 G/DL (ref 11.5–15.5)
IMM GRANULOCYTES # BLD AUTO: 0.15 K/UL (ref 0–0.58)
IMM GRANULOCYTES NFR BLD: 3 % (ref 0–5)
LYMPHOCYTES NFR BLD: 1.06 K/UL (ref 1.5–4)
LYMPHOCYTES RELATIVE PERCENT: 22 % (ref 20–42)
MCH RBC QN AUTO: 26.3 PG (ref 26–35)
MCHC RBC AUTO-ENTMCNC: 30.6 G/DL (ref 32–34.5)
MCV RBC AUTO: 86.2 FL (ref 80–99.9)
MICROORGANISM SPEC CULT: NORMAL
MICROORGANISM SPEC CULT: NORMAL
MONOCYTES NFR BLD: 0.28 K/UL (ref 0.1–0.95)
MONOCYTES NFR BLD: 6 % (ref 2–12)
NEUTROPHILS NFR BLD: 66 % (ref 43–80)
NEUTS SEG NFR BLD: 3.15 K/UL (ref 1.8–7.3)
PLATELET # BLD AUTO: 166 K/UL (ref 130–450)
PMV BLD AUTO: 10 FL (ref 7–12)
POTASSIUM SERPL-SCNC: 3.6 MMOL/L (ref 3.5–5)
PROCALCITONIN SERPL-MCNC: 0.12 NG/ML (ref 0–0.08)
PROT SERPL-MCNC: 5.9 G/DL (ref 6.4–8.3)
RBC # BLD AUTO: 3.76 M/UL (ref 3.5–5.5)
SERVICE CMNT-IMP: NORMAL
SERVICE CMNT-IMP: NORMAL
SODIUM SERPL-SCNC: 141 MMOL/L (ref 132–146)
SPECIMEN DESCRIPTION: NORMAL
SPECIMEN DESCRIPTION: NORMAL
WBC OTHER # BLD: 4.8 K/UL (ref 4.5–11.5)

## 2023-10-29 PROCEDURE — 80053 COMPREHEN METABOLIC PANEL: CPT

## 2023-10-29 PROCEDURE — 6370000000 HC RX 637 (ALT 250 FOR IP): Performed by: INTERNAL MEDICINE

## 2023-10-29 PROCEDURE — 85025 COMPLETE CBC W/AUTO DIFF WBC: CPT

## 2023-10-29 PROCEDURE — 94640 AIRWAY INHALATION TREATMENT: CPT

## 2023-10-29 PROCEDURE — 2580000003 HC RX 258: Performed by: INTERNAL MEDICINE

## 2023-10-29 PROCEDURE — 36415 COLL VENOUS BLD VENIPUNCTURE: CPT

## 2023-10-29 PROCEDURE — 6360000002 HC RX W HCPCS: Performed by: INTERNAL MEDICINE

## 2023-10-29 PROCEDURE — 6370000000 HC RX 637 (ALT 250 FOR IP): Performed by: STUDENT IN AN ORGANIZED HEALTH CARE EDUCATION/TRAINING PROGRAM

## 2023-10-29 PROCEDURE — 2700000000 HC OXYGEN THERAPY PER DAY

## 2023-10-29 PROCEDURE — 51701 INSERT BLADDER CATHETER: CPT

## 2023-10-29 PROCEDURE — 94669 MECHANICAL CHEST WALL OSCILL: CPT

## 2023-10-29 PROCEDURE — 84145 PROCALCITONIN (PCT): CPT

## 2023-10-29 RX ORDER — CIPROFLOXACIN 750 MG/1
750 TABLET, FILM COATED ORAL EVERY 12 HOURS SCHEDULED
Qty: 23 TABLET | Refills: 0 | Status: SHIPPED | OUTPATIENT
Start: 2023-10-29 | End: 2023-11-10

## 2023-10-29 RX ORDER — POLYETHYLENE GLYCOL 3350 17 G/17G
17 POWDER, FOR SOLUTION ORAL DAILY PRN
Qty: 527 G | Refills: 0 | Status: SHIPPED | OUTPATIENT
Start: 2023-10-29 | End: 2023-11-28

## 2023-10-29 RX ORDER — GUAIFENESIN/DEXTROMETHORPHAN 100-10MG/5
5 SYRUP ORAL EVERY 4 HOURS PRN
Qty: 120 ML | Refills: 0 | Status: SHIPPED | OUTPATIENT
Start: 2023-10-29 | End: 2023-11-08

## 2023-10-29 RX ORDER — PHENAZOPYRIDINE HYDROCHLORIDE 200 MG/1
200 TABLET, FILM COATED ORAL
Qty: 9 TABLET | Refills: 0 | Status: SHIPPED | OUTPATIENT
Start: 2023-10-29 | End: 2023-11-01

## 2023-10-29 RX ADMIN — IPRATROPIUM BROMIDE AND ALBUTEROL SULFATE 1 DOSE: .5; 2.5 SOLUTION RESPIRATORY (INHALATION) at 08:47

## 2023-10-29 RX ADMIN — CIPROFLOXACIN HYDROCHLORIDE 750 MG: 500 TABLET, FILM COATED ORAL at 09:14

## 2023-10-29 RX ADMIN — SODIUM CHLORIDE, PRESERVATIVE FREE 10 ML: 5 INJECTION INTRAVENOUS at 09:16

## 2023-10-29 RX ADMIN — PANTOPRAZOLE SODIUM 40 MG: 40 TABLET, DELAYED RELEASE ORAL at 09:18

## 2023-10-29 RX ADMIN — BUDESONIDE INHALATION 500 MCG: 0.5 SUSPENSION RESPIRATORY (INHALATION) at 08:47

## 2023-10-29 RX ADMIN — 0.12% CHLORHEXIDINE GLUCONATE 15 ML: 1.2 RINSE ORAL at 09:18

## 2023-10-29 RX ADMIN — ENOXAPARIN SODIUM 40 MG: 100 INJECTION SUBCUTANEOUS at 09:14

## 2023-10-29 RX ADMIN — PHENAZOPYRIDINE 200 MG: 100 TABLET ORAL at 09:13

## 2023-10-29 RX ADMIN — ARFORMOTEROL TARTRATE 15 MCG: 15 SOLUTION RESPIRATORY (INHALATION) at 08:47

## 2023-10-29 RX ADMIN — Medication 1 CAPSULE: at 09:13

## 2023-11-30 DIAGNOSIS — F41.9 ANXIETY: ICD-10-CM

## 2023-11-30 DIAGNOSIS — Z51.5 HOSPICE CARE PATIENT: Primary | ICD-10-CM

## 2023-11-30 RX ORDER — LORAZEPAM 0.5 MG/1
0.5 TABLET ORAL EVERY 6 HOURS PRN
Qty: 30 TABLET | Refills: 3 | Status: SHIPPED | OUTPATIENT
Start: 2023-11-30 | End: 2023-12-30

## 2023-12-03 DIAGNOSIS — R09.89 AIR HUNGER: ICD-10-CM

## 2023-12-03 DIAGNOSIS — R52 PAIN: ICD-10-CM

## 2023-12-03 DIAGNOSIS — Z51.5 HOSPICE CARE PATIENT: Primary | ICD-10-CM

## 2023-12-03 RX ORDER — MORPHINE SULFATE 100 MG/5ML
5 SOLUTION ORAL EVERY 4 HOURS PRN
Qty: 30 ML | Refills: 0 | Status: SHIPPED | OUTPATIENT
Start: 2023-12-03 | End: 2024-01-02

## 2023-12-14 DIAGNOSIS — Z51.5 HOSPICE CARE PATIENT: ICD-10-CM

## 2023-12-14 DIAGNOSIS — R52 PAIN: ICD-10-CM

## 2023-12-14 RX ORDER — MORPHINE SULFATE 100 MG/5ML
5 SOLUTION ORAL EVERY 4 HOURS PRN
Qty: 30 ML | Refills: 0 | Status: SHIPPED | OUTPATIENT
Start: 2023-12-14 | End: 2024-01-13

## (undated) DEVICE — SPONGE LAP W18XL18IN WHT COT 4 PLY FLD STRUNG RADPQ DISP ST

## (undated) DEVICE — Z INACTIVE USE 2641837 CLIP LIG M BLU TI HRT SHP WIRE HORZ 600 PER BX

## (undated) DEVICE — DRESSING TRNSPAR W4XL55IN ACRYL SUP FLM W ADH WTRPRF OPSITE

## (undated) DEVICE — SCISSORS ENDOSCP DIA5MM CRV MPLR CAUT W/ RATCH HNDL

## (undated) DEVICE — DOUBLE BASIN SET: Brand: MEDLINE INDUSTRIES, INC.

## (undated) DEVICE — Z DISCONTINUED NO SUB IDED MASK RESP UNIV N95 4 PANEL HD STRP INDIVIDUALLY WRP LF

## (undated) DEVICE — INTENDED FOR TISSUE SEPARATION, AND OTHER PROCEDURES THAT REQUIRE A SHARP SURGICAL BLADE TO PUNCTURE OR CUT.: Brand: BARD-PARKER ® STAINLESS STEEL BLADES

## (undated) DEVICE — 3M™ STERI-DRAPE™ INCISE DRAPE 1050 (60CM X 45CM): Brand: STERI-DRAPE™

## (undated) DEVICE — GOWN,SIRUS,FABRNF,XL,20/CS: Brand: MEDLINE

## (undated) DEVICE — KIT,ANTI FOG,W/SPONGE & FLUID,SOFT PACK: Brand: MEDLINE

## (undated) DEVICE — PACK PROCEDURE SURG GEN CUST

## (undated) DEVICE — RELOAD STPL H4.1X2MM DIA60MM THCK TISS GRN 6 ROW PWR GST B

## (undated) DEVICE — TAPE ADH W1INXL10YD PLAS TRNSPAR H2O RESIST HYPOALRG CURAD

## (undated) DEVICE — SET THORACIC I

## (undated) DEVICE — TROCAR ENDOSCP L80MM DIA10/12MM THOR RIG SL DISP FLXPATH

## (undated) DEVICE — SOLUTION IV IRRIG 500ML 0.9% SODIUM CHL 2F7123

## (undated) DEVICE — CHLORAPREP 26ML ORANGE

## (undated) DEVICE — SKIN AFFIX SURG ADHESIVE 72/CS 0.55ML: Brand: MEDLINE

## (undated) DEVICE — STAPLER INT L34CM 60MM LNG ENDOSCP ARTC PWR + ECHELON FLX

## (undated) DEVICE — SOLUTION IV IRRIG POUR BRL 0.9% SODIUM CHL 2F7124

## (undated) DEVICE — SPONGE,DRAIN,NONWVN,4"X4",6PLY,STRL,LF: Brand: MEDLINE

## (undated) DEVICE — STRIP,CLOSURE,WOUND,MEDI-STRIP,1/2X4: Brand: MEDLINE

## (undated) DEVICE — TAPE ADH CLTH SILK H2O REPELLENT CURAD

## (undated) DEVICE — PACK,UNIV, II AURORA: Brand: MEDLINE

## (undated) DEVICE — CATHETER THORACENTESIS STR 28 FRX23 IN 6 EYELET TAPR TIP LF

## (undated) DEVICE — GAUZE,SPONGE,4"X4",8PLY,STRL,LF,10/TRAY: Brand: MEDLINE

## (undated) DEVICE — SURGICAL PROCEDURE PACK BRONCH

## (undated) DEVICE — TOWEL,OR,DSP,ST,BLUE,STD,6/PK,12PK/CS: Brand: MEDLINE

## (undated) DEVICE — SUTURE D SPEC DISPOSABLE/SNGLE USE D9600

## (undated) DEVICE — BLOWER PWD 3GM FOR STERITALC TALCAIR NOVATECH

## (undated) DEVICE — SWABSTICK SURG PREP BENZOIN TINCTURE SINGLE ST

## (undated) DEVICE — MAGNETIC INSTR DRAPE 20X16: Brand: MEDLINE INDUSTRIES, INC.

## (undated) DEVICE — TAPE ADH W3INXL10YD WHT COT WVN BK POWERFUL RUB BASE HIGHLY

## (undated) DEVICE — TUBING, SUCTION, 3/16" X 12', STRAIGHT: Brand: MEDLINE

## (undated) DEVICE — SPONGE,DISSECTOR,K,XRAY,9/16"X1/4",STRL: Brand: MEDLINE

## (undated) DEVICE — KIT SURG W7XL11IN 2 PKT UNTREATED NA

## (undated) DEVICE — CLIP INT SM TI EZ LD LIG SYS WECK HORZ

## (undated) DEVICE — TAPE ADH W1INXL10YD WHT PAPR GENTLE BRTH FLX COMFORTABLE

## (undated) DEVICE — SET SURG INSTR ART III

## (undated) DEVICE — TRAP SPEC MUCUS FOR SUCT

## (undated) DEVICE — ABSORBENT, WATERPROOF, BACTERIA PROOF FILM DRESSING: Brand: OPSITE POST OP 15.5X8.5CM CTN 20

## (undated) DEVICE — DRAIN SURG SGL COLL PT TB FOR ATS BG OASIS

## (undated) DEVICE — CLIP INT M L GRN TI TRNSVRS GRV CHEVRON SHP W/ PRECIS TIP

## (undated) DEVICE — MARKER,SKIN,WI/RULER AND LABELS: Brand: MEDLINE

## (undated) DEVICE — PATIENT RETURN ELECTRODE, SINGLE-USE, CONTACT QUALITY MONITORING, ADULT, WITH 9FT CORD, FOR PATIENTS WEIGING OVER 33LBS. (15KG): Brand: MEGADYNE

## (undated) DEVICE — SYRINGE IRRIG 60ML SFT PLIABLE BLB EZ TO GRP 1 HND USE W/

## (undated) DEVICE — YANKAUER,OPEN TIP,W/O VENT,STERILE: Brand: MEDLINE INDUSTRIES, INC.

## (undated) DEVICE — ABSORBENT, WATERPROOF, BACTERIA PROOF FILM DRESSING: Brand: OPSITE POST OP 20X10CM CTN 20

## (undated) DEVICE — TOTAL TRAY, 16FR 10ML SIL FOLEY, URN: Brand: MEDLINE

## (undated) DEVICE — SET INSTR ART 1

## (undated) DEVICE — 4-PORT MANIFOLD: Brand: NEPTUNE 2